# Patient Record
Sex: FEMALE | Race: WHITE | NOT HISPANIC OR LATINO | Employment: OTHER | URBAN - METROPOLITAN AREA
[De-identification: names, ages, dates, MRNs, and addresses within clinical notes are randomized per-mention and may not be internally consistent; named-entity substitution may affect disease eponyms.]

---

## 2017-03-07 ENCOUNTER — HISTORICAL (OUTPATIENT)
Dept: ADMINISTRATIVE | Facility: HOSPITAL | Age: 62
End: 2017-03-07

## 2017-03-07 LAB
ALBUMIN SERPL-MCNC: 4.2 G/DL (ref 3.1–4.7)
ALP SERPL-CCNC: 64 IU/L (ref 40–104)
ALT (SGPT): 22 IU/L (ref 3–33)
AST SERPL-CCNC: 31 IU/L (ref 10–40)
BASOPHILS NFR BLD: 0.1 K/UL (ref 0–0.2)
BASOPHILS NFR BLD: 0.6 %
BILIRUB SERPL-MCNC: 0.5 MG/DL (ref 0.3–1)
BUN SERPL-MCNC: 16 MG/DL (ref 8–20)
CALCIUM SERPL-MCNC: 9.4 MG/DL (ref 7.7–10.4)
CHLORIDE: 102 MMOL/L (ref 98–110)
CO2 SERPL-SCNC: 23.6 MMOL/L (ref 22.8–31.6)
CREATININE: 0.63 MG/DL (ref 0.6–1.4)
CRP SERPL-MCNC: 0.11 MG/DL (ref 0–1.4)
EOSINOPHIL NFR BLD: 0.4 K/UL (ref 0–0.7)
EOSINOPHIL NFR BLD: 4.4 %
ERYTHROCYTE [DISTWIDTH] IN BLOOD BY AUTOMATED COUNT: 13 % (ref 11.7–14.9)
GLUCOSE: 136 MG/DL (ref 70–99)
GRAN #: 4.8 K/UL (ref 1.4–6.5)
GRAN%: 52.1 %
HCT VFR BLD AUTO: 36.8 % (ref 36–48)
HGB BLD-MCNC: 12 G/DL (ref 12–15)
IMMATURE GRANS (ABS): 0 K/UL (ref 0–1)
IMMATURE GRANULOCYTES: 0.3 %
LYMPH #: 3.1 K/UL (ref 1.2–3.4)
LYMPH%: 33.3 %
MCH RBC QN AUTO: 35 PG (ref 25–35)
MCHC RBC AUTO-ENTMCNC: 32.6 G/DL (ref 31–36)
MCV RBC AUTO: 107.3 FL (ref 79–98)
MONO #: 0.9 K/UL (ref 0.1–0.6)
MONO%: 9.3 %
NUCLEATED RBCS: 0 %
PLATELET # BLD AUTO: 426 K/UL (ref 140–440)
PMV BLD AUTO: 10.1 FL (ref 8.8–12.7)
POTASSIUM SERPL-SCNC: 4.1 MMOL/L (ref 3.5–5)
PROT SERPL-MCNC: 7.5 G/DL (ref 6–8.2)
RBC # BLD AUTO: 3.43 M/UL (ref 3.5–5.5)
SODIUM: 141 MMOL/L (ref 134–144)
WBC # BLD AUTO: 9.3 K/UL (ref 5–10)

## 2017-06-15 RX ORDER — IBANDRONATE SODIUM 150 MG/1
TABLET, FILM COATED ORAL
Qty: 1 TABLET | Refills: 5 | Status: SHIPPED | OUTPATIENT
Start: 2017-06-15 | End: 2017-12-06 | Stop reason: SDUPTHER

## 2017-06-15 RX ORDER — TEMAZEPAM 30 MG/1
CAPSULE ORAL
Qty: 30 CAPSULE | Refills: 5 | Status: SHIPPED | OUTPATIENT
Start: 2017-06-15 | End: 2017-12-06 | Stop reason: SDUPTHER

## 2017-12-06 ENCOUNTER — OFFICE VISIT (OUTPATIENT)
Dept: RHEUMATOLOGY | Facility: CLINIC | Age: 62
End: 2017-12-06
Payer: MEDICARE

## 2017-12-06 VITALS
HEIGHT: 60 IN | BODY MASS INDEX: 20.62 KG/M2 | WEIGHT: 105 LBS | SYSTOLIC BLOOD PRESSURE: 150 MMHG | DIASTOLIC BLOOD PRESSURE: 78 MMHG

## 2017-12-06 DIAGNOSIS — M05.79 RHEUMATOID ARTHRITIS INVOLVING MULTIPLE SITES WITH POSITIVE RHEUMATOID FACTOR: Primary | ICD-10-CM

## 2017-12-06 DIAGNOSIS — F43.20 ADULT SITUATIONAL STRESS DISORDER: ICD-10-CM

## 2017-12-06 DIAGNOSIS — M79.7 FIBROMYALGIA: ICD-10-CM

## 2017-12-06 DIAGNOSIS — Z79.899 ENCOUNTER FOR LONG-TERM (CURRENT) DRUG USE: ICD-10-CM

## 2017-12-06 LAB
ALBUMIN SERPL-MCNC: 4.6 G/DL (ref 3.1–4.7)
ALP SERPL-CCNC: 58 IU/L (ref 40–104)
ALT (SGPT): 21 IU/L (ref 3–33)
AST SERPL-CCNC: 35 IU/L (ref 10–40)
BASOPHILS NFR BLD: 0.1 K/UL (ref 0–0.2)
BASOPHILS NFR BLD: 0.6 %
BILIRUB SERPL-MCNC: 0.6 MG/DL (ref 0.3–1)
BUN SERPL-MCNC: 14 MG/DL (ref 8–20)
CALCIUM SERPL-MCNC: 9.6 MG/DL (ref 7.7–10.4)
CHLORIDE: 103 MMOL/L (ref 98–110)
CO2 SERPL-SCNC: 26.4 MMOL/L (ref 22.8–31.6)
CREATININE: 0.62 MG/DL (ref 0.6–1.4)
CRP SERPL-MCNC: 0.14 MG/DL (ref 0–1.4)
EOSINOPHIL NFR BLD: 0.3 K/UL (ref 0–0.7)
EOSINOPHIL NFR BLD: 3 %
ERYTHROCYTE [DISTWIDTH] IN BLOOD BY AUTOMATED COUNT: 13.2 % (ref 11.7–14.9)
GLUCOSE: 110 MG/DL (ref 70–99)
GRAN #: 4.3 K/UL (ref 1.4–6.5)
GRAN%: 49.9 %
HCT VFR BLD AUTO: 38 % (ref 36–48)
HGB BLD-MCNC: 12.6 G/DL (ref 12–15)
IMMATURE GRANS (ABS): 0 K/UL (ref 0–1)
IMMATURE GRANULOCYTES: 0.2 %
LYMPH #: 3.3 K/UL (ref 1.2–3.4)
LYMPH%: 38.3 %
MCH RBC QN AUTO: 34.7 PG (ref 25–35)
MCHC RBC AUTO-ENTMCNC: 33.2 G/DL (ref 31–36)
MCV RBC AUTO: 104.7 FL (ref 79–98)
MONO #: 0.7 K/UL (ref 0.1–0.6)
MONO%: 8 %
NUCLEATED RBCS: 0 %
PLATELET # BLD AUTO: 369 K/UL (ref 140–440)
PMV BLD AUTO: 9.5 FL (ref 8.8–12.7)
POTASSIUM SERPL-SCNC: 4.1 MMOL/L (ref 3.5–5)
PROT SERPL-MCNC: 7.8 G/DL (ref 6–8.2)
RBC # BLD AUTO: 3.63 M/UL (ref 3.5–5.5)
SODIUM: 138 MMOL/L (ref 134–144)
WBC # BLD AUTO: 8.7 K/UL (ref 5–10)

## 2017-12-06 PROCEDURE — 99214 OFFICE O/P EST MOD 30 MIN: CPT | Mod: ,,, | Performed by: INTERNAL MEDICINE

## 2017-12-06 RX ORDER — DULOXETIN HYDROCHLORIDE 60 MG/1
CAPSULE, DELAYED RELEASE ORAL
Qty: 30 CAPSULE | Refills: 10 | Status: SHIPPED | OUTPATIENT
Start: 2017-12-06 | End: 2022-04-12 | Stop reason: ALTCHOICE

## 2017-12-06 RX ORDER — IBANDRONATE SODIUM 150 MG/1
TABLET, FILM COATED ORAL
Qty: 1 TABLET | Refills: 5 | Status: SHIPPED | OUTPATIENT
Start: 2017-12-06 | End: 2018-06-03 | Stop reason: SDUPTHER

## 2017-12-06 RX ORDER — HYDROXYCHLOROQUINE SULFATE 200 MG/1
TABLET, FILM COATED ORAL
Qty: 60 TABLET | Refills: 10 | Status: SHIPPED | OUTPATIENT
Start: 2017-12-06 | End: 2020-05-18 | Stop reason: SDUPTHER

## 2017-12-06 RX ORDER — PREDNISONE 2.5 MG/1
5 TABLET ORAL 2 TIMES DAILY
COMMUNITY
Start: 2015-12-11 | End: 2020-05-18 | Stop reason: SDUPTHER

## 2017-12-06 RX ORDER — DULOXETIN HYDROCHLORIDE 60 MG/1
CAPSULE, DELAYED RELEASE ORAL
COMMUNITY
End: 2017-12-06 | Stop reason: SDUPTHER

## 2017-12-06 RX ORDER — HYDROCODONE BITARTRATE AND ACETAMINOPHEN 10; 325 MG/1; MG/1
1 TABLET ORAL EVERY 6 HOURS PRN
COMMUNITY

## 2017-12-06 RX ORDER — HYDROXYCHLOROQUINE SULFATE 200 MG/1
TABLET, FILM COATED ORAL
COMMUNITY
End: 2017-12-06 | Stop reason: SDUPTHER

## 2017-12-06 RX ORDER — TEMAZEPAM 30 MG/1
CAPSULE ORAL
Qty: 30 CAPSULE | Refills: 5 | Status: SHIPPED | OUTPATIENT
Start: 2017-12-06 | End: 2022-04-12 | Stop reason: ALTCHOICE

## 2017-12-06 NOTE — PROGRESS NOTES
[facility] RHEUMATOLOGY           Follow-up visit      Subjective:       Patient ID:   NAME: Janey Null : 1955     62 y.o. female    Referring Doc: No ref. provider found  Other Physicians:    Chief Complaint:  Rheumatoid Arthritis      HPI/Interval History:    The patient is having no problems with her arthritis. She notes no red, hot, and/or swollen joints. She does note that she is under an enormous amount of stress. Her 22-year-old daughter is now involved in drugs and has abandoned the patient's little granddaughter. A custody garza is underway.          ROS:   GEN:    no fever, night sweats or weight loss  SKIN:   no rashes , erythema, bruising, or swelling, no Raynauds, no photosensitivity  HEENT: no HAs, no changes in vision, no mouth ulcers, no sicca symptoms, no scalp tenderness, jaw claudication.  CV:      no CP, SOB, PND, WOLFF or orthopnea,no palpitations  PULM: no SOB, cough, hemoptysis, sputum or pleuritic pain  GI:      no abdominal pain, nausea, vomiting, constipation, diarrhea, melanotic stools, BRBPR, or hematemesis, no dysphagia, no GERD  :     no hematuria, dysuria  NEURO: no paresthesias, headaches, visual disturbances  MUSCULOSKELETAL:  No muscle or joint pain  PSYCH:   + insomnia, worsening depression & anxiety    Medications:    Current Outpatient Prescriptions:     DULoxetine (CYMBALTA) 60 MG capsule, Take by mouth., Disp: , Rfl:     hydrocodone-acetaminophen 10-325mg (NORCO)  mg Tab, Take by mouth., Disp: , Rfl:     hydroxychloroquine (PLAQUENIL) 200 mg tablet, Take by mouth., Disp: , Rfl:     ibandronate (BONIVA) 150 mg tablet, take 1 tablet by mouth every month, Disp: 1 tablet, Rfl: 5    predniSONE (DELTASONE) 2.5 MG tablet, Take by mouth., Disp: , Rfl:     temazepam (RESTORIL) 30 mg capsule, take 1 capsule by mouth at bedtime, Disp: 30 capsule, Rfl: 5      FAMILY HISTORY: negative for Connective Tissue Disease        Review of patient's allergies  indicates:   Allergen Reactions    Penicillins              Objective:     Vitals:  Blood pressure (!) 150/78, height 5' (1.524 m), weight 47.6 kg (105 lb).    Physical Examination:   GEN: wn/wd female in no apparent distress  SKIN: no rashes, no lesions, no sclerodactyly, no Raynaud's, no periungual erythema  HEAD: no alopecia, no scalp tenderness, no temporal artery tenderness or induration.  EYES: no pallor, no icterus, PERRLA  ENT:  no thrush, no mucosal dryness or ulcerations, adequate oral hygiene & dentition.  NECK: supple x 6, no masses, no thyromegaly, no lymphadenopathy.  CV:   S1 and S2 regular, no murmurs, gallop or rubs  CHEST: Normal respiratory effort;  normal breath sounds/no adventitious sounds. No signs of consolidation.  ABD: non-tender and non-distended; soft; normal bowel sounds; no rebound/guarding or tenderness. No hepatosplenomegaly.  Musculoskeletal:  No active synovitis or progressive deformity is noted  EXTREM: no clubbing, cyanosis or edema. normal pulses.  NEURO: grossly intact; motor/sensory WNL; AAOx3; no tremors  PSYCH:  Tearful but normal insight, no psychotic ideation            Labs:   Lab Results   Component Value Date    WBC 9.3 03/07/2017    HGB 12.0 03/07/2017    HCT 36.8 03/07/2017    .3 (H) 03/07/2017     03/07/2017   CMP@  Sodium   Date Value Ref Range Status   03/07/2017 141 134 - 144 mmol/L      Potassium   Date Value Ref Range Status   03/07/2017 4.1 3.5 - 5.0 mmol/L      Chloride   Date Value Ref Range Status   03/07/2017 102 98 - 110 mmol/L      CO2   Date Value Ref Range Status   03/07/2017 23.6 22.8 - 31.6 mmol/L      Glucose   Date Value Ref Range Status   03/07/2017 136 (H) 70 - 99 mg/dL      BUN, Bld   Date Value Ref Range Status   03/07/2017 16 8 - 20 mg/dL      Creatinine   Date Value Ref Range Status   03/07/2017 0.63 0.60 - 1.40 mg/dL      Calcium   Date Value Ref Range Status   03/07/2017 9.4 7.7 - 10.4 mg/dL      Total Protein   Date Value Ref  Range Status   03/07/2017 7.5 6.0 - 8.2 g/dL      Albumin   Date Value Ref Range Status   03/07/2017 4.2 3.1 - 4.7 g/dL      Total Bilirubin   Date Value Ref Range Status   03/07/2017 0.5 0.3 - 1.0 mg/dL      Alkaline Phosphatase   Date Value Ref Range Status   03/07/2017 64 40 - 104 IU/L      AST   Date Value Ref Range Status   03/07/2017 31 10 - 40 IU/L      CRP   Date Value Ref Range Status   03/07/2017 0.11 0.00 - 1.40 mg/dL          Radiology/Diagnostic Studies:    none    Assessment/Discussion/Plan:   62 y.o. female with rheumatoid arthritis-stable on current medication  (2) FMS secondary to underlying anxiety-depression, exacerbated by acute situational stress.      PLAN:  I will continue her medication without change. Routine blood testing has been obtained.  I discussed stress management techniques with her. I did not offer her to change her medications are provided her with additional anxiolytic medications as I believe that her custody effort might be hampered by additional pharmaceuticals. The patient states she understands and agrees with that concern.      RTC:  I will see her back in 3-4 months.      Electronically signed by John Merchant MD

## 2017-12-07 LAB — RHEUMATOID FACT SERPL-ACNC: 10.2 IU/ML (ref 0–13.9)

## 2018-01-06 RX ORDER — PREDNISONE 5 MG/1
TABLET ORAL
Qty: 60 TABLET | Refills: 5 | Status: SHIPPED | OUTPATIENT
Start: 2018-01-06 | End: 2018-07-05

## 2018-05-31 RX ORDER — TEMAZEPAM 30 MG/1
CAPSULE ORAL
Qty: 30 CAPSULE | Refills: 0 | OUTPATIENT
Start: 2018-05-31

## 2018-06-03 RX ORDER — IBANDRONATE SODIUM 150 MG/1
TABLET, FILM COATED ORAL
Qty: 1 TABLET | Refills: 0 | Status: SHIPPED | OUTPATIENT
Start: 2018-06-03 | End: 2018-07-05 | Stop reason: SDUPTHER

## 2018-07-05 RX ORDER — IBANDRONATE SODIUM 150 MG/1
TABLET, FILM COATED ORAL
Qty: 1 TABLET | Refills: 0 | Status: SHIPPED | OUTPATIENT
Start: 2018-07-05 | End: 2018-09-20 | Stop reason: SDUPTHER

## 2018-08-11 RX ORDER — IBANDRONATE SODIUM 150 MG/1
TABLET, FILM COATED ORAL
Qty: 1 TABLET | Refills: 0
Start: 2018-08-11

## 2018-09-20 RX ORDER — IBANDRONATE SODIUM 150 MG/1
TABLET, FILM COATED ORAL
Qty: 1 TABLET | Refills: 0 | Status: SHIPPED | OUTPATIENT
Start: 2018-09-20 | End: 2022-04-12 | Stop reason: ALTCHOICE

## 2018-10-18 RX ORDER — IBANDRONATE SODIUM 150 MG/1
TABLET, FILM COATED ORAL
Qty: 1 TABLET | Refills: 0 | OUTPATIENT
Start: 2018-10-18

## 2018-11-19 RX ORDER — DULOXETIN HYDROCHLORIDE 60 MG/1
CAPSULE, DELAYED RELEASE ORAL
Qty: 30 CAPSULE | Refills: 0 | OUTPATIENT
Start: 2018-11-19

## 2018-11-19 RX ORDER — HYDROXYCHLOROQUINE SULFATE 200 MG/1
TABLET, FILM COATED ORAL
Qty: 60 TABLET | Refills: 0 | OUTPATIENT
Start: 2018-11-19

## 2018-12-18 RX ORDER — HYDROXYCHLOROQUINE SULFATE 200 MG/1
TABLET, FILM COATED ORAL
Qty: 60 TABLET | Refills: 0 | OUTPATIENT
Start: 2018-12-18

## 2019-01-16 RX ORDER — HYDROXYCHLOROQUINE SULFATE 200 MG/1
TABLET, FILM COATED ORAL
Qty: 60 TABLET | Refills: 0 | OUTPATIENT
Start: 2019-01-16

## 2019-01-16 RX ORDER — DULOXETIN HYDROCHLORIDE 60 MG/1
CAPSULE, DELAYED RELEASE ORAL
Qty: 30 CAPSULE | Refills: 0 | OUTPATIENT
Start: 2019-01-16

## 2019-04-16 ENCOUNTER — HOSPITAL ENCOUNTER (EMERGENCY)
Facility: HOSPITAL | Age: 64
Discharge: HOME | End: 2019-04-16
Attending: EMERGENCY MEDICINE
Payer: MEDICARE

## 2019-04-16 VITALS
DIASTOLIC BLOOD PRESSURE: 76 MMHG | WEIGHT: 100 LBS | RESPIRATION RATE: 20 BRPM | HEART RATE: 100 BPM | OXYGEN SATURATION: 98 % | BODY MASS INDEX: 21.57 KG/M2 | TEMPERATURE: 99 F | HEIGHT: 57 IN | SYSTOLIC BLOOD PRESSURE: 164 MMHG

## 2019-04-16 DIAGNOSIS — M25.551 RIGHT HIP PAIN: Primary | ICD-10-CM

## 2019-04-16 DIAGNOSIS — M89.8X5 PAIN IN RIGHT FEMUR: ICD-10-CM
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
## 2019-04-16 PROCEDURE — 73521 X-RAY EXAM HIPS BI 2 VIEWS: CPT | Mod: 26,,, | Performed by: RADIOLOGY

## 2019-04-16 PROCEDURE — 73552 X-RAY EXAM OF FEMUR 2/>: CPT | Mod: TC,FY,RT

## 2019-04-16 PROCEDURE — 73552 XR FEMUR 2 VIEW RIGHT: ICD-10-PCS | Mod: 26,RT,, | Performed by: RADIOLOGY

## 2019-04-16 PROCEDURE — 73521 X-RAY EXAM HIPS BI 2 VIEWS: CPT | Mod: TC,FY

## 2019-04-16 PROCEDURE — 99284 EMERGENCY DEPT VISIT MOD MDM: CPT

## 2019-04-16 PROCEDURE — 73521 XR HIPS BILATERAL 2 VIEW INCL AP PELVIS: ICD-10-PCS | Mod: 26,,, | Performed by: RADIOLOGY

## 2019-04-16 PROCEDURE — 73552 X-RAY EXAM OF FEMUR 2/>: CPT | Mod: 26,RT,, | Performed by: RADIOLOGY
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
## 2019-09-18 DIAGNOSIS — M51.27 LUMBAGO-SCIATICA DUE TO DISPLACEMENT OF LUMBAR INTERVERTEBRAL DISC: ICD-10-CM

## 2019-09-18 DIAGNOSIS — M51.26 DISPLACEMENT OF LUMBAR INTERVERTEBRAL DISC WITHOUT MYELOPATHY: ICD-10-CM

## 2019-09-18 DIAGNOSIS — M47.817 FACET HYPERTROPHY OF LUMBOSACRAL REGION: ICD-10-CM

## 2019-09-18 DIAGNOSIS — M47.27 LUMBOSACRAL RADICULOPATHY DUE TO DEGENERATIVE JOINT DISEASE OF SPINE: ICD-10-CM

## 2019-09-18 DIAGNOSIS — M47.26 OTHER SPONDYLOSIS WITH RADICULOPATHY, LUMBAR REGION: Primary | ICD-10-CM

## 2019-09-18 DIAGNOSIS — M47.816 FACET HYPERTROPHY OF LUMBAR REGION: ICD-10-CM

## 2019-09-19 ENCOUNTER — HOSPITAL ENCOUNTER (OUTPATIENT)
Dept: RADIOLOGY | Facility: HOSPITAL | Age: 64
Discharge: HOME | End: 2019-09-19
Attending: PAIN MEDICINE
Payer: MEDICARE

## 2019-09-19 DIAGNOSIS — M51.26 DISPLACEMENT OF LUMBAR INTERVERTEBRAL DISC WITHOUT MYELOPATHY: ICD-10-CM

## 2019-09-19 DIAGNOSIS — M47.26 OTHER SPONDYLOSIS WITH RADICULOPATHY, LUMBAR REGION: ICD-10-CM

## 2019-09-19 DIAGNOSIS — M51.27 LUMBAGO-SCIATICA DUE TO DISPLACEMENT OF LUMBAR INTERVERTEBRAL DISC: ICD-10-CM

## 2019-09-19 DIAGNOSIS — M47.27 LUMBOSACRAL RADICULOPATHY DUE TO DEGENERATIVE JOINT DISEASE OF SPINE: ICD-10-CM

## 2019-09-19 DIAGNOSIS — M47.816 FACET HYPERTROPHY OF LUMBAR REGION: ICD-10-CM

## 2019-09-19 DIAGNOSIS — M47.817 FACET HYPERTROPHY OF LUMBOSACRAL REGION: ICD-10-CM

## 2019-09-19 PROCEDURE — 72148 MRI LUMBAR SPINE W/O DYE: CPT | Mod: TC,PO

## 2019-09-19 PROCEDURE — 72120 X-RAY BEND ONLY L-S SPINE: CPT | Mod: TC,PO

## 2020-05-18 ENCOUNTER — OFFICE VISIT (OUTPATIENT)
Dept: RHEUMATOLOGY | Facility: CLINIC | Age: 65
End: 2020-05-18
Payer: MEDICARE

## 2020-05-18 VITALS
SYSTOLIC BLOOD PRESSURE: 165 MMHG | TEMPERATURE: 98 F | WEIGHT: 105 LBS | BODY MASS INDEX: 22.72 KG/M2 | DIASTOLIC BLOOD PRESSURE: 101 MMHG

## 2020-05-18 DIAGNOSIS — M05.79 RHEUMATOID ARTHRITIS INVOLVING MULTIPLE SITES WITH POSITIVE RHEUMATOID FACTOR: Primary | ICD-10-CM

## 2020-05-18 DIAGNOSIS — Z79.899 ENCOUNTER FOR LONG-TERM (CURRENT) DRUG USE: ICD-10-CM

## 2020-05-18 DIAGNOSIS — M79.7 FIBROMYALGIA: ICD-10-CM

## 2020-05-18 PROCEDURE — 99213 OFFICE O/P EST LOW 20 MIN: CPT | Mod: S$GLB,,, | Performed by: INTERNAL MEDICINE

## 2020-05-18 PROCEDURE — 99213 PR OFFICE/OUTPT VISIT, EST, LEVL III, 20-29 MIN: ICD-10-PCS | Mod: S$GLB,,, | Performed by: INTERNAL MEDICINE

## 2020-05-18 RX ORDER — PREDNISONE 2.5 MG/1
5 TABLET ORAL 2 TIMES DAILY
Qty: 60 TABLET | Refills: 5 | Status: SHIPPED | OUTPATIENT
Start: 2020-05-18 | End: 2020-10-22 | Stop reason: SDUPTHER

## 2020-05-18 RX ORDER — HYDROXYCHLOROQUINE SULFATE 200 MG/1
200 TABLET, FILM COATED ORAL 2 TIMES DAILY
Qty: 60 TABLET | Refills: 5 | Status: SHIPPED | OUTPATIENT
Start: 2020-05-18 | End: 2020-10-22 | Stop reason: SDUPTHER

## 2020-05-18 RX ORDER — GABAPENTIN 100 MG/1
CAPSULE ORAL NIGHTLY
COMMUNITY
Start: 2020-05-12 | End: 2022-04-12 | Stop reason: ALTCHOICE

## 2020-05-18 NOTE — PROGRESS NOTES
[facility] RHEUMATOLOGY           Follow-up visit    Notes dictated to M*Modal. Please forgive any unintended errors.  Subjective:       Patient ID:   NAME: Janey Null : 1955     64 y.o. female    Referring Doc: No ref. provider found  Other Physicians:    Chief Complaint:  Rheumatoid Arthritis      HPI/Interval History:  Patient has not been in for over 2 years.  She has had multiple personal problems to deal with and has not had time or money for visits.  She has been without Plaquenil and prednisone for 2 years.  Her arthritis has been moderately active.  She has swelling and pain in her wrists and hands.  She has also begun to notice some swelling of the left knee.  She has been in touch with her pain management physician, Dr. Reynolds, which does provide her with Norco 10/325 4 times daily as needed.    ROS:   GEN:    no fever, night sweats or weight loss  SKIN:   no rashes, erythema, bruising, or swelling, no Raynauds, no photosensitivity  HEENT: no changes in vision, no mouth ulcers, no sicca symptoms, no scalp tenderness, no jaw claudication.  CV:      no CP, PND, WOLFF, orthopnea, no palpitations  PULM: no SOB, cough, hemoptysis, sputum or pleuritic pain  GI:       no GERD, no dysphagia, no hematemesis, no abdominal pain, nausea, vomiting, constipation, diarrhea, melanotic stools, BRBPR  :      no hematuria, dysuria  NEURO: no paresthesias, headaches, acute visual disturbances  MUSCULOSKELETAL:  As above    PSYCH:   No increased insomnia, no increased anxiety, no increased depression    Past Medical/Surgical History:  Past Medical History:   Diagnosis Date    Anxiety     Depression     Encounter for long-term (current) use of high-risk medication     Fibromyalgia     Osteoporosis     Rheumatoid arthritis involving multiple sites with positive rheumatoid factor      Past Surgical History:   Procedure Laterality Date    right leg       multiple surgeries ,amputation        Allergies:  Review of patient's allergies indicates:   Allergen Reactions    Penicillins        Social/Family History:  Social History     Socioeconomic History    Marital status:      Spouse name: Not on file    Number of children: Not on file    Years of education: Not on file    Highest education level: Not on file   Occupational History    Not on file   Social Needs    Financial resource strain: Not on file    Food insecurity:     Worry: Not on file     Inability: Not on file    Transportation needs:     Medical: Not on file     Non-medical: Not on file   Tobacco Use    Smoking status: Current Every Day Smoker     Packs/day: 0.50     Types: Cigarettes    Smokeless tobacco: Never Used   Substance and Sexual Activity    Alcohol use: No    Drug use: Never    Sexual activity: Not on file   Lifestyle    Physical activity:     Days per week: Not on file     Minutes per session: Not on file    Stress: Not on file   Relationships    Social connections:     Talks on phone: Not on file     Gets together: Not on file     Attends Buddhist service: Not on file     Active member of club or organization: Not on file     Attends meetings of clubs or organizations: Not on file     Relationship status: Not on file   Other Topics Concern    Not on file   Social History Narrative    Not on file     History reviewed. No pertinent family history.  FAMILY HISTORY: negative for Connective Tissue Disease      Medications:    Current Outpatient Medications:     gabapentin (NEURONTIN) 100 MG capsule, every evening. , Disp: , Rfl:     hydrocodone-acetaminophen 10-325mg (NORCO)  mg Tab, Take 1 tablet by mouth every 6 (six) hours as needed. , Disp: , Rfl:     DULoxetine (CYMBALTA) 60 MG capsule, take 1 capsule by mouth every morning (Patient not taking: Reported on 5/18/2020), Disp: 30 capsule, Rfl: 10    hydroxychloroquine (PLAQUENIL) 200 mg tablet, Take 1 tablet (200 mg total) by mouth 2 (two)  times daily., Disp: 60 tablet, Rfl: 5    ibandronate (BONIVA) 150 mg tablet, TAKE ONE TABLET BY MOUTH ONCE MONTHLY (Patient not taking: Reported on 5/18/2020), Disp: 1 tablet, Rfl: 0    predniSONE (DELTASONE) 2.5 MG tablet, Take 2 tablets (5 mg total) by mouth 2 (two) times daily., Disp: 60 tablet, Rfl: 5    temazepam (RESTORIL) 30 mg capsule, take 1 capsule by mouth at bedtime (Patient not taking: Reported on 5/18/2020), Disp: 30 capsule, Rfl: 5      Objective:     Vitals:  Blood pressure (!) 165/101, temperature 98 °F (36.7 °C), weight 47.6 kg (105 lb).    Physical Examination:   GEN: wn/wd female in no apparent distress  SKIN: no rashes, no sclerodactyly, no Raynaud's, no periungual erythema, no digital tip ulcerations, no nailbed pitting  HEAD: no alopecia, no scalp tenderness, no temporal artery tenderness or induration.  EYES: no pallor, no icterus, PERRLA  ENT:  no thrush, no mucosal dryness or ulcerations, adequate oral hygiene & dentition.  NECK: supple x 6, no masses, no thyromegaly, no lymphadenopathy.  CV:   S1 and S2 regular, no murmurs, gallop or rubs  CHEST: Normal respiratory effort;  normal breath sounds/no adventitious sounds. No signs of consolidation.  ABD: non-tender and non-distended; soft; normal bowel sounds; no rebound/guarding or tenderness. No hepatosplenomegaly.  Musculoskeletal:  Active synovitis of the wrists, MCPs, and PIP joints of all digits bilaterally.  There is progression of interosseous atrophy on the right.  There is increased IP deformity.  There is synovitis EN bloc with subluxations of the 2nd, 3rd, and 4th fingers.  Classical radial drift an ulnar deviation of the digits is noted.  The elbows are slightly warm but without nodules and without limitation.  Shoulders and hips are normal.  There is slight warmth and puffiness to the left knee.  EXTREM: no clubbing, cyanosis or edema. normal pulses.  NEURO:  grossly intact; motor/sensory WNL; no tremors  PSYCH:  normal mood,  affect and behavior    Labs:   Lab Results   Component Value Date    WBC 8.7 12/06/2017    HGB 12.6 12/06/2017    HCT 38.0 12/06/2017    .7 (H) 12/06/2017     12/06/2017   CMP@  Sodium   Date Value Ref Range Status   12/06/2017 138 134 - 144 mmol/L      Potassium   Date Value Ref Range Status   12/06/2017 4.1 3.5 - 5.0 mmol/L      Chloride   Date Value Ref Range Status   12/06/2017 103 98 - 110 mmol/L      CO2   Date Value Ref Range Status   12/06/2017 26.4 22.8 - 31.6 mmol/L      Glucose   Date Value Ref Range Status   12/06/2017 110 (H) 70 - 99 mg/dL      BUN, Bld   Date Value Ref Range Status   12/06/2017 14 8 - 20 mg/dL      Creatinine   Date Value Ref Range Status   12/06/2017 0.62 0.60 - 1.40 mg/dL      Calcium   Date Value Ref Range Status   12/06/2017 9.6 7.7 - 10.4 mg/dL      Total Protein   Date Value Ref Range Status   12/06/2017 7.8 6.0 - 8.2 g/dL      Albumin   Date Value Ref Range Status   12/06/2017 4.6 3.1 - 4.7 g/dL      Total Bilirubin   Date Value Ref Range Status   12/06/2017 0.6 0.3 - 1.0 mg/dL      Alkaline Phosphatase   Date Value Ref Range Status   12/06/2017 58 40 - 104 IU/L      AST   Date Value Ref Range Status   12/06/2017 35 10 - 40 IU/L      CRP   Date Value Ref Range Status   12/06/2017 0.14 0.00 - 1.40 mg/dL      Rheumatoid Factor   Date Value Ref Range Status   12/06/2017 10.2 0.0 - 13.9 IU/mL      Comment:     Performed at: [facility], [facility] [address], [place], [id number] Higginbotham MD, Phone:  [phone]       Radiology/Diagnostic Studies:    None    Assessment/Discussion/Plan:   64 y.o. female with rheumatoid arthritis-active off medications secondary to noncompliance    PLAN:  We have agreed to restart Plaquenil 200 mg twice a day and prednisone 2.5 mg twice per day.  I have ordered blood testing which she will have drawn at the [facility] laboratory today or at the [facility] tomorrow.    RTC:  I will see her back in 3-4  months.    Electronically signed by John Merchant MD

## 2020-10-21 ENCOUNTER — OFFICE VISIT (OUTPATIENT)
Dept: RHEUMATOLOGY | Facility: CLINIC | Age: 65
End: 2020-10-21
Payer: MEDICARE

## 2020-10-21 ENCOUNTER — LAB VISIT (OUTPATIENT)
Dept: LAB | Facility: HOSPITAL | Age: 65
End: 2020-10-21
Attending: INTERNAL MEDICINE
Payer: MEDICARE

## 2020-10-21 VITALS
DIASTOLIC BLOOD PRESSURE: 78 MMHG | WEIGHT: 100.69 LBS | SYSTOLIC BLOOD PRESSURE: 152 MMHG | BODY MASS INDEX: 21.79 KG/M2 | TEMPERATURE: 98 F

## 2020-10-21 DIAGNOSIS — Z79.899 ENCOUNTER FOR LONG-TERM (CURRENT) DRUG USE: ICD-10-CM

## 2020-10-21 DIAGNOSIS — M05.79 RHEUMATOID ARTHRITIS INVOLVING MULTIPLE SITES WITH POSITIVE RHEUMATOID FACTOR: Primary | ICD-10-CM

## 2020-10-21 DIAGNOSIS — M05.79 RHEUMATOID ARTHRITIS INVOLVING MULTIPLE SITES WITH POSITIVE RHEUMATOID FACTOR: ICD-10-CM

## 2020-10-21 DIAGNOSIS — M79.7 FIBROMYALGIA: ICD-10-CM

## 2020-10-21 LAB
ALBUMIN SERPL BCP-MCNC: 4.3 G/DL (ref 3.5–5.2)
ALP SERPL-CCNC: 68 U/L (ref 55–135)
ALT SERPL W/O P-5'-P-CCNC: 25 U/L (ref 10–44)
ANION GAP SERPL CALC-SCNC: 11 MMOL/L (ref 8–16)
AST SERPL-CCNC: 35 U/L (ref 10–40)
BASOPHILS # BLD AUTO: 0.05 K/UL (ref 0–0.2)
BASOPHILS NFR BLD: 0.8 % (ref 0–1.9)
BILIRUB SERPL-MCNC: 0.7 MG/DL (ref 0.1–1)
BUN SERPL-MCNC: 12 MG/DL (ref 8–23)
CALCIUM SERPL-MCNC: 9.2 MG/DL (ref 8.7–10.5)
CHLORIDE SERPL-SCNC: 103 MMOL/L (ref 95–110)
CO2 SERPL-SCNC: 25 MMOL/L (ref 23–29)
CREAT SERPL-MCNC: 0.7 MG/DL (ref 0.5–1.4)
CRP SERPL-MCNC: 0.69 MG/DL
DIFFERENTIAL METHOD: ABNORMAL
EOSINOPHIL # BLD AUTO: 0.2 K/UL (ref 0–0.5)
EOSINOPHIL NFR BLD: 2.5 % (ref 0–8)
ERYTHROCYTE [DISTWIDTH] IN BLOOD BY AUTOMATED COUNT: 13.4 % (ref 11.5–14.5)
EST. GFR  (AFRICAN AMERICAN): >60 ML/MIN/1.73 M^2
EST. GFR  (NON AFRICAN AMERICAN): >60 ML/MIN/1.73 M^2
GLUCOSE SERPL-MCNC: 94 MG/DL (ref 70–110)
HCT VFR BLD AUTO: 39.1 % (ref 37–48.5)
HGB BLD-MCNC: 12.8 G/DL (ref 12–16)
IMM GRANULOCYTES # BLD AUTO: 0.02 K/UL (ref 0–0.04)
IMM GRANULOCYTES NFR BLD AUTO: 0.3 % (ref 0–0.5)
LYMPHOCYTES # BLD AUTO: 2.7 K/UL (ref 1–4.8)
LYMPHOCYTES NFR BLD: 41.1 % (ref 18–48)
MCH RBC QN AUTO: 34.8 PG (ref 27–31)
MCHC RBC AUTO-ENTMCNC: 32.7 G/DL (ref 32–36)
MCV RBC AUTO: 106 FL (ref 82–98)
MONOCYTES # BLD AUTO: 0.6 K/UL (ref 0.3–1)
MONOCYTES NFR BLD: 9.2 % (ref 4–15)
NEUTROPHILS # BLD AUTO: 3 K/UL (ref 1.8–7.7)
NEUTROPHILS NFR BLD: 46.1 % (ref 38–73)
NRBC BLD-RTO: 0 /100 WBC
PLATELET # BLD AUTO: 377 K/UL (ref 150–350)
PMV BLD AUTO: 8.8 FL (ref 9.2–12.9)
POTASSIUM SERPL-SCNC: 3.9 MMOL/L (ref 3.5–5.1)
PROT SERPL-MCNC: 7.9 G/DL (ref 6–8.4)
RBC # BLD AUTO: 3.68 M/UL (ref 4–5.4)
SODIUM SERPL-SCNC: 139 MMOL/L (ref 136–145)
WBC # BLD AUTO: 6.44 K/UL (ref 3.9–12.7)

## 2020-10-21 PROCEDURE — 99214 OFFICE O/P EST MOD 30 MIN: CPT | Mod: S$GLB,,, | Performed by: INTERNAL MEDICINE

## 2020-10-21 PROCEDURE — 85025 COMPLETE CBC W/AUTO DIFF WBC: CPT

## 2020-10-21 PROCEDURE — 80053 COMPREHEN METABOLIC PANEL: CPT

## 2020-10-21 PROCEDURE — 86140 C-REACTIVE PROTEIN: CPT

## 2020-10-21 PROCEDURE — 99214 PR OFFICE/OUTPT VISIT, EST, LEVL IV, 30-39 MIN: ICD-10-PCS | Mod: S$GLB,,, | Performed by: INTERNAL MEDICINE

## 2020-10-21 PROCEDURE — 36415 COLL VENOUS BLD VENIPUNCTURE: CPT

## 2020-10-21 RX ORDER — TIZANIDINE 2 MG/1
TABLET ORAL
COMMUNITY
Start: 2020-10-06 | End: 2022-04-12 | Stop reason: ALTCHOICE

## 2020-10-21 NOTE — PROGRESS NOTES
[facility] RHEUMATOLOGY           Follow-up visit    Notes dictated to M*Modal. Please forgive any unintended errors.  Subjective:       Patient ID:   NAME: Janey Null : 1955     64 y.o. female    Referring Doc: No ref. provider found  Other Physicians:    Chief Complaint:  Rheumatoid Arthritis      HPI/Interval History:  The patient is doing reasonably well.  She has intermittent joint pain and swelling in both hands but has been able to perform all of her chores and ADLs.    ROS:   GEN:    no fever, night sweats or weight loss  SKIN:   no rashes, bruising, or swelling, no Raynauds, no photosensitivity  HEENT: no changes in vision, no mouth ulcers, no sicca symptoms, no scalp tenderness, no jaw claudication.  CV:      no CP, PND, WOLFF, orthopnea, no palpitations  PULM: no SOB, cough, hemoptysis, sputum or pleuritic pain  GI:        no dysphagia, no GERD, no hematemesis, no abdominal pain, nausea, vomiting, constipation, diarrhea, melanotic stools, BRBPR  :      no hematuria, dysuria  NEURO: no paresthesias, headaches, acute visual disturbances  MUSCULOSKELETAL:  No red, hot, and/or swollen joints    PSYCH:   No increased insomnia, no increased anxiety, no increased depression    Past Medical/Surgical History:  Past Medical History:   Diagnosis Date    Anxiety     Depression     Encounter for long-term (current) use of high-risk medication     Fibromyalgia     Osteoporosis     Rheumatoid arthritis involving multiple sites with positive rheumatoid factor      Past Surgical History:   Procedure Laterality Date    right leg       multiple surgeries ,amputation       Allergies:  Review of patient's allergies indicates:   Allergen Reactions    Penicillins        Social/Family History:  Social History     Socioeconomic History    Marital status:      Spouse name: Not on file    Number of children: Not on file    Years of education: Not on file    Highest education level: Not on  file   Occupational History    Not on file   Social Needs    Financial resource strain: Not on file    Food insecurity     Worry: Not on file     Inability: Not on file    Transportation needs     Medical: Not on file     Non-medical: Not on file   Tobacco Use    Smoking status: Current Every Day Smoker     Packs/day: 0.50     Types: Cigarettes    Smokeless tobacco: Never Used   Substance and Sexual Activity    Alcohol use: No    Drug use: Never    Sexual activity: Not on file   Lifestyle    Physical activity     Days per week: Not on file     Minutes per session: Not on file    Stress: Not on file   Relationships    Social connections     Talks on phone: Not on file     Gets together: Not on file     Attends [employer] service: Not on file     Active member of club or organization: Not on file     Attends meetings of clubs or organizations: Not on file     Relationship status: Not on file   Other Topics Concern    Not on file   Social History Narrative    Not on file     History reviewed. No pertinent family history.  FAMILY HISTORY: negative for Connective Tissue Disease      Medications:    Current Outpatient Medications:     hydrocodone-acetaminophen 10-325mg (NORCO)  mg Tab, Take 1 tablet by mouth every 6 (six) hours as needed. , Disp: , Rfl:     hydroxychloroquine (PLAQUENIL) 200 mg tablet, Take 1 tablet (200 mg total) by mouth 2 (two) times daily., Disp: 60 tablet, Rfl: 5    predniSONE (DELTASONE) 2.5 MG tablet, Take 2 tablets (5 mg total) by mouth 2 (two) times daily., Disp: 60 tablet, Rfl: 5    tiZANidine (ZANAFLEX) 2 MG tablet, TK 1 T PO HS, Disp: , Rfl:     DULoxetine (CYMBALTA) 60 MG capsule, take 1 capsule by mouth every morning (Patient not taking: Reported on 5/18/2020), Disp: 30 capsule, Rfl: 10    gabapentin (NEURONTIN) 100 MG capsule, every evening. , Disp: , Rfl:     ibandronate (BONIVA) 150 mg tablet, TAKE ONE TABLET BY MOUTH ONCE MONTHLY (Patient not taking: Reported on  5/18/2020), Disp: 1 tablet, Rfl: 0    temazepam (RESTORIL) 30 mg capsule, take 1 capsule by mouth at bedtime (Patient not taking: Reported on 5/18/2020), Disp: 30 capsule, Rfl: 5      Objective:     Vitals:  Blood pressure (!) 152/78, temperature 97.9 °F (36.6 °C), weight 45.7 kg (100 lb 11.2 oz).    Physical Examination:   GEN: wn/wd female in no apparent distress  SKIN: no rashes, no sclerodactyly, no Raynaud's, no periungual erythema, no digital tip ulcerations, no nailbed pitting  HEAD: no alopecia, no scalp tenderness, no temporal artery tenderness or induration.  EYES: no pallor, no icterus, PERRLA  ENT:  no thrush, no mucosal dryness or ulcerations, adequate oral hygiene & dentition.  NECK: supple x 6, no masses, no thyromegaly, no lymphadenopathy.  CV:   S1 and S2 regular, no murmurs, gallop or rubs  CHEST: Normal respiratory effort;  normal breath sounds/no adventitious sounds. No signs of consolidation.  ABD: non-tender and non-distended; soft; normal bowel sounds; no rebound/guarding or tenderness. No hepatosplenomegaly.  Musculoskeletal:  Chronic synovial proliferation with advanced RA deformities, without evidence of acute synovitis or progression of deformity  EXTREM: no clubbing, cyanosis or edema. normal pulses.  NEURO:  grossly intact; motor/sensory WNL; no tremors  PSYCH:  normal mood, affect and behavior    Labs:   Lab Results   Component Value Date    WBC 8.7 12/06/2017    HGB 12.6 12/06/2017    HCT 38.0 12/06/2017    .7 (H) 12/06/2017     12/06/2017   CMP@  Sodium   Date Value Ref Range Status   12/06/2017 138 134 - 144 mmol/L      Potassium   Date Value Ref Range Status   12/06/2017 4.1 3.5 - 5.0 mmol/L      Chloride   Date Value Ref Range Status   12/06/2017 103 98 - 110 mmol/L      CO2   Date Value Ref Range Status   12/06/2017 26.4 22.8 - 31.6 mmol/L      Glucose   Date Value Ref Range Status   12/06/2017 110 (H) 70 - 99 mg/dL      BUN, Bld   Date Value Ref Range Status    12/06/2017 14 8 - 20 mg/dL      Creatinine   Date Value Ref Range Status   12/06/2017 0.62 0.60 - 1.40 mg/dL      Calcium   Date Value Ref Range Status   12/06/2017 9.6 7.7 - 10.4 mg/dL      Total Protein   Date Value Ref Range Status   12/06/2017 7.8 6.0 - 8.2 g/dL      Albumin   Date Value Ref Range Status   12/06/2017 4.6 3.1 - 4.7 g/dL      Total Bilirubin   Date Value Ref Range Status   12/06/2017 0.6 0.3 - 1.0 mg/dL      Alkaline Phosphatase   Date Value Ref Range Status   12/06/2017 58 40 - 104 IU/L      AST   Date Value Ref Range Status   12/06/2017 35 10 - 40 IU/L      CRP   Date Value Ref Range Status   12/06/2017 0.14 0.00 - 1.40 mg/dL      Rheumatoid Factor   Date Value Ref Range Status   12/06/2017 10.2 0.0 - 13.9 IU/mL      Comment:     Performed at: [facility], [facility] [address], [id number] Higginbotham MD, Phone:  [phone]       Radiology/Diagnostic Studies:    None    Assessment/Discussion/Plan:   64 y.o. female with rheumatoid arthritis-stable on Plaquenil 400 mg daily and prednisone 2.5 mg twice daily    PLAN:  She had not gotten the labs that I ordered at the last visit.  I gave her a new lab order and advised her that I would not renew her medications until those results were available to me.  She states she will have them done at [facility] this week.  I also discussed the need for her to have a visual field examination for her Plaquenil.    RTC:  I will see her back in 6 months    Electronically signed by John Merchant MD

## 2020-10-22 DIAGNOSIS — M05.79 RHEUMATOID ARTHRITIS INVOLVING MULTIPLE SITES WITH POSITIVE RHEUMATOID FACTOR: Primary | ICD-10-CM

## 2020-10-22 DIAGNOSIS — M05.79 RHEUMATOID ARTHRITIS INVOLVING MULTIPLE SITES WITH POSITIVE RHEUMATOID FACTOR: ICD-10-CM

## 2020-10-22 RX ORDER — HYDROXYCHLOROQUINE SULFATE 200 MG/1
200 TABLET, FILM COATED ORAL 2 TIMES DAILY
Qty: 60 TABLET | Refills: 5 | Status: SHIPPED | OUTPATIENT
Start: 2020-10-22 | End: 2020-10-22 | Stop reason: SDUPTHER

## 2020-10-22 RX ORDER — HYDROXYCHLOROQUINE SULFATE 200 MG/1
200 TABLET, FILM COATED ORAL 2 TIMES DAILY
Qty: 60 TABLET | Refills: 5 | Status: SHIPPED | OUTPATIENT
Start: 2020-10-22 | End: 2021-04-21 | Stop reason: SDUPTHER

## 2020-10-22 RX ORDER — PREDNISONE 2.5 MG/1
5 TABLET ORAL 2 TIMES DAILY
Qty: 60 TABLET | Refills: 5 | Status: SHIPPED | OUTPATIENT
Start: 2020-10-22 | End: 2020-10-22 | Stop reason: SDUPTHER

## 2020-10-22 RX ORDER — PREDNISONE 2.5 MG/1
5 TABLET ORAL 2 TIMES DAILY
Qty: 60 TABLET | Refills: 5 | Status: SHIPPED | OUTPATIENT
Start: 2020-10-22 | End: 2021-03-09 | Stop reason: SDUPTHER

## 2021-03-09 DIAGNOSIS — M05.79 RHEUMATOID ARTHRITIS INVOLVING MULTIPLE SITES WITH POSITIVE RHEUMATOID FACTOR: ICD-10-CM

## 2021-03-09 RX ORDER — PREDNISONE 2.5 MG/1
5 TABLET ORAL 2 TIMES DAILY
Qty: 60 TABLET | Refills: 5 | Status: SHIPPED | OUTPATIENT
Start: 2021-03-09 | End: 2021-05-11 | Stop reason: SDUPTHER

## 2021-04-21 DIAGNOSIS — M05.79 RHEUMATOID ARTHRITIS INVOLVING MULTIPLE SITES WITH POSITIVE RHEUMATOID FACTOR: Primary | ICD-10-CM

## 2021-04-21 RX ORDER — HYDROXYCHLOROQUINE SULFATE 200 MG/1
200 TABLET, FILM COATED ORAL 2 TIMES DAILY
Qty: 60 TABLET | Refills: 5 | Status: SHIPPED | OUTPATIENT
Start: 2021-04-21 | End: 2021-12-11 | Stop reason: SDUPTHER

## 2021-05-11 ENCOUNTER — OFFICE VISIT (OUTPATIENT)
Dept: RHEUMATOLOGY | Facility: CLINIC | Age: 66
End: 2021-05-11
Payer: MEDICARE

## 2021-05-11 VITALS
BODY MASS INDEX: 22.53 KG/M2 | HEART RATE: 91 BPM | SYSTOLIC BLOOD PRESSURE: 157 MMHG | WEIGHT: 104.13 LBS | DIASTOLIC BLOOD PRESSURE: 81 MMHG

## 2021-05-11 DIAGNOSIS — M79.7 FIBROMYALGIA: ICD-10-CM

## 2021-05-11 DIAGNOSIS — Z79.899 ENCOUNTER FOR LONG-TERM (CURRENT) DRUG USE: ICD-10-CM

## 2021-05-11 DIAGNOSIS — M05.79 RHEUMATOID ARTHRITIS INVOLVING MULTIPLE SITES WITH POSITIVE RHEUMATOID FACTOR: Primary | ICD-10-CM

## 2021-05-11 PROCEDURE — 99213 PR OFFICE/OUTPT VISIT, EST, LEVL III, 20-29 MIN: ICD-10-PCS | Mod: S$GLB,,, | Performed by: INTERNAL MEDICINE

## 2021-05-11 PROCEDURE — 3008F PR BODY MASS INDEX (BMI) DOCUMENTED: ICD-10-PCS | Mod: S$GLB,,, | Performed by: INTERNAL MEDICINE

## 2021-05-11 PROCEDURE — 99213 OFFICE O/P EST LOW 20 MIN: CPT | Mod: S$GLB,,, | Performed by: INTERNAL MEDICINE

## 2021-05-11 PROCEDURE — 1125F AMNT PAIN NOTED PAIN PRSNT: CPT | Mod: S$GLB,,, | Performed by: INTERNAL MEDICINE

## 2021-05-11 PROCEDURE — 3008F BODY MASS INDEX DOCD: CPT | Mod: S$GLB,,, | Performed by: INTERNAL MEDICINE

## 2021-05-11 PROCEDURE — 1125F PR PAIN SEVERITY QUANTIFIED, PAIN PRESENT: ICD-10-PCS | Mod: S$GLB,,, | Performed by: INTERNAL MEDICINE

## 2021-05-11 RX ORDER — PREDNISONE 5 MG/1
5 TABLET ORAL 2 TIMES DAILY
Qty: 60 TABLET | Refills: 5 | Status: SHIPPED | OUTPATIENT
Start: 2021-05-11 | End: 2021-11-11 | Stop reason: CLARIF

## 2021-05-11 RX ORDER — FOLIC ACID 1 MG/1
1 TABLET ORAL DAILY
Qty: 30 TABLET | Refills: 11 | Status: SHIPPED | OUTPATIENT
Start: 2021-05-11 | End: 2022-05-07 | Stop reason: SDUPTHER

## 2021-07-21 ENCOUNTER — HOSPITAL ENCOUNTER (OUTPATIENT)
Dept: RADIOLOGY | Facility: HOSPITAL | Age: 66
Discharge: HOME | End: 2021-07-21
Attending: NURSE PRACTITIONER
Payer: MEDICARE

## 2021-07-21 DIAGNOSIS — M25.552 LEFT HIP PAIN: Primary | ICD-10-CM

## 2021-07-21 DIAGNOSIS — M25.552 LEFT HIP PAIN: ICD-10-CM

## 2021-07-21 PROCEDURE — 73502 X-RAY EXAM HIP UNI 2-3 VIEWS: CPT | Mod: TC,FY,LT

## 2021-07-21 PROCEDURE — 73502 X-RAY EXAM HIP UNI 2-3 VIEWS: CPT | Mod: 26,LT,, | Performed by: RADIOLOGY

## 2021-07-21 PROCEDURE — 73502 XR PELVIS 3 VIEW INC HIP 2 VIEW LEFT: ICD-10-PCS | Mod: 26,LT,, | Performed by: RADIOLOGY

## 2021-10-08 ENCOUNTER — LAB VISIT (OUTPATIENT)
Dept: LAB | Facility: HOSPITAL | Age: 66
End: 2021-10-08
Attending: INTERNAL MEDICINE
Payer: MEDICARE

## 2021-10-08 DIAGNOSIS — Z79.899 ENCOUNTER FOR LONG-TERM (CURRENT) DRUG USE: ICD-10-CM

## 2021-10-08 DIAGNOSIS — M05.79 RHEUMATOID ARTHRITIS INVOLVING MULTIPLE SITES WITH POSITIVE RHEUMATOID FACTOR: ICD-10-CM

## 2021-10-08 LAB
ALBUMIN SERPL BCP-MCNC: 4.2 G/DL (ref 3.5–5.2)
ALP SERPL-CCNC: 64 U/L (ref 55–135)
ALT SERPL W/O P-5'-P-CCNC: 19 U/L (ref 10–44)
ANION GAP SERPL CALC-SCNC: 11 MMOL/L (ref 8–16)
AST SERPL-CCNC: 28 U/L (ref 10–40)
BASOPHILS # BLD AUTO: 0.03 K/UL (ref 0–0.2)
BASOPHILS NFR BLD: 0.3 % (ref 0–1.9)
BILIRUB SERPL-MCNC: 0.4 MG/DL (ref 0.1–1)
BUN SERPL-MCNC: 11 MG/DL (ref 8–23)
CALCIUM SERPL-MCNC: 9.3 MG/DL (ref 8.7–10.5)
CHLORIDE SERPL-SCNC: 103 MMOL/L (ref 95–110)
CO2 SERPL-SCNC: 24 MMOL/L (ref 23–29)
CREAT SERPL-MCNC: 0.5 MG/DL (ref 0.5–1.4)
CRP SERPL-MCNC: 0.31 MG/DL
DIFFERENTIAL METHOD: ABNORMAL
EOSINOPHIL # BLD AUTO: 0.1 K/UL (ref 0–0.5)
EOSINOPHIL NFR BLD: 1.1 % (ref 0–8)
ERYTHROCYTE [DISTWIDTH] IN BLOOD BY AUTOMATED COUNT: 13.8 % (ref 11.5–14.5)
EST. GFR  (AFRICAN AMERICAN): >60 ML/MIN/1.73 M^2
EST. GFR  (NON AFRICAN AMERICAN): >60 ML/MIN/1.73 M^2
GLUCOSE SERPL-MCNC: 114 MG/DL (ref 70–110)
HCT VFR BLD AUTO: 38.6 % (ref 37–48.5)
HGB BLD-MCNC: 12.7 G/DL (ref 12–16)
IMM GRANULOCYTES # BLD AUTO: 0.03 K/UL (ref 0–0.04)
IMM GRANULOCYTES NFR BLD AUTO: 0.3 % (ref 0–0.5)
LYMPHOCYTES # BLD AUTO: 3.6 K/UL (ref 1–4.8)
LYMPHOCYTES NFR BLD: 35.5 % (ref 18–48)
MCH RBC QN AUTO: 34.3 PG (ref 27–31)
MCHC RBC AUTO-ENTMCNC: 32.9 G/DL (ref 32–36)
MCV RBC AUTO: 104 FL (ref 82–98)
MONOCYTES # BLD AUTO: 0.6 K/UL (ref 0.3–1)
MONOCYTES NFR BLD: 5.6 % (ref 4–15)
NEUTROPHILS # BLD AUTO: 5.8 K/UL (ref 1.8–7.7)
NEUTROPHILS NFR BLD: 57.2 % (ref 38–73)
NRBC BLD-RTO: 0 /100 WBC
PLATELET # BLD AUTO: 373 K/UL (ref 150–450)
PMV BLD AUTO: 8.4 FL (ref 9.2–12.9)
POTASSIUM SERPL-SCNC: 4.1 MMOL/L (ref 3.5–5.1)
PROT SERPL-MCNC: 7.8 G/DL (ref 6–8.4)
RBC # BLD AUTO: 3.7 M/UL (ref 4–5.4)
SODIUM SERPL-SCNC: 138 MMOL/L (ref 136–145)
WBC # BLD AUTO: 10.13 K/UL (ref 3.9–12.7)

## 2021-10-08 PROCEDURE — 85025 COMPLETE CBC W/AUTO DIFF WBC: CPT | Performed by: INTERNAL MEDICINE

## 2021-10-08 PROCEDURE — 80053 COMPREHEN METABOLIC PANEL: CPT | Performed by: INTERNAL MEDICINE

## 2021-10-08 PROCEDURE — 86140 C-REACTIVE PROTEIN: CPT | Performed by: INTERNAL MEDICINE

## 2021-11-11 ENCOUNTER — OFFICE VISIT (OUTPATIENT)
Dept: RHEUMATOLOGY | Facility: CLINIC | Age: 66
End: 2021-11-11
Payer: MEDICARE

## 2021-11-11 VITALS
DIASTOLIC BLOOD PRESSURE: 76 MMHG | BODY MASS INDEX: 22.38 KG/M2 | WEIGHT: 103.38 LBS | SYSTOLIC BLOOD PRESSURE: 148 MMHG

## 2021-11-11 DIAGNOSIS — Z79.899 ENCOUNTER FOR LONG-TERM (CURRENT) DRUG USE: ICD-10-CM

## 2021-11-11 DIAGNOSIS — M79.7 FIBROMYALGIA: ICD-10-CM

## 2021-11-11 DIAGNOSIS — M05.79 RHEUMATOID ARTHRITIS INVOLVING MULTIPLE SITES WITH POSITIVE RHEUMATOID FACTOR: Primary | ICD-10-CM

## 2021-11-11 PROCEDURE — 99213 PR OFFICE/OUTPT VISIT, EST, LEVL III, 20-29 MIN: ICD-10-PCS | Mod: S$GLB,,, | Performed by: INTERNAL MEDICINE

## 2021-11-11 PROCEDURE — 1160F PR REVIEW ALL MEDS BY PRESCRIBER/CLIN PHARMACIST DOCUMENTED: ICD-10-PCS | Mod: S$GLB,,, | Performed by: INTERNAL MEDICINE

## 2021-11-11 PROCEDURE — 3078F DIAST BP <80 MM HG: CPT | Mod: S$GLB,,, | Performed by: INTERNAL MEDICINE

## 2021-11-11 PROCEDURE — 3008F BODY MASS INDEX DOCD: CPT | Mod: S$GLB,,, | Performed by: INTERNAL MEDICINE

## 2021-11-11 PROCEDURE — 99213 OFFICE O/P EST LOW 20 MIN: CPT | Mod: S$GLB,,, | Performed by: INTERNAL MEDICINE

## 2021-11-11 PROCEDURE — 1125F AMNT PAIN NOTED PAIN PRSNT: CPT | Mod: S$GLB,,, | Performed by: INTERNAL MEDICINE

## 2021-11-11 PROCEDURE — 1101F PR PT FALLS ASSESS DOC 0-1 FALLS W/OUT INJ PAST YR: ICD-10-PCS | Mod: S$GLB,,, | Performed by: INTERNAL MEDICINE

## 2021-11-11 PROCEDURE — 3077F SYST BP >= 140 MM HG: CPT | Mod: S$GLB,,, | Performed by: INTERNAL MEDICINE

## 2021-11-11 PROCEDURE — 1160F RVW MEDS BY RX/DR IN RCRD: CPT | Mod: S$GLB,,, | Performed by: INTERNAL MEDICINE

## 2021-11-11 PROCEDURE — 3077F PR MOST RECENT SYSTOLIC BLOOD PRESSURE >= 140 MM HG: ICD-10-PCS | Mod: S$GLB,,, | Performed by: INTERNAL MEDICINE

## 2021-11-11 PROCEDURE — 1125F PR PAIN SEVERITY QUANTIFIED, PAIN PRESENT: ICD-10-PCS | Mod: S$GLB,,, | Performed by: INTERNAL MEDICINE

## 2021-11-11 PROCEDURE — 1101F PT FALLS ASSESS-DOCD LE1/YR: CPT | Mod: S$GLB,,, | Performed by: INTERNAL MEDICINE

## 2021-11-11 PROCEDURE — 3288F PR FALLS RISK ASSESSMENT DOCUMENTED: ICD-10-PCS | Mod: S$GLB,,, | Performed by: INTERNAL MEDICINE

## 2021-11-11 PROCEDURE — 3008F PR BODY MASS INDEX (BMI) DOCUMENTED: ICD-10-PCS | Mod: S$GLB,,, | Performed by: INTERNAL MEDICINE

## 2021-11-11 PROCEDURE — 3288F FALL RISK ASSESSMENT DOCD: CPT | Mod: S$GLB,,, | Performed by: INTERNAL MEDICINE

## 2021-11-11 PROCEDURE — 3078F PR MOST RECENT DIASTOLIC BLOOD PRESSURE < 80 MM HG: ICD-10-PCS | Mod: S$GLB,,, | Performed by: INTERNAL MEDICINE

## 2021-11-11 RX ORDER — PREDNISONE 2.5 MG/1
TABLET ORAL
COMMUNITY
Start: 2021-11-10 | End: 2022-05-11 | Stop reason: SDUPTHER

## 2022-04-12 ENCOUNTER — HOSPITAL ENCOUNTER (INPATIENT)
Facility: HOSPITAL | Age: 67
LOS: 3 days | Discharge: HOME | DRG: 354 | End: 2022-04-15
Attending: EMERGENCY MEDICINE | Admitting: FAMILY MEDICINE
Payer: MEDICARE

## 2022-04-12 DIAGNOSIS — K45.8 LUMBAR HERNIA: Primary | ICD-10-CM

## 2022-04-12 DIAGNOSIS — K43.6 SPIGELIAN HERNIA WITH BOWEL OBSTRUCTION: ICD-10-CM

## 2022-04-12 DIAGNOSIS — R11.2 NAUSEA AND VOMITING, INTRACTABILITY OF VOMITING NOT SPECIFIED, UNSPECIFIED VOMITING TYPE: ICD-10-CM

## 2022-04-12 DIAGNOSIS — K59.01 SLOW TRANSIT CONSTIPATION: ICD-10-CM

## 2022-04-12 DIAGNOSIS — R07.9 CHEST PAIN: ICD-10-CM

## 2022-04-12 PROBLEM — R10.9 ABDOMINAL PAIN: Status: ACTIVE | Noted: 2022-04-12

## 2022-04-12 PROBLEM — N39.0 UTI (URINARY TRACT INFECTION): Status: ACTIVE | Noted: 2022-04-12

## 2022-04-12 LAB
ALBUMIN SERPL BCP-MCNC: 3.2 G/DL (ref 3.5–5.2)
ALP SERPL-CCNC: 71 U/L (ref 55–135)
ALT SERPL W/O P-5'-P-CCNC: 16 U/L (ref 10–44)
ANION GAP SERPL CALC-SCNC: 13 MMOL/L (ref 8–16)
AST SERPL-CCNC: 22 U/L (ref 10–40)
BACTERIA #/AREA URNS HPF: ABNORMAL /HPF
BASOPHILS # BLD AUTO: 0.04 K/UL (ref 0–0.2)
BASOPHILS NFR BLD: 0.3 % (ref 0–1.9)
BILIRUB SERPL-MCNC: 0.5 MG/DL (ref 0.1–1)
BILIRUB UR QL STRIP: NEGATIVE
BUN SERPL-MCNC: 13 MG/DL (ref 8–23)
CALCIUM SERPL-MCNC: 8.9 MG/DL (ref 8.7–10.5)
CHLORIDE SERPL-SCNC: 104 MMOL/L (ref 95–110)
CLARITY UR: CLEAR
CO2 SERPL-SCNC: 20 MMOL/L (ref 23–29)
COLOR UR: YELLOW
CREAT SERPL-MCNC: 0.6 MG/DL (ref 0.5–1.4)
DIFFERENTIAL METHOD: ABNORMAL
EOSINOPHIL # BLD AUTO: 0.2 K/UL (ref 0–0.5)
EOSINOPHIL NFR BLD: 1.4 % (ref 0–8)
ERYTHROCYTE [DISTWIDTH] IN BLOOD BY AUTOMATED COUNT: 13.2 % (ref 11.5–14.5)
EST. GFR  (AFRICAN AMERICAN): >60 ML/MIN/1.73 M^2
EST. GFR  (NON AFRICAN AMERICAN): >60 ML/MIN/1.73 M^2
GLUCOSE SERPL-MCNC: 90 MG/DL (ref 70–110)
GLUCOSE UR QL STRIP: NEGATIVE
HCT VFR BLD AUTO: 42.4 % (ref 37–48.5)
HGB BLD-MCNC: 14.3 G/DL (ref 12–16)
HGB UR QL STRIP: ABNORMAL
IMM GRANULOCYTES # BLD AUTO: 0.1 K/UL (ref 0–0.04)
IMM GRANULOCYTES NFR BLD AUTO: 0.8 % (ref 0–0.5)
KETONES UR QL STRIP: NEGATIVE
LEUKOCYTE ESTERASE UR QL STRIP: ABNORMAL
LIPASE SERPL-CCNC: 30 U/L (ref 4–60)
LYMPHOCYTES # BLD AUTO: 3.9 K/UL (ref 1–4.8)
LYMPHOCYTES NFR BLD: 32.6 % (ref 18–48)
MCH RBC QN AUTO: 33.2 PG (ref 27–31)
MCHC RBC AUTO-ENTMCNC: 33.7 G/DL (ref 32–36)
MCV RBC AUTO: 98 FL (ref 82–98)
MICROSCOPIC COMMENT: ABNORMAL
MONOCYTES # BLD AUTO: 1.1 K/UL (ref 0.3–1)
MONOCYTES NFR BLD: 9.1 % (ref 4–15)
NEUTROPHILS # BLD AUTO: 6.7 K/UL (ref 1.8–7.7)
NEUTROPHILS NFR BLD: 55.8 % (ref 38–73)
NITRITE UR QL STRIP: POSITIVE
NRBC BLD-RTO: 0 /100 WBC
PH UR STRIP: 7 [PH] (ref 5–8)
PLATELET # BLD AUTO: 356 K/UL (ref 150–450)
PMV BLD AUTO: 8.9 FL (ref 9.2–12.9)
POTASSIUM SERPL-SCNC: 3.2 MMOL/L (ref 3.5–5.1)
PROT SERPL-MCNC: 6.8 G/DL (ref 6–8.4)
PROT UR QL STRIP: NEGATIVE
RBC # BLD AUTO: 4.31 M/UL (ref 4–5.4)
RBC #/AREA URNS HPF: 2 /HPF (ref 0–4)
SODIUM SERPL-SCNC: 137 MMOL/L (ref 136–145)
SP GR UR STRIP: 1.01 (ref 1–1.03)
SQUAMOUS #/AREA URNS HPF: 2 /HPF
URN SPEC COLLECT METH UR: ABNORMAL
UROBILINOGEN UR STRIP-ACNC: NEGATIVE EU/DL
WBC # BLD AUTO: 12 K/UL (ref 3.9–12.7)
WBC #/AREA URNS HPF: 3 /HPF (ref 0–5)

## 2022-04-12 PROCEDURE — 99223 1ST HOSP IP/OBS HIGH 75: CPT | Mod: ,,, | Performed by: STUDENT IN AN ORGANIZED HEALTH CARE EDUCATION/TRAINING PROGRAM

## 2022-04-12 PROCEDURE — 99223 PR INITIAL HOSPITAL CARE,LEVL III: ICD-10-PCS | Mod: AI,,, | Performed by: FAMILY MEDICINE

## 2022-04-12 PROCEDURE — 11000001 HC ACUTE MED/SURG PRIVATE ROOM

## 2022-04-12 PROCEDURE — 83690 ASSAY OF LIPASE: CPT | Performed by: EMERGENCY MEDICINE

## 2022-04-12 PROCEDURE — 81000 URINALYSIS NONAUTO W/SCOPE: CPT | Performed by: EMERGENCY MEDICINE

## 2022-04-12 PROCEDURE — 96375 TX/PRO/DX INJ NEW DRUG ADDON: CPT

## 2022-04-12 PROCEDURE — 63600175 PHARM REV CODE 636 W HCPCS: Performed by: FAMILY MEDICINE

## 2022-04-12 PROCEDURE — 25500020 PHARM REV CODE 255: Performed by: EMERGENCY MEDICINE

## 2022-04-12 PROCEDURE — 63600175 PHARM REV CODE 636 W HCPCS: Performed by: EMERGENCY MEDICINE

## 2022-04-12 PROCEDURE — 85025 COMPLETE CBC W/AUTO DIFF WBC: CPT | Performed by: EMERGENCY MEDICINE

## 2022-04-12 PROCEDURE — 74177 CT ABD & PELVIS W/CONTRAST: CPT | Mod: TC

## 2022-04-12 PROCEDURE — 96361 HYDRATE IV INFUSION ADD-ON: CPT

## 2022-04-12 PROCEDURE — 74177 CT ABDOMEN PELVIS WITH CONTRAST: ICD-10-PCS | Mod: 26,,, | Performed by: RADIOLOGY

## 2022-04-12 PROCEDURE — 25000003 PHARM REV CODE 250: Performed by: EMERGENCY MEDICINE

## 2022-04-12 PROCEDURE — 99223 PR INITIAL HOSPITAL CARE,LEVL III: ICD-10-PCS | Mod: ,,, | Performed by: STUDENT IN AN ORGANIZED HEALTH CARE EDUCATION/TRAINING PROGRAM

## 2022-04-12 PROCEDURE — 80053 COMPREHEN METABOLIC PANEL: CPT | Performed by: EMERGENCY MEDICINE

## 2022-04-12 PROCEDURE — 99223 1ST HOSP IP/OBS HIGH 75: CPT | Mod: AI,,, | Performed by: FAMILY MEDICINE

## 2022-04-12 PROCEDURE — 99900035 HC TECH TIME PER 15 MIN (STAT)

## 2022-04-12 PROCEDURE — 96374 THER/PROPH/DIAG INJ IV PUSH: CPT

## 2022-04-12 PROCEDURE — 99285 EMERGENCY DEPT VISIT HI MDM: CPT | Mod: 25

## 2022-04-12 PROCEDURE — 74177 CT ABD & PELVIS W/CONTRAST: CPT | Mod: 26,,, | Performed by: RADIOLOGY

## 2022-04-12 RX ORDER — IPRATROPIUM BROMIDE AND ALBUTEROL SULFATE 2.5; .5 MG/3ML; MG/3ML
3 SOLUTION RESPIRATORY (INHALATION) EVERY 6 HOURS PRN
Status: DISCONTINUED | OUTPATIENT
Start: 2022-04-12 | End: 2022-04-15 | Stop reason: HOSPADM

## 2022-04-12 RX ORDER — ACETAMINOPHEN 325 MG/1
650 TABLET ORAL EVERY 4 HOURS PRN
Status: DISCONTINUED | OUTPATIENT
Start: 2022-04-12 | End: 2022-04-15 | Stop reason: HOSPADM

## 2022-04-12 RX ORDER — ONDANSETRON 2 MG/ML
4 INJECTION INTRAMUSCULAR; INTRAVENOUS EVERY 8 HOURS PRN
Status: DISCONTINUED | OUTPATIENT
Start: 2022-04-12 | End: 2022-04-15 | Stop reason: HOSPADM

## 2022-04-12 RX ORDER — PREDNISONE 1 MG/1
2 TABLET ORAL DAILY
Status: CANCELLED | OUTPATIENT
Start: 2022-04-12

## 2022-04-12 RX ORDER — LANOLIN ALCOHOL/MO/W.PET/CERES
800 CREAM (GRAM) TOPICAL
Status: DISCONTINUED | OUTPATIENT
Start: 2022-04-12 | End: 2022-04-15 | Stop reason: HOSPADM

## 2022-04-12 RX ORDER — PROMETHAZINE HYDROCHLORIDE 12.5 MG/1
25 TABLET ORAL EVERY 6 HOURS PRN
Status: DISCONTINUED | OUTPATIENT
Start: 2022-04-12 | End: 2022-04-15 | Stop reason: HOSPADM

## 2022-04-12 RX ORDER — SODIUM,POTASSIUM PHOSPHATES 280-250MG
2 POWDER IN PACKET (EA) ORAL
Status: DISCONTINUED | OUTPATIENT
Start: 2022-04-12 | End: 2022-04-15 | Stop reason: HOSPADM

## 2022-04-12 RX ORDER — SODIUM CHLORIDE 0.9 % (FLUSH) 0.9 %
10 SYRINGE (ML) INJECTION EVERY 8 HOURS PRN
Status: DISCONTINUED | OUTPATIENT
Start: 2022-04-12 | End: 2022-04-15 | Stop reason: HOSPADM

## 2022-04-12 RX ORDER — GLUCAGON 1 MG
1 KIT INJECTION
Status: DISCONTINUED | OUTPATIENT
Start: 2022-04-12 | End: 2022-04-15 | Stop reason: HOSPADM

## 2022-04-12 RX ORDER — PREDNISONE 2.5 MG/1
2.5 TABLET ORAL DAILY
Status: DISCONTINUED | OUTPATIENT
Start: 2022-04-13 | End: 2022-04-12

## 2022-04-12 RX ORDER — MORPHINE SULFATE 4 MG/ML
4 INJECTION, SOLUTION INTRAMUSCULAR; INTRAVENOUS
Status: COMPLETED | OUTPATIENT
Start: 2022-04-12 | End: 2022-04-12

## 2022-04-12 RX ORDER — SODIUM CHLORIDE, SODIUM LACTATE, POTASSIUM CHLORIDE, CALCIUM CHLORIDE 600; 310; 30; 20 MG/100ML; MG/100ML; MG/100ML; MG/100ML
INJECTION, SOLUTION INTRAVENOUS CONTINUOUS
Status: DISCONTINUED | OUTPATIENT
Start: 2022-04-12 | End: 2022-04-15 | Stop reason: HOSPADM

## 2022-04-12 RX ORDER — IBUPROFEN 200 MG
16 TABLET ORAL
Status: DISCONTINUED | OUTPATIENT
Start: 2022-04-12 | End: 2022-04-15 | Stop reason: HOSPADM

## 2022-04-12 RX ORDER — ONDANSETRON 2 MG/ML
4 INJECTION INTRAMUSCULAR; INTRAVENOUS
Status: COMPLETED | OUTPATIENT
Start: 2022-04-12 | End: 2022-04-12

## 2022-04-12 RX ORDER — FOLIC ACID 1 MG/1
1 TABLET ORAL DAILY
Status: DISCONTINUED | OUTPATIENT
Start: 2022-04-13 | End: 2022-04-15 | Stop reason: HOSPADM

## 2022-04-12 RX ORDER — NALOXONE HCL 0.4 MG/ML
0.02 VIAL (ML) INJECTION
Status: DISCONTINUED | OUTPATIENT
Start: 2022-04-12 | End: 2022-04-15 | Stop reason: HOSPADM

## 2022-04-12 RX ORDER — HYDROXYCHLOROQUINE SULFATE 200 MG/1
200 TABLET, FILM COATED ORAL 2 TIMES DAILY
Status: DISCONTINUED | OUTPATIENT
Start: 2022-04-13 | End: 2022-04-15 | Stop reason: HOSPADM

## 2022-04-12 RX ORDER — MORPHINE SULFATE 4 MG/ML
2 INJECTION, SOLUTION INTRAMUSCULAR; INTRAVENOUS EVERY 4 HOURS PRN
Status: DISCONTINUED | OUTPATIENT
Start: 2022-04-12 | End: 2022-04-13

## 2022-04-12 RX ORDER — IBUPROFEN 200 MG
24 TABLET ORAL
Status: DISCONTINUED | OUTPATIENT
Start: 2022-04-12 | End: 2022-04-15 | Stop reason: HOSPADM

## 2022-04-12 RX ADMIN — MORPHINE SULFATE 2 MG: 4 INJECTION INTRAVENOUS at 02:04

## 2022-04-12 RX ADMIN — SODIUM CHLORIDE, SODIUM LACTATE, POTASSIUM CHLORIDE, AND CALCIUM CHLORIDE: .6; .31; .03; .02 INJECTION, SOLUTION INTRAVENOUS at 12:04

## 2022-04-12 RX ADMIN — IOHEXOL 75 ML: 350 INJECTION, SOLUTION INTRAVENOUS at 09:04

## 2022-04-12 RX ADMIN — MORPHINE SULFATE 2 MG: 4 INJECTION INTRAVENOUS at 11:04

## 2022-04-12 RX ADMIN — SODIUM CHLORIDE 1000 ML: 0.9 INJECTION, SOLUTION INTRAVENOUS at 08:04

## 2022-04-12 RX ADMIN — MORPHINE SULFATE 4 MG: 4 INJECTION, SOLUTION INTRAMUSCULAR; INTRAVENOUS at 10:04

## 2022-04-12 RX ADMIN — MORPHINE SULFATE 2 MG: 4 INJECTION INTRAVENOUS at 06:04

## 2022-04-12 RX ADMIN — ONDANSETRON 4 MG: 2 INJECTION INTRAMUSCULAR; INTRAVENOUS at 08:04

## 2022-04-12 NOTE — ASSESSMENT & PLAN NOTE
With associate nausea, vomiting  Likely 2/2 colonic obstruction from hernia  General Surgery consulted, appreciate their recommendations   NPO  IVFs  IV pain medication and antiemetics  NG tube for continued emesis  Daily labs

## 2022-04-12 NOTE — NURSING
Patient AAOX4, respirations even and unlabored. No distress noted. Patient transported from ER to room 113 via wheelchair. Vital signs stable. Patient oriented to call light and verbalizes understanding. Call light within reach, bed in lowest position, wheels locked, bed alarm on and audible. Patient denies any needs at this time.

## 2022-04-12 NOTE — ED TRIAGE NOTES
Patient presents to ED POV with c/o constipation, vomiting, and abdominal pain. She reports that her LBM was four days ago. Pt is AAOx4. Skin warm, dry to touch. Respirations even, nonlabored. Ambulatory from wheelchair to stretcher.

## 2022-04-12 NOTE — H&P
[facility] Medicine  History & Physical    Patient Name: Janey Null  MRN: [id number]  Patient Class: IP- Inpatient  Admission Date: 4/12/2022  Attending Physician: Hannah Reese MD   Primary Care Provider: John Merchant MD         Patient information was obtained from patient and ER records.     Subjective:     Principal Problem:Spigelian hernia with bowel obstruction    Chief Complaint:   Chief Complaint   Patient presents with    Abdominal Pain    Vomiting        HPI: 67 yo-old female with past medical history of anxiety, depression, fibromyalgia, rheumatoid arthritis, osteoporosis presents to the ER with complaints of nausea and vomiting over the last 2 days.  She last had a bowel movement 4 days ago and has felt constipated since then along with some abdominal swelling.  Two days ago she developed crampy abdominal pain and then yesterday started vomiting.  Today the vomiting continued and she felt dehydrated along with worsening abdominal pain and bloated abdomen so came to the ER.  She has a known hernia but has never had this issue before.  Denies any abdominal surgeries.  In the ER, WBC normal, mild hypokalemia with potassium of 3.2, urine with bacteria and nitrate positive, LFTs normal.  CT abdomen pelvis with large left spigelian hernia containing prominent internal loop of colon with resulting constipation likely secondary to either delay or partial obstruction.  General surgery consulted and hospital team called for admission.      Past Medical History:   Diagnosis Date    Anxiety     Depression     Encounter for long-term (current) use of high-risk medication     Fibromyalgia     Osteoporosis     Rheumatoid arthritis involving multiple sites with positive rheumatoid factor        Past Surgical History:   Procedure Laterality Date    right leg       multiple surgeries ,amputation       Review of patient's allergies indicates:   Allergen Reactions     Penicillins        No current facility-administered medications on file prior to encounter.     Current Outpatient Medications on File Prior to Encounter   Medication Sig    folic acid (FOLVITE) 1 MG tablet Take 1 tablet (1 mg total) by mouth once daily.    hydrocodone-acetaminophen 10-325mg (NORCO)  mg Tab Take 1 tablet by mouth every 6 (six) hours as needed.     hydrOXYchloroQUINE (PLAQUENIL) 200 mg tablet TAKE 1 TABLET(200 MG) BY MOUTH TWICE DAILY    predniSONE (DELTASONE) 2.5 MG tablet     [DISCONTINUED] DULoxetine (CYMBALTA) 60 MG capsule take 1 capsule by mouth every morning    [DISCONTINUED] gabapentin (NEURONTIN) 100 MG capsule every evening.     [DISCONTINUED] ibandronate (BONIVA) 150 mg tablet TAKE ONE TABLET BY MOUTH ONCE MONTHLY    [DISCONTINUED] temazepam (RESTORIL) 30 mg capsule take 1 capsule by mouth at bedtime    [DISCONTINUED] tiZANidine (ZANAFLEX) 2 MG tablet TK 1 T PO HS     Family History    None       Tobacco Use    Smoking status: Current Every Day Smoker     Packs/day: 0.50     Types: Cigarettes    Smokeless tobacco: Never Used   Substance and Sexual Activity    Alcohol use: No    Drug use: Never    Sexual activity: Not on file     Review of Systems   Constitutional:  Positive for fatigue. Negative for fever.   Eyes: Negative.    Respiratory:  Negative for shortness of breath.    Cardiovascular:  Negative for chest pain.   Gastrointestinal:  Positive for abdominal pain, constipation, nausea and vomiting.   Endocrine: Negative.    Genitourinary: Negative.    Musculoskeletal: Negative.    Allergic/Immunologic: Negative.    Neurological: Negative.    Hematological: Negative.    Psychiatric/Behavioral: Negative.     Objective:     Vital Signs (Most Recent):  Temp: 98 °F (36.7 °C) (04/12/22 1206)  Pulse: 89 (04/12/22 1313)  Resp: 18 (04/12/22 1425)  BP: 130/60 (04/12/22 1206)  SpO2: 96 % (04/12/22 1313) Vital Signs (24h Range):  Temp:  [98 °F (36.7 °C)] 98 °F (36.7 °C)  Pulse:   [] 89  Resp:  [13-24] 18  SpO2:  [93 %-97 %] 96 %  BP: (118-178)/(60-90) 130/60     Weight: 47.7 kg (105 lb 2.6 oz)  Body mass index is 22.76 kg/m².    Physical Exam  Vitals reviewed.   Constitutional:       General: She is not in acute distress.     Appearance: She is not toxic-appearing or diaphoretic.   HENT:      Head: Normocephalic and atraumatic.      Right Ear: External ear normal.      Left Ear: External ear normal.      Nose: Nose normal.      Mouth/Throat:      Mouth: Mucous membranes are moist.   Eyes:      Conjunctiva/sclera: Conjunctivae normal.   Cardiovascular:      Rate and Rhythm: Normal rate and regular rhythm.      Pulses: Normal pulses.      Heart sounds: No murmur heard.    No gallop.   Pulmonary:      Effort: Pulmonary effort is normal. No respiratory distress.   Abdominal:      General: There is distension.      Palpations: Abdomen is soft.      Tenderness: There is abdominal tenderness (generalized but worse in the right middle/lower quandrant). There is no guarding.   Musculoskeletal:      Left lower leg: No edema.      Comments: R BKA   Skin:     General: Skin is warm and dry.   Neurological:      Mental Status: She is alert and oriented to person, place, and time. Mental status is at baseline.   Psychiatric:         Mood and Affect: Mood normal.           Significant Labs: All pertinent labs within the past 24 hours have been reviewed.  CBC:   Recent Labs   Lab 04/12/22  0746   WBC 12.00   HGB 14.3   HCT 42.4        CMP:   Recent Labs   Lab 04/12/22  0839      K 3.2*      CO2 20*   GLU 90   BUN 13   CREATININE 0.6   CALCIUM 8.9   PROT 6.8   ALBUMIN 3.2*   BILITOT 0.5   ALKPHOS 71   AST 22   ALT 16   ANIONGAP 13   EGFRNONAA >60.0       Significant Imaging: I have reviewed all pertinent imaging results/findings within the past 24 hours.  CT Abdomen Pelvis With Contrast   Final Result   Abnormal      1. Moderate sized hiatal hernia.   2. Large left spigelian hernia  containing a prominent internal loop of colon with resulting constipation likely secondary to either delay or partial obstruction.   3. Small amount of ascites.   4. Moderate thoracolumbar levoscoliosis.   This report was flagged in Epic as abnormal.         Electronically signed by: Velasquez Echevarria   Date:    04/12/2022   Time:    09:44            Assessment/Plan:     * Spigelian hernia with bowel obstruction    With associate nausea, vomiting  Likely 2/2 colonic obstruction from hernia  General Surgery consulted, appreciate their recommendations   NPO  IVFs  IV pain medication and antiemetics  NG tube for continued emesis  Daily labs    UTI (urinary tract infection)  Start on IV Rocephin for now        Abdominal pain  See above        VTE Risk Mitigation (From admission, onward)         Ordered     IP VTE LOW RISK PATIENT  Once         04/12/22 1209     Place sequential compression device  Until discontinued         04/12/22 1209                   Hannah Reese MD  Department of Hospital Medicine   [facility]

## 2022-04-12 NOTE — PLAN OF CARE
Problem: Adult Inpatient Plan of Care  Goal: Plan of Care Review  Outcome: Ongoing, Progressing     Problem: Adult Inpatient Plan of Care  Goal: Patient-Specific Goal (Individualized)  Outcome: Ongoing, Progressing     Problem: Adult Inpatient Plan of Care  Goal: Absence of Hospital-Acquired Illness or Injury  Outcome: Ongoing, Progressing     Problem: Adult Inpatient Plan of Care  Goal: Optimal Comfort and Wellbeing  Outcome: Ongoing, Progressing     Patient AAOX4, respirations even and unlabored. NADN. Patient free from falls, skin breakdown, or injury. Pain well controlled with PRN medications. Bed in lowest position, wheels locked, side rails X2, call light within reach. Purposeful rounding done.

## 2022-04-12 NOTE — ED NOTES
Dr. Sierra called back and spoke to Dr. Pennington. Case discussed and no new orders received at this time.

## 2022-04-12 NOTE — ED PROVIDER NOTES
CHIEF COMPLAINT    Chief Complaint   Patient presents with    Abdominal Pain    Vomiting       HPI    Janey Null is a 66 y.o. female who presents complaining of lower abdominal pain that started a few days ago and has had several episodes of nausea and vomiting over the past couple days.  She has not had a bowel movement in 4 days which is abnormal for her.  She says she is not passing any gas either.  Denies history of bowel obstruction or abdominal surgery.  She has had some chills, denies any fevers, chest pain, shortness of breath, diarrhea or dysuria.  She tried taking some over-the-counter laxatives yesterday which has not helped.  The severity of the symptoms is moderate.    CURRENT MEDICATIONS    Prior to Admission medications    Medication Sig Start Date End Date Taking? Authorizing Provider   DULoxetine (CYMBALTA) 60 MG capsule take 1 capsule by mouth every morning 12/6/17   John Merchant MD   folic acid (FOLVITE) 1 MG tablet Take 1 tablet (1 mg total) by mouth once daily. 5/11/21 5/11/22  John Merchant MD   gabapentin (NEURONTIN) 100 MG capsule every evening.  5/12/20   Historical Provider   hydrocodone-acetaminophen 10-325mg (NORCO)  mg Tab Take 1 tablet by mouth every 6 (six) hours as needed.     Historical Provider   hydrOXYchloroQUINE (PLAQUENIL) 200 mg tablet TAKE 1 TABLET(200 MG) BY MOUTH TWICE DAILY 2/10/22   John Merchant MD   ibandronate (BONIVA) 150 mg tablet TAKE ONE TABLET BY MOUTH ONCE MONTHLY 9/20/18   John Merchant MD   predniSONE (DELTASONE) 2.5 MG tablet  11/10/21   Historical Provider   temazepam (RESTORIL) 30 mg capsule take 1 capsule by mouth at bedtime 12/6/17   John Merchant MD   tiZANidine (ZANAFLEX) 2 MG tablet TK 1 T PO HS 10/6/20   Historical Provider       ALLERGIES    Review of patient's allergies indicates:   Allergen Reactions    Penicillins        PAST MEDICAL HISTORY  Past Medical History:   Diagnosis Date    Anxiety      "Depression     Encounter for long-term (current) use of high-risk medication     Fibromyalgia     Osteoporosis     Rheumatoid arthritis involving multiple sites with positive rheumatoid factor        SURGICAL HISTORY    Past Surgical History:   Procedure Laterality Date    right leg       multiple surgeries ,amputation       SOCIAL HISTORY    Social History     Socioeconomic History    Marital status:    Tobacco Use    Smoking status: Current Every Day Smoker     Packs/day: 0.50     Types: Cigarettes    Smokeless tobacco: Never Used   Substance and Sexual Activity    Alcohol use: No    Drug use: Never       FAMILY HISTORY    History reviewed. No pertinent family history.    REVIEW OF SYSTEMS    Constitutional:  No reported fever, weight loss or weakness.   Eyes:  No reported photophobia or discharge. No visual changes  HENT:  No reported sore throat or ear pain.   Respiratory:  No reported cough or shortness of breath.   Cardiovascular:  No reported chest pain, palpitations or swelling.   GI:  No reported diarrhea.   Musculoskeletal:  No reported back pain.   Skin:  No reported rash.   Neurologic:  No reported headache, focal weakness or sensory changes.   Endocrine:  No reported polyuria or polydypsia.   Lymphatic:  No reported swollen glands.   Psychiatric:  No reported depression, suicidal ideation or homicidal ideation.   All Systems otherwise negative except as noted in the Review of Systems and History of Present Illness.    PHYSICAL EXAM    VITAL SIGNS: /60 (BP Location: Right arm, Patient Position: Lying)   Pulse 89   Temp 98 °F (36.7 °C) (Oral)   Resp 20   Ht 4' 9" (1.448 m)   Wt 47.7 kg (105 lb 2.6 oz)   SpO2 96%   Breastfeeding No   BMI 22.76 kg/m²    Constitutional:  Well developed, Well nourished who appears stated age, No acute distress, Non-toxic appearance.   Respiratory: Breath sounds clear to auscultation bilaterally, No respiratory distress, No wheezing, No chest " tenderness.   Cardiovascular:  Normal heart rate, Normal rhythm, No murmurs, No rubs, No gallops.   GI:  Bowel sounds normal, Soft, moderate generalized lower abdominal tenderness to palpation, No rebound or guarding, No masses or hepatosplenomegaly, No pulsatile masses.   Musculoskeletal:  Intact distal pulses, No edema, No cyanosis, No clubbing. Good range of motion in all major joints. No major deformities noted. No tenderness to palpation.  Integument:  Warm, Dry, No erythema, No rash.   Psychiatric:  Affect normal, Judgment normal, Mood normal.     LABS  Pertinent labs reviewed. (See chart for details)   Labs Reviewed   CBC W/ AUTO DIFFERENTIAL - Abnormal; Notable for the following components:       Result Value    MCH 33.2 (*)     MPV 8.9 (*)     Immature Granulocytes 0.8 (*)     Immature Grans (Abs) 0.10 (*)     Mono # 1.1 (*)     All other components within normal limits   COMPREHENSIVE METABOLIC PANEL - Abnormal; Notable for the following components:    Potassium 3.2 (*)     CO2 20 (*)     Albumin 3.2 (*)     All other components within normal limits    Narrative:     Recoll. [id number] by [facility] at 04/12/2022 08:18, reason: Specimen   hemolyzed   URINALYSIS, REFLEX TO URINE CULTURE - Abnormal; Notable for the following components:    Occult Blood UA 2+ (*)     Nitrite, UA Positive (*)     Leukocytes, UA Trace (*)     All other components within normal limits    Narrative:     In and Out Cath as needed it patient unable to void  Preferred Collection Type->Urine, Clean Catch  Specimen Source->Urine   URINALYSIS MICROSCOPIC - Abnormal; Notable for the following components:    Bacteria Many (*)     All other components within normal limits    Narrative:     In and Out Cath as needed it patient unable to void  Preferred Collection Type->Urine, Clean Catch  Specimen Source->Urine   LIPASE   LIPASE    Narrative:     Recoll. [id number] by [facility] at 04/12/2022 08:18, reason: Specimen   hemolyzed       RADIOLOGY     Imaging Results           CT Abdomen Pelvis With Contrast (Final result)  Result time 04/12/22 09:44:45    Final result by Velasquez Echevarria MD (04/12/22 09:44:45)                 Impression:      1. Moderate sized hiatal hernia.  2. Large left spigelian hernia containing a prominent internal loop of colon with resulting constipation likely secondary to either delay or partial obstruction.  3. Small amount of ascites.  4. Moderate thoracolumbar levoscoliosis.  This report was flagged in Epic as abnormal.      Electronically signed by: Velasquez Echevarria  Date:    04/12/2022  Time:    09:44             Narrative:    EXAMINATION:  CT ABDOMEN PELVIS WITH CONTRAST    CLINICAL HISTORY:  Lower abdominal pain with nausea vomiting, no bowel movement for days, rule out obstruction;    TECHNIQUE:  Low dose axial images, sagittal and coronal reformations were obtained from the lung bases to the pubic symphysis following the IV administration of 75 mL of Omnipaque 350 .  Oral contrast was not given.    COMPARISON:  None.    FINDINGS:  Dependent discoid atelectasis at the lung bases.  There is a moderate sized hiatal hernia.  No pleural or pericardial effusions.    The liver and spleen are normal in size and attenuation.  Small amount of perihepatic and perisplenic fluid.  The gallbladder, pancreas and adrenal glands are unremarkable.  Kidneys are normal in size and enhance homogeneously.  No renal calculi.  No changes of hydronephrosis.  No perinephric inflammatory change.    There is a large left spigelian hernia with the defect measuring 6.5 cm.  There is a large loop of descending colon which extends through the hernia defect into the subcutaneous fat of the left lateral abdominal wall.  No definite bowel wall thickening or significant adjacent inflammatory change to suggest strangulation.  However, portion of the hernia is excluded from the examination.  There is a large amount of stool throughout the colon to the level of  the hernia suggesting delay or partial obstruction.  The loops of small bowel are decompressed.    The bladder is partially distended.  Uterus and ovaries are atrophic.  Small of free fluid within the cul-de-sac.    No significant mesenteric or retroperitoneal lymphadenopathy.  There is marked tortuosity of the of the abdominal aorta with extensive calcified atherosclerotic change resulting in significant narrowing of the distal aorta and bilateral common iliac arteries.    Moderate thoracolumbar levoscoliosis.                                ED COURSE & MEDICAL DECISION MAKING    Medications   hydrOXYchloroQUINE tablet 200 mg (has no administration in time range)   folic acid tablet 1 mg (has no administration in time range)   predniSONE tablet 2.5 mg (has no administration in time range)   sodium chloride 0.9% flush 10 mL (has no administration in time range)   albuterol-ipratropium 2.5 mg-0.5 mg/3 mL nebulizer solution 3 mL (has no administration in time range)   trazodone split tablet 25 mg (has no administration in time range)   acetaminophen tablet 650 mg (has no administration in time range)   naloxone 0.4 mg/mL injection 0.02 mg (has no administration in time range)   magnesium oxide tablet 800 mg (has no administration in time range)   magnesium oxide tablet 800 mg (has no administration in time range)   potassium, sodium phosphates 280-160-250 mg packet 2 packet (has no administration in time range)   potassium, sodium phosphates 280-160-250 mg packet 2 packet (has no administration in time range)   potassium, sodium phosphates 280-160-250 mg packet 2 packet (has no administration in time range)   glucose chewable tablet 16 g (has no administration in time range)   glucose chewable tablet 24 g (has no administration in time range)   dextrose 50% injection 12.5 g (has no administration in time range)   dextrose 50% injection 25 g (has no administration in time range)   glucagon (human recombinant) injection  1 mg (has no administration in time range)   dextrose 10% bolus 125 mL (has no administration in time range)   dextrose 10% bolus 250 mL (has no administration in time range)   lactated ringers infusion ( Intravenous New Bag 4/12/22 1249)   ondansetron injection 4 mg (has no administration in time range)   promethazine tablet 25 mg (has no administration in time range)   morphine injection 2 mg (has no administration in time range)   ondansetron injection 4 mg (4 mg Intravenous Given 4/12/22 0805)   sodium chloride 0.9% bolus 1,000 mL (0 mLs Intravenous Stopped 4/12/22 0905)   iohexoL (OMNIPAQUE 350) injection 75 mL (75 mLs Intravenous Given 4/12/22 0921)   morphine injection 4 mg (4 mg Intravenous Given 4/12/22 1049)       The patient presents with the history as noted above. The differential diagnosis would include but is not limited to:  Bowel obstruction, constipation, fecal impaction, diverticulitis, appendicitis, pancreatitis     Patient presents with abdominal pain with nausea vomiting.  No bowel movement for several days and not passing gas.  She does have ventral hernia on exam which is moderately tender, no evidence of strangulation or herniation.  CT scan of her abdomen pelvis shows a spigelian hernia with evidence of obstruction.  She was given pain medication, antiemetics and fluids and did feel little bit better but still having recurrent pain.  Spoke with Dr. Sierra who will see patient in consultation.  Dr. Reese to admit for further inpatient management.    FINAL IMPRESSION    1. Spigelian hernia with bowel obstruction    2. Chest pain    3. Nausea and vomiting, intractability of vomiting not specified, unspecified vomiting type          Chidi Pennington      **Parts of this note were created using [employer] Voice Recognition software program. While efforts were made to correct any mistakes made by this voice recognition software program, nonsensical phrases may remain in this note.        Chidi Pennington,   04/12/22 7337

## 2022-04-12 NOTE — PLAN OF CARE
Patient in no apparent distress. Sat's 96  % on room air. PRN treatments not needed . Will continue to monitor.
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
## 2022-04-12 NOTE — SUBJECTIVE & OBJECTIVE
Past Medical History:   Diagnosis Date    Anxiety     Depression     Encounter for long-term (current) use of high-risk medication     Fibromyalgia     Osteoporosis     Rheumatoid arthritis involving multiple sites with positive rheumatoid factor        Past Surgical History:   Procedure Laterality Date    right leg       multiple surgeries ,amputation       Review of patient's allergies indicates:   Allergen Reactions    Penicillins        No current facility-administered medications on file prior to encounter.     Current Outpatient Medications on File Prior to Encounter   Medication Sig    folic acid (FOLVITE) 1 MG tablet Take 1 tablet (1 mg total) by mouth once daily.    hydrocodone-acetaminophen 10-325mg (NORCO)  mg Tab Take 1 tablet by mouth every 6 (six) hours as needed.     hydrOXYchloroQUINE (PLAQUENIL) 200 mg tablet TAKE 1 TABLET(200 MG) BY MOUTH TWICE DAILY    predniSONE (DELTASONE) 2.5 MG tablet     [DISCONTINUED] DULoxetine (CYMBALTA) 60 MG capsule take 1 capsule by mouth every morning    [DISCONTINUED] gabapentin (NEURONTIN) 100 MG capsule every evening.     [DISCONTINUED] ibandronate (BONIVA) 150 mg tablet TAKE ONE TABLET BY MOUTH ONCE MONTHLY    [DISCONTINUED] temazepam (RESTORIL) 30 mg capsule take 1 capsule by mouth at bedtime    [DISCONTINUED] tiZANidine (ZANAFLEX) 2 MG tablet TK 1 T PO HS     Family History    None       Tobacco Use    Smoking status: Current Every Day Smoker     Packs/day: 0.50     Types: Cigarettes    Smokeless tobacco: Never Used   Substance and Sexual Activity    Alcohol use: No    Drug use: Never    Sexual activity: Not on file     Review of Systems   Constitutional:  Positive for fatigue. Negative for fever.   Eyes: Negative.    Respiratory:  Negative for shortness of breath.    Cardiovascular:  Negative for chest pain.   Gastrointestinal:  Positive for abdominal pain, constipation, nausea and vomiting.   Endocrine: Negative.    Genitourinary: Negative.     Musculoskeletal: Negative.    Allergic/Immunologic: Negative.    Neurological: Negative.    Hematological: Negative.    Psychiatric/Behavioral: Negative.     Objective:     Vital Signs (Most Recent):  Temp: 98 °F (36.7 °C) (04/12/22 1206)  Pulse: 89 (04/12/22 1313)  Resp: 18 (04/12/22 1425)  BP: 130/60 (04/12/22 1206)  SpO2: 96 % (04/12/22 1313) Vital Signs (24h Range):  Temp:  [98 °F (36.7 °C)] 98 °F (36.7 °C)  Pulse:  [] 89  Resp:  [13-24] 18  SpO2:  [93 %-97 %] 96 %  BP: (118-178)/(60-90) 130/60     Weight: 47.7 kg (105 lb 2.6 oz)  Body mass index is 22.76 kg/m².    Physical Exam  Vitals reviewed.   Constitutional:       General: She is not in acute distress.     Appearance: She is not toxic-appearing or diaphoretic.   HENT:      Head: Normocephalic and atraumatic.      Right Ear: External ear normal.      Left Ear: External ear normal.      Nose: Nose normal.      Mouth/Throat:      Mouth: Mucous membranes are moist.   Eyes:      Conjunctiva/sclera: Conjunctivae normal.   Cardiovascular:      Rate and Rhythm: Normal rate and regular rhythm.      Pulses: Normal pulses.      Heart sounds: No murmur heard.    No gallop.   Pulmonary:      Effort: Pulmonary effort is normal. No respiratory distress.   Abdominal:      General: There is distension.      Palpations: Abdomen is soft.      Tenderness: There is abdominal tenderness (generalized but worse in the right middle/lower quandrant). There is no guarding.   Musculoskeletal:      Left lower leg: No edema.      Comments: R BKA   Skin:     General: Skin is warm and dry.   Neurological:      Mental Status: She is alert and oriented to person, place, and time. Mental status is at baseline.   Psychiatric:         Mood and Affect: Mood normal.           Significant Labs: All pertinent labs within the past 24 hours have been reviewed.  CBC:   Recent Labs   Lab 04/12/22  0746   WBC 12.00   HGB 14.3   HCT 42.4        CMP:   Recent Labs   Lab 04/12/22  0839       K 3.2*      CO2 20*   GLU 90   BUN 13   CREATININE 0.6   CALCIUM 8.9   PROT 6.8   ALBUMIN 3.2*   BILITOT 0.5   ALKPHOS 71   AST 22   ALT 16   ANIONGAP 13   EGFRNONAA >60.0       Significant Imaging: I have reviewed all pertinent imaging results/findings within the past 24 hours.  CT Abdomen Pelvis With Contrast   Final Result   Abnormal      1. Moderate sized hiatal hernia.   2. Large left spigelian hernia containing a prominent internal loop of colon with resulting constipation likely secondary to either delay or partial obstruction.   3. Small amount of ascites.   4. Moderate thoracolumbar levoscoliosis.   This report was flagged in Epic as abnormal.         Electronically signed by: Velasquez Echevarria   Date:    04/12/2022   Time:    09:44

## 2022-04-12 NOTE — HPI
65 yo-old female with past medical history of anxiety, depression, fibromyalgia, rheumatoid arthritis, osteoporosis presents to the ER with complaints of nausea and vomiting over the last 2 days.  She last had a bowel movement 4 days ago and has felt constipated since then along with some abdominal swelling.  Two days ago she developed crampy abdominal pain and then yesterday started vomiting.  Today the vomiting continued and she felt dehydrated along with worsening abdominal pain and bloated abdomen so came to the ER.  She has a known hernia but has never had this issue before.  Denies any abdominal surgeries.  In the ER, WBC normal, mild hypokalemia with potassium of 3.2, urine with bacteria and nitrate positive, LFTs normal.  CT abdomen pelvis with large left spigelian hernia containing prominent internal loop of colon with resulting constipation likely secondary to either delay or partial obstruction.  General surgery consulted and hospital team called for admission.

## 2022-04-13 ENCOUNTER — ANESTHESIA EVENT (OUTPATIENT)
Dept: SURGERY | Facility: HOSPITAL | Age: 67
DRG: 354 | End: 2022-04-13
Payer: MEDICARE

## 2022-04-13 ENCOUNTER — ANESTHESIA (OUTPATIENT)
Dept: SURGERY | Facility: HOSPITAL | Age: 67
DRG: 354 | End: 2022-04-13
Payer: MEDICARE

## 2022-04-13 PROBLEM — K45.8 LUMBAR HERNIA: Status: ACTIVE | Noted: 2022-04-12

## 2022-04-13 LAB
ALBUMIN SERPL BCP-MCNC: 2.8 G/DL (ref 3.5–5.2)
ALP SERPL-CCNC: 70 U/L (ref 55–135)
ALT SERPL W/O P-5'-P-CCNC: 14 U/L (ref 10–44)
ANION GAP SERPL CALC-SCNC: 16 MMOL/L (ref 8–16)
AST SERPL-CCNC: 22 U/L (ref 10–40)
BASOPHILS # BLD AUTO: 0.02 K/UL (ref 0–0.2)
BASOPHILS NFR BLD: 0.2 % (ref 0–1.9)
BILIRUB SERPL-MCNC: 0.7 MG/DL (ref 0.1–1)
BUN SERPL-MCNC: 12 MG/DL (ref 8–23)
CALCIUM SERPL-MCNC: 8.6 MG/DL (ref 8.7–10.5)
CHLORIDE SERPL-SCNC: 103 MMOL/L (ref 95–110)
CO2 SERPL-SCNC: 15 MMOL/L (ref 23–29)
CREAT SERPL-MCNC: 0.5 MG/DL (ref 0.5–1.4)
DIFFERENTIAL METHOD: ABNORMAL
EOSINOPHIL # BLD AUTO: 0 K/UL (ref 0–0.5)
EOSINOPHIL NFR BLD: 0.2 % (ref 0–8)
ERYTHROCYTE [DISTWIDTH] IN BLOOD BY AUTOMATED COUNT: 13 % (ref 11.5–14.5)
EST. GFR  (AFRICAN AMERICAN): >60 ML/MIN/1.73 M^2
EST. GFR  (NON AFRICAN AMERICAN): >60 ML/MIN/1.73 M^2
GLUCOSE SERPL-MCNC: 69 MG/DL (ref 70–110)
HCT VFR BLD AUTO: 38.4 % (ref 37–48.5)
HGB BLD-MCNC: 13.1 G/DL (ref 12–16)
IMM GRANULOCYTES # BLD AUTO: 0.02 K/UL (ref 0–0.04)
IMM GRANULOCYTES NFR BLD AUTO: 0.2 % (ref 0–0.5)
LYMPHOCYTES # BLD AUTO: 1.7 K/UL (ref 1–4.8)
LYMPHOCYTES NFR BLD: 19.5 % (ref 18–48)
MAGNESIUM SERPL-MCNC: 1.4 MG/DL (ref 1.6–2.6)
MCH RBC QN AUTO: 33.6 PG (ref 27–31)
MCHC RBC AUTO-ENTMCNC: 34.1 G/DL (ref 32–36)
MCV RBC AUTO: 99 FL (ref 82–98)
MONOCYTES # BLD AUTO: 1 K/UL (ref 0.3–1)
MONOCYTES NFR BLD: 11.8 % (ref 4–15)
NEUTROPHILS # BLD AUTO: 6 K/UL (ref 1.8–7.7)
NEUTROPHILS NFR BLD: 68.1 % (ref 38–73)
NRBC BLD-RTO: 0 /100 WBC
PLATELET # BLD AUTO: 334 K/UL (ref 150–450)
PMV BLD AUTO: 8.5 FL (ref 9.2–12.9)
POTASSIUM SERPL-SCNC: 3.1 MMOL/L (ref 3.5–5.1)
PROT SERPL-MCNC: 6.3 G/DL (ref 6–8.4)
RBC # BLD AUTO: 3.9 M/UL (ref 4–5.4)
SODIUM SERPL-SCNC: 134 MMOL/L (ref 136–145)
WBC # BLD AUTO: 8.84 K/UL (ref 3.9–12.7)

## 2022-04-13 PROCEDURE — 88302 TISSUE EXAM BY PATHOLOGIST: CPT | Mod: 26,,, | Performed by: PATHOLOGY

## 2022-04-13 PROCEDURE — 63600175 PHARM REV CODE 636 W HCPCS: Performed by: SURGERY

## 2022-04-13 PROCEDURE — 71000033 HC RECOVERY, INTIAL HOUR: Performed by: SURGERY

## 2022-04-13 PROCEDURE — 63600175 PHARM REV CODE 636 W HCPCS: Performed by: FAMILY MEDICINE

## 2022-04-13 PROCEDURE — 99900035 HC TECH TIME PER 15 MIN (STAT)

## 2022-04-13 PROCEDURE — D9220A PRA ANESTHESIA: Mod: CRNA,,, | Performed by: NURSE ANESTHETIST, CERTIFIED REGISTERED

## 2022-04-13 PROCEDURE — C1781 MESH (IMPLANTABLE): HCPCS | Performed by: SURGERY

## 2022-04-13 PROCEDURE — D9220A PRA ANESTHESIA: Mod: ANES,,, | Performed by: ANESTHESIOLOGY

## 2022-04-13 PROCEDURE — 37000008 HC ANESTHESIA 1ST 15 MINUTES: Performed by: SURGERY

## 2022-04-13 PROCEDURE — 37000009 HC ANESTHESIA EA ADD 15 MINS: Performed by: SURGERY

## 2022-04-13 PROCEDURE — 63600175 PHARM REV CODE 636 W HCPCS: Performed by: NURSE ANESTHETIST, CERTIFIED REGISTERED

## 2022-04-13 PROCEDURE — 25000003 PHARM REV CODE 250: Performed by: SURGERY

## 2022-04-13 PROCEDURE — 99233 SBSQ HOSP IP/OBS HIGH 50: CPT | Mod: 57,,, | Performed by: STUDENT IN AN ORGANIZED HEALTH CARE EDUCATION/TRAINING PROGRAM

## 2022-04-13 PROCEDURE — D9220A PRA ANESTHESIA: ICD-10-PCS | Mod: ANES,,, | Performed by: ANESTHESIOLOGY

## 2022-04-13 PROCEDURE — 99233 SBSQ HOSP IP/OBS HIGH 50: CPT | Mod: ,,, | Performed by: FAMILY MEDICINE

## 2022-04-13 PROCEDURE — D9220A PRA ANESTHESIA: ICD-10-PCS | Mod: CRNA,,, | Performed by: NURSE ANESTHETIST, CERTIFIED REGISTERED

## 2022-04-13 PROCEDURE — 11000001 HC ACUTE MED/SURG PRIVATE ROOM

## 2022-04-13 PROCEDURE — 27201423 OPTIME MED/SURG SUP & DEVICES STERILE SUPPLY: Performed by: SURGERY

## 2022-04-13 PROCEDURE — 36000706: Performed by: SURGERY

## 2022-04-13 PROCEDURE — 88302 PR  SURG PATH,LEVEL II: ICD-10-PCS | Mod: 26,,, | Performed by: PATHOLOGY

## 2022-04-13 PROCEDURE — 88302 TISSUE EXAM BY PATHOLOGIST: CPT | Performed by: PATHOLOGY

## 2022-04-13 PROCEDURE — 85025 COMPLETE CBC W/AUTO DIFF WBC: CPT | Performed by: FAMILY MEDICINE

## 2022-04-13 PROCEDURE — 83735 ASSAY OF MAGNESIUM: CPT | Performed by: FAMILY MEDICINE

## 2022-04-13 PROCEDURE — 36415 COLL VENOUS BLD VENIPUNCTURE: CPT | Performed by: FAMILY MEDICINE

## 2022-04-13 PROCEDURE — 25000003 PHARM REV CODE 250: Performed by: FAMILY MEDICINE

## 2022-04-13 PROCEDURE — 80053 COMPREHEN METABOLIC PANEL: CPT | Performed by: FAMILY MEDICINE

## 2022-04-13 PROCEDURE — 25000003 PHARM REV CODE 250: Performed by: NURSE ANESTHETIST, CERTIFIED REGISTERED

## 2022-04-13 PROCEDURE — 99233 PR SUBSEQUENT HOSPITAL CARE,LEVL III: ICD-10-PCS | Mod: 57,,, | Performed by: STUDENT IN AN ORGANIZED HEALTH CARE EDUCATION/TRAINING PROGRAM

## 2022-04-13 PROCEDURE — 36000707: Performed by: SURGERY

## 2022-04-13 PROCEDURE — 49540: ICD-10-PCS | Mod: LT,,, | Performed by: SURGERY

## 2022-04-13 PROCEDURE — 49540 REPAIR LUMBAR HERNIA: CPT | Mod: LT,,, | Performed by: SURGERY

## 2022-04-13 PROCEDURE — 99233 PR SUBSEQUENT HOSPITAL CARE,LEVL III: ICD-10-PCS | Mod: ,,, | Performed by: FAMILY MEDICINE

## 2022-04-13 PROCEDURE — 63600175 PHARM REV CODE 636 W HCPCS: Performed by: ANESTHESIOLOGY

## 2022-04-13 RX ORDER — ONDANSETRON 2 MG/ML
4 INJECTION INTRAMUSCULAR; INTRAVENOUS DAILY PRN
Status: DISCONTINUED | OUTPATIENT
Start: 2022-04-13 | End: 2022-04-13 | Stop reason: HOSPADM

## 2022-04-13 RX ORDER — BUPIVACAINE HYDROCHLORIDE 2.5 MG/ML
INJECTION, SOLUTION EPIDURAL; INFILTRATION; INTRACAUDAL
Status: DISCONTINUED | OUTPATIENT
Start: 2022-04-13 | End: 2022-04-13 | Stop reason: HOSPADM

## 2022-04-13 RX ORDER — SODIUM CHLORIDE AND POTASSIUM CHLORIDE 150; 900 MG/100ML; MG/100ML
INJECTION, SOLUTION INTRAVENOUS CONTINUOUS
Status: DISCONTINUED | OUTPATIENT
Start: 2022-04-13 | End: 2022-04-13

## 2022-04-13 RX ORDER — MEPERIDINE HYDROCHLORIDE 50 MG/ML
INJECTION INTRAMUSCULAR; INTRAVENOUS; SUBCUTANEOUS
Status: DISCONTINUED | OUTPATIENT
Start: 2022-04-13 | End: 2022-04-13

## 2022-04-13 RX ORDER — OXYCODONE AND ACETAMINOPHEN 5; 325 MG/1; MG/1
1 TABLET ORAL
Status: DISCONTINUED | OUTPATIENT
Start: 2022-04-13 | End: 2022-04-13 | Stop reason: HOSPADM

## 2022-04-13 RX ORDER — PREDNISONE 1 MG/1
3 TABLET ORAL DAILY
Status: DISCONTINUED | OUTPATIENT
Start: 2022-04-13 | End: 2022-04-15 | Stop reason: HOSPADM

## 2022-04-13 RX ORDER — DOCUSATE SODIUM 100 MG/1
100 CAPSULE, LIQUID FILLED ORAL DAILY
Status: DISCONTINUED | OUTPATIENT
Start: 2022-04-13 | End: 2022-04-15 | Stop reason: HOSPADM

## 2022-04-13 RX ORDER — PROPOFOL 10 MG/ML
VIAL (ML) INTRAVENOUS
Status: DISCONTINUED | OUTPATIENT
Start: 2022-04-13 | End: 2022-04-13

## 2022-04-13 RX ORDER — ROCURONIUM BROMIDE 10 MG/ML
INJECTION, SOLUTION INTRAVENOUS
Status: DISCONTINUED | OUTPATIENT
Start: 2022-04-13 | End: 2022-04-13

## 2022-04-13 RX ORDER — MUPIROCIN 20 MG/G
OINTMENT TOPICAL 2 TIMES DAILY
Status: DISCONTINUED | OUTPATIENT
Start: 2022-04-13 | End: 2022-04-15 | Stop reason: HOSPADM

## 2022-04-13 RX ORDER — POLYETHYLENE GLYCOL 3350 17 G/17G
17 POWDER, FOR SOLUTION ORAL DAILY
Status: DISCONTINUED | OUTPATIENT
Start: 2022-04-13 | End: 2022-04-15 | Stop reason: HOSPADM

## 2022-04-13 RX ORDER — SODIUM CHLORIDE, SODIUM LACTATE, POTASSIUM CHLORIDE, CALCIUM CHLORIDE 600; 310; 30; 20 MG/100ML; MG/100ML; MG/100ML; MG/100ML
INJECTION, SOLUTION INTRAVENOUS CONTINUOUS
Status: CANCELLED | OUTPATIENT
Start: 2022-04-13

## 2022-04-13 RX ORDER — DOCUSATE SODIUM 100 MG/1
100 CAPSULE, LIQUID FILLED ORAL DAILY
Status: DISCONTINUED | OUTPATIENT
Start: 2022-04-13 | End: 2022-04-13

## 2022-04-13 RX ORDER — ACETAMINOPHEN 10 MG/ML
1000 INJECTION, SOLUTION INTRAVENOUS ONCE
Status: COMPLETED | OUTPATIENT
Start: 2022-04-13 | End: 2022-04-13

## 2022-04-13 RX ORDER — HYDROMORPHONE HYDROCHLORIDE 2 MG/ML
INJECTION, SOLUTION INTRAMUSCULAR; INTRAVENOUS; SUBCUTANEOUS
Status: DISCONTINUED | OUTPATIENT
Start: 2022-04-13 | End: 2022-04-13

## 2022-04-13 RX ORDER — MIDAZOLAM HYDROCHLORIDE 1 MG/ML
INJECTION INTRAMUSCULAR; INTRAVENOUS
Status: DISCONTINUED | OUTPATIENT
Start: 2022-04-13 | End: 2022-04-13

## 2022-04-13 RX ORDER — SODIUM CHLORIDE, SODIUM LACTATE, POTASSIUM CHLORIDE, CALCIUM CHLORIDE 600; 310; 30; 20 MG/100ML; MG/100ML; MG/100ML; MG/100ML
125 INJECTION, SOLUTION INTRAVENOUS CONTINUOUS
Status: DISCONTINUED | OUTPATIENT
Start: 2022-04-13 | End: 2022-04-13

## 2022-04-13 RX ORDER — MORPHINE SULFATE 4 MG/ML
3 INJECTION, SOLUTION INTRAMUSCULAR; INTRAVENOUS EVERY 4 HOURS PRN
Status: DISCONTINUED | OUTPATIENT
Start: 2022-04-13 | End: 2022-04-14

## 2022-04-13 RX ORDER — LIDOCAINE HYDROCHLORIDE 20 MG/ML
INJECTION, SOLUTION EPIDURAL; INFILTRATION; INTRACAUDAL; PERINEURAL
Status: DISCONTINUED | OUTPATIENT
Start: 2022-04-13 | End: 2022-04-13

## 2022-04-13 RX ORDER — MORPHINE SULFATE 4 MG/ML
2 INJECTION, SOLUTION INTRAMUSCULAR; INTRAVENOUS EVERY 5 MIN PRN
Status: DISCONTINUED | OUTPATIENT
Start: 2022-04-13 | End: 2022-04-13 | Stop reason: HOSPADM

## 2022-04-13 RX ORDER — POLYETHYLENE GLYCOL 3350 17 G/17G
17 POWDER, FOR SOLUTION ORAL DAILY
Status: DISCONTINUED | OUTPATIENT
Start: 2022-04-14 | End: 2022-04-13

## 2022-04-13 RX ORDER — BISACODYL 10 MG
10 SUPPOSITORY, RECTAL RECTAL DAILY PRN
Status: DISCONTINUED | OUTPATIENT
Start: 2022-04-13 | End: 2022-04-15 | Stop reason: HOSPADM

## 2022-04-13 RX ORDER — LIDOCAINE HYDROCHLORIDE 10 MG/ML
1 INJECTION, SOLUTION EPIDURAL; INFILTRATION; INTRACAUDAL; PERINEURAL ONCE
Status: CANCELLED | OUTPATIENT
Start: 2022-04-13 | End: 2022-04-13

## 2022-04-13 RX ADMIN — CEFTRIAXONE 1 G: 1 INJECTION, SOLUTION INTRAVENOUS at 11:04

## 2022-04-13 RX ADMIN — MIDAZOLAM HYDROCHLORIDE 3 MG: 1 INJECTION, SOLUTION INTRAMUSCULAR; INTRAVENOUS at 01:04

## 2022-04-13 RX ADMIN — POTASSIUM CHLORIDE AND SODIUM CHLORIDE: 900; 150 INJECTION, SOLUTION INTRAVENOUS at 01:04

## 2022-04-13 RX ADMIN — ACETAMINOPHEN 1000 MG: 10 INJECTION INTRAVENOUS at 02:04

## 2022-04-13 RX ADMIN — HYDROMORPHONE HYDROCHLORIDE 1 MG: 2 INJECTION INTRAMUSCULAR; INTRAVENOUS; SUBCUTANEOUS at 01:04

## 2022-04-13 RX ADMIN — LIDOCAINE HYDROCHLORIDE 100 MG: 20 INJECTION, SOLUTION EPIDURAL; INFILTRATION; INTRACAUDAL; PERINEURAL at 12:04

## 2022-04-13 RX ADMIN — DOCUSATE SODIUM 100 MG: 100 CAPSULE ORAL at 04:04

## 2022-04-13 RX ADMIN — MORPHINE SULFATE 3 MG: 4 INJECTION, SOLUTION INTRAMUSCULAR; INTRAVENOUS at 09:04

## 2022-04-13 RX ADMIN — PROPOFOL 200 MG: 10 INJECTION, EMULSION INTRAVENOUS at 12:04

## 2022-04-13 RX ADMIN — MORPHINE SULFATE 2 MG: 4 INJECTION INTRAVENOUS at 02:04

## 2022-04-13 RX ADMIN — ROCURONIUM BROMIDE 20 MG: 10 INJECTION, SOLUTION INTRAVENOUS at 12:04

## 2022-04-13 RX ADMIN — MEPERIDINE HYDROCHLORIDE 50 MG: 50 INJECTION INTRAMUSCULAR; INTRAVENOUS; SUBCUTANEOUS at 12:04

## 2022-04-13 RX ADMIN — SODIUM CHLORIDE, SODIUM LACTATE, POTASSIUM CHLORIDE, AND CALCIUM CHLORIDE: .6; .31; .03; .02 INJECTION, SOLUTION INTRAVENOUS at 05:04

## 2022-04-13 RX ADMIN — FOLIC ACID 1 MG: 1 TABLET ORAL at 08:04

## 2022-04-13 RX ADMIN — ONDANSETRON 4 MG: 2 INJECTION INTRAMUSCULAR; INTRAVENOUS at 02:04

## 2022-04-13 RX ADMIN — ONDANSETRON 4 MG: 2 INJECTION INTRAMUSCULAR; INTRAVENOUS at 12:04

## 2022-04-13 RX ADMIN — MORPHINE SULFATE 3 MG: 4 INJECTION, SOLUTION INTRAMUSCULAR; INTRAVENOUS at 05:04

## 2022-04-13 RX ADMIN — PREDNISONE 3 MG: 1 TABLET ORAL at 04:04

## 2022-04-13 RX ADMIN — MIDAZOLAM HYDROCHLORIDE 2 MG: 1 INJECTION, SOLUTION INTRAMUSCULAR; INTRAVENOUS at 12:04

## 2022-04-13 RX ADMIN — ROCURONIUM BROMIDE 30 MG: 10 INJECTION, SOLUTION INTRAVENOUS at 12:04

## 2022-04-13 RX ADMIN — DOCUSATE SODIUM 100 MG: 100 CAPSULE ORAL at 08:04

## 2022-04-13 RX ADMIN — MORPHINE SULFATE 2 MG: 4 INJECTION INTRAVENOUS at 03:04

## 2022-04-13 RX ADMIN — HYDROXYCHLOROQUINE SULFATE 200 MG: 200 TABLET, FILM COATED ORAL at 09:04

## 2022-04-13 RX ADMIN — POLYETHYLENE GLYCOL (3350) 17 G: 17 POWDER, FOR SOLUTION ORAL at 08:04

## 2022-04-13 RX ADMIN — MORPHINE SULFATE 2 MG: 4 INJECTION INTRAVENOUS at 07:04

## 2022-04-13 RX ADMIN — SUGAMMADEX 200 MG: 100 INJECTION, SOLUTION INTRAVENOUS at 01:04

## 2022-04-13 NOTE — NURSING TRANSFER
Nursing Transfer Note      4/13/2022     Reason patient is being transferred: S/P hernia repair    Transfer to 113 from PACU.     Transfer via bed    Transfer with IV fluids    Transported by this RN and ALEXANDRA Young    Medicines sent: NS & 20 meq KCL    Any special needs or follow-up needed: n/a    Chart send with patient: yes    Patient reassessed at: 4/13/22 @ 1454    Upon arrival to floor: Nursing at bedside. Bed in lowest, locked position with call light in reach.

## 2022-04-13 NOTE — ANESTHESIA PREPROCEDURE EVALUATION
04/13/2022  Janey Null is a 66 y.o., female.      Pre-op Assessment    I have reviewed the Patient Summary Reports.     I have reviewed the Nursing Notes.    I have reviewed the Medications.     Review of Systems  Anesthesia Hx:  No problems with previous Anesthesia  Neg history of prior surgery. Denies Family Hx of Anesthesia complications.   Denies Personal Hx of Anesthesia complications.   Social:  Smoker    Hematology/Oncology:  Hematology Normal   Oncology Normal     EENT/Dental:EENT/Dental Normal   Cardiovascular:  Cardiovascular Normal     Pulmonary:  Pulmonary Normal    Renal/:  Renal/ Normal     Hepatic/GI:  Hepatic/GI Normal    Musculoskeletal:  Musculoskeletal Normal    Neurological:   Neuromuscular Disease,    Endocrine:  Endocrine Normal    Dermatological:  Skin Normal    Psych:   Psychiatric History depression          Physical Exam  General: Alert    Airway:  Mallampati: II   Mouth Opening: Normal  TM Distance: Normal  Neck ROM: Normal ROM    Dental:  Intact        Anesthesia Plan  Type of Anesthesia, risks & benefits discussed:    Anesthesia Type: Gen ETT  Post Op Pain Control Plan: multimodal analgesia  Airway Plan: Direct, Post-Induction  Informed Consent: Informed consent signed with the Patient and all parties understand the risks and agree with anesthesia plan.  All questions answered. Patient consented to blood products? No  ASA Score: 2 Emergent  Day of Surgery Review of History & Physical: H&P Update referred to the surgeon/provider.    Ready For Surgery From Anesthesia Perspective.     .

## 2022-04-13 NOTE — TRANSFER OF CARE
"Anesthesia Transfer of Care Note    Patient: Janey Null    Procedure(s) Performed: Procedure(s) (LRB):  REPAIR, HERNIA--RIGHT LATERAL POSITIONING (Left)    Patient location: PACU    Anesthesia Type: general    Transport from OR: Transported from OR intubated on 100% O2 by AMBU with adequate controlled ventilation    Post pain: adequate analgesia    Post assessment: no apparent anesthetic complications    Post vital signs: stable    Level of consciousness: sedated and responds to stimulation    Nausea/Vomiting: no nausea/vomiting    Complications: none    Transfer of care protocol was followed      Last vitals:   Visit Vitals  BP (!) 154/73   Pulse 105   Temp 37.7 °C (99.9 °F) (Oral)   Resp 16   Ht 4' 9" (1.448 m)   Wt 47.6 kg (105 lb)   SpO2 99%   Breastfeeding No   BMI 22.72 kg/m²     "

## 2022-04-13 NOTE — PLAN OF CARE
Pt transferred from room 113 via bed to pre op for procedure. Earrings to left and right ears are unable to be removed due to stuck in place. Ring to right hand unable to be removed due to stuck in place. Tape placed over ring and earings. Cloraheadine wipe applied

## 2022-04-13 NOTE — ASSESSMENT & PLAN NOTE
With associate nausea, vomiting  Obstruction identified on CT  General Surgery consulted, appreciate their recommendations   NPO  IVFs  IV pain medication and antiemetics  Daily labs  To OR on 4/13 for hernia repair

## 2022-04-13 NOTE — PROGRESS NOTES
[facility] phone call message- general phone call:    Reason for call: She was supposed to receive a letter with her test results on it and has not received that yet.Please give her a call back.    Return call needed: Yes    OK to leave a message on voice mail? Yes    Primary language: English      needed? No    Call taken on April 20, 2018 at 8:11 AM by Nate Nevarez     Report called to the floor.

## 2022-04-13 NOTE — SUBJECTIVE & OBJECTIVE
Interval History: hurting in abdomen, feeling nauseated     Review of Systems   Constitutional:  Negative for fatigue and fever.   Eyes:  Negative for visual disturbance.   Respiratory:  Negative for shortness of breath.    Cardiovascular: Negative.    Gastrointestinal:  Positive for abdominal pain, constipation and nausea. Negative for vomiting.   Endocrine: Negative.    Genitourinary: Negative.    Musculoskeletal: Negative.    Skin: Negative.    Allergic/Immunologic: Negative.    Neurological: Negative.    Hematological: Negative.    Psychiatric/Behavioral: Negative.     Objective:     Vital Signs (Most Recent):  Temp: 97.9 °F (36.6 °C) (04/13/22 0710)  Pulse: 103 (04/13/22 0710)  Resp: 18 (04/13/22 0745)  BP: 133/61 (04/13/22 0710)  SpO2: 96 % (04/13/22 0710) Vital Signs (24h Range):  Temp:  [97.9 °F (36.6 °C)-99.9 °F (37.7 °C)] 97.9 °F (36.6 °C)  Pulse:  [] 103  Resp:  [16-22] 18  SpO2:  [92 %-97 %] 96 %  BP: (118-143)/(60-94) 133/61     Weight: 47.7 kg (105 lb 2.6 oz)  Body mass index is 22.76 kg/m².    Intake/Output Summary (Last 24 hours) at 4/13/2022 1003  Last data filed at 4/13/2022 0442  Gross per 24 hour   Intake 1920.73 ml   Output --   Net 1920.73 ml      Physical Exam  Vitals reviewed.   Constitutional:       General: She is not in acute distress.     Appearance: She is not toxic-appearing or diaphoretic.      Comments: Appears uncomfortable and in pain   HENT:      Head: Normocephalic and atraumatic.      Right Ear: External ear normal.      Left Ear: External ear normal.      Nose: Nose normal.      Mouth/Throat:      Mouth: Mucous membranes are moist.   Eyes:      Conjunctiva/sclera: Conjunctivae normal.   Cardiovascular:      Rate and Rhythm: Normal rate and regular rhythm.      Pulses: Normal pulses.      Heart sounds: No murmur heard.  Pulmonary:      Effort: Pulmonary effort is normal. No respiratory distress.   Abdominal:      General: There is distension.      Tenderness: There is  abdominal tenderness (lower quadrant).      Hernia: A hernia (large lumbar hernia on the left back/flank) is present.   Musculoskeletal:      Right lower leg: No edema.      Left lower leg: No edema.   Skin:     General: Skin is warm and dry.   Neurological:      Mental Status: She is alert and oriented to person, place, and time. Mental status is at baseline.   Psychiatric:         Mood and Affect: Mood normal.   All pertinent labs within the past 24 hours have been reviewed.  CBC:   Recent Labs   Lab 04/12/22  0746 04/13/22  0614   WBC 12.00 8.84   HGB 14.3 13.1   HCT 42.4 38.4    334     CMP:   Recent Labs   Lab 04/12/22  0839 04/13/22  0614    134*   K 3.2* 3.1*    103   CO2 20* 15*   GLU 90 69*   BUN 13 12   CREATININE 0.6 0.5   CALCIUM 8.9 8.6*   PROT 6.8 6.3   ALBUMIN 3.2* 2.8*   BILITOT 0.5 0.7   ALKPHOS 71 70   AST 22 22   ALT 16 14   ANIONGAP 13 16   EGFRNONAA >60.0 >60.0       Significant Labs: All pertinent labs within the past 24 hours have been reviewed.  CBC:   Recent Labs   Lab 04/12/22  0746 04/13/22  0614   WBC 12.00 8.84   HGB 14.3 13.1   HCT 42.4 38.4    334     CMP:   Recent Labs   Lab 04/12/22  0839 04/13/22  0614    134*   K 3.2* 3.1*    103   CO2 20* 15*   GLU 90 69*   BUN 13 12   CREATININE 0.6 0.5   CALCIUM 8.9 8.6*   PROT 6.8 6.3   ALBUMIN 3.2* 2.8*   BILITOT 0.5 0.7   ALKPHOS 71 70   AST 22 22   ALT 16 14   ANIONGAP 13 16   EGFRNONAA >60.0 >60.0       Significant Imaging: I have reviewed all pertinent imaging results/findings within the past 24 hours.  CT Abdomen Pelvis With Contrast   Final Result   Abnormal      1. Moderate sized hiatal hernia.   2. Large left spigelian hernia containing a prominent internal loop of colon with resulting constipation likely secondary to either delay or partial obstruction.   3. Small amount of ascites.   4. Moderate thoracolumbar levoscoliosis.   This report was flagged in Epic as abnormal.         Electronically  signed by: Velasquez Echevarria   Date:    04/12/2022   Time:    09:44

## 2022-04-13 NOTE — PROGRESS NOTES
[facility]  Progress Note    Patient Name: Janey Null  MRN: [id number]  Patient Class: IP- Inpatient   Admission Date: 4/12/2022  Length of Stay: 1 days  Attending Physician: Hannah Reese MD  Primary Care Provider: John Merchant MD        Subjective:     Principal Problem:Lumbar hernia        HPI:  65 yo-old female with past medical history of anxiety, depression, fibromyalgia, rheumatoid arthritis, osteoporosis presents to the ER with complaints of nausea and vomiting over the last 2 days.  She last had a bowel movement 4 days ago and has felt constipated since then along with some abdominal swelling.  Two days ago she developed crampy abdominal pain and then yesterday started vomiting.  Today the vomiting continued and she felt dehydrated along with worsening abdominal pain and bloated abdomen so came to the ER.  She has a known hernia but has never had this issue before.  Denies any abdominal surgeries.  In the ER, WBC normal, mild hypokalemia with potassium of 3.2, urine with bacteria and nitrate positive, LFTs normal.  CT abdomen pelvis with large left spigelian hernia containing prominent internal loop of colon with resulting constipation likely secondary to either delay or partial obstruction.  General surgery consulted and hospital team called for admission.      Overview/Hospital Course:  No notes on file    Interval History: hurting in abdomen, feeling nauseated     Review of Systems   Constitutional:  Negative for fatigue and fever.   Eyes:  Negative for visual disturbance.   Respiratory:  Negative for shortness of breath.    Cardiovascular: Negative.    Gastrointestinal:  Positive for abdominal pain, constipation and nausea. Negative for vomiting.   Endocrine: Negative.    Genitourinary: Negative.    Musculoskeletal: Negative.    Skin: Negative.    Allergic/Immunologic: Negative.    Neurological: Negative.    Hematological: Negative.     Psychiatric/Behavioral: Negative.     Objective:     Vital Signs (Most Recent):  Temp: 97.9 °F (36.6 °C) (04/13/22 0710)  Pulse: 103 (04/13/22 0710)  Resp: 18 (04/13/22 0745)  BP: 133/61 (04/13/22 0710)  SpO2: 96 % (04/13/22 0710) Vital Signs (24h Range):  Temp:  [97.9 °F (36.6 °C)-99.9 °F (37.7 °C)] 97.9 °F (36.6 °C)  Pulse:  [] 103  Resp:  [16-22] 18  SpO2:  [92 %-97 %] 96 %  BP: (118-143)/(60-94) 133/61     Weight: 47.7 kg (105 lb 2.6 oz)  Body mass index is 22.76 kg/m².    Intake/Output Summary (Last 24 hours) at 4/13/2022 1003  Last data filed at 4/13/2022 0442  Gross per 24 hour   Intake 1920.73 ml   Output --   Net 1920.73 ml      Physical Exam  Vitals reviewed.   Constitutional:       General: She is not in acute distress.     Appearance: She is not toxic-appearing or diaphoretic.      Comments: Appears uncomfortable and in pain   HENT:      Head: Normocephalic and atraumatic.      Right Ear: External ear normal.      Left Ear: External ear normal.      Nose: Nose normal.      Mouth/Throat:      Mouth: Mucous membranes are moist.   Eyes:      Conjunctiva/sclera: Conjunctivae normal.   Cardiovascular:      Rate and Rhythm: Normal rate and regular rhythm.      Pulses: Normal pulses.      Heart sounds: No murmur heard.  Pulmonary:      Effort: Pulmonary effort is normal. No respiratory distress.   Abdominal:      General: There is distension.      Tenderness: There is abdominal tenderness (lower quadrant).      Hernia: A hernia (large lumbar hernia on the left back/flank) is present.   Musculoskeletal:      Right lower leg: No edema.      Left lower leg: No edema.   Skin:     General: Skin is warm and dry.   Neurological:      Mental Status: She is alert and oriented to person, place, and time. Mental status is at baseline.   Psychiatric:         Mood and Affect: Mood normal.   All pertinent labs within the past 24 hours have been reviewed.  CBC:   Recent Labs   Lab 04/12/22  0746 04/13/22  0614   WBC  12.00 8.84   HGB 14.3 13.1   HCT 42.4 38.4    334     CMP:   Recent Labs   Lab 04/12/22  0839 04/13/22  0614    134*   K 3.2* 3.1*    103   CO2 20* 15*   GLU 90 69*   BUN 13 12   CREATININE 0.6 0.5   CALCIUM 8.9 8.6*   PROT 6.8 6.3   ALBUMIN 3.2* 2.8*   BILITOT 0.5 0.7   ALKPHOS 71 70   AST 22 22   ALT 16 14   ANIONGAP 13 16   EGFRNONAA >60.0 >60.0       Significant Labs: All pertinent labs within the past 24 hours have been reviewed.  CBC:   Recent Labs   Lab 04/12/22  0746 04/13/22  0614   WBC 12.00 8.84   HGB 14.3 13.1   HCT 42.4 38.4    334     CMP:   Recent Labs   Lab 04/12/22  0839 04/13/22  0614    134*   K 3.2* 3.1*    103   CO2 20* 15*   GLU 90 69*   BUN 13 12   CREATININE 0.6 0.5   CALCIUM 8.9 8.6*   PROT 6.8 6.3   ALBUMIN 3.2* 2.8*   BILITOT 0.5 0.7   ALKPHOS 71 70   AST 22 22   ALT 16 14   ANIONGAP 13 16   EGFRNONAA >60.0 >60.0       Significant Imaging: I have reviewed all pertinent imaging results/findings within the past 24 hours.  CT Abdomen Pelvis With Contrast   Final Result   Abnormal      1. Moderate sized hiatal hernia.   2. Large left spigelian hernia containing a prominent internal loop of colon with resulting constipation likely secondary to either delay or partial obstruction.   3. Small amount of ascites.   4. Moderate thoracolumbar levoscoliosis.   This report was flagged in Epic as abnormal.         Electronically signed by: Velasquez Echevarria   Date:    04/12/2022   Time:    09:44              Assessment/Plan:      * Lumbar hernia  With associate nausea, vomiting  Obstruction identified on CT  General Surgery consulted, appreciate their recommendations   NPO  IVFs  IV pain medication and antiemetics  Daily labs  To OR on 4/13 for hernia repair    UTI (urinary tract infection)  Continue on IV Rocephin for now        Abdominal pain  See above        VTE Risk Mitigation (From admission, onward)         Ordered     IP VTE LOW RISK PATIENT  Once          04/12/22 1209     Place sequential compression device  Until discontinued         04/12/22 1209                Discharge Planning   SHELBI:      Code Status: Full Code   Is the patient medically ready for discharge?:     Reason for patient still in hospital (select all that apply): Treatment                     Hannah Reese MD  Department of [facility]   [facility]

## 2022-04-13 NOTE — ANESTHESIA POSTPROCEDURE EVALUATION
Anesthesia Post Evaluation    Patient: Janey Null    Procedure(s) Performed: Procedure(s) (LRB):  REPAIR, HERNIA--RIGHT LATERAL POSITIONING (Left)    Final Anesthesia Type: general      Patient location during evaluation: PACU  Patient participation: Yes- Able to Participate  Level of consciousness: awake and awake and alert  Post-procedure vital signs: reviewed and stable  Pain management: adequate  Airway patency: patent    PONV status at discharge: No PONV  Anesthetic complications: no      Cardiovascular status: blood pressure returned to baseline  Respiratory status: unassisted and spontaneous ventilation  Hydration status: euvolemic  Follow-up not needed.          Vitals Value Taken Time   /61 04/13/22 1457   Temp 35.9 °C (96.6 °F) 04/13/22 1457   Pulse 96 04/13/22 1457   Resp 17 04/13/22 1457   SpO2 92 % 04/13/22 1457         Event Time   Out of Recovery 04/13/2022 14:57:00         Pain/Bryson Score: Pain Rating Prior to Med Admin: 10 (4/13/2022  2:38 PM)  Pain Rating Post Med Admin: 8 (4/13/2022  8:15 AM)  Bryson Score: 9 (4/13/2022  2:45 PM)
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
## 2022-04-13 NOTE — PROGRESS NOTES
[facility]  General Surgery  Daily Note    Patient Name: Janey Null  MRN: [id number]  Admission Date: 4/12/2022  Attending Physician: Hannah Reese MD   Consult Physician: Carly Sierra MD  Primary Care Provider: John Merchant MD    Subjective:     Principle Problem: Lumbar hernia    Last 24 hour history:  No acute events overnight.  Still no bowel movement yet.    Objective:     Vital Signs (Most Recent):  Temp: 99.9 °F (37.7 °C) (04/13/22 1121)  Pulse: 105 (04/13/22 1121)  Resp: 16 (04/13/22 1121)  BP: (!) 154/73 (04/13/22 1123)  SpO2: 99 % (04/13/22 1121) Vital Signs (24h Range):  Temp:  [97.9 °F (36.6 °C)-99.9 °F (37.7 °C)] 99.9 °F (37.7 °C)  Pulse:  [] 105  Resp:  [16-20] 16  SpO2:  [92 %-99 %] 99 %  BP: (118-167)/(60-94) 154/73     Intake/Output last 24 hours:    Intake/Output Summary (Last 24 hours) at 4/13/2022 1128  Last data filed at 4/13/2022 0442  Gross per 24 hour   Intake 1920.73 ml   Output --   Net 1920.73 ml       I/O last 3 completed shifts:  In: 2919.7 [I.V.:1920.7; IV Piggyback:999]  Out: -   No intake/output data recorded.    Weight: 47.6 kg (105 lb)  Body mass index is 22.72 kg/m².    Gen: Wd Wn female currently in NAD  Heent: Nc/At, MMM  Eyes: Perrl, Eomi  Cv: RRR, no  M/g/r  Lung: Non-labored breathing, clear bilaterally  Abd: Soft, non-tender, non-distended  Skin: No rashes bruises or abrasions  Neuro: Afocal  Ext: No cyanosis clubbing or edema  Muscular:  Left lumbar hernia with palpable stool in colon    Significant Labs:  CBC:   Recent Labs   Lab 04/13/22  0614   WBC 8.84   RBC 3.90*   HGB 13.1   HCT 38.4      MCV 99*   MCH 33.6*   MCHC 34.1     BMP:   Recent Labs   Lab 04/13/22  0614   GLU 69*   *   K 3.1*      CO2 15*   BUN 12   CREATININE 0.5   CALCIUM 8.6*   MG 1.4*     CMP:   Recent Labs   Lab 04/13/22 0614   GLU 69*   CALCIUM 8.6*   ALBUMIN 2.8*   PROT 6.3   *   K 3.1*   CO2 15*      BUN 12   CREATININE 0.5   ALKPHOS  70   ALT 14   AST 22   BILITOT 0.7     LFTs:   Recent Labs   Lab 04/13/22  0614   ALT 14   AST 22   ALKPHOS 70   BILITOT 0.7   PROT 6.3   ALBUMIN 2.8*     Coagulation: No results for input(s): LABPROT, INR, APTT in the last 168 hours.  Specimen (24h ago, onward)            None        Recent Labs   Lab 04/12/22  1045   COLORU Yellow   SPECGRAV 1.015   PHUR 7.0   PROTEINUA Negative   BACTERIA Many*   NITRITE Positive*   LEUKOCYTESUR Trace*   UROBILINOGEN Negative       Cultures:    Microbiology Results (last 7 days)     ** No results found for the last 168 hours. **          Significant Diagnostics:  I have reviewed all pertinent imaging results/findings within the past 24 hours.    Assessment:   Janey Null is a 66 y.o. female constipation and a chronic left lumbar hernia that is recurrent.    Active Diagnoses:    Diagnosis Date Noted POA    PRINCIPAL PROBLEM:  Lumbar hernia [K45.8] 04/12/2022 Yes    Abdominal pain [R10.9] 04/12/2022 Yes    UTI (urinary tract infection) [N39.0] 04/12/2022 Yes    Slow transit constipation [K59.01]  Yes      Problems Resolved During this Admission:     VTE Risk Mitigation (From admission, onward)         Ordered     IP VTE LOW RISK PATIENT  Once         04/12/22 1209     Place sequential compression device  Until discontinued         04/12/22 1209                Medical Decision Making/Plan:  Patient with large stool block burden and constipation.  She still has not had a bowel movement since admission.  She may have a partial obstruction caused by this hernia.  I have recommended to the patient surgical repair of the hernia and relief of any obstruction.  She has had a similar repair in the past.  Risks, benefits, alternatives to procedure discussed with patient and all of her questions were answered.  She agrees to proceed.    Carly Sierra MD  General Surgery  [facility]

## 2022-04-14 PROBLEM — K43.6 SPIGELIAN HERNIA WITH BOWEL OBSTRUCTION: Status: ACTIVE | Noted: 2022-04-14

## 2022-04-14 LAB
ALBUMIN SERPL BCP-MCNC: 2.3 G/DL (ref 3.5–5.2)
ALP SERPL-CCNC: 57 U/L (ref 55–135)
ALT SERPL W/O P-5'-P-CCNC: 12 U/L (ref 10–44)
ANION GAP SERPL CALC-SCNC: 12 MMOL/L (ref 8–16)
AST SERPL-CCNC: 31 U/L (ref 10–40)
BASOPHILS # BLD AUTO: 0.01 K/UL (ref 0–0.2)
BASOPHILS NFR BLD: 0.1 % (ref 0–1.9)
BILIRUB SERPL-MCNC: 0.6 MG/DL (ref 0.1–1)
BUN SERPL-MCNC: 8 MG/DL (ref 8–23)
CALCIUM SERPL-MCNC: 8.6 MG/DL (ref 8.7–10.5)
CHLORIDE SERPL-SCNC: 100 MMOL/L (ref 95–110)
CO2 SERPL-SCNC: 19 MMOL/L (ref 23–29)
CREAT SERPL-MCNC: 0.5 MG/DL (ref 0.5–1.4)
DIFFERENTIAL METHOD: ABNORMAL
EOSINOPHIL # BLD AUTO: 0 K/UL (ref 0–0.5)
EOSINOPHIL NFR BLD: 0.1 % (ref 0–8)
ERYTHROCYTE [DISTWIDTH] IN BLOOD BY AUTOMATED COUNT: 12.9 % (ref 11.5–14.5)
EST. GFR  (AFRICAN AMERICAN): >60 ML/MIN/1.73 M^2
EST. GFR  (NON AFRICAN AMERICAN): >60 ML/MIN/1.73 M^2
GLUCOSE SERPL-MCNC: 90 MG/DL (ref 70–110)
HCT VFR BLD AUTO: 35.4 % (ref 37–48.5)
HGB BLD-MCNC: 11.9 G/DL (ref 12–16)
IMM GRANULOCYTES # BLD AUTO: 0.03 K/UL (ref 0–0.04)
IMM GRANULOCYTES NFR BLD AUTO: 0.3 % (ref 0–0.5)
LYMPHOCYTES # BLD AUTO: 1.1 K/UL (ref 1–4.8)
LYMPHOCYTES NFR BLD: 10.6 % (ref 18–48)
MAGNESIUM SERPL-MCNC: 1.4 MG/DL (ref 1.6–2.6)
MCH RBC QN AUTO: 33.4 PG (ref 27–31)
MCHC RBC AUTO-ENTMCNC: 33.6 G/DL (ref 32–36)
MCV RBC AUTO: 99 FL (ref 82–98)
MONOCYTES # BLD AUTO: 1.4 K/UL (ref 0.3–1)
MONOCYTES NFR BLD: 13.4 % (ref 4–15)
NEUTROPHILS # BLD AUTO: 7.6 K/UL (ref 1.8–7.7)
NEUTROPHILS NFR BLD: 75.5 % (ref 38–73)
NRBC BLD-RTO: 0 /100 WBC
PLATELET # BLD AUTO: 328 K/UL (ref 150–450)
PMV BLD AUTO: 8.7 FL (ref 9.2–12.9)
POTASSIUM SERPL-SCNC: 3.5 MMOL/L (ref 3.5–5.1)
PROT SERPL-MCNC: 5.8 G/DL (ref 6–8.4)
RBC # BLD AUTO: 3.56 M/UL (ref 4–5.4)
SODIUM SERPL-SCNC: 131 MMOL/L (ref 136–145)
WBC # BLD AUTO: 10.12 K/UL (ref 3.9–12.7)

## 2022-04-14 PROCEDURE — 99900035 HC TECH TIME PER 15 MIN (STAT)

## 2022-04-14 PROCEDURE — 25000003 PHARM REV CODE 250: Performed by: FAMILY MEDICINE

## 2022-04-14 PROCEDURE — 83735 ASSAY OF MAGNESIUM: CPT | Performed by: SURGERY

## 2022-04-14 PROCEDURE — 63600175 PHARM REV CODE 636 W HCPCS: Performed by: SURGERY

## 2022-04-14 PROCEDURE — 85025 COMPLETE CBC W/AUTO DIFF WBC: CPT | Performed by: SURGERY

## 2022-04-14 PROCEDURE — 36415 COLL VENOUS BLD VENIPUNCTURE: CPT | Performed by: SURGERY

## 2022-04-14 PROCEDURE — 80053 COMPREHEN METABOLIC PANEL: CPT | Performed by: SURGERY

## 2022-04-14 PROCEDURE — 99024 POSTOP FOLLOW-UP VISIT: CPT | Mod: ,,, | Performed by: SURGERY

## 2022-04-14 PROCEDURE — 11000001 HC ACUTE MED/SURG PRIVATE ROOM

## 2022-04-14 PROCEDURE — 25000003 PHARM REV CODE 250: Performed by: HOSPITALIST

## 2022-04-14 PROCEDURE — 25000003 PHARM REV CODE 250: Performed by: SURGERY

## 2022-04-14 PROCEDURE — 99024 PR POST-OP FOLLOW-UP VISIT: ICD-10-PCS | Mod: ,,, | Performed by: SURGERY

## 2022-04-14 PROCEDURE — 99232 SBSQ HOSP IP/OBS MODERATE 35: CPT | Mod: ,,, | Performed by: HOSPITALIST

## 2022-04-14 PROCEDURE — 99232 PR SUBSEQUENT HOSPITAL CARE,LEVL II: ICD-10-PCS | Mod: ,,, | Performed by: HOSPITALIST

## 2022-04-14 PROCEDURE — 63600175 PHARM REV CODE 636 W HCPCS: Performed by: HOSPITALIST

## 2022-04-14 RX ORDER — HYDROCODONE BITARTRATE AND ACETAMINOPHEN 7.5; 325 MG/1; MG/1
1 TABLET ORAL EVERY 6 HOURS PRN
Status: DISCONTINUED | OUTPATIENT
Start: 2022-04-14 | End: 2022-04-15 | Stop reason: HOSPADM

## 2022-04-14 RX ORDER — MORPHINE SULFATE 4 MG/ML
3 INJECTION, SOLUTION INTRAMUSCULAR; INTRAVENOUS EVERY 4 HOURS PRN
Status: DISCONTINUED | OUTPATIENT
Start: 2022-04-14 | End: 2022-04-14

## 2022-04-14 RX ORDER — CYCLOBENZAPRINE HCL 5 MG
10 TABLET ORAL 3 TIMES DAILY
Status: DISCONTINUED | OUTPATIENT
Start: 2022-04-14 | End: 2022-04-15 | Stop reason: HOSPADM

## 2022-04-14 RX ORDER — MORPHINE SULFATE 4 MG/ML
2 INJECTION, SOLUTION INTRAMUSCULAR; INTRAVENOUS EVERY 4 HOURS PRN
Status: DISCONTINUED | OUTPATIENT
Start: 2022-04-14 | End: 2022-04-15 | Stop reason: HOSPADM

## 2022-04-14 RX ADMIN — MAGNESIUM OXIDE TAB 400 MG (241.3 MG ELEMENTAL MG) 800 MG: 400 (241.3 MG) TAB at 10:04

## 2022-04-14 RX ADMIN — CEFTRIAXONE 1 G: 1 INJECTION, SOLUTION INTRAVENOUS at 10:04

## 2022-04-14 RX ADMIN — SODIUM CHLORIDE, SODIUM LACTATE, POTASSIUM CHLORIDE, AND CALCIUM CHLORIDE: .6; .31; .03; .02 INJECTION, SOLUTION INTRAVENOUS at 09:04

## 2022-04-14 RX ADMIN — MORPHINE SULFATE 2 MG: 4 INJECTION, SOLUTION INTRAMUSCULAR; INTRAVENOUS at 02:04

## 2022-04-14 RX ADMIN — Medication 800 MG: at 06:04

## 2022-04-14 RX ADMIN — MORPHINE SULFATE 3 MG: 4 INJECTION, SOLUTION INTRAMUSCULAR; INTRAVENOUS at 05:04

## 2022-04-14 RX ADMIN — SODIUM CHLORIDE, SODIUM LACTATE, POTASSIUM CHLORIDE, AND CALCIUM CHLORIDE: .6; .31; .03; .02 INJECTION, SOLUTION INTRAVENOUS at 01:04

## 2022-04-14 RX ADMIN — HYDROXYCHLOROQUINE SULFATE 200 MG: 200 TABLET, FILM COATED ORAL at 09:04

## 2022-04-14 RX ADMIN — MORPHINE SULFATE 2 MG: 4 INJECTION, SOLUTION INTRAMUSCULAR; INTRAVENOUS at 08:04

## 2022-04-14 RX ADMIN — MORPHINE SULFATE 3 MG: 4 INJECTION, SOLUTION INTRAMUSCULAR; INTRAVENOUS at 10:04

## 2022-04-14 RX ADMIN — CYCLOBENZAPRINE HYDROCHLORIDE 10 MG: 5 TABLET, FILM COATED ORAL at 02:04

## 2022-04-14 RX ADMIN — CYCLOBENZAPRINE HYDROCHLORIDE 10 MG: 5 TABLET, FILM COATED ORAL at 09:04

## 2022-04-14 RX ADMIN — FOLIC ACID 1 MG: 1 TABLET ORAL at 09:04

## 2022-04-14 RX ADMIN — Medication 800 MG: at 10:04

## 2022-04-14 RX ADMIN — MUPIROCIN: 20 OINTMENT TOPICAL at 09:04

## 2022-04-14 RX ADMIN — PREDNISONE 3 MG: 1 TABLET ORAL at 09:04

## 2022-04-14 RX ADMIN — MORPHINE SULFATE 3 MG: 4 INJECTION, SOLUTION INTRAMUSCULAR; INTRAVENOUS at 01:04

## 2022-04-14 RX ADMIN — DOCUSATE SODIUM 100 MG: 100 CAPSULE ORAL at 04:04

## 2022-04-14 RX ADMIN — POLYETHYLENE GLYCOL (3350) 17 G: 17 POWDER, FOR SOLUTION ORAL at 09:04

## 2022-04-14 NOTE — SUBJECTIVE & OBJECTIVE
Interval History:  She complains of mid back pain which is 8/10 on the pain intensity scale.  Patient has a history of back pain.  She also has occasional nausea but denies vomiting, fever, chills or dysuria.  She has not had a bowel movement.    Review of Systems   Constitutional: Negative.    HENT: Negative.     Eyes: Negative.    Respiratory: Negative.     Cardiovascular: Negative.    Gastrointestinal:  Positive for abdominal pain and nausea. Negative for diarrhea and vomiting.   Endocrine: Negative.    Genitourinary: Negative.    Musculoskeletal:  Positive for back pain.   Skin: Negative.    Allergic/Immunologic: Negative.    Neurological: Negative.    Hematological: Negative.    Psychiatric/Behavioral: Negative.     Objective:     Vital Signs (Most Recent):  Temp: 99.9 °F (37.7 °C) (04/14/22 1010)  Pulse: 108 (04/14/22 1010)  Resp: 17 (04/14/22 1010)  BP: 129/68 (04/14/22 1010)  SpO2: (!) 90 % (04/14/22 1010)   Vital Signs (24h Range):  Temp:  [96.6 °F (35.9 °C)-99.9 °F (37.7 °C)] 99.9 °F (37.7 °C)  Pulse:  [] 108  Resp:  [16-32] 17  SpO2:  [90 %-100 %] 90 %  BP: (124-167)/(56-80) 129/68     Weight: 47.6 kg (105 lb)  Body mass index is 22.72 kg/m².    Intake/Output Summary (Last 24 hours) at 4/14/2022 1021  Last data filed at 4/14/2022 0543  Gross per 24 hour   Intake 1240 ml   Output 1775 ml   Net -535 ml      Physical Exam  Constitutional:       Appearance: Normal appearance.   HENT:      Head: Normocephalic and atraumatic.      Right Ear: External ear normal.      Left Ear: External ear normal.      Nose: Nose normal.      Mouth/Throat:      Mouth: Mucous membranes are moist.   Eyes:      Extraocular Movements: Extraocular movements intact.   Cardiovascular:      Rate and Rhythm: Normal rate and regular rhythm.      Pulses: Normal pulses.      Heart sounds: Normal heart sounds.   Pulmonary:      Effort: Pulmonary effort is normal.      Breath sounds: Normal breath sounds.   Abdominal:      General:  Abdomen is flat. Bowel sounds are normal.      Palpations: Abdomen is soft.      Tenderness: There is abdominal tenderness.   Musculoskeletal:         General: Normal range of motion.      Cervical back: Normal range of motion and neck supple.   Skin:     General: Skin is warm.      Capillary Refill: Capillary refill takes 2 to 3 seconds.   Neurological:      General: No focal deficit present.      Mental Status: She is alert and oriented to person, place, and time. Mental status is at baseline.   Psychiatric:         Mood and Affect: Mood normal.         Behavior: Behavior normal.       Significant Labs: All pertinent labs within the past 24 hours have been reviewed.    Significant Imaging: I have reviewed all pertinent imaging results/findings within the past 24 hours.

## 2022-04-14 NOTE — PLAN OF CARE
Problem: Adult Inpatient Plan of Care  Goal: Plan of Care Review  Outcome: Ongoing, Progressing  Goal: Patient-Specific Goal (Individualized)  Outcome: Ongoing, Progressing  Goal: Absence of Hospital-Acquired Illness or Injury  Outcome: Ongoing, Progressing  Goal: Optimal Comfort and Wellbeing  Outcome: Ongoing, Progressing  Goal: Readiness for Transition of Care  Outcome: Ongoing, Progressing     Problem: Infection  Goal: Absence of Infection Signs and Symptoms  Outcome: Ongoing, Progressing     Problem: Skin Injury Risk Increased  Goal: Skin Health and Integrity  Outcome: Ongoing, Progressing     Problem: Fall Injury Risk  Goal: Absence of Fall and Fall-Related Injury  Outcome: Ongoing, Progressing

## 2022-04-14 NOTE — PLAN OF CARE
04/14/22 1057   Discharge Assessment   Assessment Type Discharge Planning Assessment   Confirmed Demographics Contacted registration to update   Source of Information patient;family   When was your last doctors appointment?   (November with PCP/Rhematology)   Communicated SHELBI with patient/caregiver Date not available/Unable to determine   Reason For Admission Lumbar Hernia Surgery   Lives With spouse;child(blanco), dependent   Facility Arrived From: Home   Do you expect to return to your current living situation? Yes   Do you have help at home or someone to help you manage your care at home? Yes   Who are your caregiver(s) and their phone number(s)? Spouse, Owen Null ([phone])   Prior to hospitilization cognitive status: Alert/Oriented   Current cognitive status: Alert/Oriented   Walking or Climbing Stairs Difficulty none   Dressing/Bathing Difficulty none   Equipment Currently Used at Home none   Readmission within 30 days? No   Patient currently being followed by outpatient case management? No   Do you currently have service(s) that help you manage your care at home? No   Do you take prescription medications? Yes   Do you have prescription coverage? Yes   Coverage [employer]   Do you have any problems affording any of your prescribed medications? No   Is the patient taking medications as prescribed? yes   Who is going to help you get home at discharge? Spouse   How do you get to doctors appointments? car, drives self   Are you on dialysis? No   Discharge Plan A Home with family   Discharge Plan B Home with family   DME Needed Upon Discharge  none   Discharge Plan discussed with: Patient;Spouse/sig other   Name(s) and Number(s) Owen Null (Spouse) [phone]   Discharge Barriers Identified None   Relationship/Environment   Name(s) of Who Lives With Patient Oewn Null (Spouse) [phone] and 7 year old child   SW spoke with patient to complete discharge planning assessment.  Patient in room with supportive  spouse; is experiencing pain from surgery and receiving pain medication.  Patient is alert and oriented and independent of ADL's at baseline.  She will have support and assistance from spouse as she recuperates at home from surgery.  Patient's plan is to return home with spouse after surgery.  Also in the home is their 7 year old daughter.  No need are anticipated other than assistance with scheduling needed follow up appointments.    SW/Case Management will continue to follow.

## 2022-04-14 NOTE — PROGRESS NOTES
[facility] Medicine  Progress Note    Patient Name: Janey Null  MRN: [id number]  Patient Class: IP- Inpatient   Admission Date: 4/12/2022  Length of Stay: 2 days  Attending Physician: Hannah Reese MD  Primary Care Provider: John Merchant MD        Subjective:     Principal Problem:Lumbar hernia        HPI:  65 yo-old female with past medical history of anxiety, depression, fibromyalgia, rheumatoid arthritis, osteoporosis presents to the ER with complaints of nausea and vomiting over the last 2 days.  She last had a bowel movement 4 days ago and has felt constipated since then along with some abdominal swelling.  Two days ago she developed crampy abdominal pain and then yesterday started vomiting.  Today the vomiting continued and she felt dehydrated along with worsening abdominal pain and bloated abdomen so came to the ER.  She has a known hernia but has never had this issue before.  Denies any abdominal surgeries.  In the ER, WBC normal, mild hypokalemia with potassium of 3.2, urine with bacteria and nitrate positive, LFTs normal.  CT abdomen pelvis with large left spigelian hernia containing prominent internal loop of colon with resulting constipation likely secondary to either delay or partial obstruction.  General surgery consulted and hospital team called for admission.      Overview/Hospital Course:  No notes on file    Interval History:  She complains of mid back pain which is 8/10 on the pain intensity scale.  Patient has a history of back pain.  She also has occasional nausea but denies vomiting, fever, chills or dysuria.  She has not had a bowel movement.    Review of Systems   Constitutional: Negative.    HENT: Negative.     Eyes: Negative.    Respiratory: Negative.     Cardiovascular: Negative.    Gastrointestinal:  Positive for abdominal pain and nausea. Negative for diarrhea and vomiting.   Endocrine: Negative.    Genitourinary: Negative.    Musculoskeletal:   Positive for back pain.   Skin: Negative.    Allergic/Immunologic: Negative.    Neurological: Negative.    Hematological: Negative.    Psychiatric/Behavioral: Negative.     Objective:     Vital Signs (Most Recent):  Temp: 99.9 °F (37.7 °C) (04/14/22 1010)  Pulse: 108 (04/14/22 1010)  Resp: 17 (04/14/22 1010)  BP: 129/68 (04/14/22 1010)  SpO2: (!) 90 % (04/14/22 1010)   Vital Signs (24h Range):  Temp:  [96.6 °F (35.9 °C)-99.9 °F (37.7 °C)] 99.9 °F (37.7 °C)  Pulse:  [] 108  Resp:  [16-32] 17  SpO2:  [90 %-100 %] 90 %  BP: (124-167)/(56-80) 129/68     Weight: 47.6 kg (105 lb)  Body mass index is 22.72 kg/m².    Intake/Output Summary (Last 24 hours) at 4/14/2022 1021  Last data filed at 4/14/2022 0543  Gross per 24 hour   Intake 1240 ml   Output 1775 ml   Net -535 ml      Physical Exam  Constitutional:       Appearance: Normal appearance.   HENT:      Head: Normocephalic and atraumatic.      Right Ear: External ear normal.      Left Ear: External ear normal.      Nose: Nose normal.      Mouth/Throat:      Mouth: Mucous membranes are moist.   Eyes:      Extraocular Movements: Extraocular movements intact.   Cardiovascular:      Rate and Rhythm: Normal rate and regular rhythm.      Pulses: Normal pulses.      Heart sounds: Normal heart sounds.   Pulmonary:      Effort: Pulmonary effort is normal.      Breath sounds: Normal breath sounds.   Abdominal:      General: Abdomen is flat. Bowel sounds are normal.      Palpations: Abdomen is soft.      Tenderness: There is abdominal tenderness.   Musculoskeletal:         General: Normal range of motion.      Cervical back: Normal range of motion and neck supple.   Skin:     General: Skin is warm.      Capillary Refill: Capillary refill takes 2 to 3 seconds.   Neurological:      General: No focal deficit present.      Mental Status: She is alert and oriented to person, place, and time. Mental status is at baseline.   Psychiatric:         Mood and Affect: Mood normal.          Behavior: Behavior normal.       Significant Labs: All pertinent labs within the past 24 hours have been reviewed.    Significant Imaging: I have reviewed all pertinent imaging results/findings within the past 24 hours.      Assessment/Plan:      * Lumbar hernia  With associate nausea, vomiting  Obstruction identified on CT  General Surgery consulted, appreciate their recommendations   NPO  IVFs  IV pain medication and antiemetics  Daily labs  To OR on 4/13 for hernia repair    Spigelian hernia with bowel obstruction    Continue postoperative care    UTI (urinary tract infection)  Continue on IV Rocephin for now        Abdominal pain  See above        VTE Risk Mitigation (From admission, onward)         Ordered     IP VTE LOW RISK PATIENT  Once         04/12/22 1209     Place sequential compression device  Until discontinued         04/12/22 1209                Discharge Planning   SHELBI:      Code Status: Full Code   Is the patient medically ready for discharge?:     Reason for patient still in hospital (select all that apply): Treatment                     Issa Duron MD  Department of Hospital Medicine   [facility]

## 2022-04-14 NOTE — PROGRESS NOTES
"Surgery note.    Afebrile.  Vital signs stable since surgical intervention yesterday.  Patient feels much better today.  Abdominal pain resolved.  Pain to be expected after surgery.  No other new issues or complaints.  Tolerating a diet.  Passing flatus no bowel movement yet.    BP (!) 124/56 (BP Location: Left arm, Patient Position: Lying)   Pulse 107   Temp 99.4 °F (37.4 °C) (Oral)   Resp 20   Ht 4' 9" (1.448 m)   Wt 47.6 kg (105 lb)   SpO2 (!) 94%   Breastfeeding No   BMI 22.72 kg/m²   Abdomen surgical sites left flank clean dry intact no signs or symptoms of infection.  Soft nondistended tenderness to be expected on left side after surgical intervention.    Assessment plan 66-year-old female status post lumbar/flank hernia, recurrent, incarcerated with colon repair with large bowel obstruction related to hernia.    Continue current course.  Continue liquid diet.  Advance diet as patient tolerates.  Once patient has a good bowel movement, she may be able to be discharged home.  Lifting precautions no heavy lifting greater than 10 lb.  Follow-up surgery clinic 2 weeks.  "

## 2022-04-14 NOTE — PLAN OF CARE
04/14/22 1057   Medicare Message   Important Message from Medicare regarding Discharge Appeal Rights Given to patient/caregiver;Explained to patient/caregiver;Signed/date by patient/caregiver   Date IMM was signed 04/14/22   Time IMM was signed 1057

## 2022-04-15 VITALS
DIASTOLIC BLOOD PRESSURE: 72 MMHG | HEIGHT: 57 IN | SYSTOLIC BLOOD PRESSURE: 141 MMHG | RESPIRATION RATE: 17 BRPM | HEART RATE: 107 BPM | WEIGHT: 105 LBS | BODY MASS INDEX: 22.65 KG/M2 | OXYGEN SATURATION: 95 % | TEMPERATURE: 99 F

## 2022-04-15 PROBLEM — N39.0 UTI (URINARY TRACT INFECTION): Status: RESOLVED | Noted: 2022-04-12 | Resolved: 2022-04-15

## 2022-04-15 PROBLEM — K45.8 LUMBAR HERNIA: Status: RESOLVED | Noted: 2022-04-12 | Resolved: 2022-04-15

## 2022-04-15 PROBLEM — K43.6 SPIGELIAN HERNIA WITH BOWEL OBSTRUCTION: Status: RESOLVED | Noted: 2022-04-14 | Resolved: 2022-04-15

## 2022-04-15 LAB
ALBUMIN SERPL BCP-MCNC: 2 G/DL (ref 3.5–5.2)
ALP SERPL-CCNC: 78 U/L (ref 55–135)
ALT SERPL W/O P-5'-P-CCNC: 13 U/L (ref 10–44)
ANION GAP SERPL CALC-SCNC: 12 MMOL/L (ref 8–16)
AST SERPL-CCNC: 25 U/L (ref 10–40)
BASOPHILS # BLD AUTO: 0.01 K/UL (ref 0–0.2)
BASOPHILS NFR BLD: 0.1 % (ref 0–1.9)
BILIRUB SERPL-MCNC: 0.6 MG/DL (ref 0.1–1)
BUN SERPL-MCNC: 6 MG/DL (ref 8–23)
CALCIUM SERPL-MCNC: 7.9 MG/DL (ref 8.7–10.5)
CHLORIDE SERPL-SCNC: 97 MMOL/L (ref 95–110)
CO2 SERPL-SCNC: 20 MMOL/L (ref 23–29)
CREAT SERPL-MCNC: 0.4 MG/DL (ref 0.5–1.4)
DIFFERENTIAL METHOD: ABNORMAL
EOSINOPHIL # BLD AUTO: 0 K/UL (ref 0–0.5)
EOSINOPHIL NFR BLD: 0.1 % (ref 0–8)
ERYTHROCYTE [DISTWIDTH] IN BLOOD BY AUTOMATED COUNT: 12.5 % (ref 11.5–14.5)
EST. GFR  (AFRICAN AMERICAN): >60 ML/MIN/1.73 M^2
EST. GFR  (NON AFRICAN AMERICAN): >60 ML/MIN/1.73 M^2
GLUCOSE SERPL-MCNC: 115 MG/DL (ref 70–110)
HCT VFR BLD AUTO: 34.5 % (ref 37–48.5)
HGB BLD-MCNC: 11.8 G/DL (ref 12–16)
IMM GRANULOCYTES # BLD AUTO: 0.05 K/UL (ref 0–0.04)
IMM GRANULOCYTES NFR BLD AUTO: 0.5 % (ref 0–0.5)
LYMPHOCYTES # BLD AUTO: 0.9 K/UL (ref 1–4.8)
LYMPHOCYTES NFR BLD: 8.7 % (ref 18–48)
MAGNESIUM SERPL-MCNC: 1.4 MG/DL (ref 1.6–2.6)
MCH RBC QN AUTO: 33.1 PG (ref 27–31)
MCHC RBC AUTO-ENTMCNC: 34.2 G/DL (ref 32–36)
MCV RBC AUTO: 97 FL (ref 82–98)
MONOCYTES # BLD AUTO: 0.9 K/UL (ref 0.3–1)
MONOCYTES NFR BLD: 8.5 % (ref 4–15)
NEUTROPHILS # BLD AUTO: 8.6 K/UL (ref 1.8–7.7)
NEUTROPHILS NFR BLD: 82.1 % (ref 38–73)
NRBC BLD-RTO: 0 /100 WBC
PLATELET # BLD AUTO: 317 K/UL (ref 150–450)
PMV BLD AUTO: 8.8 FL (ref 9.2–12.9)
POTASSIUM SERPL-SCNC: 3 MMOL/L (ref 3.5–5.1)
PROT SERPL-MCNC: 5 G/DL (ref 6–8.4)
RBC # BLD AUTO: 3.56 M/UL (ref 4–5.4)
SODIUM SERPL-SCNC: 129 MMOL/L (ref 136–145)
WBC # BLD AUTO: 10.48 K/UL (ref 3.9–12.7)

## 2022-04-15 PROCEDURE — 1111F PR DISCHARGE MEDS RECONCILED W/ CURRENT OUTPATIENT MED LIST: ICD-10-PCS | Mod: CPTII,,, | Performed by: HOSPITALIST

## 2022-04-15 PROCEDURE — 25000003 PHARM REV CODE 250: Performed by: SURGERY

## 2022-04-15 PROCEDURE — 99238 HOSP IP/OBS DSCHRG MGMT 30/<: CPT | Mod: ,,, | Performed by: HOSPITALIST

## 2022-04-15 PROCEDURE — 25000003 PHARM REV CODE 250: Performed by: HOSPITALIST

## 2022-04-15 PROCEDURE — 1111F DSCHRG MED/CURRENT MED MERGE: CPT | Mod: CPTII,,, | Performed by: HOSPITALIST

## 2022-04-15 PROCEDURE — 63600175 PHARM REV CODE 636 W HCPCS: Performed by: SURGERY

## 2022-04-15 PROCEDURE — 51798 US URINE CAPACITY MEASURE: CPT

## 2022-04-15 PROCEDURE — 94761 N-INVAS EAR/PLS OXIMETRY MLT: CPT

## 2022-04-15 PROCEDURE — 85025 COMPLETE CBC W/AUTO DIFF WBC: CPT | Performed by: SURGERY

## 2022-04-15 PROCEDURE — 80053 COMPREHEN METABOLIC PANEL: CPT | Performed by: SURGERY

## 2022-04-15 PROCEDURE — 63600175 PHARM REV CODE 636 W HCPCS: Performed by: FAMILY MEDICINE

## 2022-04-15 PROCEDURE — 63600175 PHARM REV CODE 636 W HCPCS: Performed by: HOSPITALIST

## 2022-04-15 PROCEDURE — 83735 ASSAY OF MAGNESIUM: CPT | Performed by: SURGERY

## 2022-04-15 PROCEDURE — 25000003 PHARM REV CODE 250: Performed by: FAMILY MEDICINE

## 2022-04-15 PROCEDURE — 36415 COLL VENOUS BLD VENIPUNCTURE: CPT | Performed by: SURGERY

## 2022-04-15 PROCEDURE — 99238 PR HOSPITAL DISCHARGE DAY,<30 MIN: ICD-10-PCS | Mod: ,,, | Performed by: HOSPITALIST

## 2022-04-15 RX ORDER — POTASSIUM CHLORIDE 750 MG/1
10 TABLET, EXTENDED RELEASE ORAL ONCE
Status: COMPLETED | OUTPATIENT
Start: 2022-04-15 | End: 2022-04-15

## 2022-04-15 RX ORDER — CIPROFLOXACIN 500 MG/1
500 TABLET ORAL 2 TIMES DAILY
Qty: 6 TABLET | Refills: 0 | Status: SHIPPED | OUTPATIENT
Start: 2022-04-15 | End: 2022-04-18

## 2022-04-15 RX ORDER — MAGNESIUM SULFATE HEPTAHYDRATE 40 MG/ML
2 INJECTION, SOLUTION INTRAVENOUS
Status: COMPLETED | OUTPATIENT
Start: 2022-04-15 | End: 2022-04-15

## 2022-04-15 RX ORDER — MAGNESIUM SULFATE HEPTAHYDRATE 40 MG/ML
2 INJECTION, SOLUTION INTRAVENOUS ONCE
Status: DISCONTINUED | OUTPATIENT
Start: 2022-04-15 | End: 2022-04-15

## 2022-04-15 RX ADMIN — SODIUM CHLORIDE, SODIUM LACTATE, POTASSIUM CHLORIDE, AND CALCIUM CHLORIDE: .6; .31; .03; .02 INJECTION, SOLUTION INTRAVENOUS at 05:04

## 2022-04-15 RX ADMIN — MORPHINE SULFATE 2 MG: 4 INJECTION, SOLUTION INTRAMUSCULAR; INTRAVENOUS at 01:04

## 2022-04-15 RX ADMIN — MAGNESIUM SULFATE 2 G: 2 INJECTION INTRAVENOUS at 10:04

## 2022-04-15 RX ADMIN — MORPHINE SULFATE 2 MG: 4 INJECTION, SOLUTION INTRAMUSCULAR; INTRAVENOUS at 10:04

## 2022-04-15 RX ADMIN — PREDNISONE 3 MG: 1 TABLET ORAL at 08:04

## 2022-04-15 RX ADMIN — POTASSIUM CHLORIDE 10 MEQ: 750 TABLET, FILM COATED, EXTENDED RELEASE ORAL at 10:04

## 2022-04-15 RX ADMIN — MORPHINE SULFATE 2 MG: 4 INJECTION, SOLUTION INTRAMUSCULAR; INTRAVENOUS at 05:04

## 2022-04-15 RX ADMIN — MAGNESIUM SULFATE 2 G: 2 INJECTION INTRAVENOUS at 12:04

## 2022-04-15 RX ADMIN — CEFTRIAXONE 1 G: 1 INJECTION, SOLUTION INTRAVENOUS at 10:04

## 2022-04-15 RX ADMIN — CYCLOBENZAPRINE HYDROCHLORIDE 10 MG: 5 TABLET, FILM COATED ORAL at 08:04

## 2022-04-15 RX ADMIN — HYDROXYCHLOROQUINE SULFATE 200 MG: 200 TABLET, FILM COATED ORAL at 08:04

## 2022-04-15 RX ADMIN — MUPIROCIN: 20 OINTMENT TOPICAL at 09:04

## 2022-04-15 RX ADMIN — FOLIC ACID 1 MG: 1 TABLET ORAL at 08:04

## 2022-04-15 RX ADMIN — POLYETHYLENE GLYCOL (3350) 17 G: 17 POWDER, FOR SOLUTION ORAL at 08:04

## 2022-04-15 NOTE — PLAN OF CARE
04/15/22 1000   Final Note   Assessment Type Final Discharge Note   Anticipated Discharge Disposition Home   What phone number can be called within the next 1-3 days to see how you are doing after discharge? [phone]   Post-Acute Status   Discharge Delays None known at this time   Patient notified of being discharged today.  is happy to go home. States he  will be at home to help her when she is at home. She denies wanting home health. [facility] she has an appointment on Tuesday with her pain management doctor & Dr Merchant in May. Will call her on Monday with follow up appointment with Dr Cornelius. Demonstrated understanding by verbal feedback. Denies any other needs at this time.

## 2022-04-15 NOTE — NURSING
New orders received for discharge, patient is agreeable with discharge. PIV removed, catheter tip intact. Dressing applied. Discharge teaching done at bedside with patient and spouse, verbalized understanding. Presciptions e-scripted to pharmacy of choice. Medications reviewed, appointments given. Will d/c home with all belongings per w/c.

## 2022-04-15 NOTE — NURSING
Dr. Duron notified patient is unable to void and bladder scan shows 113ML. Per MD encourage PO fluids.     1615--Dr. Duron notified that the patient does not want to stay until she voids that she needs to leave, educated patient that the would have to leave AMA per the MD patient verbalizes understanding on the meaning of leaving AMA and would like to sign the paperwork.     1621--AMA form reviewed with the patient and verbalizes understanding and is signed by the patient and witnessed.

## 2022-04-15 NOTE — DISCHARGE SUMMARY
[facility]  Discharge Summary      Patient Name: Janey Null  MRN: [id number]  Admission Date: 4/12/2022  Hospital Length of Stay: 3 days  Discharge Date and Time:  04/15/2022 9:55 AM  Attending Physician: Hannah Reese MD   Discharging Provider: Issa Duron MD  Primary Care Provider: John Merchant MD admitting diagnosis:    Admitting diagnosis:    1. Spigelian hernia with bowel obstruction    2. Abdominal pain    3. Urinary tract infection    3. Hypokalemia    Final diagnosis:    1. Spigelian hernia with bowel obstruction    2. Abdominal pain     3. Hypokalemia    4. Hypomagnesemia     Reason for hospitalization:  To repair of large left Spigelian hernia        HPI:  This 66 year old female who suffers from anxiety, depression, fibromyalgia, rheumatoid arthritis, and osteoporosis has been having nausea and vomiting for 2 days duration.  She has not had a bowel movement and 4 days.  She was seen and evaluated in the emergency room and CT scan of the abdomen revealed a large left SPIGELIAN hernia.  She also had hypokalemia.  Surgical service was called and recommended patient be admitted to the hospitalist service.    Procedure(s) (LRB):  REPAIR, HERNIA--RIGHT LATERAL POSITIONING (Left)      Hospital Course:  Patient was admitted and surgical consult was obtained.  She was taken to surgery on 04/13/2022 and had hernia repair.  She tolerated procedure well.  Her hypokalemia was treated with potassium chloride.  She also was treated with magnesium sulfate for hypomagnesemia .  She was given morphine and Norco for pain.  She was placed on liquid diet when she developed bowel sounds.  She had a large liquid stool this morning.  Surgical service recommend the patient be discharged and follow up in 2 weeks.  Her diet has been advanced to a soft diet.  There were not any documentation that this patient had a urinary tract infection.    Consults:   Consults (From  admission, onward)        Status Ordering Provider     Inpatient consult to General Surgery  Once        Provider:  Carly Sierra MD    Completed JEREMIAS GONG          Final Active Diagnoses:    Diagnosis Date Noted POA    Abdominal pain [R10.9] 04/12/2022 Yes    Slow transit constipation [K59.01]  Yes      Problems Resolved During this Admission:    Diagnosis Date Noted Date Resolved POA    PRINCIPAL PROBLEM:  Spigelian hernia with bowel obstruction [K43.6] 04/14/2022 04/15/2022 Unknown    Lumbar hernia [K45.8] 04/12/2022 04/15/2022 Yes    UTI (urinary tract infection) [N39.0] 04/12/2022 04/15/2022 Yes      Discharged Condition: stable    Disposition: Home or Self Care    Follow Up:   Follow-up Information     Tyrell Cornelius MD Follow up in 2 week(s).    Specialty: General Surgery  Why: someone will call with follow up appointment  Contact information:  [address] [place] [address]  [phone]                       Patient Instructions:   No discharge procedures on file.  Medications:  Reconciled Home Medications:      Medication List      START taking these medications    ciprofloxacin HCl 500 MG tablet  Commonly known as: CIPRO  Take 1 tablet (500 mg total) by mouth 2 (two) times daily. for 3 days        CONTINUE taking these medications    folic acid 1 MG tablet  Commonly known as: FOLVITE  Take 1 tablet (1 mg total) by mouth once daily.     HYDROcodone-acetaminophen  mg per tablet  Commonly known as: NORCO  Take 1 tablet by mouth every 6 (six) hours as needed.     hydrOXYchloroQUINE 200 mg tablet  Commonly known as: PLAQUENIL  TAKE 1 TABLET(200 MG) BY MOUTH TWICE DAILY     predniSONE 2.5 MG tablet  Commonly known as: DELTASONE            Significant Diagnostic Studies: Labs:   CMP   Recent Labs   Lab 04/14/22  0611 04/15/22  0641   * 129*   K 3.5 3.0*    97   CO2 19* 20*   GLU 90 115*   BUN 8 6*   CREATININE 0.5 0.4*   CALCIUM 8.6* 7.9*   PROT 5.8* 5.0*    ALBUMIN 2.3* 2.0*   BILITOT 0.6 0.6   ALKPHOS 57 78   AST 31 25   ALT 12 13   ANIONGAP 12 12   ESTGFRAFRICA >60.0 >60.0   EGFRNONAA >60.0 >60.0    and CBC   Recent Labs   Lab 04/14/22  0611 04/15/22  0641   WBC 10.12 10.48   HGB 11.9* 11.8*   HCT 35.4* 34.5*    317       Pending Diagnostic Studies:     Procedure Component Value Units Date/Time    Specimen to Pathology, Surgery General Surgery [[id number]] Collected: 04/13/22 1326    Order Status: Sent Lab Status: In process Updated: 04/14/22 0745    Specimen: Tissue         Indwelling Lines/Drains at time of discharge:   Lines/Drains/Airways     Drain  Duration                Urethral Catheter 04/13/22 1230 1 day                Time spent on the discharge of patient: 20 minutes         Issa Duron MD  Department of Hospital Medicine  [facility]

## 2022-04-15 NOTE — PLAN OF CARE
Problem: Adult Inpatient Plan of Care  Goal: Plan of Care Review  Outcome: Met     Problem: Adult Inpatient Plan of Care  Goal: Patient-Specific Goal (Individualized)  Outcome: Met     Problem: Adult Inpatient Plan of Care  Goal: Absence of Hospital-Acquired Illness or Injury  Outcome: Met     Problem: Adult Inpatient Plan of Care  Goal: Optimal Comfort and Wellbeing  Outcome: Met     Problem: Adult Inpatient Plan of Care  Goal: Readiness for Transition of Care  Outcome: Met     Problem: Infection  Goal: Absence of Infection Signs and Symptoms  Outcome: Met       Patient AAOX4, respirations even and unlabored. NADN. Patient free from falls, skin breakdown, or injury. Pain well controlled with PRN medications. Bed in lowest position, wheels locked, side rails X2, call light within reach. Purposeful rounding done.

## 2022-04-25 LAB
FINAL PATHOLOGIC DIAGNOSIS: NORMAL
GROSS: NORMAL
Lab: NORMAL

## 2022-04-28 ENCOUNTER — OFFICE VISIT (OUTPATIENT)
Dept: SURGERY | Facility: CLINIC | Age: 67
End: 2022-04-28
Payer: MEDICARE

## 2022-04-28 VITALS
DIASTOLIC BLOOD PRESSURE: 74 MMHG | WEIGHT: 99 LBS | OXYGEN SATURATION: 96 % | BODY MASS INDEX: 21.36 KG/M2 | HEART RATE: 108 BPM | HEIGHT: 57 IN | SYSTOLIC BLOOD PRESSURE: 166 MMHG

## 2022-04-28 DIAGNOSIS — Z09 POSTOP CHECK: Primary | ICD-10-CM

## 2022-04-28 PROCEDURE — 1101F PR PT FALLS ASSESS DOC 0-1 FALLS W/OUT INJ PAST YR: ICD-10-PCS | Mod: CPTII,S$GLB,, | Performed by: SURGERY

## 2022-04-28 PROCEDURE — 99999 PR PBB SHADOW E&M-EST. PATIENT-LVL III: CPT | Mod: PBBFAC,,, | Performed by: SURGERY

## 2022-04-28 PROCEDURE — 1159F MED LIST DOCD IN RCRD: CPT | Mod: CPTII,S$GLB,, | Performed by: SURGERY

## 2022-04-28 PROCEDURE — 1159F PR MEDICATION LIST DOCUMENTED IN MEDICAL RECORD: ICD-10-PCS | Mod: CPTII,S$GLB,, | Performed by: SURGERY

## 2022-04-28 PROCEDURE — 1125F AMNT PAIN NOTED PAIN PRSNT: CPT | Mod: CPTII,S$GLB,, | Performed by: SURGERY

## 2022-04-28 PROCEDURE — 1101F PT FALLS ASSESS-DOCD LE1/YR: CPT | Mod: CPTII,S$GLB,, | Performed by: SURGERY

## 2022-04-28 PROCEDURE — 99024 POSTOP FOLLOW-UP VISIT: CPT | Mod: S$GLB,,, | Performed by: SURGERY

## 2022-04-28 PROCEDURE — 3078F DIAST BP <80 MM HG: CPT | Mod: CPTII,S$GLB,, | Performed by: SURGERY

## 2022-04-28 PROCEDURE — 3078F PR MOST RECENT DIASTOLIC BLOOD PRESSURE < 80 MM HG: ICD-10-PCS | Mod: CPTII,S$GLB,, | Performed by: SURGERY

## 2022-04-28 PROCEDURE — 99999 PR PBB SHADOW E&M-EST. PATIENT-LVL III: ICD-10-PCS | Mod: PBBFAC,,, | Performed by: SURGERY

## 2022-04-28 PROCEDURE — 3288F FALL RISK ASSESSMENT DOCD: CPT | Mod: CPTII,S$GLB,, | Performed by: SURGERY

## 2022-04-28 PROCEDURE — 3077F SYST BP >= 140 MM HG: CPT | Mod: CPTII,S$GLB,, | Performed by: SURGERY

## 2022-04-28 PROCEDURE — 99024 PR POST-OP FOLLOW-UP VISIT: ICD-10-PCS | Mod: S$GLB,,, | Performed by: SURGERY

## 2022-04-28 PROCEDURE — 3008F BODY MASS INDEX DOCD: CPT | Mod: CPTII,S$GLB,, | Performed by: SURGERY

## 2022-04-28 PROCEDURE — 1125F PR PAIN SEVERITY QUANTIFIED, PAIN PRESENT: ICD-10-PCS | Mod: CPTII,S$GLB,, | Performed by: SURGERY

## 2022-04-28 PROCEDURE — 3288F PR FALLS RISK ASSESSMENT DOCUMENTED: ICD-10-PCS | Mod: CPTII,S$GLB,, | Performed by: SURGERY

## 2022-04-28 PROCEDURE — 3008F PR BODY MASS INDEX (BMI) DOCUMENTED: ICD-10-PCS | Mod: CPTII,S$GLB,, | Performed by: SURGERY

## 2022-04-28 PROCEDURE — 3077F PR MOST RECENT SYSTOLIC BLOOD PRESSURE >= 140 MM HG: ICD-10-PCS | Mod: CPTII,S$GLB,, | Performed by: SURGERY

## 2022-04-28 RX ORDER — GABAPENTIN 100 MG/1
100-300 CAPSULE ORAL NIGHTLY PRN
COMMUNITY
Start: 2022-04-21 | End: 2023-09-11

## 2022-04-28 NOTE — PROGRESS NOTES
"General Surgery  [facility]  Follow-up    HPI/Follow-up exam:  Janey Null is a 66 y.o. female presents today for follow-up examination after left flank/lumbar hernia repair.  Patient since surgeries done well.  No fevers chills.  No nausea vomiting.  Tolerating diet.  Having normal bowel function.  Surgical site clean dry intact no signs symptoms of infection.  Mild skin desiccation around the midportion of the wound proximally 1 x 2 cm.  No erythema induration or fluctuance.  No signs or symptoms of infection.  No other new issues or complaints today.      PHYSICAL EXAM:  BP (!) 166/74   Pulse 108   Ht 4' 9" (1.448 m)   Wt 44.9 kg (99 lb)   SpO2 96%   BMI 21.42 kg/m²   Gen: Wd Wn female in NAD  Heent: Nc/At, MMM  Cv: RRR  Lung: Non-labored breathing, clear bilaterally  Abd: Soft, non-tender, non-distended, left side surgical site clean dry intact no signs symptoms of infection.  Mild wound desiccation near midportion of wound 1 x 2 cm.   Ext: No cyanosis clubbing or edema    Pathology:  Benign hernia sac.    Assessment:  Janey Null is a 66 y.o. female s/p left flank/lumbar hernia repair.    Plan/Medical Decision Making:  Doing well.  No issues.  Continue lifting precautions.  Follow-up surgery clinic 2-3 weeks.    Followup:  2 3 weeks.    Patient instructed that best way to communicate with my office staff is for patient to get on the [facility] patient portal to expedite communication and communication issues that may occur.  Patient was given instructions on how to get on the portal.  I encouraged patient to obtain portal access as well.  Ultimately it is up to the patient to obtain access.  Patient voiced understanding.          "

## 2022-05-10 ENCOUNTER — HOSPITAL ENCOUNTER (EMERGENCY)
Facility: HOSPITAL | Age: 67
Discharge: HOME | End: 2022-05-10
Attending: FAMILY MEDICINE
Payer: MEDICARE

## 2022-05-10 VITALS
BODY MASS INDEX: 21.36 KG/M2 | HEIGHT: 57 IN | TEMPERATURE: 98 F | DIASTOLIC BLOOD PRESSURE: 77 MMHG | WEIGHT: 99 LBS | HEART RATE: 104 BPM | SYSTOLIC BLOOD PRESSURE: 167 MMHG | OXYGEN SATURATION: 99 % | RESPIRATION RATE: 20 BRPM

## 2022-05-10 DIAGNOSIS — R06.02 SOB (SHORTNESS OF BREATH): ICD-10-CM

## 2022-05-10 DIAGNOSIS — L08.9 WOUND INFECTION: ICD-10-CM

## 2022-05-10 DIAGNOSIS — Z48.89 ENCOUNTER FOR POST SURGICAL WOUND CHECK: Primary | ICD-10-CM

## 2022-05-10 DIAGNOSIS — T14.8XXA WOUND INFECTION: ICD-10-CM

## 2022-05-10 LAB
ALBUMIN SERPL BCP-MCNC: 2.6 G/DL (ref 3.5–5.2)
ALP SERPL-CCNC: 84 U/L (ref 55–135)
ALT SERPL W/O P-5'-P-CCNC: 20 U/L (ref 10–44)
ANION GAP SERPL CALC-SCNC: 11 MMOL/L (ref 8–16)
AST SERPL-CCNC: 22 U/L (ref 10–40)
BACTERIA #/AREA URNS HPF: ABNORMAL /HPF
BASOPHILS # BLD AUTO: 0.02 K/UL (ref 0–0.2)
BASOPHILS NFR BLD: 0.2 % (ref 0–1.9)
BILIRUB SERPL-MCNC: 0.2 MG/DL (ref 0.1–1)
BILIRUB UR QL STRIP: NEGATIVE
BUN SERPL-MCNC: 11 MG/DL (ref 8–23)
CALCIUM SERPL-MCNC: 8.8 MG/DL (ref 8.7–10.5)
CHLORIDE SERPL-SCNC: 102 MMOL/L (ref 95–110)
CLARITY UR: CLEAR
CO2 SERPL-SCNC: 24 MMOL/L (ref 23–29)
COLOR UR: YELLOW
CREAT SERPL-MCNC: 0.6 MG/DL (ref 0.5–1.4)
DIFFERENTIAL METHOD: ABNORMAL
EOSINOPHIL # BLD AUTO: 0.1 K/UL (ref 0–0.5)
EOSINOPHIL NFR BLD: 0.6 % (ref 0–8)
ERYTHROCYTE [DISTWIDTH] IN BLOOD BY AUTOMATED COUNT: 13.3 % (ref 11.5–14.5)
EST. GFR  (AFRICAN AMERICAN): >60 ML/MIN/1.73 M^2
EST. GFR  (NON AFRICAN AMERICAN): >60 ML/MIN/1.73 M^2
GLUCOSE SERPL-MCNC: 119 MG/DL (ref 70–110)
GLUCOSE UR QL STRIP: NEGATIVE
HCT VFR BLD AUTO: 33.1 % (ref 37–48.5)
HGB BLD-MCNC: 10.8 G/DL (ref 12–16)
HGB UR QL STRIP: ABNORMAL
IMM GRANULOCYTES # BLD AUTO: 0.03 K/UL (ref 0–0.04)
IMM GRANULOCYTES NFR BLD AUTO: 0.3 % (ref 0–0.5)
KETONES UR QL STRIP: NEGATIVE
LACTATE SERPL-SCNC: 1.3 MMOL/L (ref 0.5–2.2)
LEUKOCYTE ESTERASE UR QL STRIP: NEGATIVE
LYMPHOCYTES # BLD AUTO: 2.2 K/UL (ref 1–4.8)
LYMPHOCYTES NFR BLD: 22.2 % (ref 18–48)
MCH RBC QN AUTO: 32.1 PG (ref 27–31)
MCHC RBC AUTO-ENTMCNC: 32.6 G/DL (ref 32–36)
MCV RBC AUTO: 99 FL (ref 82–98)
MICROSCOPIC COMMENT: ABNORMAL
MONOCYTES # BLD AUTO: 0.7 K/UL (ref 0.3–1)
MONOCYTES NFR BLD: 7.5 % (ref 4–15)
NEUTROPHILS # BLD AUTO: 6.7 K/UL (ref 1.8–7.7)
NEUTROPHILS NFR BLD: 69.2 % (ref 38–73)
NITRITE UR QL STRIP: NEGATIVE
NRBC BLD-RTO: 0 /100 WBC
PH UR STRIP: 7 [PH] (ref 5–8)
PLATELET # BLD AUTO: 513 K/UL (ref 150–450)
PMV BLD AUTO: 8.2 FL (ref 9.2–12.9)
POTASSIUM SERPL-SCNC: 3.9 MMOL/L (ref 3.5–5.1)
PROT SERPL-MCNC: 7.3 G/DL (ref 6–8.4)
PROT UR QL STRIP: NEGATIVE
RBC # BLD AUTO: 3.36 M/UL (ref 4–5.4)
RBC #/AREA URNS HPF: 20 /HPF (ref 0–4)
SODIUM SERPL-SCNC: 137 MMOL/L (ref 136–145)
SP GR UR STRIP: 1.01 (ref 1–1.03)
URN SPEC COLLECT METH UR: ABNORMAL
UROBILINOGEN UR STRIP-ACNC: NEGATIVE EU/DL
WBC # BLD AUTO: 9.67 K/UL (ref 3.9–12.7)
WBC #/AREA URNS HPF: 1 /HPF (ref 0–5)

## 2022-05-10 PROCEDURE — 71045 X-RAY EXAM CHEST 1 VIEW: CPT | Mod: TC,FY

## 2022-05-10 PROCEDURE — 71045 X-RAY EXAM CHEST 1 VIEW: CPT | Mod: 26,,, | Performed by: RADIOLOGY

## 2022-05-10 PROCEDURE — 71045 XR CHEST AP PORTABLE: ICD-10-PCS | Mod: 26,,, | Performed by: RADIOLOGY

## 2022-05-10 PROCEDURE — 96360 HYDRATION IV INFUSION INIT: CPT

## 2022-05-10 PROCEDURE — 36415 COLL VENOUS BLD VENIPUNCTURE: CPT | Performed by: FAMILY MEDICINE

## 2022-05-10 PROCEDURE — 81000 URINALYSIS NONAUTO W/SCOPE: CPT | Performed by: FAMILY MEDICINE

## 2022-05-10 PROCEDURE — 80053 COMPREHEN METABOLIC PANEL: CPT | Performed by: FAMILY MEDICINE

## 2022-05-10 PROCEDURE — 83605 ASSAY OF LACTIC ACID: CPT | Performed by: FAMILY MEDICINE

## 2022-05-10 PROCEDURE — 25000003 PHARM REV CODE 250: Performed by: FAMILY MEDICINE

## 2022-05-10 PROCEDURE — 93010 EKG 12-LEAD: ICD-10-PCS | Mod: ,,, | Performed by: INTERNAL MEDICINE

## 2022-05-10 PROCEDURE — 87040 BLOOD CULTURE FOR BACTERIA: CPT | Performed by: FAMILY MEDICINE

## 2022-05-10 PROCEDURE — 93010 ELECTROCARDIOGRAM REPORT: CPT | Mod: ,,, | Performed by: INTERNAL MEDICINE

## 2022-05-10 PROCEDURE — 85025 COMPLETE CBC W/AUTO DIFF WBC: CPT | Performed by: FAMILY MEDICINE

## 2022-05-10 PROCEDURE — 93005 ELECTROCARDIOGRAM TRACING: CPT

## 2022-05-10 PROCEDURE — 99285 EMERGENCY DEPT VISIT HI MDM: CPT | Mod: 25

## 2022-05-10 RX ORDER — SULFAMETHOXAZOLE AND TRIMETHOPRIM 800; 160 MG/1; MG/1
1 TABLET ORAL 2 TIMES DAILY
Qty: 14 TABLET | Refills: 0 | Status: SHIPPED | OUTPATIENT
Start: 2022-05-10 | End: 2022-05-17

## 2022-05-10 RX ADMIN — SODIUM CHLORIDE 500 ML: 9 INJECTION, SOLUTION INTRAVENOUS at 03:05

## 2022-05-10 NOTE — ED PROVIDER NOTES
Encounter Date: 5/10/2022       History     Chief Complaint   Patient presents with    Post-op Problem     Lumbar hernia sx on 4/13 by Dr Cornelius. Concerned incision site is infected. +Redness +Discharge     66-year-old female presents to the ED ambulatory complaining of drainage from a surgical incision on her left buttock area this was a repair attempt per general surgeon Dr. Cornelius it was done on April 14th of this year she denies any fever chills nausea vomiting decreased appetite or excessive fatigue she has a past history anxiety depression fibromyalgia and osteoporosis        Review of patient's allergies indicates:   Allergen Reactions    Penicillins      Past Medical History:   Diagnosis Date    Anxiety     Depression     Encounter for long-term (current) use of high-risk medication     Fibromyalgia     Osteoporosis     Rheumatoid arthritis involving multiple sites with positive rheumatoid factor      Past Surgical History:   Procedure Laterality Date    REPAIR OF RECURRENT INCARCERATED INCISIONAL HERNIA Left 4/13/2022    Procedure: REPAIR, HERNIA--RIGHT LATERAL POSITIONING;  Surgeon: Tyrell Cornelius MD;  Location: [facility];  Service: General;  Laterality: Left;  left lumbar hernia    right leg       multiple surgeries ,amputation     History reviewed. No pertinent family history.  Social History     Tobacco Use    Smoking status: Current Every Day Smoker     Packs/day: 0.50     Types: Cigarettes    Smokeless tobacco: Never Used   Substance Use Topics    Alcohol use: No    Drug use: Never     Review of Systems   Constitutional: Negative for fever.   HENT: Negative for sore throat.    Respiratory: Negative for shortness of breath.    Cardiovascular: Negative for chest pain.   Gastrointestinal: Negative for nausea.   Genitourinary: Negative for dysuria.   Musculoskeletal: Positive for back pain.   Skin: Positive for wound. Negative for rash.   Neurological: Negative for weakness.    Hematological: Does not bruise/bleed easily.       Physical Exam     Initial Vitals   BP Pulse Resp Temp SpO2   05/10/22 1404 05/10/22 1402 05/10/22 1402 05/10/22 1402 05/10/22 1402   (!) 167/77 104 20 98.4 °F (36.9 °C) 99 %      MAP       --                Physical Exam    Nursing note and vitals reviewed.  Constitutional: She is not diaphoretic. No distress.   Patient is very thin   HENT:   Head: Normocephalic and atraumatic.   Right Ear: External ear normal.   Left Ear: External ear normal.   Eyes: Pupils are equal, round, and reactive to light. Right eye exhibits no discharge. Left eye exhibits no discharge.   Neck: No tracheal deviation present. No JVD present.   Cardiovascular: Exam reveals no friction rub.    No murmur heard.  Pulmonary/Chest: No stridor. No respiratory distress. She has no wheezes. She has no rales.   Abdominal: Bowel sounds are normal. She exhibits no distension.   Musculoskeletal:         General: Normal range of motion.     Neurological: She is alert.   Skin: Skin is warm.   Patient has wound dehiscence of part of the surgical wound over the left buttock with some cellulitic induration and drainage of gray shaggy purulent material this does appear to be localized   Psychiatric: She has a normal mood and affect.         ED Course   Procedures  Labs Reviewed   CBC W/ AUTO DIFFERENTIAL - Abnormal; Notable for the following components:       Result Value    RBC 3.36 (*)     Hemoglobin 10.8 (*)     Hematocrit 33.1 (*)     MCV 99 (*)     MCH 32.1 (*)     Platelets 513 (*)     MPV 8.2 (*)     All other components within normal limits   COMPREHENSIVE METABOLIC PANEL - Abnormal; Notable for the following components:    Glucose 119 (*)     Albumin 2.6 (*)     All other components within normal limits   URINALYSIS, REFLEX TO URINE CULTURE - Abnormal; Notable for the following components:    Occult Blood UA 1+ (*)     All other components within normal limits    Narrative:     Preferred Collection  Type->Urine, Clean Catch  Specimen Source->Urine   URINALYSIS MICROSCOPIC - Abnormal; Notable for the following components:    RBC, UA 20 (*)     All other components within normal limits    Narrative:     Preferred Collection Type->Urine, Clean Catch  Specimen Source->Urine   CULTURE, BLOOD   CULTURE, BLOOD   LACTIC ACID, PLASMA   LACTIC ACID, PLASMA     EKG Readings: (Independently Interpreted)   Initial Reading: No STEMI. Rhythm: Normal Sinus Rhythm. Heart Rate: 92. Ectopy: No Ectopy. Conduction: Normal. ST Segments: Normal ST Segments. T Waves: Normal.       Imaging Results          X-Ray Chest AP Portable (Final result)  Result time 05/10/22 15:38:10    Final result by Velasquez Echevarria MD (05/10/22 15:38:10)                 Impression:      Mild chronic interstitial change with dependent atelectasis at the lung bases.      Electronically signed by: Velasquez Echevarria  Date:    05/10/2022  Time:    15:38             Narrative:    EXAMINATION:  XR CHEST AP PORTABLE    CLINICAL HISTORY:  Sepsis;    TECHNIQUE:  Single frontal view of the chest was performed.    COMPARISON:  None    FINDINGS:  Mild chronic interstitial change.  Dependent discoid atelectasis at the lung bases.  No focal consolidation.    Heart size is normal.  Calcified aortic plaque.  Trachea midline.    Bony thorax intact.  Surgical clips project over the left lung and left axilla.                                 Medications   sodium chloride 0.9% bolus 500 mL (0 mLs Intravenous Stopped 5/10/22 1640)                 ED Course as of 05/10/22 1731   Tue May 10, 2022   1506 The full 30 cc/kg of recommended fluids will not be given at this time I will give her bolus doses of of 500 cc normal saline as her vital signs are relatively normal and she is very tiny individual [WK]   9401 Message left on Dr. Cornelius answering phone, he is not on-call today [WK]      ED Course User Index  [WK] Jerson Gilbert MD             Clinical Impression:   Final  diagnoses:  [R06.02] SOB (shortness of breath)  [Z48.89] Encounter for post surgical wound check (Primary)  [T14.8XXA, L08.9] Wound infection          ED Disposition Condition    Discharge Stable        ED Prescriptions     Medication Sig Dispense Start Date End Date Auth. Provider    sulfamethoxazole-trimethoprim 800-160mg (BACTRIM DS) 800-160 mg Tab Take 1 tablet by mouth 2 (two) times daily. for 7 days 14 tablet 5/10/2022 5/17/2022 Jerson Gilbert MD        Follow-up Information    None          Jerson Gilbert MD  05/10/22 9664

## 2022-05-10 NOTE — ED TRIAGE NOTES
Lumbar Hernia sx by Dr Cornelius on 4/13. Concerned incision site is infected after dressing change this morning. +Redness +Discharge

## 2022-05-15 LAB
BACTERIA BLD CULT: NORMAL
BACTERIA BLD CULT: NORMAL

## 2022-05-17 ENCOUNTER — OFFICE VISIT (OUTPATIENT)
Dept: SURGERY | Facility: CLINIC | Age: 67
End: 2022-05-17
Payer: MEDICARE

## 2022-05-17 VITALS
WEIGHT: 98 LBS | BODY MASS INDEX: 21.14 KG/M2 | OXYGEN SATURATION: 95 % | SYSTOLIC BLOOD PRESSURE: 158 MMHG | HEIGHT: 57 IN | RESPIRATION RATE: 17 BRPM | DIASTOLIC BLOOD PRESSURE: 66 MMHG | HEART RATE: 97 BPM

## 2022-05-17 DIAGNOSIS — Z09 POSTOP CHECK: Primary | ICD-10-CM

## 2022-05-17 PROCEDURE — 1159F PR MEDICATION LIST DOCUMENTED IN MEDICAL RECORD: ICD-10-PCS | Mod: CPTII,S$GLB,, | Performed by: SURGERY

## 2022-05-17 PROCEDURE — 99999 PR PBB SHADOW E&M-EST. PATIENT-LVL III: CPT | Mod: PBBFAC,,, | Performed by: SURGERY

## 2022-05-17 PROCEDURE — 99024 POSTOP FOLLOW-UP VISIT: CPT | Mod: S$GLB,,, | Performed by: SURGERY

## 2022-05-17 PROCEDURE — 99999 PR PBB SHADOW E&M-EST. PATIENT-LVL III: ICD-10-PCS | Mod: PBBFAC,,, | Performed by: SURGERY

## 2022-05-17 PROCEDURE — 3008F BODY MASS INDEX DOCD: CPT | Mod: CPTII,S$GLB,, | Performed by: SURGERY

## 2022-05-17 PROCEDURE — 3008F PR BODY MASS INDEX (BMI) DOCUMENTED: ICD-10-PCS | Mod: CPTII,S$GLB,, | Performed by: SURGERY

## 2022-05-17 PROCEDURE — 1126F AMNT PAIN NOTED NONE PRSNT: CPT | Mod: CPTII,S$GLB,, | Performed by: SURGERY

## 2022-05-17 PROCEDURE — 3078F PR MOST RECENT DIASTOLIC BLOOD PRESSURE < 80 MM HG: ICD-10-PCS | Mod: CPTII,S$GLB,, | Performed by: SURGERY

## 2022-05-17 PROCEDURE — 99024 PR POST-OP FOLLOW-UP VISIT: ICD-10-PCS | Mod: S$GLB,,, | Performed by: SURGERY

## 2022-05-17 PROCEDURE — 1126F PR PAIN SEVERITY QUANTIFIED, NO PAIN PRESENT: ICD-10-PCS | Mod: CPTII,S$GLB,, | Performed by: SURGERY

## 2022-05-17 PROCEDURE — 3077F SYST BP >= 140 MM HG: CPT | Mod: CPTII,S$GLB,, | Performed by: SURGERY

## 2022-05-17 PROCEDURE — 3078F DIAST BP <80 MM HG: CPT | Mod: CPTII,S$GLB,, | Performed by: SURGERY

## 2022-05-17 PROCEDURE — 1159F MED LIST DOCD IN RCRD: CPT | Mod: CPTII,S$GLB,, | Performed by: SURGERY

## 2022-05-17 PROCEDURE — 3077F PR MOST RECENT SYSTOLIC BLOOD PRESSURE >= 140 MM HG: ICD-10-PCS | Mod: CPTII,S$GLB,, | Performed by: SURGERY

## 2022-05-17 NOTE — PROGRESS NOTES
"General Surgery  [facility]  Follow-up    HPI/Follow-up exam:  Janey Null is a 66 y.o. female  Presents today for follow-up examination after left flank/ lumbar hernia repair.  Patient since repairs done well.  No evidence of recurrence.  Patient had a small area of superficial wound dehiscence at last visit.  Patient with fibrous necrosis at this site.  No erythema induration fluctuance.  No purulent drainage.  No other new issues or complaints today.  She presented the ER initiated on Bactrim.    PHYSICAL EXAM:  BP (!) 158/66   Pulse 97   Resp 17   Ht 4' 9" (1.448 m)   Wt 44.5 kg (98 lb)   SpO2 95%   BMI 21.21 kg/m²   Gen: Wd Wn female in NAD  Heent: Nc/At, MMM  Cv: RRR  Lung: Non-labored breathing, clear bilaterally  Abd: Soft, non-tender, non-distended,   Left flank procedural site with 3 cm x 1 cm area of fibrinous exudate.  No signs or symptoms of infection no erythema induration or fluctuance.  No evidence of recurrence of hernia.  Ext: No cyanosis clubbing or edema    Assessment:  Janey Null is a 66 y.o. female s/p   Left flank hernia repair.    Plan/Medical Decision Making:    No overt signs of infection.  Blood cultures negative.  Finished course of Bactrim.  Fibrous exudate is presents with mild superficial wound dehiscence however no superficial wound infection.  Debrided in clinic today.  Damp to dry dressings recommended.  Follow-up surgery clinic 3 weeks    Followup:  3 weeks.    Patient instructed that best way to communicate with my office staff is for patient to get on the [email or web address] [facility] patient portal to expedite communication and communication issues that may occur.  Patient was given instructions on how to get on the portal.  I encouraged patient to obtain portal access as well.  Ultimately it is up to the patient to obtain access.  Patient voiced understanding.          "

## 2022-05-19 ENCOUNTER — OFFICE VISIT (OUTPATIENT)
Dept: RHEUMATOLOGY | Facility: CLINIC | Age: 67
End: 2022-05-19
Payer: MEDICARE

## 2022-05-19 VITALS — BODY MASS INDEX: 21.34 KG/M2 | SYSTOLIC BLOOD PRESSURE: 154 MMHG | DIASTOLIC BLOOD PRESSURE: 76 MMHG | WEIGHT: 98.63 LBS

## 2022-05-19 DIAGNOSIS — M81.0 AGE-RELATED OSTEOPOROSIS WITHOUT CURRENT PATHOLOGICAL FRACTURE: ICD-10-CM

## 2022-05-19 DIAGNOSIS — Z79.899 ENCOUNTER FOR LONG-TERM (CURRENT) DRUG USE: ICD-10-CM

## 2022-05-19 DIAGNOSIS — M79.7 FIBROMYALGIA: ICD-10-CM

## 2022-05-19 DIAGNOSIS — M05.79 RHEUMATOID ARTHRITIS INVOLVING MULTIPLE SITES WITH POSITIVE RHEUMATOID FACTOR: Primary | ICD-10-CM

## 2022-05-19 DIAGNOSIS — M85.89 DISAPPEARING BONE DISEASE: ICD-10-CM

## 2022-05-19 PROCEDURE — 3077F SYST BP >= 140 MM HG: CPT | Mod: CPTII,S$GLB,, | Performed by: INTERNAL MEDICINE

## 2022-05-19 PROCEDURE — 1125F AMNT PAIN NOTED PAIN PRSNT: CPT | Mod: CPTII,S$GLB,, | Performed by: INTERNAL MEDICINE

## 2022-05-19 PROCEDURE — 3008F PR BODY MASS INDEX (BMI) DOCUMENTED: ICD-10-PCS | Mod: CPTII,S$GLB,, | Performed by: INTERNAL MEDICINE

## 2022-05-19 PROCEDURE — 99214 PR OFFICE/OUTPT VISIT, EST, LEVL IV, 30-39 MIN: ICD-10-PCS | Mod: S$GLB,,, | Performed by: INTERNAL MEDICINE

## 2022-05-19 PROCEDURE — 3288F FALL RISK ASSESSMENT DOCD: CPT | Mod: CPTII,S$GLB,, | Performed by: INTERNAL MEDICINE

## 2022-05-19 PROCEDURE — 1101F PT FALLS ASSESS-DOCD LE1/YR: CPT | Mod: CPTII,S$GLB,, | Performed by: INTERNAL MEDICINE

## 2022-05-19 PROCEDURE — 1159F PR MEDICATION LIST DOCUMENTED IN MEDICAL RECORD: ICD-10-PCS | Mod: CPTII,S$GLB,, | Performed by: INTERNAL MEDICINE

## 2022-05-19 PROCEDURE — 1159F MED LIST DOCD IN RCRD: CPT | Mod: CPTII,S$GLB,, | Performed by: INTERNAL MEDICINE

## 2022-05-19 PROCEDURE — 1101F PR PT FALLS ASSESS DOC 0-1 FALLS W/OUT INJ PAST YR: ICD-10-PCS | Mod: CPTII,S$GLB,, | Performed by: INTERNAL MEDICINE

## 2022-05-19 PROCEDURE — 3288F PR FALLS RISK ASSESSMENT DOCUMENTED: ICD-10-PCS | Mod: CPTII,S$GLB,, | Performed by: INTERNAL MEDICINE

## 2022-05-19 PROCEDURE — 99214 OFFICE O/P EST MOD 30 MIN: CPT | Mod: S$GLB,,, | Performed by: INTERNAL MEDICINE

## 2022-05-19 PROCEDURE — 3077F PR MOST RECENT SYSTOLIC BLOOD PRESSURE >= 140 MM HG: ICD-10-PCS | Mod: CPTII,S$GLB,, | Performed by: INTERNAL MEDICINE

## 2022-05-19 PROCEDURE — 1160F RVW MEDS BY RX/DR IN RCRD: CPT | Mod: CPTII,S$GLB,, | Performed by: INTERNAL MEDICINE

## 2022-05-19 PROCEDURE — 3078F DIAST BP <80 MM HG: CPT | Mod: CPTII,S$GLB,, | Performed by: INTERNAL MEDICINE

## 2022-05-19 PROCEDURE — 1125F PR PAIN SEVERITY QUANTIFIED, PAIN PRESENT: ICD-10-PCS | Mod: CPTII,S$GLB,, | Performed by: INTERNAL MEDICINE

## 2022-05-19 PROCEDURE — 1160F PR REVIEW ALL MEDS BY PRESCRIBER/CLIN PHARMACIST DOCUMENTED: ICD-10-PCS | Mod: CPTII,S$GLB,, | Performed by: INTERNAL MEDICINE

## 2022-05-19 PROCEDURE — 3078F PR MOST RECENT DIASTOLIC BLOOD PRESSURE < 80 MM HG: ICD-10-PCS | Mod: CPTII,S$GLB,, | Performed by: INTERNAL MEDICINE

## 2022-05-19 PROCEDURE — 3008F BODY MASS INDEX DOCD: CPT | Mod: CPTII,S$GLB,, | Performed by: INTERNAL MEDICINE

## 2022-05-19 NOTE — PROGRESS NOTES
[facility] RHEUMATOLOGY           Follow-up visit    Notes dictated to M*Modal. Please forgive any unintended errors.  Subjective:       Patient ID:   NAME: Janey Null : 1955     66 y.o. female    Referring Doc: No ref. provider found  Other Physicians:    Chief Complaint:  Rheumatoid Arthritis      HPI/Interval History:  The patient continues to experience inflammation of the right wrist.  She has not noted any new swelling.  She underwent repair of an abdominal hernia at the left mid axillary line.  This apparently became infected.  She has been managed by infectious disease and is now in wound care.  Her methotrexate was discontinued for 2 months; she just restarted it 2 weeks ago does not yet feel any improvement.      ROS:   GEN:    no fever, night sweats or weight loss  SKIN:   no rashes, bruising, or swelling, no Raynauds, no photosensitivity  HEENT: no changes in vision, no mouth ulcers, no sicca symptoms, no scalp tenderness, no jaw claudication.  CV:      no CP, PND, WOLFF, orthopnea, no palpitations  PULM: no SOB, cough, hemoptysis, sputum or pleuritic pain  GI:        no dysphagia, no GERD, no hematemesis, no abdominal pain, nausea, vomiting, constipation, diarrhea, melanotic stools, BRBPR  :      no hematuria, dysuria  NEURO: no paresthesias, headaches, acute visual disturbances  MUSCULOSKELETAL:  No red, hot, and/or swollen joints  PSYCH:   No increased insomnia, no increased anxiety, no increased depression    Past Medical/Surgical History:  Past Medical History:   Diagnosis Date    Anxiety     Depression     Encounter for long-term (current) use of high-risk medication     Fibromyalgia     Osteoporosis     Rheumatoid arthritis involving multiple sites with positive rheumatoid factor      Past Surgical History:   Procedure Laterality Date    REPAIR OF RECURRENT INCARCERATED INCISIONAL HERNIA Left 2022    Procedure: REPAIR, HERNIA--RIGHT LATERAL POSITIONING;  Surgeon:  Tyrell Cornelius MD;  Location: [facility] OR;  Service: General;  Laterality: Left;  left lumbar hernia    right leg       multiple surgeries ,amputation       Allergies:  Review of patient's allergies indicates:   Allergen Reactions    Penicillins        Social/Family History:  Social History     Socioeconomic History    Marital status:    Tobacco Use    Smoking status: Current Every Day Smoker     Packs/day: 0.50     Types: Cigarettes    Smokeless tobacco: Never Used   Substance and Sexual Activity    Alcohol use: No    Drug use: Never     History reviewed. No pertinent family history.  FAMILY HISTORY: negative for Connective Tissue Disease      Medications:    Current Outpatient Medications:     folic acid (FOLVITE) 1 MG tablet, TAKE 1 TABLET(1 MG) BY MOUTH EVERY DAY, Disp: 30 tablet, Rfl: 11    gabapentin (NEURONTIN) 100 MG capsule, Take 100-300 mg by mouth nightly as needed., Disp: , Rfl:     hydrocodone-acetaminophen 10-325mg (NORCO)  mg Tab, Take 1 tablet by mouth every 6 (six) hours as needed. , Disp: , Rfl:     hydrOXYchloroQUINE (PLAQUENIL) 200 mg tablet, TAKE 1 TABLET(200 MG) BY MOUTH TWICE DAILY, Disp: 60 tablet, Rfl: 5    predniSONE (DELTASONE) 5 MG tablet, TAKE 1 TABLET(5 MG) BY MOUTH TWICE DAILY, Disp: 60 tablet, Rfl: 5      Objective:     Vitals:  Blood pressure (!) 154/76, weight 44.7 kg (98 lb 9.6 oz).    Physical Examination:   GEN: wn/wd female in no apparent distress  SKIN: no rashes, no sclerodactyly, no Raynaud's, no periungual erythema, no digital tip ulcerations, no nailbed pitting  HEAD: no alopecia, no scalp tenderness, no temporal artery tenderness or induration.  EYES: no pallor, no icterus, PERRLA  ENT:  no thrush, no mucosal dryness or ulcerations, adequate oral hygiene & dentition.  NECK: supple x 6, no masses, no thyromegaly, no lymphadenopathy.  CV:   S1 and S2 regular, no murmurs, gallop or rubs  CHEST: Normal respiratory effort;  normal breath sounds/no  adventitious sounds. No signs of consolidation.  ABD:  Occlusive bandage in the left latissimus region at the mid axillary line; non-tender and non-distended; soft; normal bowel sounds; no rebound/guarding or tenderness. No hepatosplenomegaly.  Musculoskeletal:  No evidence of active synovitis.  No progression of deformities  EXTREM: no clubbing, cyanosis or edema. normal pulses.  NEURO:  grossly intact; motor/sensory WNL; no tremors  PSYCH:  normal mood, affect and behavior    Labs:   Lab Results   Component Value Date    WBC 9.67 05/10/2022    HGB 10.8 (L) 05/10/2022    HCT 33.1 (L) 05/10/2022    MCV 99 (H) 05/10/2022     (H) 05/10/2022   CMP@  Sodium   Date Value Ref Range Status   05/10/2022 137 136 - 145 mmol/L Final   12/06/2017 138 134 - 144 mmol/L      Potassium   Date Value Ref Range Status   05/10/2022 3.9 3.5 - 5.1 mmol/L Final     Chloride   Date Value Ref Range Status   05/10/2022 102 95 - 110 mmol/L Final   12/06/2017 103 98 - 110 mmol/L      CO2   Date Value Ref Range Status   05/10/2022 24 23 - 29 mmol/L Final     Glucose   Date Value Ref Range Status   05/10/2022 119 (H) 70 - 110 mg/dL Final   12/06/2017 110 (H) 70 - 99 mg/dL      BUN   Date Value Ref Range Status   05/10/2022 11 8 - 23 mg/dL Final     Creatinine   Date Value Ref Range Status   05/10/2022 0.6 0.5 - 1.4 mg/dL Final   12/06/2017 0.62 0.60 - 1.40 mg/dL      Calcium   Date Value Ref Range Status   05/10/2022 8.8 8.7 - 10.5 mg/dL Final     Total Protein   Date Value Ref Range Status   05/10/2022 7.3 6.0 - 8.4 g/dL Final     Albumin   Date Value Ref Range Status   05/10/2022 2.6 (L) 3.5 - 5.2 g/dL Final   12/06/2017 4.6 3.1 - 4.7 g/dL      Total Bilirubin   Date Value Ref Range Status   05/10/2022 0.2 0.1 - 1.0 mg/dL Final     Comment:     For infants and newborns, interpretation of results should be based  on gestational age, weight and in agreement with clinical  observations.    Premature Infant recommended reference ranges:  Up  to 24 hours.............<8.0 mg/dL  Up to 48 hours............<12.0 mg/dL  3-5 days..................<15.0 mg/dL  6-29 days.................<15.0 mg/dL       Alkaline Phosphatase   Date Value Ref Range Status   05/10/2022 84 55 - 135 U/L Final     AST   Date Value Ref Range Status   05/10/2022 22 10 - 40 U/L Final     ALT   Date Value Ref Range Status   05/10/2022 20 10 - 44 U/L Final     CRP   Date Value Ref Range Status   10/08/2021 0.31 <0.76 mg/dL Final     Rheumatoid Factor   Date Value Ref Range Status   12/06/2017 10.2 0.0 - 13.9 IU/mL      Comment:     Performed at: [facility], [facility] [address], [id number] Higginbotham MD, Phone:  [phone]       Radiology/Diagnostic Studies:    None    Assessment/Discussion/Plan:   66 y.o. 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  She has remained on prednisone 2.5 mg twice daily and on Plaquenil 400 mg daily  Routine follow-up blood testing including an ESR, CRP, and pre calcitonin were ordered.  She was advised to go immediately to the emergency room or to dial 911 if she develops abdominal pain, fever, chills, or notes a foul or fetid discharge from the abdominal wound.    RTC:  I will see her back in 3 months or sooner if needed        Electronically signed by John Merchant MD

## 2022-05-20 ENCOUNTER — LAB VISIT (OUTPATIENT)
Dept: LAB | Facility: HOSPITAL | Age: 67
End: 2022-05-20
Attending: INTERNAL MEDICINE
Payer: MEDICARE

## 2022-05-20 DIAGNOSIS — Z79.899 ENCOUNTER FOR LONG-TERM (CURRENT) DRUG USE: ICD-10-CM

## 2022-05-20 DIAGNOSIS — M05.79 RHEUMATOID ARTHRITIS INVOLVING MULTIPLE SITES WITH POSITIVE RHEUMATOID FACTOR: ICD-10-CM

## 2022-05-20 DIAGNOSIS — M81.0 AGE-RELATED OSTEOPOROSIS WITHOUT CURRENT PATHOLOGICAL FRACTURE: ICD-10-CM

## 2022-05-20 DIAGNOSIS — M85.89 DISAPPEARING BONE DISEASE: ICD-10-CM

## 2022-05-20 LAB
25(OH)D3+25(OH)D2 SERPL-MCNC: 21 NG/ML (ref 30–96)
ALBUMIN SERPL BCP-MCNC: 3.4 G/DL (ref 3.5–5.2)
ALP SERPL-CCNC: 70 U/L (ref 55–135)
ALT SERPL W/O P-5'-P-CCNC: 20 U/L (ref 10–44)
ANION GAP SERPL CALC-SCNC: 13 MMOL/L (ref 8–16)
AST SERPL-CCNC: 25 U/L (ref 10–40)
BASOPHILS # BLD AUTO: 0.06 K/UL (ref 0–0.2)
BASOPHILS NFR BLD: 0.4 % (ref 0–1.9)
BILIRUB SERPL-MCNC: 0.3 MG/DL (ref 0.1–1)
BUN SERPL-MCNC: 22 MG/DL (ref 8–23)
CALCIUM SERPL-MCNC: 8.6 MG/DL (ref 8.7–10.5)
CHLORIDE SERPL-SCNC: 101 MMOL/L (ref 95–110)
CO2 SERPL-SCNC: 21 MMOL/L (ref 23–29)
CREAT SERPL-MCNC: 0.5 MG/DL (ref 0.5–1.4)
CRP SERPL-MCNC: 2 MG/DL
DIFFERENTIAL METHOD: ABNORMAL
EOSINOPHIL # BLD AUTO: 0.1 K/UL (ref 0–0.5)
EOSINOPHIL NFR BLD: 0.7 % (ref 0–8)
ERYTHROCYTE [DISTWIDTH] IN BLOOD BY AUTOMATED COUNT: 14.1 % (ref 11.5–14.5)
EST. GFR  (AFRICAN AMERICAN): >60 ML/MIN/1.73 M^2
EST. GFR  (NON AFRICAN AMERICAN): >60 ML/MIN/1.73 M^2
GLUCOSE SERPL-MCNC: 105 MG/DL (ref 70–110)
HCT VFR BLD AUTO: 34.6 % (ref 37–48.5)
HGB BLD-MCNC: 11.2 G/DL (ref 12–16)
IMM GRANULOCYTES # BLD AUTO: 0.05 K/UL (ref 0–0.04)
IMM GRANULOCYTES NFR BLD AUTO: 0.4 % (ref 0–0.5)
LYMPHOCYTES # BLD AUTO: 4.8 K/UL (ref 1–4.8)
LYMPHOCYTES NFR BLD: 35.5 % (ref 18–48)
MCH RBC QN AUTO: 31.7 PG (ref 27–31)
MCHC RBC AUTO-ENTMCNC: 32.4 G/DL (ref 32–36)
MCV RBC AUTO: 98 FL (ref 82–98)
MONOCYTES # BLD AUTO: 1 K/UL (ref 0.3–1)
MONOCYTES NFR BLD: 7.3 % (ref 4–15)
NEUTROPHILS # BLD AUTO: 7.5 K/UL (ref 1.8–7.7)
NEUTROPHILS NFR BLD: 55.7 % (ref 38–73)
NRBC BLD-RTO: 0 /100 WBC
PLATELET # BLD AUTO: 455 K/UL (ref 150–450)
PMV BLD AUTO: 8.1 FL (ref 9.2–12.9)
POTASSIUM SERPL-SCNC: 3.8 MMOL/L (ref 3.5–5.1)
PROCALCITONIN SERPL IA-MCNC: <0.05 NG/ML (ref 0–0.5)
PROT SERPL-MCNC: 7.6 G/DL (ref 6–8.4)
RBC # BLD AUTO: 3.53 M/UL (ref 4–5.4)
SODIUM SERPL-SCNC: 135 MMOL/L (ref 136–145)
WBC # BLD AUTO: 13.53 K/UL (ref 3.9–12.7)

## 2022-05-20 PROCEDURE — 36415 COLL VENOUS BLD VENIPUNCTURE: CPT | Performed by: INTERNAL MEDICINE

## 2022-05-20 PROCEDURE — 84145 PROCALCITONIN (PCT): CPT | Performed by: INTERNAL MEDICINE

## 2022-05-20 PROCEDURE — 86140 C-REACTIVE PROTEIN: CPT | Performed by: INTERNAL MEDICINE

## 2022-05-20 PROCEDURE — 82306 VITAMIN D 25 HYDROXY: CPT | Performed by: INTERNAL MEDICINE

## 2022-05-20 PROCEDURE — 85025 COMPLETE CBC W/AUTO DIFF WBC: CPT | Performed by: INTERNAL MEDICINE

## 2022-05-20 PROCEDURE — 80053 COMPREHEN METABOLIC PANEL: CPT | Performed by: INTERNAL MEDICINE

## 2022-05-22 ENCOUNTER — NURSE TRIAGE (OUTPATIENT)
Dept: ADMINISTRATIVE | Facility: CLINIC | Age: 67
End: 2022-05-22
Payer: MEDICARE

## 2022-05-23 ENCOUNTER — TELEPHONE (OUTPATIENT)
Dept: SURGERY | Facility: CLINIC | Age: 67
End: 2022-05-23
Payer: MEDICARE

## 2022-05-23 NOTE — TELEPHONE ENCOUNTER
Returned call. Scheduled appointment for tomorrow.     ----- Message from Garima Dominguez sent at 5/23/2022  7:44 AM CDT -----  Contact: Patti  Type: Needs Medical Advice  Who Called: Pt daughter Patti  Symptoms (please be specific):   How long has patient had these symptoms:    Pharmacy name and phone #:    Best Call Back Number: [phone]  Additional Information: Pt daughter requesting a call back concerning the pts wound and has concerns of infection.Pt has surgery on 04/13/22.

## 2022-05-23 NOTE — TELEPHONE ENCOUNTER
Adult patient is not with the caller at this time. Caller states that she has an incision from a hernia repair. Caller states when changing the dressing there is a lot of white pus in the wound. States greenish/white pus in the wound. Surgery noted in EPic on 4/13. Was seen by surgeon on May 17th.    Reason for Disposition   [1] Pus or bad-smelling fluid draining from incision AND [2] no fever    Additional Information   Negative: [1] Major abdominal surgical incision AND [2] wound gaping open AND [3] visible internal organs   Negative: Sounds like a life-threatening emergency to the triager   Negative: Patient has a Negative Pressure Wound Therapy device   Negative: Patient is followed by a wound clinic or wound specialist for this wound   Negative: [1] Bleeding from incision AND [2] won't stop after 10 minutes of direct pressure   Negative: [1] Widespread rash AND [2] bright red, sunburn-like   Negative: Severe pain in the incision   Negative: [1] Incision gaping open AND [2] < 48 hours since wound re-opened   Negative: [1] Incision gaping open AND [2] length of opening > 2 inches (5 cm)   Negative: Patient sounds very sick or weak to the triager   Negative: Sounds like a serious complication to the triager   Negative: Fever > 100.4 F (38.0 C)   Negative: [1] Incision looks infected (spreading redness, pain) AND [2] fever > 99.5 F (37.5 C)   Negative: [1] Incision looks infected (spreading redness, pain) AND [2] large red area (> 2 in. or 5 cm)   Negative: [1] Incision looks infected (spreading redness, pain) AND [2] face wound   Negative: [1] Red streak runs from the incision AND [2] longer than 1 inch (2.5 cm)    Protocols used: POST-OP INCISION SYMPTOMS AND [id number]

## 2022-05-24 ENCOUNTER — OFFICE VISIT (OUTPATIENT)
Dept: SURGERY | Facility: CLINIC | Age: 67
End: 2022-05-24
Payer: MEDICARE

## 2022-05-24 VITALS
DIASTOLIC BLOOD PRESSURE: 81 MMHG | OXYGEN SATURATION: 95 % | SYSTOLIC BLOOD PRESSURE: 159 MMHG | HEART RATE: 104 BPM | WEIGHT: 99 LBS | HEIGHT: 57 IN | BODY MASS INDEX: 21.36 KG/M2

## 2022-05-24 DIAGNOSIS — Z09 POSTOP CHECK: Primary | ICD-10-CM

## 2022-05-24 PROCEDURE — 1125F AMNT PAIN NOTED PAIN PRSNT: CPT | Mod: CPTII,S$GLB,, | Performed by: SURGERY

## 2022-05-24 PROCEDURE — 3077F PR MOST RECENT SYSTOLIC BLOOD PRESSURE >= 140 MM HG: ICD-10-PCS | Mod: CPTII,S$GLB,, | Performed by: SURGERY

## 2022-05-24 PROCEDURE — 1159F PR MEDICATION LIST DOCUMENTED IN MEDICAL RECORD: ICD-10-PCS | Mod: CPTII,S$GLB,, | Performed by: SURGERY

## 2022-05-24 PROCEDURE — 3077F SYST BP >= 140 MM HG: CPT | Mod: CPTII,S$GLB,, | Performed by: SURGERY

## 2022-05-24 PROCEDURE — 3008F BODY MASS INDEX DOCD: CPT | Mod: CPTII,S$GLB,, | Performed by: SURGERY

## 2022-05-24 PROCEDURE — 1159F MED LIST DOCD IN RCRD: CPT | Mod: CPTII,S$GLB,, | Performed by: SURGERY

## 2022-05-24 PROCEDURE — 99024 PR POST-OP FOLLOW-UP VISIT: ICD-10-PCS | Mod: S$GLB,,, | Performed by: SURGERY

## 2022-05-24 PROCEDURE — 1125F PR PAIN SEVERITY QUANTIFIED, PAIN PRESENT: ICD-10-PCS | Mod: CPTII,S$GLB,, | Performed by: SURGERY

## 2022-05-24 PROCEDURE — 99999 PR PBB SHADOW E&M-EST. PATIENT-LVL III: ICD-10-PCS | Mod: PBBFAC,,, | Performed by: SURGERY

## 2022-05-24 PROCEDURE — 3288F PR FALLS RISK ASSESSMENT DOCUMENTED: ICD-10-PCS | Mod: CPTII,S$GLB,, | Performed by: SURGERY

## 2022-05-24 PROCEDURE — 1101F PT FALLS ASSESS-DOCD LE1/YR: CPT | Mod: CPTII,S$GLB,, | Performed by: SURGERY

## 2022-05-24 PROCEDURE — 3079F DIAST BP 80-89 MM HG: CPT | Mod: CPTII,S$GLB,, | Performed by: SURGERY

## 2022-05-24 PROCEDURE — 3008F PR BODY MASS INDEX (BMI) DOCUMENTED: ICD-10-PCS | Mod: CPTII,S$GLB,, | Performed by: SURGERY

## 2022-05-24 PROCEDURE — 1101F PR PT FALLS ASSESS DOC 0-1 FALLS W/OUT INJ PAST YR: ICD-10-PCS | Mod: CPTII,S$GLB,, | Performed by: SURGERY

## 2022-05-24 PROCEDURE — 99999 PR PBB SHADOW E&M-EST. PATIENT-LVL III: CPT | Mod: PBBFAC,,, | Performed by: SURGERY

## 2022-05-24 PROCEDURE — 99024 POSTOP FOLLOW-UP VISIT: CPT | Mod: S$GLB,,, | Performed by: SURGERY

## 2022-05-24 PROCEDURE — 3288F FALL RISK ASSESSMENT DOCD: CPT | Mod: CPTII,S$GLB,, | Performed by: SURGERY

## 2022-05-24 PROCEDURE — 3079F PR MOST RECENT DIASTOLIC BLOOD PRESSURE 80-89 MM HG: ICD-10-PCS | Mod: CPTII,S$GLB,, | Performed by: SURGERY

## 2022-05-24 NOTE — PROGRESS NOTES
Patient presents today because of concerns of wound.  Wound with mild surgical dehiscence.  There was some fibrous exudate.  No purulent drainage.  No erythema.  No induration.  No fluctuance.  No fevers or chills.  Mild fibrous exudate lightly debrided in clinic.  Continue damp to dry dressing changes.  Follow-up as scheduled previous postoperative evaluation.

## 2022-05-30 DIAGNOSIS — Z79.899 ENCOUNTER FOR LONG-TERM (CURRENT) DRUG USE: Primary | ICD-10-CM

## 2022-05-30 DIAGNOSIS — R79.89 LOW VITAMIN D LEVEL: ICD-10-CM

## 2022-05-30 RX ORDER — ERGOCALCIFEROL 1.25 MG/1
50000 CAPSULE ORAL
Qty: 12 CAPSULE | Refills: 1 | Status: SHIPPED | OUTPATIENT
Start: 2022-05-30 | End: 2022-09-02 | Stop reason: SDUPTHER

## 2022-05-30 NOTE — TELEPHONE ENCOUNTER
----- Message from John Merchant MD sent at 5/30/2022  8:59 AM CDT -----  Regarding: FW: Reminder about Janey Null [MRN: [id number]]  Her vitamin-D level is very low.  Have her start vitamin-D 63947 units weekly.  Recheck vitamin-D level in 3 months.  Thank you  ----- Message -----  From: John Merchant MD  Sent: 5/21/2022   7:40 AM CDT  To: John Merchant MD  Subject: Reminder about Janey Null [MRN:#    Vit D

## 2022-06-07 ENCOUNTER — OFFICE VISIT (OUTPATIENT)
Dept: SURGERY | Facility: CLINIC | Age: 67
End: 2022-06-07
Payer: MEDICARE

## 2022-06-07 VITALS
SYSTOLIC BLOOD PRESSURE: 150 MMHG | HEIGHT: 57 IN | WEIGHT: 99 LBS | DIASTOLIC BLOOD PRESSURE: 78 MMHG | OXYGEN SATURATION: 97 % | BODY MASS INDEX: 21.36 KG/M2 | RESPIRATION RATE: 17 BRPM | HEART RATE: 97 BPM

## 2022-06-07 DIAGNOSIS — Z09 POSTOP CHECK: Primary | ICD-10-CM

## 2022-06-07 PROCEDURE — 3008F PR BODY MASS INDEX (BMI) DOCUMENTED: ICD-10-PCS | Mod: CPTII,S$GLB,, | Performed by: SURGERY

## 2022-06-07 PROCEDURE — 1126F PR PAIN SEVERITY QUANTIFIED, NO PAIN PRESENT: ICD-10-PCS | Mod: CPTII,S$GLB,, | Performed by: SURGERY

## 2022-06-07 PROCEDURE — 1101F PR PT FALLS ASSESS DOC 0-1 FALLS W/OUT INJ PAST YR: ICD-10-PCS | Mod: CPTII,S$GLB,, | Performed by: SURGERY

## 2022-06-07 PROCEDURE — 3078F DIAST BP <80 MM HG: CPT | Mod: CPTII,S$GLB,, | Performed by: SURGERY

## 2022-06-07 PROCEDURE — 99999 PR PBB SHADOW E&M-EST. PATIENT-LVL III: ICD-10-PCS | Mod: PBBFAC,,, | Performed by: SURGERY

## 2022-06-07 PROCEDURE — 99999 PR PBB SHADOW E&M-EST. PATIENT-LVL III: CPT | Mod: PBBFAC,,, | Performed by: SURGERY

## 2022-06-07 PROCEDURE — 99024 PR POST-OP FOLLOW-UP VISIT: ICD-10-PCS | Mod: S$GLB,,, | Performed by: SURGERY

## 2022-06-07 PROCEDURE — 3008F BODY MASS INDEX DOCD: CPT | Mod: CPTII,S$GLB,, | Performed by: SURGERY

## 2022-06-07 PROCEDURE — 1159F MED LIST DOCD IN RCRD: CPT | Mod: CPTII,S$GLB,, | Performed by: SURGERY

## 2022-06-07 PROCEDURE — 1159F PR MEDICATION LIST DOCUMENTED IN MEDICAL RECORD: ICD-10-PCS | Mod: CPTII,S$GLB,, | Performed by: SURGERY

## 2022-06-07 PROCEDURE — 1101F PT FALLS ASSESS-DOCD LE1/YR: CPT | Mod: CPTII,S$GLB,, | Performed by: SURGERY

## 2022-06-07 PROCEDURE — 3077F SYST BP >= 140 MM HG: CPT | Mod: CPTII,S$GLB,, | Performed by: SURGERY

## 2022-06-07 PROCEDURE — 1126F AMNT PAIN NOTED NONE PRSNT: CPT | Mod: CPTII,S$GLB,, | Performed by: SURGERY

## 2022-06-07 PROCEDURE — 3288F FALL RISK ASSESSMENT DOCD: CPT | Mod: CPTII,S$GLB,, | Performed by: SURGERY

## 2022-06-07 PROCEDURE — 99024 POSTOP FOLLOW-UP VISIT: CPT | Mod: S$GLB,,, | Performed by: SURGERY

## 2022-06-07 PROCEDURE — 3077F PR MOST RECENT SYSTOLIC BLOOD PRESSURE >= 140 MM HG: ICD-10-PCS | Mod: CPTII,S$GLB,, | Performed by: SURGERY

## 2022-06-07 PROCEDURE — 3078F PR MOST RECENT DIASTOLIC BLOOD PRESSURE < 80 MM HG: ICD-10-PCS | Mod: CPTII,S$GLB,, | Performed by: SURGERY

## 2022-06-07 PROCEDURE — 3288F PR FALLS RISK ASSESSMENT DOCUMENTED: ICD-10-PCS | Mod: CPTII,S$GLB,, | Performed by: SURGERY

## 2022-06-07 NOTE — PROGRESS NOTES
"General Surgery  [facility]  Follow-up    HPI/Follow-up exam:  Janey Null is a 66 y.o. female presents today for follow-up examination of open wound, miles skin dehiscence at lumbar hernia/flank hernia site repair.  Patient since last visit has done well.  No erythema induration fluctuance.  There is a 2 cm long by 1 cm wide area of skin dehiscence middle aspect of the surgical scar.  [place] healthy granulation tissue in base of wound.  Significant, considerable improvement in decreasing in size since last visit.  No signs or symptoms of infection.  Patient doing well today.  No other new issues or complaints.    PHYSICAL EXAM:  BP (!) 150/78   Pulse 97   Resp 17   Ht 4' 9" (1.448 m)   Wt 44.9 kg (99 lb)   SpO2 97%   BMI 21.42 kg/m²   Gen: Wd Wn female in NAD  Heent: Nc/At, MMM  Cv: RRR  Lung: Non-labored breathing, clear bilaterally  Abd: Soft, non-tender, non-distended, left-sided surgical site clean dry intact no signs or symptoms of infection.  Healthy pink granulation tissue in base of the wound.  Ext: No cyanosis clubbing or edema    Assessment:  Janey Null is a 66 y.o. female s/p left lumbar hernia repair/flank hernia repair.    Plan/Medical Decision Making:  Doing well.  No signs or symptoms of infection.  Continue damp to dry dressing changes.  Follow-up surgery clinic 1 month    Followup:  1 month.    Patient instructed that best way to communicate with my office staff is for patient to get on the [facility] patient portal to expedite communication and communication issues that may occur.  Patient was given instructions on how to get on the portal.  I encouraged patient to obtain portal access as well.  Ultimately it is up to the patient to obtain access.  Patient voiced understanding.          "

## 2022-06-21 ENCOUNTER — TELEPHONE (OUTPATIENT)
Dept: SURGERY | Facility: CLINIC | Age: 67
End: 2022-06-21
Payer: MEDICARE

## 2022-06-21 ENCOUNTER — OFFICE VISIT (OUTPATIENT)
Dept: SURGERY | Facility: CLINIC | Age: 67
End: 2022-06-21
Payer: MEDICARE

## 2022-06-21 VITALS
WEIGHT: 99 LBS | OXYGEN SATURATION: 97 % | HEART RATE: 101 BPM | HEIGHT: 57 IN | SYSTOLIC BLOOD PRESSURE: 143 MMHG | BODY MASS INDEX: 21.36 KG/M2 | DIASTOLIC BLOOD PRESSURE: 59 MMHG

## 2022-06-21 DIAGNOSIS — Z09 POSTOP CHECK: Primary | ICD-10-CM

## 2022-06-21 PROCEDURE — 1125F PR PAIN SEVERITY QUANTIFIED, PAIN PRESENT: ICD-10-PCS | Mod: CPTII,S$GLB,, | Performed by: SURGERY

## 2022-06-21 PROCEDURE — 1101F PT FALLS ASSESS-DOCD LE1/YR: CPT | Mod: CPTII,S$GLB,, | Performed by: SURGERY

## 2022-06-21 PROCEDURE — 99024 POSTOP FOLLOW-UP VISIT: CPT | Mod: S$GLB,,, | Performed by: SURGERY

## 2022-06-21 PROCEDURE — 3078F DIAST BP <80 MM HG: CPT | Mod: CPTII,S$GLB,, | Performed by: SURGERY

## 2022-06-21 PROCEDURE — 3008F PR BODY MASS INDEX (BMI) DOCUMENTED: ICD-10-PCS | Mod: CPTII,S$GLB,, | Performed by: SURGERY

## 2022-06-21 PROCEDURE — 99024 PR POST-OP FOLLOW-UP VISIT: ICD-10-PCS | Mod: S$GLB,,, | Performed by: SURGERY

## 2022-06-21 PROCEDURE — 1101F PR PT FALLS ASSESS DOC 0-1 FALLS W/OUT INJ PAST YR: ICD-10-PCS | Mod: CPTII,S$GLB,, | Performed by: SURGERY

## 2022-06-21 PROCEDURE — 3008F BODY MASS INDEX DOCD: CPT | Mod: CPTII,S$GLB,, | Performed by: SURGERY

## 2022-06-21 PROCEDURE — 3078F PR MOST RECENT DIASTOLIC BLOOD PRESSURE < 80 MM HG: ICD-10-PCS | Mod: CPTII,S$GLB,, | Performed by: SURGERY

## 2022-06-21 PROCEDURE — 99999 PR PBB SHADOW E&M-EST. PATIENT-LVL II: ICD-10-PCS | Mod: PBBFAC,,, | Performed by: SURGERY

## 2022-06-21 PROCEDURE — 99999 PR PBB SHADOW E&M-EST. PATIENT-LVL II: CPT | Mod: PBBFAC,,, | Performed by: SURGERY

## 2022-06-21 PROCEDURE — 3077F PR MOST RECENT SYSTOLIC BLOOD PRESSURE >= 140 MM HG: ICD-10-PCS | Mod: CPTII,S$GLB,, | Performed by: SURGERY

## 2022-06-21 PROCEDURE — 3077F SYST BP >= 140 MM HG: CPT | Mod: CPTII,S$GLB,, | Performed by: SURGERY

## 2022-06-21 PROCEDURE — 1125F AMNT PAIN NOTED PAIN PRSNT: CPT | Mod: CPTII,S$GLB,, | Performed by: SURGERY

## 2022-06-21 PROCEDURE — 3288F PR FALLS RISK ASSESSMENT DOCUMENTED: ICD-10-PCS | Mod: CPTII,S$GLB,, | Performed by: SURGERY

## 2022-06-21 PROCEDURE — 3288F FALL RISK ASSESSMENT DOCD: CPT | Mod: CPTII,S$GLB,, | Performed by: SURGERY

## 2022-06-21 NOTE — TELEPHONE ENCOUNTER
----- Message from Connor Escalante sent at 6/21/2022 11:33 AM CDT -----  Type: Needs Medical Advice  Who Called:  Pt  Symptoms (please be specific):  in a lot of pain  How long has patient had these symptoms:     Pharmacy name and phone #:      [id number] - [place], [place] - [address] [place] [address] AT [address]  [address] [address]  Phone: [phone] Fax: [phone]      Best Call Back Number: [phone]    Additional Information: Would like a call back said she started having a lot of pain last night.  Please advise -- Thank you

## 2022-06-21 NOTE — TELEPHONE ENCOUNTER
Writer spoke to patient and scheduled her a same day appt due to pain. Pt expressed verbal understanding.

## 2022-06-21 NOTE — PROGRESS NOTES
"General Surgery  [facility]  Follow-up    HPI/Follow-up exam:  Janey Null is a 66 y.o. female presents today for follow-up examination after lumbar hernia/ flank hernia repair.  Patient last night started have some pain left side.  She got concerned.  No fevers chills.  No erythema induration fluctuance surgical wound.  Very small area healing by secondary intention with healthy pink granulation tissue in base of wound.  Decreasing in size significantly since last visit.  Tolerating diet.  Having normal bowel function.  No bulge.  She got concerned therefore desired surgery Clinic today for evaluation.    PHYSICAL EXAM:  BP (!) 143/59   Pulse 101   Ht 4' 9" (1.448 m)   Wt 44.9 kg (99 lb)   SpO2 97%   BMI 21.42 kg/m²   Gen: Wd Wn female in NAD  Heent: Nc/At, MMM  Cv: RRR  Lung: Non-labored breathing, clear bilaterally  Abd: Soft, non-tender, non-distended, left flank wound looks great.  No signs or symptoms of infection.  Healthy pink granulation tissue in base of wound 1 x 1 cm.   Ext: No cyanosis clubbing or edema    Assessment:  Janey Null is a 66 y.o. female s/p left flank/lumbar hernia repair.    Plan/Medical Decision Making:  Doing well.  No apparent postsurgical issues.  Pain likely musculoskeletal pulled muscle.  Ibuprofen naproxen as needed for pain relief.  Heating pad can help.  No evidence of recurrence of hernia.  Follow-up as scheduled at next visit.  Continue lifting precautions.  Silver nitrate applied today to granulation tissue.    Followup:  As previously scheduled.    Patient instructed that best way to communicate with my office staff is for patient to get on the [facility] patient portal to expedite communication and communication issues that may occur.  Patient was given instructions on how to get on the portal.  I encouraged patient to obtain portal access as well.  Ultimately it is up to the patient to obtain access.  Patient voiced understanding.          "

## 2022-06-30 ENCOUNTER — OFFICE VISIT (OUTPATIENT)
Dept: SURGERY | Facility: CLINIC | Age: 67
End: 2022-06-30
Payer: MEDICARE

## 2022-06-30 VITALS
HEIGHT: 57 IN | WEIGHT: 100 LBS | HEART RATE: 91 BPM | DIASTOLIC BLOOD PRESSURE: 79 MMHG | SYSTOLIC BLOOD PRESSURE: 157 MMHG | BODY MASS INDEX: 21.57 KG/M2

## 2022-06-30 DIAGNOSIS — Z09 POSTOP CHECK: Primary | ICD-10-CM

## 2022-06-30 PROCEDURE — 99024 POSTOP FOLLOW-UP VISIT: CPT | Mod: S$GLB,,, | Performed by: SURGERY

## 2022-06-30 PROCEDURE — 1159F PR MEDICATION LIST DOCUMENTED IN MEDICAL RECORD: ICD-10-PCS | Mod: CPTII,S$GLB,, | Performed by: SURGERY

## 2022-06-30 PROCEDURE — 3078F PR MOST RECENT DIASTOLIC BLOOD PRESSURE < 80 MM HG: ICD-10-PCS | Mod: CPTII,S$GLB,, | Performed by: SURGERY

## 2022-06-30 PROCEDURE — 3077F SYST BP >= 140 MM HG: CPT | Mod: CPTII,S$GLB,, | Performed by: SURGERY

## 2022-06-30 PROCEDURE — 1101F PR PT FALLS ASSESS DOC 0-1 FALLS W/OUT INJ PAST YR: ICD-10-PCS | Mod: CPTII,S$GLB,, | Performed by: SURGERY

## 2022-06-30 PROCEDURE — 1159F MED LIST DOCD IN RCRD: CPT | Mod: CPTII,S$GLB,, | Performed by: SURGERY

## 2022-06-30 PROCEDURE — 99024 PR POST-OP FOLLOW-UP VISIT: ICD-10-PCS | Mod: S$GLB,,, | Performed by: SURGERY

## 2022-06-30 PROCEDURE — 3288F PR FALLS RISK ASSESSMENT DOCUMENTED: ICD-10-PCS | Mod: CPTII,S$GLB,, | Performed by: SURGERY

## 2022-06-30 PROCEDURE — 99999 PR PBB SHADOW E&M-EST. PATIENT-LVL III: CPT | Mod: PBBFAC,,, | Performed by: SURGERY

## 2022-06-30 PROCEDURE — 99999 PR PBB SHADOW E&M-EST. PATIENT-LVL III: ICD-10-PCS | Mod: PBBFAC,,, | Performed by: SURGERY

## 2022-06-30 PROCEDURE — 1125F PR PAIN SEVERITY QUANTIFIED, PAIN PRESENT: ICD-10-PCS | Mod: CPTII,S$GLB,, | Performed by: SURGERY

## 2022-06-30 PROCEDURE — 3008F BODY MASS INDEX DOCD: CPT | Mod: CPTII,S$GLB,, | Performed by: SURGERY

## 2022-06-30 PROCEDURE — 3077F PR MOST RECENT SYSTOLIC BLOOD PRESSURE >= 140 MM HG: ICD-10-PCS | Mod: CPTII,S$GLB,, | Performed by: SURGERY

## 2022-06-30 PROCEDURE — 3288F FALL RISK ASSESSMENT DOCD: CPT | Mod: CPTII,S$GLB,, | Performed by: SURGERY

## 2022-06-30 PROCEDURE — 3078F DIAST BP <80 MM HG: CPT | Mod: CPTII,S$GLB,, | Performed by: SURGERY

## 2022-06-30 PROCEDURE — 1125F AMNT PAIN NOTED PAIN PRSNT: CPT | Mod: CPTII,S$GLB,, | Performed by: SURGERY

## 2022-06-30 PROCEDURE — 3008F PR BODY MASS INDEX (BMI) DOCUMENTED: ICD-10-PCS | Mod: CPTII,S$GLB,, | Performed by: SURGERY

## 2022-06-30 PROCEDURE — 1101F PT FALLS ASSESS-DOCD LE1/YR: CPT | Mod: CPTII,S$GLB,, | Performed by: SURGERY

## 2022-06-30 NOTE — PROGRESS NOTES
"General Surgery  [facility]  Follow-up    HPI/Follow-up exam:  Janey Null is a 66 y.o. female presents today for follow-up examination after hernia repair.  Patient since surgeries done well.  Since last visit her symptoms have completely resolved.  She feels great.  Likely was a pulled muscle with the pain.  Wound is well healed today.  No signs or symptoms of infection.  No new issues or complaints.  She presents today for follow-up examination.    PHYSICAL EXAM:  BP (!) 157/79   Pulse 91   Ht 4' 9" (1.448 m)   Wt 45.4 kg (100 lb)   BMI 21.64 kg/m²   Gen: Wd Wn female in NAD  Heent: Nc/At, MMM  Cv: RRR  Lung: Non-labored breathing, clear bilaterally  Abd: Soft, non-tender, non-distended, surgical site left side clean dry intact.  Well healed.  No signs or symptoms of recurrence of hernia.  Ext: No cyanosis clubbing or edema    Assessment:  Janey Null is a 66 y.o. female s/p hernia repair left flank/lumbar.    Plan/Medical Decision Making:  Doing well.  No issues.  Return to normal activities.  Follow-up surgery clinic as needed.    Followup:  As needed.    Patient instructed that best way to communicate with my office staff is for patient to get on the [facility] patient portal to expedite communication and communication issues that may occur.  Patient was given instructions on how to get on the portal.  I encouraged patient to obtain portal access as well.  Ultimately it is up to the patient to obtain access.  Patient voiced understanding.          "

## 2022-11-07 DIAGNOSIS — M05.79 RHEUMATOID ARTHRITIS INVOLVING MULTIPLE SITES WITH POSITIVE RHEUMATOID FACTOR: ICD-10-CM

## 2022-11-07 RX ORDER — PREDNISONE 5 MG/1
5 TABLET ORAL 2 TIMES DAILY
Qty: 60 TABLET | Refills: 5 | Status: SHIPPED | OUTPATIENT
Start: 2022-11-07 | End: 2023-09-11

## 2023-04-03 ENCOUNTER — HOSPITAL ENCOUNTER (OUTPATIENT)
Facility: HOSPITAL | Age: 68
Discharge: HOME | End: 2023-04-06
Attending: STUDENT IN AN ORGANIZED HEALTH CARE EDUCATION/TRAINING PROGRAM | Admitting: HOSPITALIST
Payer: MEDICARE

## 2023-04-03 DIAGNOSIS — R10.13 EPIGASTRIC PAIN: ICD-10-CM

## 2023-04-03 DIAGNOSIS — R05.9 COUGH: ICD-10-CM

## 2023-04-03 DIAGNOSIS — R53.1 WEAKNESS: ICD-10-CM

## 2023-04-03 DIAGNOSIS — K31.89 GASTRIC MASS: ICD-10-CM

## 2023-04-03 DIAGNOSIS — J44.9 CHRONIC OBSTRUCTIVE PULMONARY DISEASE, UNSPECIFIED COPD TYPE: ICD-10-CM

## 2023-04-03 DIAGNOSIS — D64.9 ANEMIA, UNSPECIFIED TYPE: Primary | ICD-10-CM

## 2023-04-03 DIAGNOSIS — D64.9 SYMPTOMATIC ANEMIA: ICD-10-CM

## 2023-04-03 LAB
ABO + RH BLD: NORMAL
ABO AND RH: NORMAL
ALBUMIN SERPL BCP-MCNC: 3.1 G/DL (ref 3.5–5.2)
ALP SERPL-CCNC: 85 U/L (ref 55–135)
ALT SERPL W/O P-5'-P-CCNC: 13 U/L (ref 10–44)
ANION GAP SERPL CALC-SCNC: 12 MMOL/L (ref 8–16)
AST SERPL-CCNC: 21 U/L (ref 10–40)
BASOPHILS # BLD AUTO: 0.02 K/UL (ref 0–0.2)
BASOPHILS NFR BLD: 0.2 % (ref 0–1.9)
BILIRUB SERPL-MCNC: 0.2 MG/DL (ref 0.1–1)
BLD GP AB SCN CELLS X3 SERPL QL: NORMAL
BNP SERPL-MCNC: 78 PG/ML (ref 0–99)
BUN SERPL-MCNC: 10 MG/DL (ref 8–23)
CALCIUM SERPL-MCNC: 9.1 MG/DL (ref 8.7–10.5)
CHLORIDE SERPL-SCNC: 103 MMOL/L (ref 95–110)
CO2 SERPL-SCNC: 20 MMOL/L (ref 23–29)
CREAT SERPL-MCNC: 0.6 MG/DL (ref 0.5–1.4)
DIFFERENTIAL METHOD: ABNORMAL
EOSINOPHIL # BLD AUTO: 0.2 K/UL (ref 0–0.5)
EOSINOPHIL NFR BLD: 1.4 % (ref 0–8)
ERYTHROCYTE [DISTWIDTH] IN BLOOD BY AUTOMATED COUNT: 17.9 % (ref 11.5–14.5)
EST. GFR  (NO RACE VARIABLE): >60 ML/MIN/1.73 M^2
GLUCOSE SERPL-MCNC: 96 MG/DL (ref 70–110)
HCT VFR BLD AUTO: 15.9 % (ref 37–48.5)
HGB BLD-MCNC: 4.6 G/DL (ref 12–16)
IMM GRANULOCYTES # BLD AUTO: 0.05 K/UL (ref 0–0.04)
IMM GRANULOCYTES NFR BLD AUTO: 0.5 % (ref 0–0.5)
LYMPHOCYTES # BLD AUTO: 2.2 K/UL (ref 1–4.8)
LYMPHOCYTES NFR BLD: 20.6 % (ref 18–48)
MCH RBC QN AUTO: 22.3 PG (ref 27–31)
MCHC RBC AUTO-ENTMCNC: 28.9 G/DL (ref 32–36)
MCV RBC AUTO: 77 FL (ref 82–98)
MONOCYTES # BLD AUTO: 0.9 K/UL (ref 0.3–1)
MONOCYTES NFR BLD: 8 % (ref 4–15)
NEUTROPHILS # BLD AUTO: 7.3 K/UL (ref 1.8–7.7)
NEUTROPHILS NFR BLD: 69.3 % (ref 38–73)
NRBC BLD-RTO: 0 /100 WBC
OB PNL STL: NEGATIVE
PLATELET # BLD AUTO: 200 K/UL (ref 150–450)
PMV BLD AUTO: 9.9 FL (ref 9.2–12.9)
POTASSIUM SERPL-SCNC: 4.1 MMOL/L (ref 3.5–5.1)
PROT SERPL-MCNC: 7.4 G/DL (ref 6–8.4)
RBC # BLD AUTO: 2.06 M/UL (ref 4–5.4)
SODIUM SERPL-SCNC: 135 MMOL/L (ref 136–145)
SPECIMEN OUTDATE: NORMAL
WBC # BLD AUTO: 10.56 K/UL (ref 3.9–12.7)

## 2023-04-03 PROCEDURE — 71046 X-RAY EXAM CHEST 2 VIEWS: CPT | Mod: 26,,, | Performed by: RADIOLOGY

## 2023-04-03 PROCEDURE — 96365 THER/PROPH/DIAG IV INF INIT: CPT

## 2023-04-03 PROCEDURE — G0378 HOSPITAL OBSERVATION PER HR: HCPCS

## 2023-04-03 PROCEDURE — 96375 TX/PRO/DX INJ NEW DRUG ADDON: CPT

## 2023-04-03 PROCEDURE — P9016 RBC LEUKOCYTES REDUCED: HCPCS | Performed by: NURSE PRACTITIONER

## 2023-04-03 PROCEDURE — 94640 AIRWAY INHALATION TREATMENT: CPT

## 2023-04-03 PROCEDURE — 63600175 PHARM REV CODE 636 W HCPCS: Performed by: NURSE PRACTITIONER

## 2023-04-03 PROCEDURE — 85025 COMPLETE CBC W/AUTO DIFF WBC: CPT | Performed by: NURSE PRACTITIONER

## 2023-04-03 PROCEDURE — 86803 HEPATITIS C AB TEST: CPT | Performed by: EMERGENCY MEDICINE

## 2023-04-03 PROCEDURE — 80053 COMPREHEN METABOLIC PANEL: CPT | Performed by: NURSE PRACTITIONER

## 2023-04-03 PROCEDURE — 71046 X-RAY EXAM CHEST 2 VIEWS: CPT | Mod: TC

## 2023-04-03 PROCEDURE — 25000003 PHARM REV CODE 250: Performed by: NURSE PRACTITIONER

## 2023-04-03 PROCEDURE — 83880 ASSAY OF NATRIURETIC PEPTIDE: CPT | Performed by: NURSE PRACTITIONER

## 2023-04-03 PROCEDURE — 86920 COMPATIBILITY TEST SPIN: CPT | Performed by: NURSE PRACTITIONER

## 2023-04-03 PROCEDURE — 25000242 PHARM REV CODE 250 ALT 637 W/ HCPCS: Performed by: NURSE PRACTITIONER

## 2023-04-03 PROCEDURE — 96374 THER/PROPH/DIAG INJ IV PUSH: CPT | Mod: 59

## 2023-04-03 PROCEDURE — 99285 EMERGENCY DEPT VISIT HI MDM: CPT | Mod: 25

## 2023-04-03 PROCEDURE — 94761 N-INVAS EAR/PLS OXIMETRY MLT: CPT

## 2023-04-03 PROCEDURE — 36415 COLL VENOUS BLD VENIPUNCTURE: CPT | Performed by: NURSE PRACTITIONER

## 2023-04-03 PROCEDURE — 82272 OCCULT BLD FECES 1-3 TESTS: CPT | Performed by: NURSE PRACTITIONER

## 2023-04-03 PROCEDURE — 36430 TRANSFUSION BLD/BLD COMPNT: CPT

## 2023-04-03 PROCEDURE — 86900 BLOOD TYPING SEROLOGIC ABO: CPT | Performed by: NURSE PRACTITIONER

## 2023-04-03 PROCEDURE — 71046 XR CHEST PA AND LATERAL: ICD-10-PCS | Mod: 26,,, | Performed by: RADIOLOGY

## 2023-04-03 PROCEDURE — 96361 HYDRATE IV INFUSION ADD-ON: CPT

## 2023-04-03 PROCEDURE — 87389 HIV-1 AG W/HIV-1&-2 AB AG IA: CPT | Performed by: EMERGENCY MEDICINE

## 2023-04-03 PROCEDURE — 87040 BLOOD CULTURE FOR BACTERIA: CPT | Performed by: NURSE PRACTITIONER

## 2023-04-03 PROCEDURE — 86900 BLOOD TYPING SEROLOGIC ABO: CPT | Mod: 91 | Performed by: NURSE PRACTITIONER

## 2023-04-03 RX ORDER — ACETAMINOPHEN 325 MG/1
650 TABLET ORAL
Status: COMPLETED | OUTPATIENT
Start: 2023-04-04 | End: 2023-04-03

## 2023-04-03 RX ORDER — ONDANSETRON 2 MG/ML
4 INJECTION INTRAMUSCULAR; INTRAVENOUS
Status: COMPLETED | OUTPATIENT
Start: 2023-04-03 | End: 2023-04-03

## 2023-04-03 RX ORDER — FOLIC ACID 1 MG/1
1 TABLET ORAL DAILY
Status: DISCONTINUED | OUTPATIENT
Start: 2023-04-04 | End: 2023-04-06 | Stop reason: HOSPADM

## 2023-04-03 RX ORDER — SODIUM CHLORIDE 0.9 % (FLUSH) 0.9 %
10 SYRINGE (ML) INJECTION
Status: DISCONTINUED | OUTPATIENT
Start: 2023-04-03 | End: 2023-04-06 | Stop reason: HOSPADM

## 2023-04-03 RX ORDER — SODIUM CHLORIDE 9 MG/ML
1000 INJECTION, SOLUTION INTRAVENOUS
Status: COMPLETED | OUTPATIENT
Start: 2023-04-03 | End: 2023-04-04

## 2023-04-03 RX ORDER — IPRATROPIUM BROMIDE 0.5 MG/2.5ML
0.5 SOLUTION RESPIRATORY (INHALATION) EVERY 4 HOURS PRN
Status: DISCONTINUED | OUTPATIENT
Start: 2023-04-03 | End: 2023-04-06 | Stop reason: HOSPADM

## 2023-04-03 RX ORDER — METHYLPREDNISOLONE SOD SUCC 125 MG
80 VIAL (EA) INJECTION EVERY 8 HOURS
Status: DISCONTINUED | OUTPATIENT
Start: 2023-04-04 | End: 2023-04-04

## 2023-04-03 RX ORDER — ALBUTEROL SULFATE 0.83 MG/ML
2.5 SOLUTION RESPIRATORY (INHALATION)
Status: COMPLETED | OUTPATIENT
Start: 2023-04-03 | End: 2023-04-03

## 2023-04-03 RX ORDER — ACETAMINOPHEN 325 MG/1
650 TABLET ORAL EVERY 4 HOURS PRN
Status: DISCONTINUED | OUTPATIENT
Start: 2023-04-03 | End: 2023-04-06 | Stop reason: HOSPADM

## 2023-04-03 RX ORDER — DIPHENHYDRAMINE HCL 25 MG
50 CAPSULE ORAL
Status: COMPLETED | OUTPATIENT
Start: 2023-04-04 | End: 2023-04-03

## 2023-04-03 RX ORDER — HYDROCODONE BITARTRATE AND ACETAMINOPHEN 500; 5 MG/1; MG/1
TABLET ORAL
Status: DISCONTINUED | OUTPATIENT
Start: 2023-04-03 | End: 2023-04-06 | Stop reason: HOSPADM

## 2023-04-03 RX ORDER — FUROSEMIDE 10 MG/ML
40 INJECTION INTRAMUSCULAR; INTRAVENOUS
Status: COMPLETED | OUTPATIENT
Start: 2023-04-04 | End: 2023-04-03

## 2023-04-03 RX ORDER — ALBUTEROL SULFATE 0.83 MG/ML
2.5 SOLUTION RESPIRATORY (INHALATION) EVERY 4 HOURS PRN
Status: DISCONTINUED | OUTPATIENT
Start: 2023-04-03 | End: 2023-04-06 | Stop reason: HOSPADM

## 2023-04-03 RX ORDER — METHYLPREDNISOLONE SOD SUCC 125 MG
125 VIAL (EA) INJECTION
Status: COMPLETED | OUTPATIENT
Start: 2023-04-03 | End: 2023-04-03

## 2023-04-03 RX ORDER — ENOXAPARIN SODIUM 100 MG/ML
40 INJECTION SUBCUTANEOUS EVERY 24 HOURS
Status: DISCONTINUED | OUTPATIENT
Start: 2023-04-03 | End: 2023-04-04

## 2023-04-03 RX ADMIN — FUROSEMIDE 40 MG: 10 INJECTION, SOLUTION INTRAMUSCULAR; INTRAVENOUS at 11:04

## 2023-04-03 RX ADMIN — METHYLPREDNISOLONE SODIUM SUCCINATE 125 MG: 125 INJECTION, POWDER, FOR SOLUTION INTRAMUSCULAR; INTRAVENOUS at 07:04

## 2023-04-03 RX ADMIN — DIPHENHYDRAMINE HYDROCHLORIDE 50 MG: 25 CAPSULE ORAL at 11:04

## 2023-04-03 RX ADMIN — ALBUTEROL SULFATE 2.5 MG: 2.5 SOLUTION RESPIRATORY (INHALATION) at 07:04

## 2023-04-03 RX ADMIN — SODIUM CHLORIDE 500 ML: 9 INJECTION, SOLUTION INTRAVENOUS at 07:04

## 2023-04-03 RX ADMIN — ACETAMINOPHEN 650 MG: 325 TABLET ORAL at 11:04

## 2023-04-03 RX ADMIN — SODIUM CHLORIDE 1000 ML: 9 INJECTION, SOLUTION INTRAVENOUS at 10:04

## 2023-04-03 RX ADMIN — DOXYCYCLINE 100 MG: 100 INJECTION, POWDER, LYOPHILIZED, FOR SOLUTION INTRAVENOUS at 10:04

## 2023-04-03 RX ADMIN — ONDANSETRON 4 MG: 2 INJECTION INTRAMUSCULAR; INTRAVENOUS at 07:04

## 2023-04-03 RX ADMIN — ALBUTEROL SULFATE 2.5 MG: 2.5 SOLUTION RESPIRATORY (INHALATION) at 09:04

## 2023-04-03 NOTE — Clinical Note
Diagnosis: Weakness [[id number]]   Future Attending Provider: DONALD GARCÍA [[id number]]   Admitting Provider:: NICOLE WORKMAN [[id number]]   Special Needs:: No Special Needs [1]

## 2023-04-04 PROBLEM — M06.9 RHEUMATOID ARTHRITIS: Status: ACTIVE | Noted: 2023-04-04

## 2023-04-04 PROBLEM — R05.9 COUGH: Status: RESOLVED | Noted: 2023-04-04 | Resolved: 2023-04-04

## 2023-04-04 PROBLEM — R05.9 COUGH: Status: ACTIVE | Noted: 2023-04-04

## 2023-04-04 PROBLEM — D64.9 SYMPTOMATIC ANEMIA: Status: ACTIVE | Noted: 2023-04-04

## 2023-04-04 LAB
ANION GAP SERPL CALC-SCNC: 15 MMOL/L (ref 8–16)
APTT PPP: 28 SEC (ref 21–32)
BASOPHILS # BLD AUTO: 0.01 K/UL (ref 0–0.2)
BASOPHILS # BLD AUTO: 0.01 K/UL (ref 0–0.2)
BASOPHILS NFR BLD: 0.1 % (ref 0–1.9)
BASOPHILS NFR BLD: 0.1 % (ref 0–1.9)
BLD PROD TYP BPU: NORMAL
BLD PROD TYP BPU: NORMAL
BLOOD UNIT EXPIRATION DATE: NORMAL
BLOOD UNIT EXPIRATION DATE: NORMAL
BLOOD UNIT TYPE CODE: 5100
BLOOD UNIT TYPE CODE: 5100
BLOOD UNIT TYPE: NORMAL
BLOOD UNIT TYPE: NORMAL
BUN SERPL-MCNC: 9 MG/DL (ref 8–23)
CALCIUM SERPL-MCNC: 9.4 MG/DL (ref 8.7–10.5)
CHLORIDE SERPL-SCNC: 103 MMOL/L (ref 95–110)
CO2 SERPL-SCNC: 22 MMOL/L (ref 23–29)
CODING SYSTEM: NORMAL
CODING SYSTEM: NORMAL
CREAT SERPL-MCNC: 0.6 MG/DL (ref 0.5–1.4)
CROSSMATCH INTERPRETATION: NORMAL
CROSSMATCH INTERPRETATION: NORMAL
DIFFERENTIAL METHOD: ABNORMAL
DIFFERENTIAL METHOD: ABNORMAL
DISPENSE STATUS: NORMAL
DISPENSE STATUS: NORMAL
EOSINOPHIL # BLD AUTO: 0 K/UL (ref 0–0.5)
EOSINOPHIL # BLD AUTO: 0 K/UL (ref 0–0.5)
EOSINOPHIL NFR BLD: 0 % (ref 0–8)
EOSINOPHIL NFR BLD: 0 % (ref 0–8)
ERYTHROCYTE [DISTWIDTH] IN BLOOD BY AUTOMATED COUNT: 17.3 % (ref 11.5–14.5)
ERYTHROCYTE [DISTWIDTH] IN BLOOD BY AUTOMATED COUNT: 17.5 % (ref 11.5–14.5)
EST. GFR  (NO RACE VARIABLE): >60 ML/MIN/1.73 M^2
FERRITIN SERPL-MCNC: 29 NG/ML (ref 20–300)
FOLATE SERPL-MCNC: 12.6 NG/ML (ref 4–24)
GLUCOSE SERPL-MCNC: 146 MG/DL (ref 70–110)
HCT VFR BLD AUTO: 33.1 % (ref 37–48.5)
HCT VFR BLD AUTO: 33.3 % (ref 37–48.5)
HCV AB SERPL QL IA: NORMAL
HGB BLD-MCNC: 10.9 G/DL (ref 12–16)
HGB BLD-MCNC: 11 G/DL (ref 12–16)
HIV 1+2 AB+HIV1 P24 AG SERPL QL IA: NORMAL
IMM GRANULOCYTES # BLD AUTO: 0.02 K/UL (ref 0–0.04)
IMM GRANULOCYTES # BLD AUTO: 0.04 K/UL (ref 0–0.04)
IMM GRANULOCYTES NFR BLD AUTO: 0.2 % (ref 0–0.5)
IMM GRANULOCYTES NFR BLD AUTO: 0.4 % (ref 0–0.5)
INR PPP: 0.9 (ref 0.8–1.2)
IRON SERPL-MCNC: 74 UG/DL (ref 30–160)
LYMPHOCYTES # BLD AUTO: 0.8 K/UL (ref 1–4.8)
LYMPHOCYTES # BLD AUTO: 1 K/UL (ref 1–4.8)
LYMPHOCYTES NFR BLD: 11.3 % (ref 18–48)
LYMPHOCYTES NFR BLD: 8.1 % (ref 18–48)
MAGNESIUM SERPL-MCNC: 1.6 MG/DL (ref 1.6–2.6)
MCH RBC QN AUTO: 26.4 PG (ref 27–31)
MCH RBC QN AUTO: 26.5 PG (ref 27–31)
MCHC RBC AUTO-ENTMCNC: 32.9 G/DL (ref 32–36)
MCHC RBC AUTO-ENTMCNC: 33 G/DL (ref 32–36)
MCV RBC AUTO: 80 FL (ref 82–98)
MCV RBC AUTO: 80 FL (ref 82–98)
MONOCYTES # BLD AUTO: 0.1 K/UL (ref 0.3–1)
MONOCYTES # BLD AUTO: 0.1 K/UL (ref 0.3–1)
MONOCYTES NFR BLD: 0.9 % (ref 4–15)
MONOCYTES NFR BLD: 1.4 % (ref 4–15)
NEUTROPHILS # BLD AUTO: 8 K/UL (ref 1.8–7.7)
NEUTROPHILS # BLD AUTO: 8.9 K/UL (ref 1.8–7.7)
NEUTROPHILS NFR BLD: 86.8 % (ref 38–73)
NEUTROPHILS NFR BLD: 90.7 % (ref 38–73)
NRBC BLD-RTO: 0 /100 WBC
NRBC BLD-RTO: 0 /100 WBC
NUM UNITS TRANS PACKED RBC: NORMAL
NUM UNITS TRANS PACKED RBC: NORMAL
PLATELET # BLD AUTO: 425 K/UL (ref 150–450)
PLATELET # BLD AUTO: 438 K/UL (ref 150–450)
PMV BLD AUTO: 8.7 FL (ref 9.2–12.9)
PMV BLD AUTO: 8.9 FL (ref 9.2–12.9)
POTASSIUM SERPL-SCNC: 3.9 MMOL/L (ref 3.5–5.1)
PROTHROMBIN TIME: 10.5 SEC (ref 9–12.5)
RBC # BLD AUTO: 4.13 M/UL (ref 4–5.4)
RBC # BLD AUTO: 4.15 M/UL (ref 4–5.4)
RETICS/RBC NFR AUTO: 1 % (ref 0.5–2.5)
SATURATED IRON: 13 % (ref 20–50)
SODIUM SERPL-SCNC: 140 MMOL/L (ref 136–145)
TOTAL IRON BINDING CAPACITY: 565 UG/DL (ref 250–450)
TRANSFERRIN SERPL-MCNC: 382 MG/DL (ref 200–375)
VIT B12 SERPL-MCNC: 443 PG/ML (ref 210–950)
WBC # BLD AUTO: 9.17 K/UL (ref 3.9–12.7)
WBC # BLD AUTO: 9.86 K/UL (ref 3.9–12.7)

## 2023-04-04 PROCEDURE — 63600175 PHARM REV CODE 636 W HCPCS: Performed by: STUDENT IN AN ORGANIZED HEALTH CARE EDUCATION/TRAINING PROGRAM

## 2023-04-04 PROCEDURE — 96375 TX/PRO/DX INJ NEW DRUG ADDON: CPT

## 2023-04-04 PROCEDURE — C9113 INJ PANTOPRAZOLE SODIUM, VIA: HCPCS | Performed by: STUDENT IN AN ORGANIZED HEALTH CARE EDUCATION/TRAINING PROGRAM

## 2023-04-04 PROCEDURE — 99223 PR INITIAL HOSPITAL CARE,LEVL III: ICD-10-PCS | Mod: AI,95,, | Performed by: HOSPITALIST

## 2023-04-04 PROCEDURE — 85730 THROMBOPLASTIN TIME PARTIAL: CPT | Performed by: STUDENT IN AN ORGANIZED HEALTH CARE EDUCATION/TRAINING PROGRAM

## 2023-04-04 PROCEDURE — 96376 TX/PRO/DX INJ SAME DRUG ADON: CPT

## 2023-04-04 PROCEDURE — 63600175 PHARM REV CODE 636 W HCPCS: Performed by: HOSPITALIST

## 2023-04-04 PROCEDURE — 99233 SBSQ HOSP IP/OBS HIGH 50: CPT | Mod: ,,, | Performed by: STUDENT IN AN ORGANIZED HEALTH CARE EDUCATION/TRAINING PROGRAM

## 2023-04-04 PROCEDURE — 84466 ASSAY OF TRANSFERRIN: CPT | Performed by: STUDENT IN AN ORGANIZED HEALTH CARE EDUCATION/TRAINING PROGRAM

## 2023-04-04 PROCEDURE — G0378 HOSPITAL OBSERVATION PER HR: HCPCS

## 2023-04-04 PROCEDURE — 82728 ASSAY OF FERRITIN: CPT | Performed by: STUDENT IN AN ORGANIZED HEALTH CARE EDUCATION/TRAINING PROGRAM

## 2023-04-04 PROCEDURE — 99233 PR SUBSEQUENT HOSPITAL CARE,LEVL III: ICD-10-PCS | Mod: ,,, | Performed by: STUDENT IN AN ORGANIZED HEALTH CARE EDUCATION/TRAINING PROGRAM

## 2023-04-04 PROCEDURE — 94761 N-INVAS EAR/PLS OXIMETRY MLT: CPT

## 2023-04-04 PROCEDURE — 96361 HYDRATE IV INFUSION ADD-ON: CPT

## 2023-04-04 PROCEDURE — 83735 ASSAY OF MAGNESIUM: CPT | Performed by: HOSPITALIST

## 2023-04-04 PROCEDURE — 85025 COMPLETE CBC W/AUTO DIFF WBC: CPT | Mod: 91 | Performed by: HOSPITALIST

## 2023-04-04 PROCEDURE — 85025 COMPLETE CBC W/AUTO DIFF WBC: CPT | Performed by: STUDENT IN AN ORGANIZED HEALTH CARE EDUCATION/TRAINING PROGRAM

## 2023-04-04 PROCEDURE — 80048 BASIC METABOLIC PNL TOTAL CA: CPT | Performed by: HOSPITALIST

## 2023-04-04 PROCEDURE — 82746 ASSAY OF FOLIC ACID SERUM: CPT | Performed by: STUDENT IN AN ORGANIZED HEALTH CARE EDUCATION/TRAINING PROGRAM

## 2023-04-04 PROCEDURE — 82607 VITAMIN B-12: CPT | Performed by: STUDENT IN AN ORGANIZED HEALTH CARE EDUCATION/TRAINING PROGRAM

## 2023-04-04 PROCEDURE — 36415 COLL VENOUS BLD VENIPUNCTURE: CPT | Performed by: STUDENT IN AN ORGANIZED HEALTH CARE EDUCATION/TRAINING PROGRAM

## 2023-04-04 PROCEDURE — 85610 PROTHROMBIN TIME: CPT | Performed by: STUDENT IN AN ORGANIZED HEALTH CARE EDUCATION/TRAINING PROGRAM

## 2023-04-04 PROCEDURE — 25000003 PHARM REV CODE 250: Performed by: HOSPITALIST

## 2023-04-04 PROCEDURE — A9698 NON-RAD CONTRAST MATERIALNOC: HCPCS | Performed by: STUDENT IN AN ORGANIZED HEALTH CARE EDUCATION/TRAINING PROGRAM

## 2023-04-04 PROCEDURE — 85045 AUTOMATED RETICULOCYTE COUNT: CPT | Performed by: STUDENT IN AN ORGANIZED HEALTH CARE EDUCATION/TRAINING PROGRAM

## 2023-04-04 PROCEDURE — 25500020 PHARM REV CODE 255: Performed by: STUDENT IN AN ORGANIZED HEALTH CARE EDUCATION/TRAINING PROGRAM

## 2023-04-04 PROCEDURE — P9016 RBC LEUKOCYTES REDUCED: HCPCS | Performed by: NURSE PRACTITIONER

## 2023-04-04 PROCEDURE — 96372 THER/PROPH/DIAG INJ SC/IM: CPT | Performed by: HOSPITALIST

## 2023-04-04 PROCEDURE — 99223 1ST HOSP IP/OBS HIGH 75: CPT | Mod: AI,95,, | Performed by: HOSPITALIST

## 2023-04-04 RX ORDER — PANTOPRAZOLE SODIUM 40 MG/10ML
40 INJECTION, POWDER, LYOPHILIZED, FOR SOLUTION INTRAVENOUS DAILY
Status: DISCONTINUED | OUTPATIENT
Start: 2023-04-04 | End: 2023-04-04

## 2023-04-04 RX ORDER — HYDROCODONE BITARTRATE AND ACETAMINOPHEN 10; 325 MG/1; MG/1
1 TABLET ORAL EVERY 6 HOURS PRN
Status: DISCONTINUED | OUTPATIENT
Start: 2023-04-04 | End: 2023-04-06 | Stop reason: HOSPADM

## 2023-04-04 RX ORDER — HYDROXYCHLOROQUINE SULFATE 200 MG/1
200 TABLET, FILM COATED ORAL 2 TIMES DAILY
Status: DISCONTINUED | OUTPATIENT
Start: 2023-04-04 | End: 2023-04-06 | Stop reason: HOSPADM

## 2023-04-04 RX ORDER — PREDNISONE 1 MG/1
5 TABLET ORAL 2 TIMES DAILY
Status: DISCONTINUED | OUTPATIENT
Start: 2023-04-04 | End: 2023-04-06 | Stop reason: HOSPADM

## 2023-04-04 RX ORDER — OXYCODONE HYDROCHLORIDE 5 MG/1
5 TABLET ORAL EVERY 4 HOURS PRN
Status: DISCONTINUED | OUTPATIENT
Start: 2023-04-04 | End: 2023-04-04

## 2023-04-04 RX ORDER — PANTOPRAZOLE SODIUM 40 MG/1
40 TABLET, DELAYED RELEASE ORAL DAILY
Status: DISCONTINUED | OUTPATIENT
Start: 2023-04-05 | End: 2023-04-06 | Stop reason: HOSPADM

## 2023-04-04 RX ORDER — MORPHINE SULFATE 2 MG/ML
2 INJECTION, SOLUTION INTRAMUSCULAR; INTRAVENOUS EVERY 4 HOURS PRN
Status: DISCONTINUED | OUTPATIENT
Start: 2023-04-04 | End: 2023-04-06 | Stop reason: HOSPADM

## 2023-04-04 RX ADMIN — IOHEXOL 50 ML: 350 INJECTION, SOLUTION INTRAVENOUS at 02:04

## 2023-04-04 RX ADMIN — MORPHINE SULFATE 2 MG: 2 INJECTION, SOLUTION INTRAMUSCULAR; INTRAVENOUS at 08:04

## 2023-04-04 RX ADMIN — ACETAMINOPHEN 650 MG: 325 TABLET ORAL at 03:04

## 2023-04-04 RX ADMIN — HYDROXYCHLOROQUINE SULFATE 200 MG: 200 TABLET, FILM COATED ORAL at 08:04

## 2023-04-04 RX ADMIN — MORPHINE SULFATE 2 MG: 2 INJECTION, SOLUTION INTRAMUSCULAR; INTRAVENOUS at 04:04

## 2023-04-04 RX ADMIN — PANTOPRAZOLE SODIUM 40 MG: 40 INJECTION, POWDER, LYOPHILIZED, FOR SOLUTION INTRAVENOUS at 08:04

## 2023-04-04 RX ADMIN — FOLIC ACID 1 MG: 1 TABLET ORAL at 08:04

## 2023-04-04 RX ADMIN — METHYLPREDNISOLONE SODIUM SUCCINATE 80 MG: 125 INJECTION, POWDER, FOR SOLUTION INTRAMUSCULAR; INTRAVENOUS at 03:04

## 2023-04-04 RX ADMIN — BARIUM SULFATE 450 ML: 20 SUSPENSION ORAL at 02:04

## 2023-04-04 RX ADMIN — PREDNISONE 5 MG: 1 TABLET ORAL at 08:04

## 2023-04-04 RX ADMIN — ENOXAPARIN SODIUM 40 MG: 40 INJECTION SUBCUTANEOUS at 01:04

## 2023-04-04 RX ADMIN — MORPHINE SULFATE 2 MG: 2 INJECTION, SOLUTION INTRAMUSCULAR; INTRAVENOUS at 10:04

## 2023-04-04 NOTE — PROVIDER PROGRESS NOTES - EMERGENCY DEPT.
Encounter Date: 4/3/2023    ED Physician Progress Notes        Physician Note:   Awaiting lab results

## 2023-04-04 NOTE — SUBJECTIVE & OBJECTIVE
Interval History: Patient w/ appropriate response to 2 units of blood. No evidence of bleeding. Patient denies any blood in stool or urine and denies any vomiting. She has noticed swelling in her abdomen for past 2 weeks on left side and is having intermittent pain in that area. She denies any weight loss or hx of malignancy. CT completed which shows likely mass in gastric antrum. General surgery to be consulted to see patient in am.     Review of Systems   All other systems reviewed and are negative.  Objective:     Vital Signs (Most Recent):  Temp: 99.2 °F (37.3 °C) (04/04/23 1610)  Pulse: 93 (04/04/23 1610)  Resp: 16 (04/04/23 1611)  BP: 138/73 (04/04/23 1610)  SpO2: (!) 94 % (04/04/23 1610)   Vital Signs (24h Range):  Temp:  [96.5 °F (35.8 °C)-99.2 °F (37.3 °C)] 99.2 °F (37.3 °C)  Pulse:  [] 93  Resp:  [16-32] 16  SpO2:  [92 %-99 %] 94 %  BP: (128-188)/() 138/73     Weight: 38.3 kg (84 lb 7 oz)  Body mass index is 18.93 kg/m².    Intake/Output Summary (Last 24 hours) at 4/4/2023 1705  Last data filed at 4/4/2023 0632  Gross per 24 hour   Intake 2749.92 ml   Output --   Net 2749.92 ml      Physical Exam  Vitals reviewed  General: NAD, Well developed, Thin appearing  Head: NC/AT  Eyes: EOMI, CONTRERAS  Cardiovascular: Pulses intact distally, Regular Rate  Pulmonary: Normal Respiratory Rate, No respiratory distress  Gi: Soft, Non-tender, distended on left side  Extremities: Warm, No edema present  Skin: Warm, dry  Neuro: Alert, Oriented x3, No focal Deficit  Psych: Appropriate mood and affect      Significant Labs: All pertinent labs within the past 24 hours have been reviewed.    Significant Imaging: I have reviewed all pertinent imaging results/findings within the past 24 hours.

## 2023-04-04 NOTE — ED PROVIDER NOTES
Encounter Date: 4/3/2023       History     Chief Complaint   Patient presents with    Cough     Pt states she has been experiencing cough since Thursday. Also experiencing congestion and rhinorrhea. She reports vomiting, but states that she believes the vomiting is secondary to coughing so much.     POV to the ED with family.  Patient complains of cough congestion for 4-5 days.  Vomiting and diarrhea.  Denies chest pain or shortness of breadth.  History of tobacco use, 1+ pack per day for many many years.  Reports feeling weak.  Denies feeling faint or dizzy.  No other complaints    The history is provided by the patient.   Review of patient's allergies indicates:   Allergen Reactions    Penicillins      Past Medical History:   Diagnosis Date    Anxiety     Depression     Encounter for long-term (current) use of high-risk medication     Fibromyalgia     Osteoporosis     Rheumatoid arthritis involving multiple sites with positive rheumatoid factor      Past Surgical History:   Procedure Laterality Date    REPAIR OF RECURRENT INCARCERATED INCISIONAL HERNIA Left 4/13/2022    Procedure: REPAIR, HERNIA--RIGHT LATERAL POSITIONING;  Surgeon: Tyrell Cornelius MD;  Location: [facility] OR;  Service: General;  Laterality: Left;  left lumbar hernia    right leg       multiple surgeries ,amputation     History reviewed. No pertinent family history.  Social History     Tobacco Use    Smoking status: Every Day     Packs/day: 0.50     Types: Cigarettes    Smokeless tobacco: Never   Substance Use Topics    Alcohol use: No    Drug use: Never     Review of Systems   Constitutional:  Positive for chills. Negative for fever.   HENT:  Negative for ear pain and sore throat.    Respiratory:  Positive for cough. Negative for shortness of breath.    Cardiovascular:  Negative for chest pain.   Gastrointestinal:  Positive for diarrhea, nausea and vomiting. Negative for abdominal pain.   Musculoskeletal:  Negative for back pain.    Neurological:  Positive for weakness. Negative for dizziness and syncope.   All other systems reviewed and are negative.    Physical Exam     Initial Vitals [04/03/23 1835]   BP Pulse Resp Temp SpO2   (!) 149/73 101 20 98.6 °F (37 °C) 97 %      MAP       --         Physical Exam    Nursing note and vitals reviewed.  Constitutional: She appears well-developed and well-nourished. No distress.   Tired appearing   HENT:   Head: Normocephalic.   Dry mucous membranes, airway patent   Eyes: Pupils are equal, round, and reactive to light.   Neck:   Normal range of motion.  Cardiovascular:  Normal rate and regular rhythm.           Pulmonary/Chest: No respiratory distress.   Faint scattered coarse lung sounds with expiratory wheezing.  No work of breathing.  Mild decreased breath sounds   Abdominal: Abdomen is soft. Bowel sounds are normal. There is no abdominal tenderness.   Musculoskeletal:         General: Normal range of motion.      Cervical back: Normal range of motion.     Neurological: She is alert and oriented to person, place, and time. She has normal strength. GCS score is 15. GCS eye subscore is 4. GCS verbal subscore is 5. GCS motor subscore is 6.   Skin: Skin is warm and dry. There is pallor.   Psychiatric: She has a normal mood and affect.       ED Course   Procedures  Labs Reviewed   CBC W/ AUTO DIFFERENTIAL - Abnormal; Notable for the following components:       Result Value    RBC 2.06 (*)     Hemoglobin 4.6 (*)     Hematocrit 15.9 (*)     MCV 77 (*)     MCH 22.3 (*)     MCHC 28.9 (*)     RDW 17.9 (*)     Immature Grans (Abs) 0.05 (*)     All other components within normal limits    Narrative:       HGB, HCT critical result(s) called and verbal readback obtained   from Anil Peng  by DAX 04/03/2023 20:08  Recoll. [id number] by DAX at 04/03/2023 19:46, reason: verify results   COMPREHENSIVE METABOLIC PANEL - Abnormal; Notable for the following components:    Sodium 135 (*)     CO2 20 (*)     Albumin 3.1  (*)     All other components within normal limits   B-TYPE NATRIURETIC PEPTIDE   HIV 1 / 2 ANTIBODY   HEPATITIS C ANTIBODY   OCCULT BLOOD X 1, STOOL   TYPE & SCREEN   PREPARE RBC SOFT          Imaging Results              X-Ray Chest PA And Lateral (In process)                   X-Rays:   Independently Interpreted Readings:   Other Readings:  COPD pattern.  Reviewed and discussed with Dr Ashley, pending radiology over-read  Medications   0.9%  NaCl infusion (for blood administration) (has no administration in time range)   sodium chloride 0.9% bolus 500 mL 500 mL (0 mLs Intravenous Stopped 4/3/23 2030)   ondansetron injection 4 mg (4 mg Intravenous Given by Other 4/3/23 1905)   albuterol nebulizer solution 2.5 mg (2.5 mg Nebulization Given 4/3/23 1913)   methylPREDNISolone sodium succinate injection 125 mg (125 mg Intravenous Given by Other 4/3/23 1905)     Medical Decision Making:   Initial Assessment:   Presents for evaluation of coughing with vomiting and diarrhea.  Lab work ordered, chest x-ray ordered.  Disposition pending  Differential Diagnosis:   Viral illness, bacterial illness, dehydration, volume depletion, abnormal electrolytes, anemia, COPD, CHF, pneumonia, bronchitis  ED Management:  Noted H&H 4.6/15.9.  Noted COPD pattern on chest x-ray.  Patient has had albuterol treatment with Solu-Medrol.  Reporting that she was feeling better after her albuterol treatment.  Patient will be admitted to the hospitalist group, accepted by Dr. Matute.  Observation admission.  Type and screen has been ordered.  2 units of packed red blood cells have been ordered.  She agrees with plan of care and admission  Other:   I discussed test(s) with the performing physician.       <> Summary of the Findings: Dr Ashley, sees patient in the ED           ED Course as of 04/03/23 2122 Mon Apr 03, 2023 2020 Complete Blood Count (CBC)   Final result 04/03  1956    RBC 2.06  Hemoglobin 4.6  Hematocrit 15.9  MCV 77  MCH 22.3  MCHC 28.9  RDW 17.9  Immature Grans (Abs) 0.05     Comprehensive Metabolic Panel (CMP)                 Final result 04/03 1856    Sodium 135  CO2 20  Albumin 3.1       [CP]      ED Course User Index  [CP] Cydney Bledsoe NP                 Clinical Impression:   Final diagnoses:  [R05.9] Cough               Cydney Bledsoe NP  04/03/23 2122       Cydney Bledsoe NP  04/03/23 2130

## 2023-04-04 NOTE — ED NOTES
PRBC's transfusing as per orders. Pt denies c/o pain, discomfort, SOB, pruritus or nausea at this time. Skin pale/warm/dry. Resp equal, somewhat shallow and unlabored. Decreased wet LS heard at this time. Aleksander transfusion well. Updated on POC; verb understanding. Denies wants, needs at this time. Will CTM.

## 2023-04-04 NOTE — HPI
The patient is a 68 y/o female with PMH of fibromyalgia and RA who presents with generalized weakness, SOB, and cough. She states feeling very tired over the past month. Patient is a current smoker but denies any history of COPD diagnosis. Work up in the ER showed profound anemia. She denies any hemoptysis, BRBPR, or melena. She does report having a cough productive of clear sputum and some temps of 100.1 at home.

## 2023-04-04 NOTE — PROGRESS NOTES
[facility]  Progress Note    Patient Name: Janey Null  MRN: [id number]  Patient Class: OP- Observation   Admission Date: 4/3/2023  Length of Stay: 0 days  Attending Physician: Walter Roth MD  Primary Care Provider: John Merchant MD (Inactive)        Subjective:     Principal Problem:Abdominal pain        HPI:  The patient is a 68 y/o female with PMH of fibromyalgia and RA who presents with generalized weakness, SOB, and cough. She states feeling very tired over the past month. Patient is a current smoker but denies any history of COPD diagnosis. Work up in the ER showed profound anemia. She denies any hemoptysis, BRBPR, or melena. She does report having a cough productive of clear sputum and some temps of 100.1 at home.       Overview/Hospital Course:  No notes on file    Interval History: Patient w/ appropriate response to 2 units of blood. No evidence of bleeding. Patient denies any blood in stool or urine and denies any vomiting. She has noticed swelling in her abdomen for past 2 weeks on left side and is having intermittent pain in that area. She denies any weight loss or hx of malignancy. CT completed which shows likely mass in gastric antrum. General surgery to be consulted to see patient in am.     Review of Systems   All other systems reviewed and are negative.  Objective:     Vital Signs (Most Recent):  Temp: 99.2 °F (37.3 °C) (04/04/23 1610)  Pulse: 93 (04/04/23 1610)  Resp: 16 (04/04/23 1611)  BP: 138/73 (04/04/23 1610)  SpO2: (!) 94 % (04/04/23 1610)   Vital Signs (24h Range):  Temp:  [96.5 °F (35.8 °C)-99.2 °F (37.3 °C)] 99.2 °F (37.3 °C)  Pulse:  [] 93  Resp:  [16-32] 16  SpO2:  [92 %-99 %] 94 %  BP: (128-188)/() 138/73     Weight: 38.3 kg (84 lb 7 oz)  Body mass index is 18.93 kg/m².    Intake/Output Summary (Last 24 hours) at 4/4/2023 1705  Last data filed at 4/4/2023 0632  Gross per 24 hour   Intake 2749.92 ml   Output --   Net  2749.92 ml      Physical Exam  Vitals reviewed  General: NAD, Well developed, Thin appearing  Head: NC/AT  Eyes: EOMI, CONTRERAS  Cardiovascular: Pulses intact distally, Regular Rate  Pulmonary: Normal Respiratory Rate, No respiratory distress  Gi: Soft, Non-tender, distended on left side  Extremities: Warm, No edema present  Skin: Warm, dry  Neuro: Alert, Oriented x3, No focal Deficit  Psych: Appropriate mood and affect      Significant Labs: All pertinent labs within the past 24 hours have been reviewed.    Significant Imaging: I have reviewed all pertinent imaging results/findings within the past 24 hours.      Assessment/Plan:      * Abdominal pain  Air and stool throughout the colon and rectum.  Scattered diverticula within the colon.  No mesenteric inflammatory change.  No CT evidence for bowel obstruction.  The appendix is normal in caliber.  There is a poorly defined soft tissue attenuation mass of the distal gastric body near the gastric antrum measuring 2.5 x 3.2 cm.  This is suspicious for a gastric neoplasm.    General surgery consulted. Planning for endoscopy tomorrow. NPO at [facility]      Rheumatoid arthritis  Continue hydroxychloroquine and prednisone per home dose       Symptomatic anemia  Appropriate response to 2 units pRBC  Anemia studies completed  Will repeat Hb this evening to assure stability          VTE Risk Mitigation (From admission, onward)         Ordered     IP VTE LOW RISK PATIENT  Once         04/03/23 2233                Discharge Planning   SHELBI:      Code Status: Full Code   Is the patient medically ready for discharge?:     Reason for patient still in hospital (select all that apply): Treatment  Discharge Plan A: Home with family                  Walter Roth MD  Department of Hospital Medicine   [place] - Intensive Care

## 2023-04-04 NOTE — ASSESSMENT & PLAN NOTE
fobt negative  Follow up with PCP for anemia work up  In the process of being transfused 2 units PRBC  Repeat CBC once transfusion complete with goal of > 7  Patient already reporting feeling improved

## 2023-04-04 NOTE — SUBJECTIVE & OBJECTIVE
Past Medical History:   Diagnosis Date    Anxiety     Depression     Encounter for long-term (current) use of high-risk medication     Fibromyalgia     Osteoporosis     Rheumatoid arthritis involving multiple sites with positive rheumatoid factor        Past Surgical History:   Procedure Laterality Date    REPAIR OF RECURRENT INCARCERATED INCISIONAL HERNIA Left 4/13/2022    Procedure: REPAIR, HERNIA--RIGHT LATERAL POSITIONING;  Surgeon: Tyrell Cornelius MD;  Location: [facility];  Service: General;  Laterality: Left;  left lumbar hernia    right leg       multiple surgeries ,amputation       Review of patient's allergies indicates:   Allergen Reactions    Penicillins        No current facility-administered medications on file prior to encounter.     Current Outpatient Medications on File Prior to Encounter   Medication Sig    ergocalciferol (ERGOCALCIFEROL) 50,000 unit Cap TAKE 1 CAPSULE BY MOUTH EVERY 7 DAYS    folic acid (FOLVITE) 1 MG tablet TAKE 1 TABLET(1 MG) BY MOUTH EVERY DAY    gabapentin (NEURONTIN) 100 MG capsule Take 100-300 mg by mouth nightly as needed.    hydrocodone-acetaminophen 10-325mg (NORCO)  mg Tab Take 1 tablet by mouth every 6 (six) hours as needed.     hydrOXYchloroQUINE (PLAQUENIL) 200 mg tablet TAKE 1 TABLET(200 MG) BY MOUTH TWICE DAILY    predniSONE (DELTASONE) 5 MG tablet Take 1 tablet (5 mg total) by mouth 2 (two) times daily.     Family History    None       Tobacco Use    Smoking status: Every Day     Packs/day: 0.50     Types: Cigarettes    Smokeless tobacco: Never   Substance and Sexual Activity    Alcohol use: No    Drug use: Never    Sexual activity: Yes     Review of Systems   Constitutional:  Positive for activity change, fatigue and fever.   Respiratory:  Positive for cough and shortness of breath.    Neurological:  Negative for syncope.   Objective:     Vital Signs (Most Recent):  Temp: 98.2 °F (36.8 °C) (04/04/23 0426)  Pulse: 88 (04/04/23 0426)  Resp: (!) 24  (04/04/23 0426)  BP: (!) 175/98 (04/04/23 0426)  SpO2: 97 % (04/04/23 0426)   Vital Signs (24h Range):  Temp:  [97.9 °F (36.6 °C)-98.6 °F (37 °C)] 98.2 °F (36.8 °C)  Pulse:  [] 88  Resp:  [18-32] 24  SpO2:  [92 %-99 %] 97 %  BP: (128-175)/() 175/98     Weight: 40.8 kg (90 lb)  Body mass index is 20.18 kg/m².    Physical Exam  Constitutional:       General: She is not in acute distress.  Cardiovascular:      Rate and Rhythm: Normal rate.   Pulmonary:      Effort: Pulmonary effort is normal. No respiratory distress.   Musculoskeletal:      Comments: R BKA    Neurological:      Mental Status: She is alert.           Significant Labs: All pertinent labs within the past 24 hours have been reviewed.    Significant Imaging: I have reviewed all pertinent imaging results/findings within the past 24 hours.

## 2023-04-04 NOTE — PLAN OF CARE
[facility], [place], operated by [facility]  Initial Discharge Assessment       Primary Care Provider: John Merchant MD (Inactive)    Admission Diagnosis: Cough [R05.9]  Weakness [R53.1]  Chronic obstructive pulmonary disease, unspecified COPD type [J44.9]  Anemia, unspecified type [D64.9]    Admission Date: 4/3/2023  Expected Discharge Date:    completed assessment with patient who was alert and oriented. Patient lives with her spouse, Owen Null [phone], 8 year old daughter Tamera. She does not have a PCP and would like to get established with [facility]. Her Rheumatologist is Dr. Forde. Patient has [employer] insurance and uses [facility] in [place]. She does not use any assistive equipment at home. Patient is able to drive/transport herself. Patient denied any additional needs or barriers at this time. Case management will continue to follow.   Discharge Barriers Identified: None    Payor: [employer] MANAGED MEDICARE / Plan: [employer] MEDICARE HMO / Product Type: Capitation /     Extended Emergency Contact Information  Primary Emergency Contact: Owen Null  Mobile Phone: [phone]  Relation: Spouse  Preferred language: English   needed? No    Discharge Plan A: Home with family  Discharge Plan B: Home with family      [facility] - [place], [place] - [address]  [address] [place] [address] [place] [address]  Phone: [phone] Fax: [phone]    [id number] - [place], [place] - [address]  [address]  [place] [address]  Phone: [phone] Fax: [phone]      Initial Assessment (most recent)       Adult Discharge Assessment - 04/04/23 1250          Discharge Assessment    Assessment Type Discharge Planning Assessment     Confirmed/corrected address, phone number and insurance Yes     Confirmed Demographics Correct on Facesheet     Source of Information patient     Does patient/caregiver understand observation status Yes     Communicated SHELBI with  patient/caregiver Date not available/Unable to determine     Reason For Admission cough     People in Home child(blanco), dependent;spouse     Do you expect to return to your current living situation? Yes     Do you have help at home or someone to help you manage your care at home? Yes     Who are your caregiver(s) and their phone number(s)? spouse, wOen Null [phone]     Prior to hospitilization cognitive status: Alert/Oriented     Current cognitive status: Alert/Oriented     Equipment Currently Used at Home none     Readmission within 30 days? No     Patient currently being followed by outpatient case management? No     Do you currently have service(s) that help you manage your care at home? No     Do you have prescription coverage? Yes     Coverage [employer]     Do you have any problems affording any of your prescribed medications? No     Is the patient taking medications as prescribed? yes     Who is going to help you get home at discharge? spouse, Owen Null [phone]     How do you get to doctors appointments? car, drives self     Are you on dialysis? No     Do you take coumadin? No     Discharge Plan A Home with family     Discharge Plan B Home with family     DME Needed Upon Discharge  none     Discharge Plan discussed with: Patient     Discharge Barriers Identified None        Physical Activity    On average, how many days per week do you engage in moderate to strenuous exercise (like a brisk walk)? 2 days     On average, how many minutes do you engage in exercise at this level? 10 min        Financial Resource Strain    How hard is it for you to pay for the very basics like food, housing, medical care, and heating? Not hard at all        Housing Stability    In the last 12 months, was there a time when you were not able to pay the mortgage or rent on time? No     In the last 12 months, was there a time when you did not have a steady place to sleep or slept in a shelter (including now)? No         Transportation Needs    In the past 12 months, has lack of transportation kept you from medical appointments or from getting medications? No     In the past 12 months, has lack of transportation kept you from meetings, work, or from getting things needed for daily living? No        Food Insecurity    Within the past 12 months, you worried that your food would run out before you got the money to buy more. Never true     Within the past 12 months, the food you bought just didn't last and you didn't have money to get more. Never true        Stress    Do you feel stress - tense, restless, nervous, or anxious, or unable to sleep at night because your mind is troubled all the time - these days? Not at all        Social Connections    In a typical week, how many times do you talk on the phone with family, friends, or neighbors? Once a week     How often do you get together with friends or relatives? Twice a week     How often do you attend Christianity or Jehovah's witness services? Never     Do you belong to any clubs or organizations such as Christianity groups, unions, fraternal or athletic groups, or school groups? No     How often do you attend meetings of the clubs or organizations you belong to? Never     Are you , , , , never , or living with a partner?         Alcohol Use    Q1: How often do you have a drink containing alcohol? Never     Q2: How many drinks containing alcohol do you have on a typical day when you are drinking? Patient does not drink     Q3: How often do you have six or more drinks on one occasion? Never        OTHER    Name(s) of People in Home spouse, Owen Null [phone], 8 year old daughter Tamera

## 2023-04-04 NOTE — ASSESSMENT & PLAN NOTE
Patient may have an undiagnosed COPD  Given nebs and solumedrol  Feels improved  Will resume home dose prednisone  O2 and nebs PRN

## 2023-04-04 NOTE — ASSESSMENT & PLAN NOTE
Appropriate response to 2 units pRBC  Anemia studies completed  Will repeat Hb this evening to assure stability

## 2023-04-04 NOTE — H&P
[facility]  [facility] & Physical    Patient Name: Janey Null  MRN: [id number]  Admission Date: 4/3/2023  Attending Physician: Walter Roth MD   Primary Care Provider: John Merchant MD (Inactive)         Patient information was obtained from patient and ER records.       Subjective:     Principal Problem:Symptomatic anemia    Chief Complaint:   Chief Complaint   Patient presents with    Cough     Pt states she has been experiencing cough since Thursday. Also experiencing congestion and rhinorrhea. She reports vomiting, but states that she believes the vomiting is secondary to coughing so much.        HPI: The patient is a 68 y/o female with PMH of fibromyalgia and RA who presents with generalized weakness, SOB, and cough. She states feeling very tired over the past month. Patient is a current smoker but denies any history of COPD diagnosis. Work up in the ER showed profound anemia. She denies any hemoptysis, BRBPR, or melena. She does report having a cough productive of clear sputum and some temps of 100.1 at home.       Past Medical History:   Diagnosis Date    Anxiety     Depression     Encounter for long-term (current) use of high-risk medication     Fibromyalgia     Osteoporosis     Rheumatoid arthritis involving multiple sites with positive rheumatoid factor        Past Surgical History:   Procedure Laterality Date    REPAIR OF RECURRENT INCARCERATED INCISIONAL HERNIA Left 4/13/2022    Procedure: REPAIR, HERNIA--RIGHT LATERAL POSITIONING;  Surgeon: Tyrell Cornelius MD;  [facility];  Service: General;  Laterality: Left;  left lumbar hernia    right leg       multiple surgeries ,amputation       Review of patient's allergies indicates:   Allergen Reactions    Penicillins        No current facility-administered medications on file prior to encounter.     Current Outpatient Medications on File Prior to Encounter   Medication Sig     ergocalciferol (ERGOCALCIFEROL) 50,000 unit Cap TAKE 1 CAPSULE BY MOUTH EVERY 7 DAYS    folic acid (FOLVITE) 1 MG tablet TAKE 1 TABLET(1 MG) BY MOUTH EVERY DAY    gabapentin (NEURONTIN) 100 MG capsule Take 100-300 mg by mouth nightly as needed.    hydrocodone-acetaminophen 10-325mg (NORCO)  mg Tab Take 1 tablet by mouth every 6 (six) hours as needed.     hydrOXYchloroQUINE (PLAQUENIL) 200 mg tablet TAKE 1 TABLET(200 MG) BY MOUTH TWICE DAILY    predniSONE (DELTASONE) 5 MG tablet Take 1 tablet (5 mg total) by mouth 2 (two) times daily.     Family History    None       Tobacco Use    Smoking status: Every Day     Packs/day: 0.50     Types: Cigarettes    Smokeless tobacco: Never   Substance and Sexual Activity    Alcohol use: No    Drug use: Never    Sexual activity: Yes     Review of Systems   Constitutional:  Positive for activity change, fatigue and fever.   Respiratory:  Positive for cough and shortness of breath.    Neurological:  Negative for syncope.   Objective:     Vital Signs (Most Recent):  Temp: 98.2 °F (36.8 °C) (04/04/23 0426)  Pulse: 88 (04/04/23 0426)  Resp: (!) 24 (04/04/23 0426)  BP: (!) 175/98 (04/04/23 0426)  SpO2: 97 % (04/04/23 0426)   Vital Signs (24h Range):  Temp:  [97.9 °F (36.6 °C)-98.6 °F (37 °C)] 98.2 °F (36.8 °C)  Pulse:  [] 88  Resp:  [18-32] 24  SpO2:  [92 %-99 %] 97 %  BP: (128-175)/() 175/98     Weight: 40.8 kg (90 lb)  Body mass index is 20.18 kg/m².    Physical Exam  Constitutional:       General: She is not in acute distress.  Cardiovascular:      Rate and Rhythm: Normal rate.   Pulmonary:      Effort: Pulmonary effort is normal. No respiratory distress.   Musculoskeletal:      Comments: MANDY GARDNER    Neurological:      Mental Status: She is alert.           Significant Labs: All pertinent labs within the past 24 hours have been reviewed.    Significant Imaging: I have reviewed all pertinent imaging results/findings within the past 24 hours.    Assessment/Plan:      * Symptomatic anemia  fobt negative  Follow up with PCP for anemia work up  In the process of being transfused 2 units PRBC  Repeat CBC once transfusion complete with goal of > 7  Patient already reporting feeling improved       Rheumatoid arthritis  Continue hydroxychloroquine and prednisone per home dose       Cough  Patient may have an undiagnosed COPD  Given nebs and solumedrol  Feels improved  Will resume home dose prednisone  O2 and nebs PRN         VTE Risk Mitigation (From admission, onward)         Ordered     enoxaparin injection 40 mg  Daily         04/03/23 2233     IP VTE LOW RISK PATIENT  Once         04/03/23 2233                     The attending portion of this evaluation, treatment, and documentation was performed per Ely Kendall MD via Telemedicine AudioVisual using the secure [employer] software platform with 2 way audio/video. The provider was located off-site and the patient is located in the hospital. The aforementioned video software was utilized to document the relevant history and physical exam      Ely Kendall MD  Department of Hospital Medicine   [place] - Emergency Dept

## 2023-04-04 NOTE — PLAN OF CARE
04/04/23 1255   DIOR Message   Medicare Outpatient and Observation Notification regarding financial responsibility Explained to patient/caregiver;Signed/date by patient/caregiver   Date DIOR was signed 04/04/23   Time DIOR was signed 1240

## 2023-04-04 NOTE — ED NOTES
Approx 1200 cc's clear, yellow UOP voided to bedpan. Pt has Rt BKA and an adult incontinence brief in place. Discussed Pure Wick placement and pt verb understanding and assent to POC.

## 2023-04-05 ENCOUNTER — ANESTHESIA (OUTPATIENT)
Dept: SURGERY | Facility: HOSPITAL | Age: 68
End: 2023-04-05
Payer: MEDICARE

## 2023-04-05 ENCOUNTER — ANESTHESIA EVENT (OUTPATIENT)
Dept: SURGERY | Facility: HOSPITAL | Age: 68
End: 2023-04-05
Payer: MEDICARE

## 2023-04-05 PROBLEM — K31.89 GASTRIC MASS: Status: ACTIVE | Noted: 2023-04-05

## 2023-04-05 PROBLEM — D64.9 ANEMIA: Status: ACTIVE | Noted: 2023-04-05

## 2023-04-05 PROBLEM — J44.9 COPD (CHRONIC OBSTRUCTIVE PULMONARY DISEASE): Status: ACTIVE | Noted: 2023-04-05

## 2023-04-05 PROBLEM — Z72.0 TOBACCO ABUSE: Status: ACTIVE | Noted: 2023-04-05

## 2023-04-05 LAB
ALBUMIN SERPL BCP-MCNC: 3 G/DL (ref 3.5–5.2)
ALP SERPL-CCNC: 104 U/L (ref 55–135)
ALT SERPL W/O P-5'-P-CCNC: 13 U/L (ref 10–44)
ANION GAP SERPL CALC-SCNC: 10 MMOL/L (ref 8–16)
AST SERPL-CCNC: 27 U/L (ref 10–40)
BASOPHILS # BLD AUTO: 0.02 K/UL (ref 0–0.2)
BASOPHILS NFR BLD: 0.1 % (ref 0–1.9)
BILIRUB SERPL-MCNC: 0.4 MG/DL (ref 0.1–1)
BUN SERPL-MCNC: 15 MG/DL (ref 8–23)
CALCIUM SERPL-MCNC: 9.4 MG/DL (ref 8.7–10.5)
CHLORIDE SERPL-SCNC: 104 MMOL/L (ref 95–110)
CO2 SERPL-SCNC: 23 MMOL/L (ref 23–29)
CREAT SERPL-MCNC: 0.7 MG/DL (ref 0.5–1.4)
DIFFERENTIAL METHOD: ABNORMAL
EOSINOPHIL # BLD AUTO: 0 K/UL (ref 0–0.5)
EOSINOPHIL NFR BLD: 0 % (ref 0–8)
ERYTHROCYTE [DISTWIDTH] IN BLOOD BY AUTOMATED COUNT: 18.3 % (ref 11.5–14.5)
EST. GFR  (NO RACE VARIABLE): >60 ML/MIN/1.73 M^2
GLUCOSE SERPL-MCNC: 106 MG/DL (ref 70–110)
HCT VFR BLD AUTO: 35.1 % (ref 37–48.5)
HGB BLD-MCNC: 11.6 G/DL (ref 12–16)
IMM GRANULOCYTES # BLD AUTO: 0.09 K/UL (ref 0–0.04)
IMM GRANULOCYTES NFR BLD AUTO: 0.6 % (ref 0–0.5)
LYMPHOCYTES # BLD AUTO: 2.1 K/UL (ref 1–4.8)
LYMPHOCYTES NFR BLD: 13.8 % (ref 18–48)
MAGNESIUM SERPL-MCNC: 1.9 MG/DL (ref 1.6–2.6)
MCH RBC QN AUTO: 26.5 PG (ref 27–31)
MCHC RBC AUTO-ENTMCNC: 33 G/DL (ref 32–36)
MCV RBC AUTO: 80 FL (ref 82–98)
MONOCYTES # BLD AUTO: 1 K/UL (ref 0.3–1)
MONOCYTES NFR BLD: 6.4 % (ref 4–15)
NEUTROPHILS # BLD AUTO: 11.9 K/UL (ref 1.8–7.7)
NEUTROPHILS NFR BLD: 79.1 % (ref 38–73)
NRBC BLD-RTO: 0 /100 WBC
PLATELET # BLD AUTO: 476 K/UL (ref 150–450)
PMV BLD AUTO: 9 FL (ref 9.2–12.9)
POTASSIUM SERPL-SCNC: 3.8 MMOL/L (ref 3.5–5.1)
PROT SERPL-MCNC: 7.5 G/DL (ref 6–8.4)
RBC # BLD AUTO: 4.38 M/UL (ref 4–5.4)
SODIUM SERPL-SCNC: 137 MMOL/L (ref 136–145)
WBC # BLD AUTO: 15.11 K/UL (ref 3.9–12.7)

## 2023-04-05 PROCEDURE — 43239 EGD BIOPSY SINGLE/MULTIPLE: CPT | Performed by: SURGERY

## 2023-04-05 PROCEDURE — G0378 HOSPITAL OBSERVATION PER HR: HCPCS

## 2023-04-05 PROCEDURE — 83735 ASSAY OF MAGNESIUM: CPT | Performed by: STUDENT IN AN ORGANIZED HEALTH CARE EDUCATION/TRAINING PROGRAM

## 2023-04-05 PROCEDURE — 88305 TISSUE EXAM BY PATHOLOGIST: CPT | Performed by: STUDENT IN AN ORGANIZED HEALTH CARE EDUCATION/TRAINING PROGRAM

## 2023-04-05 PROCEDURE — 37000008 HC ANESTHESIA 1ST 15 MINUTES: Performed by: SURGERY

## 2023-04-05 PROCEDURE — 63600175 PHARM REV CODE 636 W HCPCS: Performed by: NURSE ANESTHETIST, CERTIFIED REGISTERED

## 2023-04-05 PROCEDURE — 27000221 HC OXYGEN, UP TO 24 HOURS

## 2023-04-05 PROCEDURE — 27201423 OPTIME MED/SURG SUP & DEVICES STERILE SUPPLY: Performed by: SURGERY

## 2023-04-05 PROCEDURE — 25000003 PHARM REV CODE 250: Performed by: HOSPITALIST

## 2023-04-05 PROCEDURE — 43239 EGD BIOPSY SINGLE/MULTIPLE: CPT | Mod: ,,, | Performed by: SURGERY

## 2023-04-05 PROCEDURE — 99223 PR INITIAL HOSPITAL CARE,LEVL III: ICD-10-PCS | Mod: 57,,, | Performed by: STUDENT IN AN ORGANIZED HEALTH CARE EDUCATION/TRAINING PROGRAM

## 2023-04-05 PROCEDURE — 88342 CHG IMMUNOCYTOCHEMISTRY: ICD-10-PCS | Mod: 26,,, | Performed by: STUDENT IN AN ORGANIZED HEALTH CARE EDUCATION/TRAINING PROGRAM

## 2023-04-05 PROCEDURE — 88305 TISSUE EXAM BY PATHOLOGIST: CPT | Mod: 26,,, | Performed by: STUDENT IN AN ORGANIZED HEALTH CARE EDUCATION/TRAINING PROGRAM

## 2023-04-05 PROCEDURE — 99233 PR SUBSEQUENT HOSPITAL CARE,LEVL III: ICD-10-PCS | Mod: ,,, | Performed by: STUDENT IN AN ORGANIZED HEALTH CARE EDUCATION/TRAINING PROGRAM

## 2023-04-05 PROCEDURE — 25000242 PHARM REV CODE 250 ALT 637 W/ HCPCS: Performed by: HOSPITALIST

## 2023-04-05 PROCEDURE — 99233 SBSQ HOSP IP/OBS HIGH 50: CPT | Mod: ,,, | Performed by: STUDENT IN AN ORGANIZED HEALTH CARE EDUCATION/TRAINING PROGRAM

## 2023-04-05 PROCEDURE — 80053 COMPREHEN METABOLIC PANEL: CPT | Performed by: STUDENT IN AN ORGANIZED HEALTH CARE EDUCATION/TRAINING PROGRAM

## 2023-04-05 PROCEDURE — D9220A PRA ANESTHESIA: ICD-10-PCS | Mod: ANES,,, | Performed by: ANESTHESIOLOGY

## 2023-04-05 PROCEDURE — 25000242 PHARM REV CODE 250 ALT 637 W/ HCPCS: Performed by: ANESTHESIOLOGY

## 2023-04-05 PROCEDURE — 25000003 PHARM REV CODE 250: Performed by: NURSE ANESTHETIST, CERTIFIED REGISTERED

## 2023-04-05 PROCEDURE — 96376 TX/PRO/DX INJ SAME DRUG ADON: CPT | Mod: 59

## 2023-04-05 PROCEDURE — 88342 IMHCHEM/IMCYTCHM 1ST ANTB: CPT | Mod: 59 | Performed by: STUDENT IN AN ORGANIZED HEALTH CARE EDUCATION/TRAINING PROGRAM

## 2023-04-05 PROCEDURE — 63600175 PHARM REV CODE 636 W HCPCS: Performed by: HOSPITALIST

## 2023-04-05 PROCEDURE — 99223 1ST HOSP IP/OBS HIGH 75: CPT | Mod: 57,,, | Performed by: STUDENT IN AN ORGANIZED HEALTH CARE EDUCATION/TRAINING PROGRAM

## 2023-04-05 PROCEDURE — 43239 PR EGD, FLEX, W/BIOPSY, SGL/MULTI: ICD-10-PCS | Mod: ,,, | Performed by: SURGERY

## 2023-04-05 PROCEDURE — 99900031 HC PATIENT EDUCATION (STAT)

## 2023-04-05 PROCEDURE — 94640 AIRWAY INHALATION TREATMENT: CPT | Mod: XB

## 2023-04-05 PROCEDURE — D9220A PRA ANESTHESIA: ICD-10-PCS | Mod: CRNA,,, | Performed by: NURSE ANESTHETIST, CERTIFIED REGISTERED

## 2023-04-05 PROCEDURE — D9220A PRA ANESTHESIA: Mod: ANES,,, | Performed by: ANESTHESIOLOGY

## 2023-04-05 PROCEDURE — 25000242 PHARM REV CODE 250 ALT 637 W/ HCPCS: Performed by: STUDENT IN AN ORGANIZED HEALTH CARE EDUCATION/TRAINING PROGRAM

## 2023-04-05 PROCEDURE — 88342 IMHCHEM/IMCYTCHM 1ST ANTB: CPT | Mod: 26,,, | Performed by: STUDENT IN AN ORGANIZED HEALTH CARE EDUCATION/TRAINING PROGRAM

## 2023-04-05 PROCEDURE — 25000003 PHARM REV CODE 250: Performed by: STUDENT IN AN ORGANIZED HEALTH CARE EDUCATION/TRAINING PROGRAM

## 2023-04-05 PROCEDURE — 88305 TISSUE EXAM BY PATHOLOGIST: ICD-10-PCS | Mod: 26,,, | Performed by: STUDENT IN AN ORGANIZED HEALTH CARE EDUCATION/TRAINING PROGRAM

## 2023-04-05 PROCEDURE — D9220A PRA ANESTHESIA: Mod: CRNA,,, | Performed by: NURSE ANESTHETIST, CERTIFIED REGISTERED

## 2023-04-05 PROCEDURE — 85025 COMPLETE CBC W/AUTO DIFF WBC: CPT | Performed by: STUDENT IN AN ORGANIZED HEALTH CARE EDUCATION/TRAINING PROGRAM

## 2023-04-05 PROCEDURE — 94761 N-INVAS EAR/PLS OXIMETRY MLT: CPT

## 2023-04-05 RX ORDER — ONDANSETRON 2 MG/ML
4 INJECTION INTRAMUSCULAR; INTRAVENOUS DAILY PRN
Status: DISCONTINUED | OUTPATIENT
Start: 2023-04-05 | End: 2023-04-05 | Stop reason: HOSPADM

## 2023-04-05 RX ORDER — SODIUM CHLORIDE, SODIUM LACTATE, POTASSIUM CHLORIDE, CALCIUM CHLORIDE 600; 310; 30; 20 MG/100ML; MG/100ML; MG/100ML; MG/100ML
INJECTION, SOLUTION INTRAVENOUS CONTINUOUS PRN
Status: DISCONTINUED | OUTPATIENT
Start: 2023-04-05 | End: 2023-04-05

## 2023-04-05 RX ORDER — LIDOCAINE HYDROCHLORIDE 10 MG/ML
1 INJECTION, SOLUTION EPIDURAL; INFILTRATION; INTRACAUDAL; PERINEURAL ONCE
Status: CANCELLED | OUTPATIENT
Start: 2023-04-05 | End: 2023-04-05

## 2023-04-05 RX ORDER — IPRATROPIUM BROMIDE AND ALBUTEROL SULFATE 2.5; .5 MG/3ML; MG/3ML
3 SOLUTION RESPIRATORY (INHALATION) ONCE
Status: COMPLETED | OUTPATIENT
Start: 2023-04-05 | End: 2023-04-05

## 2023-04-05 RX ORDER — SODIUM CHLORIDE, SODIUM LACTATE, POTASSIUM CHLORIDE, CALCIUM CHLORIDE 600; 310; 30; 20 MG/100ML; MG/100ML; MG/100ML; MG/100ML
125 INJECTION, SOLUTION INTRAVENOUS CONTINUOUS
Status: DISCONTINUED | OUTPATIENT
Start: 2023-04-05 | End: 2023-04-05

## 2023-04-05 RX ORDER — PROPOFOL 10 MG/ML
VIAL (ML) INTRAVENOUS
Status: DISCONTINUED | OUTPATIENT
Start: 2023-04-05 | End: 2023-04-05

## 2023-04-05 RX ORDER — SODIUM CHLORIDE, SODIUM LACTATE, POTASSIUM CHLORIDE, CALCIUM CHLORIDE 600; 310; 30; 20 MG/100ML; MG/100ML; MG/100ML; MG/100ML
INJECTION, SOLUTION INTRAVENOUS CONTINUOUS
Status: CANCELLED | OUTPATIENT
Start: 2023-04-05

## 2023-04-05 RX ORDER — ALBUTEROL SULFATE 0.83 MG/ML
2.5 SOLUTION RESPIRATORY (INHALATION) ONCE
Status: COMPLETED | OUTPATIENT
Start: 2023-04-05 | End: 2023-04-05

## 2023-04-05 RX ORDER — IPRATROPIUM BROMIDE AND ALBUTEROL SULFATE 2.5; .5 MG/3ML; MG/3ML
SOLUTION RESPIRATORY (INHALATION)
Status: DISPENSED
Start: 2023-04-05 | End: 2023-04-05

## 2023-04-05 RX ORDER — LIDOCAINE HYDROCHLORIDE 20 MG/ML
INJECTION, SOLUTION EPIDURAL; INFILTRATION; INTRACAUDAL; PERINEURAL
Status: DISCONTINUED | OUTPATIENT
Start: 2023-04-05 | End: 2023-04-05

## 2023-04-05 RX ADMIN — PROPOFOL 50 MG: 10 INJECTION, EMULSION INTRAVENOUS at 01:04

## 2023-04-05 RX ADMIN — HYDROXYCHLOROQUINE SULFATE 200 MG: 200 TABLET, FILM COATED ORAL at 09:04

## 2023-04-05 RX ADMIN — ALBUTEROL SULFATE 2.5 MG: 2.5 SOLUTION RESPIRATORY (INHALATION) at 02:04

## 2023-04-05 RX ADMIN — PROPOFOL 100 MG: 10 INJECTION, EMULSION INTRAVENOUS at 01:04

## 2023-04-05 RX ADMIN — MORPHINE SULFATE 2 MG: 2 INJECTION, SOLUTION INTRAMUSCULAR; INTRAVENOUS at 02:04

## 2023-04-05 RX ADMIN — MORPHINE SULFATE 2 MG: 2 INJECTION, SOLUTION INTRAMUSCULAR; INTRAVENOUS at 01:04

## 2023-04-05 RX ADMIN — PANTOPRAZOLE SODIUM 40 MG: 40 TABLET, DELAYED RELEASE ORAL at 08:04

## 2023-04-05 RX ADMIN — LIDOCAINE HYDROCHLORIDE 20 MG: 20 INJECTION, SOLUTION EPIDURAL; INFILTRATION; INTRACAUDAL; PERINEURAL at 01:04

## 2023-04-05 RX ADMIN — MORPHINE SULFATE 2 MG: 2 INJECTION, SOLUTION INTRAMUSCULAR; INTRAVENOUS at 08:04

## 2023-04-05 RX ADMIN — ALBUTEROL SULFATE 2.5 MG: 2.5 SOLUTION RESPIRATORY (INHALATION) at 11:04

## 2023-04-05 RX ADMIN — IPRATROPIUM BROMIDE 0.5 MG: 0.5 SOLUTION RESPIRATORY (INHALATION) at 02:04

## 2023-04-05 RX ADMIN — SODIUM CHLORIDE, POTASSIUM CHLORIDE, SODIUM LACTATE AND CALCIUM CHLORIDE: 600; 310; 30; 20 INJECTION, SOLUTION INTRAVENOUS at 01:04

## 2023-04-05 RX ADMIN — GLYCOPYRROLATE 0.4 MG: 0.2 INJECTION INTRAMUSCULAR; INTRAVENOUS at 01:04

## 2023-04-05 RX ADMIN — MORPHINE SULFATE 2 MG: 2 INJECTION, SOLUTION INTRAMUSCULAR; INTRAVENOUS at 07:04

## 2023-04-05 RX ADMIN — FOLIC ACID 1 MG: 1 TABLET ORAL at 08:04

## 2023-04-05 RX ADMIN — PREDNISONE 5 MG: 1 TABLET ORAL at 09:04

## 2023-04-05 RX ADMIN — HYDROXYCHLOROQUINE SULFATE 200 MG: 200 TABLET, FILM COATED ORAL at 08:04

## 2023-04-05 RX ADMIN — IPRATROPIUM BROMIDE AND ALBUTEROL SULFATE 3 ML: 2.5; .5 SOLUTION RESPIRATORY (INHALATION) at 07:04

## 2023-04-05 RX ADMIN — PREDNISONE 5 MG: 1 TABLET ORAL at 08:04

## 2023-04-05 NOTE — SUBJECTIVE & OBJECTIVE
Interval History: NPO at [facility] for EGD today. Patient continues to have mild cough. WBC increased in setting of high dose steroids yesterday. Not showing any signs of infection currently. She was placed on O2 for oxygen sat of 91-92%. 94% on RA. No hx of COPD but patient has a significant smoking hx. Not having fever or any other infectious symptoms. Plan to re-evaluate after EGD.    Review of Systems   All other systems reviewed and are negative.  Objective:     Vital Signs (Most Recent):  Temp: 98.3 °F (36.8 °C) (04/05/23 0746)  Pulse: 88 (04/05/23 0753)  Resp: 18 (04/05/23 0859)  BP: (!) 164/90 (04/05/23 0746)  SpO2: 99 % (04/05/23 0753)   Vital Signs (24h Range):  Temp:  [97.1 °F (36.2 °C)-99.2 °F (37.3 °C)] 98.3 °F (36.8 °C)  Pulse:  [85-99] 88  Resp:  [14-19] 18  SpO2:  [91 %-99 %] 99 %  BP: (136-169)/(70-90) 164/90     Weight: 38.3 kg (84 lb 7 oz)  Body mass index is 18.93 kg/m².    Intake/Output Summary (Last 24 hours) at 4/5/2023 1110  Last data filed at 4/4/2023 1838  Gross per 24 hour   Intake 120 ml   Output --   Net 120 ml      Physical Exam  Vitals reviewed  General: NAD, Well developed, Well Nourished  Head: NC/AT  Eyes: EOMI, CONTRERAS  Cardiovascular: Pulses intact distally, Regular Rate and rhythm  Pulmonary: Normal Respiratory Rate, No respiratory distress  Gi: Soft, distended. Some tenderness to mid epigastrum  Extremities: Warm, No edema present  Skin: Warm, dry  Neuro: Alert, Oriented x3, No focal Deficit  Psych: Appropriate mood and affect      Significant Labs: All pertinent labs within the past 24 hours have been reviewed.    Significant Imaging: I have reviewed all pertinent imaging results/findings within the past 24 hours.

## 2023-04-05 NOTE — ASSESSMENT & PLAN NOTE
Patient with likely COPD with significant smoking hx  Does not currently appear to be in acute exacerbation  Prn treatments

## 2023-04-05 NOTE — NURSING
Patient returned from endoscopy AAOx3, no change in LOC. No reports of nausea or dizziness. VSS. 93% o2 on room air. Respirations even and unlabored. All patient needs met and anticipated, patient updated on plan of care.

## 2023-04-05 NOTE — PLAN OF CARE
Transferred to ICU 6 per w/c with RN. SUE x3. Resp even and unlabored room air. No distress noted. Tolerating Po fluids without c/o nausea/vomiting. Denies c/o pain. Saline lock intact without observed problems to site.  All personal belongings returned to pt.

## 2023-04-05 NOTE — CONSULTS
[place] - Intensive Care  General Surgery  Consult Note    Patient Name: Janey Null  MRN: [id number]  Admission Date: 4/3/2023  Attending Physician: Walter Roth MD   Consult Physician: Carly Sierra MD  Primary Care Provider: John Merchant MD (Inactive)    Patient information was obtained from patient and ER records.     Subjective:     Reason for consultation: gastric mass    History of Present Illness:  Janey Null is a 67 y.o. female with a history of anxiety, depression, fibromyalgia, rheumatoid arthritis, osteoporosis presents with abdominal pain and weakness. Admitted to hospitalist service for symptomatic anemia with initial Hgb of 4.6. She received 2 units of packed red cells and had more than appropriate rise to Hgb 11. CT abdomen pelvis performed revealed a mass in the distal stomach concerning for neoplasm. Surgery consulted for diagnostic EGD. Patient feeling better since admission. She does admit to weight loss and poor appetite for the past 2 months. Denies nausea, vomiting, change in bowel habits, or blood in stool. She is a smoker.    Review of patient's allergies indicates:   Allergen Reactions    Penicillins        Past Medical History:   Diagnosis Date    Anxiety     Depression     Encounter for long-term (current) use of high-risk medication     Fibromyalgia     Osteoporosis     Rheumatoid arthritis involving multiple sites with positive rheumatoid factor      Past Surgical History:   Procedure Laterality Date    REPAIR OF RECURRENT INCARCERATED INCISIONAL HERNIA Left 4/13/2022    Procedure: REPAIR, HERNIA--RIGHT LATERAL POSITIONING;  Surgeon: Tyrell Cornelius MD;  Location: [facility];  Service: General;  Laterality: Left;  left lumbar hernia    right leg       multiple surgeries ,amputation     Family History    None       Tobacco Use    Smoking status: Every Day     Packs/day: 0.50     Types: Cigarettes    Smokeless tobacco: Never   Substance and Sexual  Activity    Alcohol use: No    Drug use: Never    Sexual activity: Yes     Review of Systems   Constitutional:  Positive for appetite change, fatigue and unexpected weight change. Negative for activity change, chills and fever.   HENT:  Negative for congestion, dental problem and ear discharge.    Eyes:  Negative for discharge and itching.   Respiratory:  Negative for apnea, choking and chest tightness.    Cardiovascular:  Negative for chest pain and leg swelling.   Gastrointestinal:  Positive for abdominal pain. Negative for abdominal distention, anal bleeding, constipation, diarrhea and nausea.   Endocrine: Negative for cold intolerance and heat intolerance.   Genitourinary:  Negative for difficulty urinating and dyspareunia.   Musculoskeletal:  Positive for arthralgias. Negative for back pain.   Skin:  Negative for color change and pallor.   Neurological:  Positive for weakness. Negative for dizziness and facial asymmetry.   Hematological:  Negative for adenopathy. Does not bruise/bleed easily.   Psychiatric/Behavioral:  Negative for agitation and behavioral problems.    Objective:     Vital Signs (Most Recent):  Temp: 98.3 °F (36.8 °C) (04/05/23 0746)  Pulse: 88 (04/05/23 0753)  Resp: 18 (04/05/23 0753)  BP: (!) 164/90 (04/05/23 0746)  SpO2: 99 % (04/05/23 0753)   Vital Signs (24h Range):  Temp:  [97.1 °F (36.2 °C)-99.2 °F (37.3 °C)] 98.3 °F (36.8 °C)  Pulse:  [85-99] 88  Resp:  [14-20] 18  SpO2:  [91 %-99 %] 99 %  BP: (136-176)/(70-90) 164/90     Weight: 38.3 kg (84 lb 7 oz)  Body mass index is 18.93 kg/m².    Physical Exam  Constitutional:       General: She is not in acute distress.     Appearance: She is well-developed.   HENT:      Head: Normocephalic and atraumatic.   Eyes:      Pupils: Pupils are equal, round, and reactive to light.   Neck:      Thyroid: No thyromegaly.   Cardiovascular:      Rate and Rhythm: Normal rate and regular rhythm.   Pulmonary:      Effort: Pulmonary effort is normal.      Breath  sounds: Normal breath sounds.   Abdominal:      General: Bowel sounds are normal. There is no distension.      Palpations: Abdomen is soft.      Tenderness: There is no abdominal tenderness.   Musculoskeletal:         General: No deformity. Normal range of motion.      Cervical back: Normal range of motion and neck supple.   Skin:     General: Skin is warm.      Capillary Refill: Capillary refill takes less than 2 seconds.      Findings: No erythema.   Neurological:      Mental Status: She is alert and oriented to person, place, and time.      Cranial Nerves: No cranial nerve deficit.   Psychiatric:         Behavior: Behavior normal.       Significant Labs:  CBC:   Recent Labs   Lab 04/05/23 0343   WBC 15.11*   RBC 4.38   HGB 11.6*   HCT 35.1*   *   MCV 80*   MCH 26.5*   MCHC 33.0     BMP:   Recent Labs   Lab 04/05/23 0343         K 3.8      CO2 23   BUN 15   CREATININE 0.7   CALCIUM 9.4   MG 1.9     CMP:   Recent Labs   Lab 04/03/23 1856 04/04/23  0700 04/05/23 0343   GLU 96   < > 106   CALCIUM 9.1   < > 9.4   ALBUMIN 3.1*  --  3.0*   PROT 7.4  --  7.5   *   < > 137   K 4.1   < > 3.8   CO2 20*   < > 23      < > 104   BUN 10   < > 15   CREATININE 0.6   < > 0.7   ALKPHOS 85  --  104   ALT 13  --   --    AST 21  --  27   BILITOT 0.2  --  0.4    < > = values in this interval not displayed.     LFTs:   Recent Labs   Lab 04/03/23 1856 04/05/23  0343   ALT 13  --    AST 21 27   ALKPHOS 85 104   BILITOT 0.2 0.4   PROT 7.4 7.5   ALBUMIN 3.1* 3.0*     Coagulation:   Recent Labs   Lab 04/04/23  0700   LABPROT 10.5   INR 0.9   APTT 28.0     Specimen (24h ago, onward)      None          No results for input(s): COLORU, CLARITYU, SPECGRAV, PHUR, PROTEINUA, GLUCOSEU, BILIRUBINCON, BLOODU, WBCU, RBCU, BACTERIA, MUCUS, NITRITE, LEUKOCYTESUR, UROBILINOGEN, HYALINECASTS in the last 168 hours.    Significant Diagnostics:  CT: I have reviewed all pertinent results/findings within the past 24  hours and my personal findings are:  mass of the distal stomach    Assessment:   Janey Null is a 67 y.o. female who presents with anemia, weight loss, gastric mass on imaging concerning for neoplasm.    Active Diagnoses:    Diagnosis Date Noted POA    PRINCIPAL PROBLEM:  Abdominal pain [R10.9] 04/12/2022 Yes    Symptomatic anemia [D64.9] 04/04/2023 Yes    Rheumatoid arthritis [M06.9] 04/04/2023 Yes      Problems Resolved During this Admission:    Diagnosis Date Noted Date Resolved POA    Cough [R05.9] 04/04/2023 04/04/2023 Yes     VTE Risk Mitigation (From admission, onward)           Ordered     IP VTE LOW RISK PATIENT  Once         04/03/23 2233                    Medical Decision Making/Plan:  Diagnostic EGD today with biopsy    Risk and benefits of EGD were discussed in depth.  Risk of EGD were discussed to include bleeding as well as perforation.  Patient understands that if the above procedural risks were to occur, he could need further intervention to include but not exclude a blood transfusion, repeat procedure, admission to the hospital, or even surgery which would likely require transfer to a higher level of care.       Carly Sierra MD  General Surgery  [facility]

## 2023-04-05 NOTE — PLAN OF CARE
[facility] - Intensive Care  Discharge Reassessment    Primary Care Provider: John Merchant MD (Inactive)    Expected Discharge Date:   Patient with abdominal mass going for EGD today. Will continue to follow for needs. Lives at home with her spouse.     Reassessment (most recent)       Discharge Reassessment - 04/05/23 0820          Discharge Reassessment    Assessment Type Discharge Planning Reassessment     Did the patient's condition or plan change since previous assessment? No     Discharge Plan discussed with: Patient     Communicated SHELBI with patient/caregiver Date not available/Unable to determine     Discharge Plan A Home with family     DME Needed Upon Discharge  none     Discharge Barriers Identified None     Why the patient remains in the hospital Requires continued medical care        Post-Acute Status    Discharge Delays None known at this time

## 2023-04-05 NOTE — PROGRESS NOTES
[facility]  Progress Note    Patient Name: Janey Null  MRN: [id number]  Patient Class: OP- Observation   Admission Date: 4/3/2023  Length of Stay: 0 days  Attending Physician: Walter Roth MD  Primary Care Provider: John Merchant MD (Inactive)        Subjective:     Principal Problem:Abdominal pain        HPI:  The patient is a 68 y/o female with PMH of fibromyalgia and RA who presents with generalized weakness, SOB, and cough. She states feeling very tired over the past month. Patient is a current smoker but denies any history of COPD diagnosis. Work up in the ER showed profound anemia. She denies any hemoptysis, BRBPR, or melena. She does report having a cough productive of clear sputum and some temps of 100.1 at home.       Overview/Hospital Course:  No notes on file    Interval History: NPO at [facility] for EGD today. Patient continues to have mild cough. WBC increased in setting of high dose steroids yesterday. Not showing any signs of infection currently. She was placed on O2 for oxygen sat of 91-92%. 94% on RA. No hx of COPD but patient has a significant smoking hx. Not having fever or any other infectious symptoms. Plan to re-evaluate after EGD.    Review of Systems   All other systems reviewed and are negative.  Objective:     Vital Signs (Most Recent):  Temp: 98.3 °F (36.8 °C) (04/05/23 0746)  Pulse: 88 (04/05/23 0753)  Resp: 18 (04/05/23 0859)  BP: (!) 164/90 (04/05/23 0746)  SpO2: 99 % (04/05/23 0753)   Vital Signs (24h Range):  Temp:  [97.1 °F (36.2 °C)-99.2 °F (37.3 °C)] 98.3 °F (36.8 °C)  Pulse:  [85-99] 88  Resp:  [14-19] 18  SpO2:  [91 %-99 %] 99 %  BP: (136-169)/(70-90) 164/90     Weight: 38.3 kg (84 lb 7 oz)  Body mass index is 18.93 kg/m².    Intake/Output Summary (Last 24 hours) at 4/5/2023 1110  Last data filed at 4/4/2023 1838  Gross per 24 hour   Intake 120 ml   Output --   Net 120 ml      Physical Exam  Vitals reviewed  General: NAD, Well  developed, Well Nourished  Head: NC/AT  Eyes: EOMI, CONTRERAS  Cardiovascular: Pulses intact distally, Regular Rate and rhythm  Pulmonary: Normal Respiratory Rate, No respiratory distress  Gi: Soft, distended. Some tenderness to mid epigastrum  Extremities: Warm, No edema present  Skin: Warm, dry  Neuro: Alert, Oriented x3, No focal Deficit  Psych: Appropriate mood and affect      Significant Labs: All pertinent labs within the past 24 hours have been reviewed.    Significant Imaging: I have reviewed all pertinent imaging results/findings within the past 24 hours.      Assessment/Plan:      * Abdominal pain  Air and stool throughout the colon and rectum.  Scattered diverticula within the colon.  No mesenteric inflammatory change.  No CT evidence for bowel obstruction.  The appendix is normal in caliber.  There is a poorly defined soft tissue attenuation mass of the distal gastric body near the gastric antrum measuring 2.5 x 3.2 cm.  This is suspicious for a gastric neoplasm.    General surgery consulted. EGD today. Treatment course to be determined following procedure      COPD (chronic obstructive pulmonary disease)  Patient with likely COPD with significant smoking hx  Does not currently appear to be in acute exacerbation  Prn treatments      Tobacco abuse  Counseled on cessation      Rheumatoid arthritis  Continue hydroxychloroquine and prednisone per home dose       Symptomatic anemia  Appropriate response to 2 units pRBC  Anemia studies completed  Hb stable          VTE Risk Mitigation (From admission, onward)         Ordered     Plan to start lovenox following EGD unless it is contraindicated from surgical standpoint      04/03/23 223                Discharge Planning   SHELBI:      Code Status: Full Code   Is the patient medically ready for discharge?:     Reason for patient still in hospital (select all that apply): Treatment  Discharge Plan A: Home with family                  Walter Roth MD  Department of  [facility] Intensive Care

## 2023-04-05 NOTE — ASSESSMENT & PLAN NOTE
Air and stool throughout the colon and rectum.  Scattered diverticula within the colon.  No mesenteric inflammatory change.  No CT evidence for bowel obstruction.  The appendix is normal in caliber.  There is a poorly defined soft tissue attenuation mass of the distal gastric body near the gastric antrum measuring 2.5 x 3.2 cm.  This is suspicious for a gastric neoplasm.    General surgery consulted. EGD today. Treatment course to be determined following procedure

## 2023-04-05 NOTE — [employer]ATION PATIENT INSTRUCTIONS
Discharge Summary/Instructions after an Endoscopic Procedure  Patient Name: Janey Null  Patient MRN: [id number]  Patient YOB: 1955 Wednesday, April 5, 2023  Tyrell Cornelius MD  RESTRICTIONS:  During your procedure today, you received medications for sedation.  These   medications may affect your judgment, balance and coordination.  Therefore,   for 24 hours, you have the following restrictions:   - DO NOT drive a car, operate machinery, make legal/financial decisions,   sign important papers or drink alcohol.    ACTIVITY:  Today: no heavy lifting, straining or running due to procedural   sedation/anesthesia.  The following day: return to full activity including work.  DIET:  Eat and drink normally unless instructed otherwise.     TREATMENT FOR COMMON SIDE EFFECTS:  - Mild abdominal pain, nausea, belching, bloating or excessive gas:  rest,   eat lightly and use a heating pad.  - Sore Throat: treat with throat lozenges and/or gargle with warm salt   water.  - Because air was used during the procedure, expelling large amounts of air   from your rectum or belching is normal.  - If a bowel prep was taken, you may not have a bowel movement for 1-3 days.    This is normal.  SYMPTOMS TO WATCH FOR AND REPORT TO YOUR PHYSICIAN:  1. Abdominal pain or bloating, other than gas cramps.  2. Chest pain.  3. Back pain.  4. Signs of infection such as: chills or fever occurring within 24 hours   after the procedure.  5. Rectal bleeding, which would show as bright red, maroon, or black stools.   (A tablespoon of blood from the rectum is not serious, especially if   hemorrhoids are present.)  6. Vomiting.  7. Weakness or dizziness.  GO DIRECTLY TO THE NEAREST EMERGENCY ROOM IF YOU HAVE ANY OF THE FOLLOWING:      Difficulty breathing              Chills and/or fever over 101 F   Persistent vomiting and/or vomiting blood   Severe abdominal pain   Severe chest pain   Black, tarry stools   Bleeding- more than one  tablespoon   Any other symptom or condition that you feel may need urgent attention  Your doctor recommends these additional instructions:  If any biopsies were taken, your doctors clinic will contact you in 1 to 2   weeks with any results.  - Return patient to hospital [facility] for ongoing care.   - Use Protonix (pantoprazole) 40 mg PO daily.   - Use sucralfate tablets 1 gram PO QID.  For questions, problems or results please call your physician - Tyrell Cornelius MD at Work:  [phone].  [facility] EMERGENCY ROOM PHONE NUMBER: [phone]  IF A COMPLICATION OR EMERGENCY SITUATION ARISES AND YOU ARE UNABLE TO REACH   YOUR PHYSICIAN - GO DIRECTLY TO THE EMERGENCY ROOM.  MD Tyrell Nguyen MD  4/5/2023 1:33:28 PM  This report has been verified and signed electronically.  Dear patient,  As a result of recent federal legislation (The Federal Cures Act), you may   receive lab or pathology results from your procedure in your [facility]   account before your physician is able to contact you. Your physician or   their representative will relay the results to you with their   recommendations at their soonest availability.  Thank you,  [employer]ATION

## 2023-04-05 NOTE — TRANSFER OF CARE
"Anesthesia Transfer of Care Note    Patient: Janey Null    Procedure(s) Performed: Procedure(s) (LRB):  EGD (ESOPHAGOGASTRODUODENOSCOPY) (N/A)    Patient location: PACU    Anesthesia Type: general    Transport from OR: Transported from OR on room air with adequate spontaneous ventilation    Post pain: adequate analgesia    Post assessment: no apparent anesthetic complications and tolerated procedure well    Post vital signs: stable    Level of consciousness: awake, alert and oriented    Nausea/Vomiting: no nausea/vomiting    Complications: none    Transfer of care protocol was followed      Last vitals:   Visit Vitals  BP (!) 151/93 (BP Location: Right arm, Patient Position: Sitting)   Pulse 93   Temp 36.9 °C (98.5 °F) (Oral)   Resp (!) 23   Ht 4' 8" (1.422 m)   Wt 38.3 kg (84 lb 7 oz)   SpO2 (!) 93%   Breastfeeding No   BMI 18.93 kg/m²     "

## 2023-04-05 NOTE — ANESTHESIA PREPROCEDURE EVALUATION
04/05/2023  Janey Null is a 67 y.o., female.      Pre-op Assessment    I have reviewed the Patient Summary Reports.     I have reviewed the Nursing Notes.    I have reviewed the Medications.     Review of Systems  Anesthesia Hx:  No problems with previous Anesthesia  Neg history of prior surgery. Denies Family Hx of Anesthesia complications.   Denies Personal Hx of Anesthesia complications.   Social:  Smoker    Hematology/Oncology:  Hematology Normal   Oncology Normal     EENT/Dental:EENT/Dental Normal   Cardiovascular:  Cardiovascular Normal     Pulmonary:  Pulmonary Normal    Renal/:  Renal/ Normal     Hepatic/GI:  Hepatic/GI Normal    Musculoskeletal:  Musculoskeletal Normal    Neurological:   Neuromuscular Disease,    Endocrine:  Endocrine Normal    Dermatological:  Skin Normal    Psych:   Psychiatric History depression          Physical Exam  General: Alert    Airway:  Mallampati: II   Mouth Opening: Normal  TM Distance: Normal  Neck ROM: Normal ROM    Dental:  Intact    Chest/Lungs:  Clear to auscultation, Normal Respiratory Rate    Heart:  Rate: Normal    Abdomen:  Normal        Anesthesia Plan  Type of Anesthesia, risks & benefits discussed:    Anesthesia Type: Gen ETT  Post Op Pain Control Plan: multimodal analgesia  Airway Plan: Direct, Post-Induction  Informed Consent: Informed consent signed with the Patient and all parties understand the risks and agree with anesthesia plan.  All questions answered. Patient consented to blood products? No  ASA Score: 2  Day of Surgery Review of History & Physical: H&P Update referred to the surgeon/provider.    Ready For Surgery From Anesthesia Perspective.     .

## 2023-04-06 VITALS
HEART RATE: 97 BPM | RESPIRATION RATE: 18 BRPM | WEIGHT: 84.44 LBS | HEIGHT: 56 IN | OXYGEN SATURATION: 95 % | SYSTOLIC BLOOD PRESSURE: 143 MMHG | TEMPERATURE: 99 F | DIASTOLIC BLOOD PRESSURE: 81 MMHG | BODY MASS INDEX: 18.99 KG/M2

## 2023-04-06 LAB
ALBUMIN SERPL BCP-MCNC: 2.7 G/DL (ref 3.5–5.2)
ALP SERPL-CCNC: 86 U/L (ref 55–135)
ALT SERPL W/O P-5'-P-CCNC: 14 U/L (ref 10–44)
ANION GAP SERPL CALC-SCNC: 15 MMOL/L (ref 8–16)
AST SERPL-CCNC: 29 U/L (ref 10–40)
BASOPHILS # BLD AUTO: 0.01 K/UL (ref 0–0.2)
BASOPHILS NFR BLD: 0.1 % (ref 0–1.9)
BILIRUB SERPL-MCNC: 0.4 MG/DL (ref 0.1–1)
BUN SERPL-MCNC: 8 MG/DL (ref 8–23)
CALCIUM SERPL-MCNC: 8.9 MG/DL (ref 8.7–10.5)
CHLORIDE SERPL-SCNC: 107 MMOL/L (ref 95–110)
CO2 SERPL-SCNC: 21 MMOL/L (ref 23–29)
CREAT SERPL-MCNC: 0.5 MG/DL (ref 0.5–1.4)
DIFFERENTIAL METHOD: ABNORMAL
EOSINOPHIL # BLD AUTO: 0 K/UL (ref 0–0.5)
EOSINOPHIL NFR BLD: 0.1 % (ref 0–8)
ERYTHROCYTE [DISTWIDTH] IN BLOOD BY AUTOMATED COUNT: 19.7 % (ref 11.5–14.5)
EST. GFR  (NO RACE VARIABLE): >60 ML/MIN/1.73 M^2
GLUCOSE SERPL-MCNC: 98 MG/DL (ref 70–110)
HCT VFR BLD AUTO: 34 % (ref 37–48.5)
HGB BLD-MCNC: 11.2 G/DL (ref 12–16)
IMM GRANULOCYTES # BLD AUTO: 0.06 K/UL (ref 0–0.04)
IMM GRANULOCYTES NFR BLD AUTO: 0.6 % (ref 0–0.5)
LYMPHOCYTES # BLD AUTO: 1.7 K/UL (ref 1–4.8)
LYMPHOCYTES NFR BLD: 17.8 % (ref 18–48)
MCH RBC QN AUTO: 26.3 PG (ref 27–31)
MCHC RBC AUTO-ENTMCNC: 32.9 G/DL (ref 32–36)
MCV RBC AUTO: 80 FL (ref 82–98)
MONOCYTES # BLD AUTO: 0.8 K/UL (ref 0.3–1)
MONOCYTES NFR BLD: 8.3 % (ref 4–15)
NEUTROPHILS # BLD AUTO: 7.1 K/UL (ref 1.8–7.7)
NEUTROPHILS NFR BLD: 73.1 % (ref 38–73)
NRBC BLD-RTO: 0 /100 WBC
PLATELET # BLD AUTO: 439 K/UL (ref 150–450)
PMV BLD AUTO: 8.5 FL (ref 9.2–12.9)
POTASSIUM SERPL-SCNC: 3.3 MMOL/L (ref 3.5–5.1)
PROT SERPL-MCNC: 7.1 G/DL (ref 6–8.4)
RBC # BLD AUTO: 4.26 M/UL (ref 4–5.4)
SODIUM SERPL-SCNC: 143 MMOL/L (ref 136–145)
WBC # BLD AUTO: 9.69 K/UL (ref 3.9–12.7)

## 2023-04-06 PROCEDURE — 94761 N-INVAS EAR/PLS OXIMETRY MLT: CPT

## 2023-04-06 PROCEDURE — 25000242 PHARM REV CODE 250 ALT 637 W/ HCPCS: Performed by: SURGERY

## 2023-04-06 PROCEDURE — 99239 PR HOSPITAL DISCHARGE DAY,>30 MIN: ICD-10-PCS | Mod: ,,, | Performed by: STUDENT IN AN ORGANIZED HEALTH CARE EDUCATION/TRAINING PROGRAM

## 2023-04-06 PROCEDURE — 80053 COMPREHEN METABOLIC PANEL: CPT | Performed by: STUDENT IN AN ORGANIZED HEALTH CARE EDUCATION/TRAINING PROGRAM

## 2023-04-06 PROCEDURE — 25000003 PHARM REV CODE 250: Performed by: STUDENT IN AN ORGANIZED HEALTH CARE EDUCATION/TRAINING PROGRAM

## 2023-04-06 PROCEDURE — 63600175 PHARM REV CODE 636 W HCPCS: Performed by: HOSPITALIST

## 2023-04-06 PROCEDURE — 25000003 PHARM REV CODE 250: Performed by: HOSPITALIST

## 2023-04-06 PROCEDURE — 85025 COMPLETE CBC W/AUTO DIFF WBC: CPT | Performed by: STUDENT IN AN ORGANIZED HEALTH CARE EDUCATION/TRAINING PROGRAM

## 2023-04-06 PROCEDURE — 94640 AIRWAY INHALATION TREATMENT: CPT

## 2023-04-06 PROCEDURE — G0378 HOSPITAL OBSERVATION PER HR: HCPCS

## 2023-04-06 PROCEDURE — 99239 HOSP IP/OBS DSCHRG MGMT >30: CPT | Mod: ,,, | Performed by: STUDENT IN AN ORGANIZED HEALTH CARE EDUCATION/TRAINING PROGRAM

## 2023-04-06 PROCEDURE — 25000003 PHARM REV CODE 250: Performed by: SURGERY

## 2023-04-06 RX ORDER — PANTOPRAZOLE SODIUM 40 MG/10ML
40 INJECTION, POWDER, LYOPHILIZED, FOR SOLUTION INTRAVENOUS DAILY
Status: DISCONTINUED | OUTPATIENT
Start: 2023-04-06 | End: 2023-04-06 | Stop reason: HOSPADM

## 2023-04-06 RX ORDER — SUCRALFATE 1 G/1
1 TABLET ORAL 4 TIMES DAILY
Qty: 120 TABLET | Refills: 2 | Status: SHIPPED | OUTPATIENT
Start: 2023-04-06 | End: 2023-07-05

## 2023-04-06 RX ORDER — PANTOPRAZOLE SODIUM 40 MG/1
40 TABLET, DELAYED RELEASE ORAL DAILY
Qty: 30 TABLET | Refills: 11 | Status: SHIPPED | OUTPATIENT
Start: 2023-04-07 | End: 2023-12-19 | Stop reason: SDUPTHER

## 2023-04-06 RX ORDER — SUCRALFATE 1 G/10ML
1 SUSPENSION ORAL EVERY 6 HOURS
Status: DISCONTINUED | OUTPATIENT
Start: 2023-04-06 | End: 2023-04-06 | Stop reason: HOSPADM

## 2023-04-06 RX ORDER — IPRATROPIUM BROMIDE AND ALBUTEROL SULFATE 2.5; .5 MG/3ML; MG/3ML
3 SOLUTION RESPIRATORY (INHALATION) EVERY 6 HOURS PRN
Qty: 75 ML | Refills: 0 | Status: SHIPPED | OUTPATIENT
Start: 2023-04-06 | End: 2023-04-09

## 2023-04-06 RX ADMIN — HYDROCODONE BITARTRATE AND ACETAMINOPHEN 1 TABLET: 10; 325 TABLET ORAL at 09:04

## 2023-04-06 RX ADMIN — POTASSIUM BICARBONATE 25 MEQ: 978 TABLET, EFFERVESCENT ORAL at 09:04

## 2023-04-06 RX ADMIN — FOLIC ACID 1 MG: 1 TABLET ORAL at 09:04

## 2023-04-06 RX ADMIN — IPRATROPIUM BROMIDE 0.5 MG: 0.5 SOLUTION RESPIRATORY (INHALATION) at 07:04

## 2023-04-06 RX ADMIN — PREDNISONE 5 MG: 1 TABLET ORAL at 09:04

## 2023-04-06 RX ADMIN — ALBUTEROL SULFATE 2.5 MG: 2.5 SOLUTION RESPIRATORY (INHALATION) at 07:04

## 2023-04-06 RX ADMIN — PANTOPRAZOLE SODIUM 40 MG: 40 TABLET, DELAYED RELEASE ORAL at 09:04

## 2023-04-06 RX ADMIN — HYDROXYCHLOROQUINE SULFATE 200 MG: 200 TABLET, FILM COATED ORAL at 09:04

## 2023-04-06 RX ADMIN — SUCRALFATE 1 G: 1 SUSPENSION ORAL at 12:04

## 2023-04-06 NOTE — PLAN OF CARE
[facility] - Intensive Care  Discharge Final Note    Primary Care Provider: John Merchant MD (Inactive)    Expected Discharge Date: 4/6/2023  Verbal & written follow up appointments with Dr Milan & Dr Cornelius provided to patient. Demonstrated understanding by verbal feedback. [facility] States her  is at work but can come get her whenever she is ready for discharge. Nurse notified. She will let patient know when she can call her spouse.     Final Discharge Note (most recent)       Final Note - 04/06/23 1117          Final Note    Assessment Type Final Discharge Note     Anticipated Discharge Disposition Home or Self Care     What phone number can be called within the next 1-3 days to see how you are doing after discharge? [phone]     Hospital Resources/Appts/Education Provided Appointments scheduled and added to AVS        Post-Acute Status    Post-Acute Authorization E     E Status Set-up Complete/Auth obtained     Discharge Delays None known at this time                     Important Message from Medicare             Contact Info       Mumtaz Milan Md    [address] [place] [address]  [phone]       Next Steps: Go on 4/18/2023    Instructions: Follow up appointment on Tuesday April 18th at 11:20 am    Dr. Tyrell Cornelius    [address] [place] [address]  [phone]       Next Steps: Go on 4/28/2023    Instructions: Follow up appointment on Friday, April 28th at 11:00am

## 2023-04-06 NOTE — NURSING
Discharged patient at this time via wheelchair to .  No distress noted. All belongings with patient. Nebulizer in hand. DC instructions given. Verbalized understanding.

## 2023-04-06 NOTE — ANESTHESIA POSTPROCEDURE EVALUATION
Anesthesia Post Evaluation    Patient: Janey Null    Procedure(s) Performed: Procedure(s) (LRB):  EGD (ESOPHAGOGASTRODUODENOSCOPY) (N/A)    Final Anesthesia Type: general      Patient location during evaluation: PACU  Patient participation: Yes- Able to Participate  Level of consciousness: awake and awake and alert  Post-procedure vital signs: reviewed and stable  Pain management: adequate  Airway patency: patent    PONV status at discharge: No PONV  Anesthetic complications: no      Cardiovascular status: blood pressure returned to baseline  Respiratory status: unassisted and spontaneous ventilation  Hydration status: euvolemic  Follow-up not needed.          Vitals Value Taken Time   /72 04/05/23 1700   Temp 37 °C (98.6 °F) 04/05/23 1700   Pulse 93 04/05/23 1700   Resp 16 04/05/23 1914   SpO2 94 % 04/05/23 1700         Event Time   Out of Recovery 04/05/2023 14:23:50         Pain/Brysno Score: Pain Rating Prior to Med Admin: 7 (4/5/2023  7:14 PM)  Pain Rating Post Med Admin: 3 (4/5/2023  3:13 PM)  Bryson Score: 10 (4/5/2023  2:15 PM)

## 2023-04-06 NOTE — PLAN OF CARE
Patient in no apparent distress. Sat's  94 % on room air. Wheezing present. PRN treatment given . Will continue to monitor.

## 2023-04-09 LAB
BACTERIA BLD CULT: NORMAL
BACTERIA BLD CULT: NORMAL

## 2023-04-11 NOTE — HOSPITAL COURSE
Patient admitted for symptomatic anemia with initial Hb of 4.6. Given 2 units of pRBC with response to Hb of 11. No signs of bleeding. Patient with abdominal pain on exam. CT completed which showed possible gastric mass. EGD completed which showed multiple ulcers which were not bleeding. Patients Hb remained stable overnight and patient was discharge home. Placed on PO protonix and sucralfate at discharge per surgery recommendations. Provided discharge instructions and return precautions.    EGD findings:   - Normal esophagus.    - Non-bleeding gastric ulcers with no stigmata of bleeding. Biopsied.    - Erythematous duodenopathy. Biopsied.     Recommendation:          - Return patient to hospital [facility] for ongoing care.    - Use Protonix (pantoprazole) 40 mg PO daily.    - Use sucralfate tablets 1 gram PO QID.

## 2023-04-18 ENCOUNTER — OFFICE VISIT (OUTPATIENT)
Dept: FAMILY MEDICINE | Facility: CLINIC | Age: 68
End: 2023-04-18
Payer: MEDICARE

## 2023-04-18 VITALS
DIASTOLIC BLOOD PRESSURE: 78 MMHG | RESPIRATION RATE: 18 BRPM | OXYGEN SATURATION: 96 % | BODY MASS INDEX: 20.16 KG/M2 | SYSTOLIC BLOOD PRESSURE: 132 MMHG | HEART RATE: 98 BPM | HEIGHT: 56 IN | WEIGHT: 89.63 LBS | TEMPERATURE: 98 F

## 2023-04-18 DIAGNOSIS — Z76.89 ENCOUNTER TO ESTABLISH CARE: Primary | ICD-10-CM

## 2023-04-18 DIAGNOSIS — Z89.619: ICD-10-CM

## 2023-04-18 DIAGNOSIS — J44.9 CHRONIC OBSTRUCTIVE PULMONARY DISEASE, UNSPECIFIED COPD TYPE: ICD-10-CM

## 2023-04-18 DIAGNOSIS — Z79.899 DRUG-INDUCED IMMUNODEFICIENCY: ICD-10-CM

## 2023-04-18 DIAGNOSIS — D84.821 DRUG-INDUCED IMMUNODEFICIENCY: ICD-10-CM

## 2023-04-18 DIAGNOSIS — Z09 HOSPITAL DISCHARGE FOLLOW-UP: ICD-10-CM

## 2023-04-18 LAB
FINAL PATHOLOGIC DIAGNOSIS: NORMAL
GROSS: NORMAL
Lab: NORMAL
MICROSCOPIC EXAM: NORMAL

## 2023-04-18 PROCEDURE — 3075F PR MOST RECENT SYSTOLIC BLOOD PRESS GE 130-139MM HG: ICD-10-PCS | Mod: CPTII,S$GLB,, | Performed by: FAMILY MEDICINE

## 2023-04-18 PROCEDURE — 3078F DIAST BP <80 MM HG: CPT | Mod: CPTII,S$GLB,, | Performed by: FAMILY MEDICINE

## 2023-04-18 PROCEDURE — 99999 PR PBB SHADOW E&M-EST. PATIENT-LVL IV: ICD-10-PCS | Mod: PBBFAC,,, | Performed by: FAMILY MEDICINE

## 2023-04-18 PROCEDURE — 3075F SYST BP GE 130 - 139MM HG: CPT | Mod: CPTII,S$GLB,, | Performed by: FAMILY MEDICINE

## 2023-04-18 PROCEDURE — 3008F PR BODY MASS INDEX (BMI) DOCUMENTED: ICD-10-PCS | Mod: CPTII,S$GLB,, | Performed by: FAMILY MEDICINE

## 2023-04-18 PROCEDURE — 3288F FALL RISK ASSESSMENT DOCD: CPT | Mod: CPTII,S$GLB,, | Performed by: FAMILY MEDICINE

## 2023-04-18 PROCEDURE — 3008F BODY MASS INDEX DOCD: CPT | Mod: CPTII,S$GLB,, | Performed by: FAMILY MEDICINE

## 2023-04-18 PROCEDURE — 1159F PR MEDICATION LIST DOCUMENTED IN MEDICAL RECORD: ICD-10-PCS | Mod: CPTII,S$GLB,, | Performed by: FAMILY MEDICINE

## 2023-04-18 PROCEDURE — 3078F PR MOST RECENT DIASTOLIC BLOOD PRESSURE < 80 MM HG: ICD-10-PCS | Mod: CPTII,S$GLB,, | Performed by: FAMILY MEDICINE

## 2023-04-18 PROCEDURE — 99204 OFFICE O/P NEW MOD 45 MIN: CPT | Mod: S$GLB,,, | Performed by: FAMILY MEDICINE

## 2023-04-18 PROCEDURE — 99204 PR OFFICE/OUTPT VISIT, NEW, LEVL IV, 45-59 MIN: ICD-10-PCS | Mod: S$GLB,,, | Performed by: FAMILY MEDICINE

## 2023-04-18 PROCEDURE — 1101F PT FALLS ASSESS-DOCD LE1/YR: CPT | Mod: CPTII,S$GLB,, | Performed by: FAMILY MEDICINE

## 2023-04-18 PROCEDURE — 1160F PR REVIEW ALL MEDS BY PRESCRIBER/CLIN PHARMACIST DOCUMENTED: ICD-10-PCS | Mod: CPTII,S$GLB,, | Performed by: FAMILY MEDICINE

## 2023-04-18 PROCEDURE — 99999 PR PBB SHADOW E&M-EST. PATIENT-LVL IV: CPT | Mod: PBBFAC,,, | Performed by: FAMILY MEDICINE

## 2023-04-18 PROCEDURE — 1159F MED LIST DOCD IN RCRD: CPT | Mod: CPTII,S$GLB,, | Performed by: FAMILY MEDICINE

## 2023-04-18 PROCEDURE — 3288F PR FALLS RISK ASSESSMENT DOCUMENTED: ICD-10-PCS | Mod: CPTII,S$GLB,, | Performed by: FAMILY MEDICINE

## 2023-04-18 PROCEDURE — 1101F PR PT FALLS ASSESS DOC 0-1 FALLS W/OUT INJ PAST YR: ICD-10-PCS | Mod: CPTII,S$GLB,, | Performed by: FAMILY MEDICINE

## 2023-04-18 PROCEDURE — 1160F RVW MEDS BY RX/DR IN RCRD: CPT | Mod: CPTII,S$GLB,, | Performed by: FAMILY MEDICINE

## 2023-04-18 PROCEDURE — 1126F AMNT PAIN NOTED NONE PRSNT: CPT | Mod: CPTII,S$GLB,, | Performed by: FAMILY MEDICINE

## 2023-04-18 PROCEDURE — 1126F PR PAIN SEVERITY QUANTIFIED, NO PAIN PRESENT: ICD-10-PCS | Mod: CPTII,S$GLB,, | Performed by: FAMILY MEDICINE

## 2023-04-18 RX ORDER — PREGABALIN 75 MG/1
75 CAPSULE ORAL NIGHTLY
COMMUNITY
Start: 2023-02-16 | End: 2023-09-11

## 2023-04-18 RX ORDER — IPRATROPIUM BROMIDE AND ALBUTEROL SULFATE 2.5; .5 MG/3ML; MG/3ML
SOLUTION RESPIRATORY (INHALATION)
Qty: 300 ML | Refills: 2 | Status: SHIPPED | OUTPATIENT
Start: 2023-04-18

## 2023-04-18 NOTE — PROGRESS NOTES
[facility] - Clinic Note    Subjective      Ms. Null is a 67 y.o. female who presents to clinic for Establish Care    Patient was recently admitted to the hospital for symptomatic anemia.  This has since resolved.  Scope was completed with nonbleeding gastric ulcers.  Has follow-up with Dr. Cornelius next week.  Also has a history of COPD.  She has been having some trouble breathing.    [facility] Janey has a past medical history of Anxiety, Depression, Encounter for long-term (current) use of high-risk medication, Fibromyalgia, Osteoporosis, and Rheumatoid arthritis involving multiple sites with positive rheumatoid factor.   PSXH Janey has a past surgical history that includes right leg ; Repair of recurrent incarcerated incisional hernia (Left, 4/13/2022); and Esophagogastroduodenoscopy (N/A, 4/5/2023).    Janey's family history is not on file.    Janey reports that she quit smoking 13 days ago. Her smoking use included cigarettes. She smoked an average of .5 packs per day. She has never used smokeless tobacco. She reports that she does not drink alcohol and does not use drugs.   ALG Janey is allergic to penicillins.   MED Janey has a current medication list which includes the following prescription(s): ergocalciferol, folic acid, hydrocodone-acetaminophen, hydroxychloroquine, pantoprazole, prednisone, pregabalin, sucralfate, albuterol-ipratropium, fluticasone-umeclidin-vilanter, and gabapentin.     Review of Systems   Constitutional:  Negative for chills and fever.   HENT:  Negative for congestion and rhinorrhea.    Eyes:  Negative for visual disturbance.   Respiratory:  Positive for wheezing. Negative for cough and shortness of breath.    Cardiovascular:  Negative for chest pain.   Gastrointestinal:  Negative for abdominal pain, constipation, diarrhea, nausea and vomiting.   Genitourinary:  Negative for dysuria.   Musculoskeletal:  Negative for myalgias.   Skin:  Negative for  "rash.   Neurological:  Negative for weakness and headaches.   Objective     /78 (BP Location: Right arm, Patient Position: Sitting, BP Method: Medium (Manual))   Pulse 98   Temp 98.1 °F (36.7 °C) (Temporal)   Resp 18   Ht 4' 8" (1.422 m)   Wt 40.6 kg (89 lb 9.6 oz)   SpO2 96%   BMI 20.09 kg/m²     Physical Exam  Vitals and nursing note reviewed.   Constitutional:       General: She is not in acute distress.     Appearance: Normal appearance. She is well-developed. She is not diaphoretic.   HENT:      Head: Normocephalic and atraumatic.      Right Ear: External ear normal.      Left Ear: External ear normal.   Eyes:      General:         Right eye: No discharge.         Left eye: No discharge.   Cardiovascular:      Rate and Rhythm: Normal rate and regular rhythm.      Heart sounds: Normal heart sounds.   Pulmonary:      Effort: Pulmonary effort is normal.      Breath sounds: Wheezing present. No rales.   Musculoskeletal:      Comments: Right LE amputation   Skin:     General: Skin is warm and dry.   Neurological:      Mental Status: She is alert and oriented to person, place, and time. Mental status is at baseline.   Psychiatric:         Mood and Affect: Mood normal.         Behavior: Behavior normal.         Thought Content: Thought content normal.         Judgment: Judgment normal.      Assessment/Plan     1. Encounter to establish care        2. Hospital discharge follow-up        3. Chronic obstructive pulmonary disease, unspecified COPD type  albuterol-ipratropium (DUO-NEB) 2.5 mg-0.5 mg/3 mL nebulizer solution    fluticasone-umeclidin-vilanter (TRELEGY ELLIPTA) 200-62.5-25 mcg inhaler      4. Drug-induced immunodeficiency        5. History of lower limb amputation          Keep follow-up with Dr. Cornelius.  Refilled DuoNeb inhalers.  Will start Trelegy to help with shortness of breath and COPD.  Patient has stopped smoking.  Follow-up in 1 month for re-evaluation.    1. Encounter to establish " care    2. Hospital discharge follow-up    3. Chronic obstructive pulmonary disease, unspecified COPD type  -     albuterol-ipratropium (DUO-NEB) 2.5 mg-0.5 mg/3 mL nebulizer solution; INHALE 1 VIAL BY NEBULIZER ROUTE EVERY 6 HOURS AS NEEDED  Dispense: 300 mL; Refill: 2  -     fluticasone-umeclidin-vilanter (TRELEGY ELLIPTA) 200-62.5-25 mcg inhaler; Inhale 1 puff into the lungs once daily.  Dispense: 60 each; Refill: 5    4. Drug-induced immunodeficiency  Assessment & Plan:  On hcq, prednisone      5. History of lower limb amputation          Future Appointments   Date Time Provider Department Center   4/28/2023 11:00 AM Tyrell Cornelius MD [facility]         Mumtaz Milan MD  Family Medicine  [facility]

## 2023-04-28 ENCOUNTER — PATIENT MESSAGE (OUTPATIENT)
Dept: ADMINISTRATIVE | Facility: HOSPITAL | Age: 68
End: 2023-04-28
Payer: MEDICARE

## 2023-04-28 ENCOUNTER — OFFICE VISIT (OUTPATIENT)
Dept: SURGERY | Facility: CLINIC | Age: 68
End: 2023-04-28
Payer: MEDICARE

## 2023-04-28 VITALS
DIASTOLIC BLOOD PRESSURE: 78 MMHG | HEIGHT: 56 IN | BODY MASS INDEX: 20.24 KG/M2 | OXYGEN SATURATION: 95 % | HEART RATE: 105 BPM | WEIGHT: 90 LBS | SYSTOLIC BLOOD PRESSURE: 178 MMHG

## 2023-04-28 DIAGNOSIS — K29.70 GASTRITIS, PRESENCE OF BLEEDING UNSPECIFIED, UNSPECIFIED CHRONICITY, UNSPECIFIED GASTRITIS TYPE: Primary | ICD-10-CM

## 2023-04-28 PROCEDURE — 1159F PR MEDICATION LIST DOCUMENTED IN MEDICAL RECORD: ICD-10-PCS | Mod: CPTII,S$GLB,, | Performed by: SURGERY

## 2023-04-28 PROCEDURE — 99213 OFFICE O/P EST LOW 20 MIN: CPT | Mod: S$GLB,,, | Performed by: SURGERY

## 2023-04-28 PROCEDURE — 1126F PR PAIN SEVERITY QUANTIFIED, NO PAIN PRESENT: ICD-10-PCS | Mod: CPTII,S$GLB,, | Performed by: SURGERY

## 2023-04-28 PROCEDURE — 3078F PR MOST RECENT DIASTOLIC BLOOD PRESSURE < 80 MM HG: ICD-10-PCS | Mod: CPTII,S$GLB,, | Performed by: SURGERY

## 2023-04-28 PROCEDURE — 99999 PR PBB SHADOW E&M-EST. PATIENT-LVL III: CPT | Mod: PBBFAC,,, | Performed by: SURGERY

## 2023-04-28 PROCEDURE — 3078F DIAST BP <80 MM HG: CPT | Mod: CPTII,S$GLB,, | Performed by: SURGERY

## 2023-04-28 PROCEDURE — 1159F MED LIST DOCD IN RCRD: CPT | Mod: CPTII,S$GLB,, | Performed by: SURGERY

## 2023-04-28 PROCEDURE — 3077F PR MOST RECENT SYSTOLIC BLOOD PRESSURE >= 140 MM HG: ICD-10-PCS | Mod: CPTII,S$GLB,, | Performed by: SURGERY

## 2023-04-28 PROCEDURE — 3008F PR BODY MASS INDEX (BMI) DOCUMENTED: ICD-10-PCS | Mod: CPTII,S$GLB,, | Performed by: SURGERY

## 2023-04-28 PROCEDURE — 3077F SYST BP >= 140 MM HG: CPT | Mod: CPTII,S$GLB,, | Performed by: SURGERY

## 2023-04-28 PROCEDURE — 99213 PR OFFICE/OUTPT VISIT, EST, LEVL III, 20-29 MIN: ICD-10-PCS | Mod: S$GLB,,, | Performed by: SURGERY

## 2023-04-28 PROCEDURE — 3008F BODY MASS INDEX DOCD: CPT | Mod: CPTII,S$GLB,, | Performed by: SURGERY

## 2023-04-28 PROCEDURE — 1126F AMNT PAIN NOTED NONE PRSNT: CPT | Mod: CPTII,S$GLB,, | Performed by: SURGERY

## 2023-04-28 PROCEDURE — 99999 PR PBB SHADOW E&M-EST. PATIENT-LVL III: ICD-10-PCS | Mod: PBBFAC,,, | Performed by: SURGERY

## 2023-04-28 NOTE — PROGRESS NOTES
"[facility] Surgery  Follow-up    Subjective:     Chief Complaint:   Follow-up EGD gastric ulcerations.    HPI:  Janey Null is a 67 y.o. female   Presents today for follow-up examination.  Patient since last visit has done well.  She is been on Protonix and Carafate since EGD.  Ulcerations identified.  Biopsies proved gastritis.  No evidence of H.pylori species.  No carcinoma or malignancy.  Patient has done well since EGD.  Symptoms have much improved.  She feels good today.  No new issues.    Review of Systems   Constitutional:  Negative for activity change, appetite change, chills and fever.   HENT:  Negative for congestion, dental problem and ear discharge.    Eyes:  Negative for discharge and itching.   Respiratory:  Negative for apnea, choking and chest tightness.    Cardiovascular:  Negative for chest pain and leg swelling.   Gastrointestinal:  Negative for abdominal distention, abdominal pain, anal bleeding, constipation, diarrhea and nausea.   Endocrine: Negative for cold intolerance and heat intolerance.   Genitourinary:  Negative for difficulty urinating and dyspareunia.   Musculoskeletal:  Negative for arthralgias and back pain.   Skin:  Negative for color change and pallor.   Neurological:  Negative for dizziness and facial asymmetry.   Hematological:  Negative for adenopathy. Does not bruise/bleed easily.   Psychiatric/Behavioral:  Negative for agitation and behavioral problems.      Objective:      Vitals:    04/28/23 1055   BP: (!) 178/78   Pulse: 105   SpO2: 95%   Weight: 40.8 kg (90 lb)   Height: 4' 8" (1.422 m)     Physical Exam  Constitutional:       General: She is not in acute distress.     Appearance: She is well-developed.   HENT:      Head: Normocephalic and atraumatic.   Eyes:      Pupils: Pupils are equal, round, and reactive to light.   Neck:      Thyroid: No thyromegaly.   Cardiovascular:      Rate and Rhythm: Normal rate and regular rhythm.   Pulmonary:      Effort: " Pulmonary effort is normal.      Breath sounds: Normal breath sounds.   Abdominal:      General: Bowel sounds are normal. There is no distension.      Palpations: Abdomen is soft.      Tenderness: There is no abdominal tenderness.   Musculoskeletal:         General: No deformity. Normal range of motion.      Cervical back: Normal range of motion and neck supple.   Skin:     General: Skin is warm.      Capillary Refill: Capillary refill takes less than 2 seconds.      Findings: No erythema.   Neurological:      Mental Status: She is alert and oriented to person, place, and time.      Cranial Nerves: No cranial nerve deficit.   Psychiatric:         Behavior: Behavior normal.        Assessment:       1. Gastritis, presence of bleeding unspecified, unspecified chronicity, unspecified gastritis type        Plan:   Gastritis, presence of bleeding unspecified, unspecified chronicity, unspecified gastritis type        Medical Decision Making/Counseling:    Gastritis.  Improving.  Continue Protonix.  Utilize Carafate for 1 month.  Follow-up surgery clinic 6 weeks.    Follow up:   Six weeks.    Patient instructed that best way to communicate with my office staff is for patient to get on the [facility] patient portal to expedite communication and communication issues that may occur.  Patient was given instructions on how to get on the portal.  I encouraged patient to obtain portal access as well.  Ultimately it is up to the patient to obtain access.  Patient voiced understanding.

## 2023-05-16 ENCOUNTER — TELEPHONE (OUTPATIENT)
Dept: FAMILY MEDICINE | Facility: CLINIC | Age: 68
End: 2023-05-16
Payer: MEDICARE

## 2023-05-16 NOTE — TELEPHONE ENCOUNTER
Attempted to contact Janey Null to discuss Scheduling an appointment.    Left voice mail to return our call at [phone] on [phone] (home).    Tracy Pandey LPN

## 2023-05-16 NOTE — TELEPHONE ENCOUNTER
----- Message from Marilyn Martinez sent at 5/16/2023 11:25 AM CDT -----  Contact: PT  Type:  Sooner Appointment Request    Caller is requesting a sooner appointment.  Caller declined first available appointment listed below.  Caller will not accept being placed on the waitlist and is requesting a message be sent to doctor.    Name of Caller:   PT  When is the first available appointment?  9/7/23  Symptoms:  6 week F/UP   Best Call Back Number:  [phone]  Additional Information:  Last seen on 4/18 6 weeks will be 5/31/23, prefer morning appt

## 2023-05-16 NOTE — TELEPHONE ENCOUNTER
----- Message from Ntaaliya Angel sent at 5/16/2023 10:45 AM CDT -----  Contact: pt  Type:  Sooner Appointment Request    Caller is requesting a sooner appointment.  Caller declined first available appointment listed below.  Caller will not accept being placed on the waitlist and is requesting a message be sent to doctor.    Name of Caller:  pt  When is the first available appointment?  None showing, blocked  Symptoms:  f/u on her COPD    Best Call Back Number:  [phone]    Additional Information:

## 2023-05-16 NOTE — TELEPHONE ENCOUNTER
----- Message from Tiana Troy, Patient Care Assistant sent at 5/16/2023 11:20 AM CDT -----  Contact: self  Type:  Patient Returning Call    Who Called:  self  Who Left Message for Patient:  Tracy  Does the patient know what this is regarding?:   need a 6 wk f/u  Best Call Back Number:  [phone]  Additional Information: Please leave a detailed mess. Thanks

## 2023-05-23 ENCOUNTER — PATIENT MESSAGE (OUTPATIENT)
Dept: RESEARCH | Facility: HOSPITAL | Age: 68
End: 2023-05-23
Payer: MEDICARE

## 2023-06-09 ENCOUNTER — OFFICE VISIT (OUTPATIENT)
Dept: SURGERY | Facility: CLINIC | Age: 68
End: 2023-06-09
Payer: MEDICARE

## 2023-06-09 VITALS
HEIGHT: 56 IN | BODY MASS INDEX: 20.24 KG/M2 | DIASTOLIC BLOOD PRESSURE: 73 MMHG | OXYGEN SATURATION: 95 % | SYSTOLIC BLOOD PRESSURE: 165 MMHG | WEIGHT: 90 LBS | HEART RATE: 79 BPM

## 2023-06-09 DIAGNOSIS — K29.70 GASTRITIS, PRESENCE OF BLEEDING UNSPECIFIED, UNSPECIFIED CHRONICITY, UNSPECIFIED GASTRITIS TYPE: Primary | ICD-10-CM

## 2023-06-09 PROCEDURE — 99999 PR PBB SHADOW E&M-EST. PATIENT-LVL III: CPT | Mod: PBBFAC,,, | Performed by: SURGERY

## 2023-06-09 PROCEDURE — 1125F AMNT PAIN NOTED PAIN PRSNT: CPT | Mod: CPTII,S$GLB,, | Performed by: SURGERY

## 2023-06-09 PROCEDURE — 3077F PR MOST RECENT SYSTOLIC BLOOD PRESSURE >= 140 MM HG: ICD-10-PCS | Mod: CPTII,S$GLB,, | Performed by: SURGERY

## 2023-06-09 PROCEDURE — 99213 PR OFFICE/OUTPT VISIT, EST, LEVL III, 20-29 MIN: ICD-10-PCS | Mod: S$GLB,,, | Performed by: SURGERY

## 2023-06-09 PROCEDURE — 3008F BODY MASS INDEX DOCD: CPT | Mod: CPTII,S$GLB,, | Performed by: SURGERY

## 2023-06-09 PROCEDURE — 3288F PR FALLS RISK ASSESSMENT DOCUMENTED: ICD-10-PCS | Mod: CPTII,S$GLB,, | Performed by: SURGERY

## 2023-06-09 PROCEDURE — 3078F DIAST BP <80 MM HG: CPT | Mod: CPTII,S$GLB,, | Performed by: SURGERY

## 2023-06-09 PROCEDURE — 1101F PR PT FALLS ASSESS DOC 0-1 FALLS W/OUT INJ PAST YR: ICD-10-PCS | Mod: CPTII,S$GLB,, | Performed by: SURGERY

## 2023-06-09 PROCEDURE — 1125F PR PAIN SEVERITY QUANTIFIED, PAIN PRESENT: ICD-10-PCS | Mod: CPTII,S$GLB,, | Performed by: SURGERY

## 2023-06-09 PROCEDURE — 99213 OFFICE O/P EST LOW 20 MIN: CPT | Mod: S$GLB,,, | Performed by: SURGERY

## 2023-06-09 PROCEDURE — 1101F PT FALLS ASSESS-DOCD LE1/YR: CPT | Mod: CPTII,S$GLB,, | Performed by: SURGERY

## 2023-06-09 PROCEDURE — 3288F FALL RISK ASSESSMENT DOCD: CPT | Mod: CPTII,S$GLB,, | Performed by: SURGERY

## 2023-06-09 PROCEDURE — 99999 PR PBB SHADOW E&M-EST. PATIENT-LVL III: ICD-10-PCS | Mod: PBBFAC,,, | Performed by: SURGERY

## 2023-06-09 PROCEDURE — 1159F MED LIST DOCD IN RCRD: CPT | Mod: CPTII,S$GLB,, | Performed by: SURGERY

## 2023-06-09 PROCEDURE — 3077F SYST BP >= 140 MM HG: CPT | Mod: CPTII,S$GLB,, | Performed by: SURGERY

## 2023-06-09 PROCEDURE — 3008F PR BODY MASS INDEX (BMI) DOCUMENTED: ICD-10-PCS | Mod: CPTII,S$GLB,, | Performed by: SURGERY

## 2023-06-09 PROCEDURE — 1159F PR MEDICATION LIST DOCUMENTED IN MEDICAL RECORD: ICD-10-PCS | Mod: CPTII,S$GLB,, | Performed by: SURGERY

## 2023-06-09 PROCEDURE — 3078F PR MOST RECENT DIASTOLIC BLOOD PRESSURE < 80 MM HG: ICD-10-PCS | Mod: CPTII,S$GLB,, | Performed by: SURGERY

## 2023-06-09 RX ORDER — METHOTREXATE 2.5 MG/1
TABLET ORAL
COMMUNITY
Start: 2023-05-05

## 2023-06-09 NOTE — PROGRESS NOTES
"[facility]  Follow-up    Subjective:     Chief Complaint:  Follow-up gastritis with ulcerations.    HPI:  Janey Null is a 67 y.o. female presents today for follow-up examination.  Patient since last visit has done well.  No issues.  Patient on EGD found to have gastritis with ulcerations within the antrum.  Biopsies proven.  No evidence of malignancy.  Patient continued to have slow progression of improvement of left upper quadrant pain abdominal pain.  No nausea vomiting.  Tolerating a diet having bowel function.  No other new issues or complaints.    Review of Systems   Constitutional:  Negative for activity change, appetite change, chills and fever.   HENT:  Negative for congestion, dental problem and ear discharge.    Eyes:  Negative for discharge and itching.   Respiratory:  Negative for apnea, choking and chest tightness.    Cardiovascular:  Negative for chest pain and leg swelling.   Gastrointestinal:  Negative for abdominal distention, abdominal pain, anal bleeding, constipation, diarrhea and nausea.   Endocrine: Negative for cold intolerance and heat intolerance.   Genitourinary:  Negative for difficulty urinating and dyspareunia.   Musculoskeletal:  Negative for arthralgias and back pain.   Skin:  Negative for color change and pallor.   Neurological:  Negative for dizziness and facial asymmetry.   Hematological:  Negative for adenopathy. Does not bruise/bleed easily.   Psychiatric/Behavioral:  Negative for agitation and behavioral problems.      Objective:      Vitals:    06/09/23 0954   BP: (!) 165/73   Pulse: 79   SpO2: 95%   Weight: 40.8 kg (90 lb)   Height: 4' 8" (1.422 m)     Physical Exam  Constitutional:       General: She is not in acute distress.     Appearance: She is well-developed.   HENT:      Head: Normocephalic and atraumatic.   Eyes:      Pupils: Pupils are equal, round, and reactive to light.   Neck:      Thyroid: No thyromegaly.   Cardiovascular:      Rate and " Rhythm: Normal rate and regular rhythm.   Pulmonary:      Effort: Pulmonary effort is normal.      Breath sounds: Normal breath sounds.   Abdominal:      General: Bowel sounds are normal. There is no distension.      Palpations: Abdomen is soft.      Tenderness: There is no abdominal tenderness.   Musculoskeletal:         General: No deformity. Normal range of motion.      Cervical back: Normal range of motion and neck supple.   Skin:     General: Skin is warm.      Capillary Refill: Capillary refill takes less than 2 seconds.      Findings: No erythema.   Neurological:      Mental Status: She is alert and oriented to person, place, and time.      Cranial Nerves: No cranial nerve deficit.   Psychiatric:         Behavior: Behavior normal.        Assessment:       1. Gastritis, presence of bleeding unspecified, unspecified chronicity, unspecified gastritis type        Plan:   Gastritis, presence of bleeding unspecified, unspecified chronicity, unspecified gastritis type        Medical Decision Making/Counseling:  Gastritis.  Improving.  Continue current regimen with the Protonix and Carafate.  Follow-up surgery clinic 3 months.  Possible need for repeat EGD if symptoms were to worsen    Follow up:  3 months    Patient instructed that best way to communicate with my office staff is for patient to get on the [facility] patient portal to expedite communication and communication issues that may occur.  Patient was given instructions on how to get on the portal.  I encouraged patient to obtain portal access as well.  Ultimately it is up to the patient to obtain access.  Patient voiced understanding.

## 2023-07-04 RX ORDER — SUCRALFATE 1 G/1
TABLET ORAL
Qty: 120 TABLET | Refills: 2 | OUTPATIENT
Start: 2023-07-04

## 2023-07-19 ENCOUNTER — PATIENT MESSAGE (OUTPATIENT)
Dept: SURGERY | Facility: CLINIC | Age: 68
End: 2023-07-19
Payer: MEDICARE

## 2023-07-19 DIAGNOSIS — K29.70 GASTRITIS, PRESENCE OF BLEEDING UNSPECIFIED, UNSPECIFIED CHRONICITY, UNSPECIFIED GASTRITIS TYPE: Primary | ICD-10-CM

## 2023-07-19 RX ORDER — SUCRALFATE 1 G/1
1 TABLET ORAL 4 TIMES DAILY
Qty: 120 TABLET | Refills: 0 | Status: SHIPPED | OUTPATIENT
Start: 2023-07-19 | End: 2023-08-23

## 2023-07-19 NOTE — TELEPHONE ENCOUNTER
----- Message from So Feldman sent at 7/19/2023  8:09 AM CDT -----  Contact: pt  Type:  RX Refill Request    Who Called:   pt  Refill or New Rx:   refill  RX Name and Strength:   sucralfate  How is the patient currently taking it? (ex. 1XDay):  as ordered ( by hospital) x4 day  Is this a 30 day or 90 day RX:  90  Preferred Pharmacy with phone number:      [id number] [place] [address] [address]  [address] [place] [address]  Phone: [phone] Fax: [phone]      Local or Mail Order:  local  Ordering Provider:  [facility]  Best Call Back Number:  [phone]  Additional Information:   pt said Dr Santillan said he would refill at last visit

## 2023-08-23 DIAGNOSIS — K29.70 GASTRITIS, PRESENCE OF BLEEDING UNSPECIFIED, UNSPECIFIED CHRONICITY, UNSPECIFIED GASTRITIS TYPE: Primary | ICD-10-CM

## 2023-08-23 RX ORDER — SUCRALFATE 1 G/1
1 TABLET ORAL
Qty: 360 TABLET | Refills: 2 | Status: SHIPPED | OUTPATIENT
Start: 2023-08-23

## 2023-08-24 ENCOUNTER — HOSPITAL ENCOUNTER (EMERGENCY)
Facility: HOSPITAL | Age: 68
Discharge: HOME | End: 2023-08-24
Attending: STUDENT IN AN ORGANIZED HEALTH CARE EDUCATION/TRAINING PROGRAM
Payer: MEDICARE

## 2023-08-24 VITALS
DIASTOLIC BLOOD PRESSURE: 63 MMHG | SYSTOLIC BLOOD PRESSURE: 125 MMHG | OXYGEN SATURATION: 98 % | WEIGHT: 90 LBS | RESPIRATION RATE: 20 BRPM | TEMPERATURE: 98 F | HEIGHT: 56 IN | BODY MASS INDEX: 20.24 KG/M2 | HEART RATE: 109 BPM

## 2023-08-24 DIAGNOSIS — L03.116 CELLULITIS OF LEFT LEG: Primary | ICD-10-CM

## 2023-08-24 DIAGNOSIS — M79.89 LEG SWELLING: ICD-10-CM

## 2023-08-24 LAB
ALBUMIN SERPL BCP-MCNC: 3.5 G/DL (ref 3.5–5.2)
ALP SERPL-CCNC: 126 U/L (ref 55–135)
ALT SERPL W/O P-5'-P-CCNC: 19 U/L (ref 10–44)
ANION GAP SERPL CALC-SCNC: 10 MMOL/L (ref 8–16)
AST SERPL-CCNC: 27 U/L (ref 10–40)
BASOPHILS # BLD AUTO: 0.04 K/UL (ref 0–0.2)
BASOPHILS NFR BLD: 0.4 % (ref 0–1.9)
BILIRUB SERPL-MCNC: 0.4 MG/DL (ref 0.1–1)
BUN SERPL-MCNC: 12 MG/DL (ref 8–23)
CALCIUM SERPL-MCNC: 9.9 MG/DL (ref 8.7–10.5)
CHLORIDE SERPL-SCNC: 106 MMOL/L (ref 95–110)
CO2 SERPL-SCNC: 23 MMOL/L (ref 23–29)
CREAT SERPL-MCNC: 0.7 MG/DL (ref 0.5–1.4)
DIFFERENTIAL METHOD: ABNORMAL
EOSINOPHIL # BLD AUTO: 0.3 K/UL (ref 0–0.5)
EOSINOPHIL NFR BLD: 2.8 % (ref 0–8)
ERYTHROCYTE [DISTWIDTH] IN BLOOD BY AUTOMATED COUNT: 16.6 % (ref 11.5–14.5)
EST. GFR  (NO RACE VARIABLE): >60 ML/MIN/1.73 M^2
GLUCOSE SERPL-MCNC: 86 MG/DL (ref 70–110)
HCT VFR BLD AUTO: 30.5 % (ref 37–48.5)
HGB BLD-MCNC: 9.9 G/DL (ref 12–16)
IMM GRANULOCYTES # BLD AUTO: 0.04 K/UL (ref 0–0.04)
IMM GRANULOCYTES NFR BLD AUTO: 0.4 % (ref 0–0.5)
LACTATE SERPL-SCNC: 0.8 MMOL/L (ref 0.5–2.2)
LYMPHOCYTES # BLD AUTO: 2.5 K/UL (ref 1–4.8)
LYMPHOCYTES NFR BLD: 21.8 % (ref 18–48)
MCH RBC QN AUTO: 28.9 PG (ref 27–31)
MCHC RBC AUTO-ENTMCNC: 32.5 G/DL (ref 32–36)
MCV RBC AUTO: 89 FL (ref 82–98)
MONOCYTES # BLD AUTO: 0.8 K/UL (ref 0.3–1)
MONOCYTES NFR BLD: 6.8 % (ref 4–15)
NEUTROPHILS # BLD AUTO: 7.7 K/UL (ref 1.8–7.7)
NEUTROPHILS NFR BLD: 67.8 % (ref 38–73)
NRBC BLD-RTO: 0 /100 WBC
PLATELET # BLD AUTO: 445 K/UL (ref 150–450)
PMV BLD AUTO: 8.4 FL (ref 9.2–12.9)
POTASSIUM SERPL-SCNC: 3.6 MMOL/L (ref 3.5–5.1)
PROT SERPL-MCNC: 7.8 G/DL (ref 6–8.4)
RBC # BLD AUTO: 3.43 M/UL (ref 4–5.4)
SODIUM SERPL-SCNC: 139 MMOL/L (ref 136–145)
WBC # BLD AUTO: 11.27 K/UL (ref 3.9–12.7)

## 2023-08-24 PROCEDURE — 85025 COMPLETE CBC W/AUTO DIFF WBC: CPT | Performed by: STUDENT IN AN ORGANIZED HEALTH CARE EDUCATION/TRAINING PROGRAM

## 2023-08-24 PROCEDURE — 25000003 PHARM REV CODE 250: Performed by: STUDENT IN AN ORGANIZED HEALTH CARE EDUCATION/TRAINING PROGRAM

## 2023-08-24 PROCEDURE — 96365 THER/PROPH/DIAG IV INF INIT: CPT

## 2023-08-24 PROCEDURE — 96361 HYDRATE IV INFUSION ADD-ON: CPT

## 2023-08-24 PROCEDURE — 87040 BLOOD CULTURE FOR BACTERIA: CPT | Mod: 59 | Performed by: STUDENT IN AN ORGANIZED HEALTH CARE EDUCATION/TRAINING PROGRAM

## 2023-08-24 PROCEDURE — 96367 TX/PROPH/DG ADDL SEQ IV INF: CPT

## 2023-08-24 PROCEDURE — 96366 THER/PROPH/DIAG IV INF ADDON: CPT

## 2023-08-24 PROCEDURE — 63600175 PHARM REV CODE 636 W HCPCS: Performed by: STUDENT IN AN ORGANIZED HEALTH CARE EDUCATION/TRAINING PROGRAM

## 2023-08-24 PROCEDURE — 80053 COMPREHEN METABOLIC PANEL: CPT | Performed by: STUDENT IN AN ORGANIZED HEALTH CARE EDUCATION/TRAINING PROGRAM

## 2023-08-24 PROCEDURE — 83605 ASSAY OF LACTIC ACID: CPT | Performed by: STUDENT IN AN ORGANIZED HEALTH CARE EDUCATION/TRAINING PROGRAM

## 2023-08-24 PROCEDURE — 99285 EMERGENCY DEPT VISIT HI MDM: CPT | Mod: 25

## 2023-08-24 RX ORDER — AMITRIPTYLINE HYDROCHLORIDE 10 MG/1
TABLET, FILM COATED ORAL
COMMUNITY
Start: 2023-08-23 | End: 2023-09-11

## 2023-08-24 RX ORDER — ADALIMUMAB 40MG/0.4ML
40 KIT SUBCUTANEOUS
COMMUNITY
Start: 2023-08-23 | End: 2023-11-27

## 2023-08-24 RX ORDER — SULFAMETHOXAZOLE AND TRIMETHOPRIM 800; 160 MG/1; MG/1
1 TABLET ORAL 2 TIMES DAILY
Qty: 20 TABLET | Refills: 0 | Status: SHIPPED | OUTPATIENT
Start: 2023-08-24 | End: 2023-09-03

## 2023-08-24 RX ORDER — DOXYCYCLINE HYCLATE 100 MG
100 TABLET ORAL 2 TIMES DAILY
Qty: 14 TABLET | Refills: 0 | Status: SHIPPED | OUTPATIENT
Start: 2023-08-24 | End: 2023-08-24 | Stop reason: CLARIF

## 2023-08-24 RX ADMIN — CEFEPIME 2 G: 2 INJECTION, POWDER, FOR SOLUTION INTRAVENOUS at 12:08

## 2023-08-24 RX ADMIN — SODIUM CHLORIDE 1000 ML: 9 INJECTION, SOLUTION INTRAVENOUS at 12:08

## 2023-08-24 RX ADMIN — VANCOMYCIN HYDROCHLORIDE 500 MG: 500 INJECTION, POWDER, LYOPHILIZED, FOR SOLUTION INTRAVENOUS at 12:08
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
## 2023-08-24 NOTE — ED NOTES
Pt c/o left leg swelling x 2 days. Pt denies any changes to her daily activities or sched meds, denies fever at home. Pt afebrile in ER. Noted redness, erythema, edema to left ankle leg, calf.

## 2023-08-29 LAB
BACTERIA BLD CULT: NORMAL
BACTERIA BLD CULT: NORMAL

## 2023-08-30 DIAGNOSIS — J44.9 CHRONIC OBSTRUCTIVE PULMONARY DISEASE, UNSPECIFIED COPD TYPE: ICD-10-CM

## 2023-08-30 NOTE — TELEPHONE ENCOUNTER
----- Message from Juliana Blackburn sent at 8/30/2023  8:45 AM CDT -----  Type:  RX Refill Request    Who Called: pt    Refill or New Rx:refill    RX Name and Strength:fluticasone-umeclidin-vilanter (TRELEGY ELLIPTA) 200-62.5-25 mcg inhaler    How is the patient currently taking it? (ex. 1XDay):as directed    Is this a 30 day or 90 day RX:30    Preferred Pharmacy with phone number:  [facility][id number] - [place], [place] [facility] [address] [facility] [address]  [address]  [place] [address]  Phone: [phone] Fax: [phone]      Would the patient rather a call back or a response via [facility]? Call back    Best Call Back Number:[phone]      Additional Information:       Please call Back to advise. Thanks!

## 2023-09-11 ENCOUNTER — OFFICE VISIT (OUTPATIENT)
Dept: SURGERY | Facility: CLINIC | Age: 68
End: 2023-09-11
Payer: MEDICARE

## 2023-09-11 VITALS
WEIGHT: 94 LBS | OXYGEN SATURATION: 98 % | HEART RATE: 91 BPM | SYSTOLIC BLOOD PRESSURE: 169 MMHG | BODY MASS INDEX: 21.15 KG/M2 | HEIGHT: 56 IN | DIASTOLIC BLOOD PRESSURE: 77 MMHG

## 2023-09-11 DIAGNOSIS — K29.30 CHRONIC SUPERFICIAL GASTRITIS WITHOUT BLEEDING: Primary | ICD-10-CM

## 2023-09-11 PROCEDURE — 1101F PR PT FALLS ASSESS DOC 0-1 FALLS W/OUT INJ PAST YR: ICD-10-PCS | Mod: CPTII,S$GLB,, | Performed by: STUDENT IN AN ORGANIZED HEALTH CARE EDUCATION/TRAINING PROGRAM

## 2023-09-11 PROCEDURE — 1126F PR PAIN SEVERITY QUANTIFIED, NO PAIN PRESENT: ICD-10-PCS | Mod: CPTII,S$GLB,, | Performed by: STUDENT IN AN ORGANIZED HEALTH CARE EDUCATION/TRAINING PROGRAM

## 2023-09-11 PROCEDURE — 1159F MED LIST DOCD IN RCRD: CPT | Mod: CPTII,S$GLB,, | Performed by: STUDENT IN AN ORGANIZED HEALTH CARE EDUCATION/TRAINING PROGRAM

## 2023-09-11 PROCEDURE — 3077F PR MOST RECENT SYSTOLIC BLOOD PRESSURE >= 140 MM HG: ICD-10-PCS | Mod: CPTII,S$GLB,, | Performed by: STUDENT IN AN ORGANIZED HEALTH CARE EDUCATION/TRAINING PROGRAM

## 2023-09-11 PROCEDURE — 1159F PR MEDICATION LIST DOCUMENTED IN MEDICAL RECORD: ICD-10-PCS | Mod: CPTII,S$GLB,, | Performed by: STUDENT IN AN ORGANIZED HEALTH CARE EDUCATION/TRAINING PROGRAM

## 2023-09-11 PROCEDURE — 3078F DIAST BP <80 MM HG: CPT | Mod: CPTII,S$GLB,, | Performed by: STUDENT IN AN ORGANIZED HEALTH CARE EDUCATION/TRAINING PROGRAM

## 2023-09-11 PROCEDURE — 99999 PR PBB SHADOW E&M-EST. PATIENT-LVL III: CPT | Mod: PBBFAC,,, | Performed by: STUDENT IN AN ORGANIZED HEALTH CARE EDUCATION/TRAINING PROGRAM

## 2023-09-11 PROCEDURE — 99213 OFFICE O/P EST LOW 20 MIN: CPT | Mod: S$GLB,,, | Performed by: STUDENT IN AN ORGANIZED HEALTH CARE EDUCATION/TRAINING PROGRAM

## 2023-09-11 PROCEDURE — 99999 PR PBB SHADOW E&M-EST. PATIENT-LVL III: ICD-10-PCS | Mod: PBBFAC,,, | Performed by: STUDENT IN AN ORGANIZED HEALTH CARE EDUCATION/TRAINING PROGRAM

## 2023-09-11 PROCEDURE — 1126F AMNT PAIN NOTED NONE PRSNT: CPT | Mod: CPTII,S$GLB,, | Performed by: STUDENT IN AN ORGANIZED HEALTH CARE EDUCATION/TRAINING PROGRAM

## 2023-09-11 PROCEDURE — 3008F BODY MASS INDEX DOCD: CPT | Mod: CPTII,S$GLB,, | Performed by: STUDENT IN AN ORGANIZED HEALTH CARE EDUCATION/TRAINING PROGRAM

## 2023-09-11 PROCEDURE — 3077F SYST BP >= 140 MM HG: CPT | Mod: CPTII,S$GLB,, | Performed by: STUDENT IN AN ORGANIZED HEALTH CARE EDUCATION/TRAINING PROGRAM

## 2023-09-11 PROCEDURE — 1101F PT FALLS ASSESS-DOCD LE1/YR: CPT | Mod: CPTII,S$GLB,, | Performed by: STUDENT IN AN ORGANIZED HEALTH CARE EDUCATION/TRAINING PROGRAM

## 2023-09-11 PROCEDURE — 3078F PR MOST RECENT DIASTOLIC BLOOD PRESSURE < 80 MM HG: ICD-10-PCS | Mod: CPTII,S$GLB,, | Performed by: STUDENT IN AN ORGANIZED HEALTH CARE EDUCATION/TRAINING PROGRAM

## 2023-09-11 PROCEDURE — 3288F FALL RISK ASSESSMENT DOCD: CPT | Mod: CPTII,S$GLB,, | Performed by: STUDENT IN AN ORGANIZED HEALTH CARE EDUCATION/TRAINING PROGRAM

## 2023-09-11 PROCEDURE — 3288F PR FALLS RISK ASSESSMENT DOCUMENTED: ICD-10-PCS | Mod: CPTII,S$GLB,, | Performed by: STUDENT IN AN ORGANIZED HEALTH CARE EDUCATION/TRAINING PROGRAM

## 2023-09-11 PROCEDURE — 3008F PR BODY MASS INDEX (BMI) DOCUMENTED: ICD-10-PCS | Mod: CPTII,S$GLB,, | Performed by: STUDENT IN AN ORGANIZED HEALTH CARE EDUCATION/TRAINING PROGRAM

## 2023-09-11 PROCEDURE — 99213 PR OFFICE/OUTPT VISIT, EST, LEVL III, 20-29 MIN: ICD-10-PCS | Mod: S$GLB,,, | Performed by: STUDENT IN AN ORGANIZED HEALTH CARE EDUCATION/TRAINING PROGRAM

## 2023-09-11 NOTE — PROGRESS NOTES
"[facility]  Follow-up    Subjective:     Chief Complaint:  Follow-up gastritis    HPI:  Janey Null is a 67 y.o. female presents today for follow-up examination.  Patient since last visit has done well.  No issues.  Patient on EGD found to have gastritis with ulcerations within the antrum. Tolerating a diet having bowel function.  No other new issues or complaints.    Review of Systems   Constitutional:  Negative for activity change, appetite change, chills and fever.   HENT:  Negative for congestion, dental problem and ear discharge.    Eyes:  Negative for discharge and itching.   Respiratory:  Negative for apnea, choking and chest tightness.    Cardiovascular:  Negative for chest pain and leg swelling.   Gastrointestinal:  Negative for abdominal distention, abdominal pain, anal bleeding, constipation, diarrhea and nausea.   Endocrine: Negative for cold intolerance and heat intolerance.   Genitourinary:  Negative for difficulty urinating and dyspareunia.   Musculoskeletal:  Negative for arthralgias and back pain.   Skin:  Negative for color change and pallor.   Neurological:  Negative for dizziness and facial asymmetry.   Hematological:  Negative for adenopathy. Does not bruise/bleed easily.   Psychiatric/Behavioral:  Negative for agitation and behavioral problems.        Objective:      Vitals:    09/11/23 1041   BP: (!) 169/77   Pulse: 91   SpO2: 98%   Weight: 42.6 kg (94 lb)   Height: 4' 8" (1.422 m)     Physical Exam  Constitutional:       General: She is not in acute distress.     Appearance: She is well-developed.   HENT:      Head: Normocephalic and atraumatic.   Eyes:      Pupils: Pupils are equal, round, and reactive to light.   Neck:      Thyroid: No thyromegaly.   Cardiovascular:      Rate and Rhythm: Normal rate and regular rhythm.   Pulmonary:      Effort: Pulmonary effort is normal.      Breath sounds: Normal breath sounds.   Abdominal:      General: Bowel sounds are " normal. There is no distension.      Palpations: Abdomen is soft.      Tenderness: There is no abdominal tenderness.   Musculoskeletal:         General: No deformity. Normal range of motion.      Cervical back: Normal range of motion and neck supple.   Skin:     General: Skin is warm.      Capillary Refill: Capillary refill takes less than 2 seconds.      Findings: No erythema.   Neurological:      Mental Status: She is alert and oriented to person, place, and time.      Cranial Nerves: No cranial nerve deficit.   Psychiatric:         Behavior: Behavior normal.          Assessment:       1. Chronic superficial gastritis without bleeding        Plan:   Chronic superficial gastritis without bleeding        Medical Decision Making/Counseling:  Gastritis.  Improving.  Continue current regimen with the Protonix and Carafate.     Follow up:  PRN    Patient instructed that best way to communicate with my office staff is for patient to get on the [facility] patient portal to expedite communication and communication issues that may occur.  Patient was given instructions on how to get on the portal.  I encouraged patient to obtain portal access as well.  Ultimately it is up to the patient to obtain access.  Patient voiced understanding.

## 2023-09-21 DIAGNOSIS — Z78.0 MENOPAUSE: ICD-10-CM

## 2023-09-29 ENCOUNTER — TELEPHONE (OUTPATIENT)
Dept: FAMILY MEDICINE | Facility: CLINIC | Age: 68
End: 2023-09-29

## 2023-09-29 NOTE — TELEPHONE ENCOUNTER
Fax received for prosthesis prescription. Fax requests:  -Rx to evaluate and treat  -Last office note  -Activity Level    Please review and advise. Documents can be faxed to [facility] @ [phone].

## 2023-10-24 ENCOUNTER — OFFICE VISIT (OUTPATIENT)
Dept: FAMILY MEDICINE | Facility: CLINIC | Age: 68
End: 2023-10-24
Payer: MEDICARE

## 2023-10-24 VITALS
HEIGHT: 56 IN | RESPIRATION RATE: 18 BRPM | DIASTOLIC BLOOD PRESSURE: 90 MMHG | OXYGEN SATURATION: 99 % | TEMPERATURE: 98 F | BODY MASS INDEX: 21.59 KG/M2 | WEIGHT: 96 LBS | HEART RATE: 94 BPM | SYSTOLIC BLOOD PRESSURE: 160 MMHG

## 2023-10-24 DIAGNOSIS — Z89.619: ICD-10-CM

## 2023-10-24 DIAGNOSIS — I10 ESSENTIAL HYPERTENSION: ICD-10-CM

## 2023-10-24 DIAGNOSIS — R05.9 COUGH, UNSPECIFIED TYPE: Primary | ICD-10-CM

## 2023-10-24 PROCEDURE — 4010F ACE/ARB THERAPY RXD/TAKEN: CPT | Mod: CPTII,S$GLB,, | Performed by: FAMILY MEDICINE

## 2023-10-24 PROCEDURE — 1159F MED LIST DOCD IN RCRD: CPT | Mod: CPTII,S$GLB,, | Performed by: FAMILY MEDICINE

## 2023-10-24 PROCEDURE — 1160F RVW MEDS BY RX/DR IN RCRD: CPT | Mod: CPTII,S$GLB,, | Performed by: FAMILY MEDICINE

## 2023-10-24 PROCEDURE — 3080F DIAST BP >= 90 MM HG: CPT | Mod: CPTII,S$GLB,, | Performed by: FAMILY MEDICINE

## 2023-10-24 PROCEDURE — 1160F PR REVIEW ALL MEDS BY PRESCRIBER/CLIN PHARMACIST DOCUMENTED: ICD-10-PCS | Mod: CPTII,S$GLB,, | Performed by: FAMILY MEDICINE

## 2023-10-24 PROCEDURE — 99214 OFFICE O/P EST MOD 30 MIN: CPT | Mod: S$GLB,,, | Performed by: FAMILY MEDICINE

## 2023-10-24 PROCEDURE — 3288F FALL RISK ASSESSMENT DOCD: CPT | Mod: CPTII,S$GLB,, | Performed by: FAMILY MEDICINE

## 2023-10-24 PROCEDURE — 99999 PR PBB SHADOW E&M-EST. PATIENT-LVL IV: CPT | Mod: PBBFAC,,, | Performed by: FAMILY MEDICINE

## 2023-10-24 PROCEDURE — 3077F SYST BP >= 140 MM HG: CPT | Mod: CPTII,S$GLB,, | Performed by: FAMILY MEDICINE

## 2023-10-24 PROCEDURE — 1101F PT FALLS ASSESS-DOCD LE1/YR: CPT | Mod: CPTII,S$GLB,, | Performed by: FAMILY MEDICINE

## 2023-10-24 PROCEDURE — 99214 PR OFFICE/OUTPT VISIT, EST, LEVL IV, 30-39 MIN: ICD-10-PCS | Mod: S$GLB,,, | Performed by: FAMILY MEDICINE

## 2023-10-24 PROCEDURE — 1125F AMNT PAIN NOTED PAIN PRSNT: CPT | Mod: CPTII,S$GLB,, | Performed by: FAMILY MEDICINE

## 2023-10-24 PROCEDURE — 4010F PR ACE/ARB THEARPY RXD/TAKEN: ICD-10-PCS | Mod: CPTII,S$GLB,, | Performed by: FAMILY MEDICINE

## 2023-10-24 PROCEDURE — 3008F BODY MASS INDEX DOCD: CPT | Mod: CPTII,S$GLB,, | Performed by: FAMILY MEDICINE

## 2023-10-24 PROCEDURE — 3077F PR MOST RECENT SYSTOLIC BLOOD PRESSURE >= 140 MM HG: ICD-10-PCS | Mod: CPTII,S$GLB,, | Performed by: FAMILY MEDICINE

## 2023-10-24 PROCEDURE — 3288F PR FALLS RISK ASSESSMENT DOCUMENTED: ICD-10-PCS | Mod: CPTII,S$GLB,, | Performed by: FAMILY MEDICINE

## 2023-10-24 PROCEDURE — 1125F PR PAIN SEVERITY QUANTIFIED, PAIN PRESENT: ICD-10-PCS | Mod: CPTII,S$GLB,, | Performed by: FAMILY MEDICINE

## 2023-10-24 PROCEDURE — 1101F PR PT FALLS ASSESS DOC 0-1 FALLS W/OUT INJ PAST YR: ICD-10-PCS | Mod: CPTII,S$GLB,, | Performed by: FAMILY MEDICINE

## 2023-10-24 PROCEDURE — 99999 PR PBB SHADOW E&M-EST. PATIENT-LVL IV: ICD-10-PCS | Mod: PBBFAC,,, | Performed by: FAMILY MEDICINE

## 2023-10-24 PROCEDURE — 3080F PR MOST RECENT DIASTOLIC BLOOD PRESSURE >= 90 MM HG: ICD-10-PCS | Mod: CPTII,S$GLB,, | Performed by: FAMILY MEDICINE

## 2023-10-24 PROCEDURE — 1159F PR MEDICATION LIST DOCUMENTED IN MEDICAL RECORD: ICD-10-PCS | Mod: CPTII,S$GLB,, | Performed by: FAMILY MEDICINE

## 2023-10-24 PROCEDURE — 3008F PR BODY MASS INDEX (BMI) DOCUMENTED: ICD-10-PCS | Mod: CPTII,S$GLB,, | Performed by: FAMILY MEDICINE

## 2023-10-24 RX ORDER — LOSARTAN POTASSIUM 50 MG/1
50 TABLET ORAL DAILY
Qty: 90 TABLET | Refills: 3 | Status: SHIPPED | OUTPATIENT
Start: 2023-10-24 | End: 2023-11-09

## 2023-10-24 NOTE — PROGRESS NOTES
[facility] - Clinic Note    Subjective      Ms. Null is a 67 y.o. female who presents to clinic for Follow-up (Need referral for prosthetics) and Fatigue    Patient has been feeling fatigued.  She needs a new right lower extremity prosthesis due to weight change.  It is not fitting as well as it was before.  Functional status is K2.  Has been also having cough.    PMH Janey has a past medical history of Anxiety, Depression, Encounter for long-term (current) use of high-risk medication, Fibromyalgia, Osteoporosis, and Rheumatoid arthritis involving multiple sites with positive rheumatoid factor.   PSXH Janey has a past surgical history that includes right leg ; Repair of recurrent incarcerated incisional hernia (Left, 4/13/2022); and Esophagogastroduodenoscopy (N/A, 4/5/2023).    Janey's family history is not on file.    Janey reports that she quit smoking about 6 months ago. Her smoking use included cigarettes. She has never used smokeless tobacco. She reports that she does not drink alcohol and does not use drugs.   ALG Janey is allergic to penicillins.   MED Janey has a current medication list which includes the following prescription(s): albuterol-ipratropium, fluticasone-umeclidin-vilanter, folic acid, humira(cf) pen, hydrocodone-acetaminophen, methotrexate, pantoprazole, sucralfate, and losartan.     Review of Systems   Constitutional:  Negative for chills and fever.   HENT:  Negative for congestion and rhinorrhea.    Eyes:  Negative for visual disturbance.   Respiratory:  Negative for cough, shortness of breath and wheezing.    Cardiovascular:  Negative for chest pain.   Gastrointestinal:  Negative for abdominal pain, constipation, diarrhea, nausea and vomiting.   Genitourinary:  Negative for dysuria.   Musculoskeletal:  Negative for myalgias.   Skin:  Negative for rash.   Neurological:  Negative for weakness and headaches.     Objective     BP (!) 160/90 (BP Location:  "Left arm, Patient Position: Sitting, BP Method: Medium (Manual))   Pulse 94   Temp 97.8 °F (36.6 °C) (Temporal)   Resp 18   Ht 4' 8" (1.422 m)   Wt 43.5 kg (96 lb)   SpO2 99%   BMI 21.52 kg/m²     Physical Exam  Vitals and nursing note reviewed.   Constitutional:       General: She is not in acute distress.     Appearance: Normal appearance. She is well-developed. She is not diaphoretic.   HENT:      Head: Normocephalic and atraumatic.      Right Ear: External ear normal.      Left Ear: External ear normal.   Eyes:      General:         Right eye: No discharge.         Left eye: No discharge.   Cardiovascular:      Rate and Rhythm: Normal rate and regular rhythm.      Heart sounds: Normal heart sounds.   Pulmonary:      Effort: Pulmonary effort is normal.      Breath sounds: No wheezing or rales.   Musculoskeletal:      Comments: Right LE amputation   Skin:     General: Skin is warm and dry.   Neurological:      Mental Status: She is alert and oriented to person, place, and time. Mental status is at baseline.   Psychiatric:         Mood and Affect: Mood normal.         Behavior: Behavior normal.         Thought Content: Thought content normal.         Judgment: Judgment normal.        Assessment/Plan     1. Cough, unspecified type  X-Ray Chest PA And Lateral    CBC Auto Differential    Comprehensive Metabolic Panel      2. Essential hypertension  losartan (COZAAR) 50 MG tablet      3. History of lower limb amputation  PROSTHESIS FOR HOME USE        Obtain chest x-ray and labs as above.  Start losartan for elevated blood pressure.  Prescription for new prosthesis has been sent.    1. Cough, unspecified type  -     X-Ray Chest PA And Lateral; Future; Expected date: 10/24/2023  -     CBC Auto Differential; Future; Expected date: 10/24/2023  -     Comprehensive Metabolic Panel; Future; Expected date: 10/24/2023    2. Essential hypertension  -     losartan (COZAAR) 50 MG tablet; Take 1 tablet (50 mg total) by " mouth once daily.  Dispense: 90 tablet; Refill: 3    3. History of lower limb amputation  -     PROSTHESIS FOR HOME USE      No future appointments.        Mumtaz Milan MD  Family Medicine  [facility]

## 2023-10-25 ENCOUNTER — HOSPITAL ENCOUNTER (OUTPATIENT)
Dept: RADIOLOGY | Facility: HOSPITAL | Age: 68
Discharge: HOME | End: 2023-10-25
Attending: FAMILY MEDICINE
Payer: MEDICARE

## 2023-10-25 DIAGNOSIS — R05.9 COUGH, UNSPECIFIED TYPE: ICD-10-CM

## 2023-10-25 PROCEDURE — 71046 X-RAY EXAM CHEST 2 VIEWS: CPT | Mod: TC

## 2023-10-25 PROCEDURE — 71046 X-RAY EXAM CHEST 2 VIEWS: CPT | Mod: 26,,, | Performed by: RADIOLOGY

## 2023-10-25 PROCEDURE — 71046 XR CHEST PA AND LATERAL: ICD-10-PCS | Mod: 26,,, | Performed by: RADIOLOGY

## 2023-10-30 ENCOUNTER — PATIENT MESSAGE (OUTPATIENT)
Dept: ADMINISTRATIVE | Facility: HOSPITAL | Age: 68
End: 2023-10-30
Payer: MEDICARE

## 2023-10-30 ENCOUNTER — PATIENT OUTREACH (OUTPATIENT)
Dept: ADMINISTRATIVE | Facility: HOSPITAL | Age: 68
End: 2023-10-30
Payer: MEDICARE

## 2023-10-30 DIAGNOSIS — J44.9 CHRONIC OBSTRUCTIVE PULMONARY DISEASE, UNSPECIFIED COPD TYPE: ICD-10-CM

## 2023-10-30 NOTE — TELEPHONE ENCOUNTER
----- Message from Esther Blevins sent at 10/30/2023 12:05 PM CDT -----  Contact: PT  Type:  Test Results    Who Called: CALL   Name of Test (Lab/Mammo/Etc): LAB AND XRAY  Date of Test: 10/25/23  Ordering Provider: Mumtaz Milan MD  Where the test was performed: [facility]  Would the patient rather a call back or a response via [facility]? CALL   Best Call Back Number: [phone] (home)   Additional Information:  THANK YOU

## 2023-10-30 NOTE — TELEPHONE ENCOUNTER
----- Message from Esther Blevins sent at 10/30/2023 12:09 PM CDT -----  Contact: PT  Type:  RX Refill Request    Who Called: PT   Refill or New Rx: REFILL   RX Name and Strength: fluticasone-umeclidin-vilanter (TRELEGY ELLIPTA) 200-62.5-25 mcg inhaler  How is the patient currently taki ng it? (ex. 1XDay): ONCE A DAY   Is this a 30 day or 90 day RX: 30  Preferred Pharmacy with phone number:  [id number] - [place], [place] - [address] [place] [address] AT [address]  [address]  [place] [address]  Phone: [phone] Fax: [phone]  Local or Mail Order:LOCAL   Ordering Provider:Mumtaz Milan MD   Would the patient rather a call back or a response via [facility]? CALL   Best Call Back Number:[phone] (home)   Additional Information: THANK YOU

## 2023-10-30 NOTE — PROGRESS NOTES
Population Health Chart Review & Patient Outreach Details      Further Action Needed If Patient Returns Outreach:      Portal message to pt to return message with remote BP reading        Updates Requested / Reviewed:     []  Care Everywhere    []     []  External Sources ([facility], [facility], [facility], etc.)    [] [facility]   [] [facility]   [] Other:    []  Care Team Updated   []  Removed  or Duplicate Orders   []  Immunization Reconciliation Completed / Queried    [] [place]   [] [place]   [] [place]   [] [place]      Health Maintenance Topics Addressed and Outreach Outcomes / Actions Taken:             Breast Cancer Screening []  Mammogram Order Placed    []  Mammogram Screening Scheduled    []  External Records Requested & Care Team Updated if Applicable    []  External Records Uploaded & Care Team Updated if Applicable    []  Pt Declined Scheduling Mammogram    []  Pt Will Schedule with External Provider / Order Routed & Care Team Updated if Applicable              Cervical Cancer Screening []  Pap Smear Scheduled in Primary Care or OBGYN    []  External Records Requested & Care Team Updated if Applicable       []  External Records Uploaded, Care Team Updated, & History Updated if Applicable    []  Patient Declined Scheduling Pap Smear    []  Patient Will Schedule with External Provider & Care Team Updated if Applicable                  Colorectal Cancer Screening []  Colonoscopy Case Request / Referral / Home Test Order Placed    []  External Records Requested & Care Team Updated if Applicable    []  External Records Uploaded, Care Team Updated, & History Updated if Applicable    []  Patient Declined Completing Colon Cancer Screening    []  Patient Will Schedule with External Provider & Care Team Updated if Applicable    []  Fit Kit Mailed (add the SmartPhrase under additional notes)    []  Reminded Patient to Complete Home Test                Diabetic Eye Exam []  Eye Exam Screening Order Placed    []   Eye Camera Scheduled or Optometry/Ophthalmology Referral Placed    []  External Records Requested & Care Team Updated if Applicable    []  External Records Uploaded, Care Team Updated, & History Updated if Applicable    []  Patient Declined Scheduling Eye Exam    []  Patient Will Schedule with External Provider & Care Team Updated if Applicable             Blood Pressure Control []  Primary Care Follow Up Visit Scheduled     []  Remote Blood Pressure Reading Captured    []  Patient Declined Remote Reading or Scheduling Appt - Escalated to PCP    []  Patient Will Call Back or Send Portal Message with Reading                 HbA1c & Other Labs []  Overdue Lab(s) Ordered    []  Overdue Lab(s) Scheduled    []  External Records Uploaded & Care Team Updated if Applicable    []  Primary Care Follow Up Visit Scheduled     []  Reminded Patient to Complete A1c Home Test    []  Patient Declined Scheduling Labs or Will Call Back to Schedule    []  Patient Will Schedule with External Provider / Order Routed, & Care Team Updated if Applicable           Primary Care Appointment []  Primary Care Appt Scheduled    []  Patient Declined Scheduling or Will Call Back to Schedule    []  Pt Established with External Provider, Updated Care Team, & Informed Pt to Notify Payor if Applicable           Medication Adherence /    Statin Use []  Primary Care Appointment Scheduled    []  Patient Reminded to  Prescription    []  Patient Declined, Provider Notified if Needed    []  Sent Provider Message to Review to Evaluate Pt for Statin, Add Exclusion Dx Codes, Document   Exclusion in Problem List, Change Statin Intensity Level to Moderate or High Intensity if Applicable                Osteoporosis Screening []  Dexa Order Placed    []  Dexa Appointment Scheduled    []  External Records Requested & Care Team Updated    []  External Records Uploaded, Care Team Updated, & History Updated if Applicable    []  Patient Declined Scheduling Dexa  or Will Call Back to Schedule    []  Patient Will Schedule with External Provider / Order Routed & Care Team Updated if Applicable       Additional Notes:

## 2023-11-09 ENCOUNTER — OFFICE VISIT (OUTPATIENT)
Dept: FAMILY MEDICINE | Facility: CLINIC | Age: 68
End: 2023-11-09
Payer: MEDICARE

## 2023-11-09 ENCOUNTER — LAB VISIT (OUTPATIENT)
Dept: LAB | Facility: HOSPITAL | Age: 68
End: 2023-11-09
Attending: STUDENT IN AN ORGANIZED HEALTH CARE EDUCATION/TRAINING PROGRAM
Payer: MEDICARE

## 2023-11-09 ENCOUNTER — TELEPHONE (OUTPATIENT)
Dept: PULMONOLOGY | Facility: CLINIC | Age: 68
End: 2023-11-09
Payer: MEDICARE

## 2023-11-09 VITALS
SYSTOLIC BLOOD PRESSURE: 164 MMHG | RESPIRATION RATE: 16 BRPM | HEIGHT: 56 IN | BODY MASS INDEX: 21.33 KG/M2 | WEIGHT: 94.81 LBS | OXYGEN SATURATION: 97 % | DIASTOLIC BLOOD PRESSURE: 86 MMHG | HEART RATE: 93 BPM

## 2023-11-09 DIAGNOSIS — I10 ESSENTIAL HYPERTENSION: ICD-10-CM

## 2023-11-09 DIAGNOSIS — D52.0 DIETARY FOLATE DEFICIENCY ANEMIA: ICD-10-CM

## 2023-11-09 DIAGNOSIS — D64.9 NORMOCYTIC ANEMIA: Primary | ICD-10-CM

## 2023-11-09 DIAGNOSIS — R60.9 EDEMA, UNSPECIFIED TYPE: ICD-10-CM

## 2023-11-09 DIAGNOSIS — F17.218 NICOTINE DEPENDENCE, CIGARETTES, WITH OTHER NICOTINE-INDUCED DISORDERS: ICD-10-CM

## 2023-11-09 DIAGNOSIS — J44.9 CHRONIC OBSTRUCTIVE PULMONARY DISEASE, UNSPECIFIED COPD TYPE: ICD-10-CM

## 2023-11-09 DIAGNOSIS — Z12.11 SCREEN FOR COLON CANCER: ICD-10-CM

## 2023-11-09 DIAGNOSIS — D64.9 NORMOCYTIC ANEMIA: ICD-10-CM

## 2023-11-09 LAB
FERRITIN SERPL-MCNC: 14 NG/ML (ref 20–300)
IRON SERPL-MCNC: 30 UG/DL (ref 30–160)
SATURATED IRON: 6 % (ref 20–50)
TOTAL IRON BINDING CAPACITY: 542 UG/DL (ref 250–450)
TRANSFERRIN SERPL-MCNC: 366 MG/DL (ref 200–375)
VIT B12 SERPL-MCNC: 382 PG/ML (ref 210–950)

## 2023-11-09 PROCEDURE — 3008F PR BODY MASS INDEX (BMI) DOCUMENTED: ICD-10-PCS | Mod: CPTII,S$GLB,, | Performed by: STUDENT IN AN ORGANIZED HEALTH CARE EDUCATION/TRAINING PROGRAM

## 2023-11-09 PROCEDURE — 82728 ASSAY OF FERRITIN: CPT | Performed by: STUDENT IN AN ORGANIZED HEALTH CARE EDUCATION/TRAINING PROGRAM

## 2023-11-09 PROCEDURE — 1159F MED LIST DOCD IN RCRD: CPT | Mod: CPTII,S$GLB,, | Performed by: STUDENT IN AN ORGANIZED HEALTH CARE EDUCATION/TRAINING PROGRAM

## 2023-11-09 PROCEDURE — 3288F FALL RISK ASSESSMENT DOCD: CPT | Mod: CPTII,S$GLB,, | Performed by: STUDENT IN AN ORGANIZED HEALTH CARE EDUCATION/TRAINING PROGRAM

## 2023-11-09 PROCEDURE — 3077F SYST BP >= 140 MM HG: CPT | Mod: CPTII,S$GLB,, | Performed by: STUDENT IN AN ORGANIZED HEALTH CARE EDUCATION/TRAINING PROGRAM

## 2023-11-09 PROCEDURE — 1160F RVW MEDS BY RX/DR IN RCRD: CPT | Mod: CPTII,S$GLB,, | Performed by: STUDENT IN AN ORGANIZED HEALTH CARE EDUCATION/TRAINING PROGRAM

## 2023-11-09 PROCEDURE — 1101F PT FALLS ASSESS-DOCD LE1/YR: CPT | Mod: CPTII,S$GLB,, | Performed by: STUDENT IN AN ORGANIZED HEALTH CARE EDUCATION/TRAINING PROGRAM

## 2023-11-09 PROCEDURE — 1159F PR MEDICATION LIST DOCUMENTED IN MEDICAL RECORD: ICD-10-PCS | Mod: CPTII,S$GLB,, | Performed by: STUDENT IN AN ORGANIZED HEALTH CARE EDUCATION/TRAINING PROGRAM

## 2023-11-09 PROCEDURE — 99999 PR PBB SHADOW E&M-EST. PATIENT-LVL V: CPT | Mod: PBBFAC,,, | Performed by: STUDENT IN AN ORGANIZED HEALTH CARE EDUCATION/TRAINING PROGRAM

## 2023-11-09 PROCEDURE — 82746 ASSAY OF FOLIC ACID SERUM: CPT | Performed by: STUDENT IN AN ORGANIZED HEALTH CARE EDUCATION/TRAINING PROGRAM

## 2023-11-09 PROCEDURE — 1126F PR PAIN SEVERITY QUANTIFIED, NO PAIN PRESENT: ICD-10-PCS | Mod: CPTII,S$GLB,, | Performed by: STUDENT IN AN ORGANIZED HEALTH CARE EDUCATION/TRAINING PROGRAM

## 2023-11-09 PROCEDURE — 1101F PR PT FALLS ASSESS DOC 0-1 FALLS W/OUT INJ PAST YR: ICD-10-PCS | Mod: CPTII,S$GLB,, | Performed by: STUDENT IN AN ORGANIZED HEALTH CARE EDUCATION/TRAINING PROGRAM

## 2023-11-09 PROCEDURE — 83540 ASSAY OF IRON: CPT | Performed by: STUDENT IN AN ORGANIZED HEALTH CARE EDUCATION/TRAINING PROGRAM

## 2023-11-09 PROCEDURE — 3077F PR MOST RECENT SYSTOLIC BLOOD PRESSURE >= 140 MM HG: ICD-10-PCS | Mod: CPTII,S$GLB,, | Performed by: STUDENT IN AN ORGANIZED HEALTH CARE EDUCATION/TRAINING PROGRAM

## 2023-11-09 PROCEDURE — 99214 OFFICE O/P EST MOD 30 MIN: CPT | Mod: S$GLB,,, | Performed by: STUDENT IN AN ORGANIZED HEALTH CARE EDUCATION/TRAINING PROGRAM

## 2023-11-09 PROCEDURE — 3079F PR MOST RECENT DIASTOLIC BLOOD PRESSURE 80-89 MM HG: ICD-10-PCS | Mod: CPTII,S$GLB,, | Performed by: STUDENT IN AN ORGANIZED HEALTH CARE EDUCATION/TRAINING PROGRAM

## 2023-11-09 PROCEDURE — 3079F DIAST BP 80-89 MM HG: CPT | Mod: CPTII,S$GLB,, | Performed by: STUDENT IN AN ORGANIZED HEALTH CARE EDUCATION/TRAINING PROGRAM

## 2023-11-09 PROCEDURE — 84466 ASSAY OF TRANSFERRIN: CPT | Performed by: STUDENT IN AN ORGANIZED HEALTH CARE EDUCATION/TRAINING PROGRAM

## 2023-11-09 PROCEDURE — 4010F PR ACE/ARB THEARPY RXD/TAKEN: ICD-10-PCS | Mod: CPTII,S$GLB,, | Performed by: STUDENT IN AN ORGANIZED HEALTH CARE EDUCATION/TRAINING PROGRAM

## 2023-11-09 PROCEDURE — 99999 PR PBB SHADOW E&M-EST. PATIENT-LVL V: ICD-10-PCS | Mod: PBBFAC,,, | Performed by: STUDENT IN AN ORGANIZED HEALTH CARE EDUCATION/TRAINING PROGRAM

## 2023-11-09 PROCEDURE — 82607 VITAMIN B-12: CPT | Performed by: STUDENT IN AN ORGANIZED HEALTH CARE EDUCATION/TRAINING PROGRAM

## 2023-11-09 PROCEDURE — 36415 COLL VENOUS BLD VENIPUNCTURE: CPT | Performed by: STUDENT IN AN ORGANIZED HEALTH CARE EDUCATION/TRAINING PROGRAM

## 2023-11-09 PROCEDURE — 99214 PR OFFICE/OUTPT VISIT, EST, LEVL IV, 30-39 MIN: ICD-10-PCS | Mod: S$GLB,,, | Performed by: STUDENT IN AN ORGANIZED HEALTH CARE EDUCATION/TRAINING PROGRAM

## 2023-11-09 PROCEDURE — 4010F ACE/ARB THERAPY RXD/TAKEN: CPT | Mod: CPTII,S$GLB,, | Performed by: STUDENT IN AN ORGANIZED HEALTH CARE EDUCATION/TRAINING PROGRAM

## 2023-11-09 PROCEDURE — 3008F BODY MASS INDEX DOCD: CPT | Mod: CPTII,S$GLB,, | Performed by: STUDENT IN AN ORGANIZED HEALTH CARE EDUCATION/TRAINING PROGRAM

## 2023-11-09 PROCEDURE — 3288F PR FALLS RISK ASSESSMENT DOCUMENTED: ICD-10-PCS | Mod: CPTII,S$GLB,, | Performed by: STUDENT IN AN ORGANIZED HEALTH CARE EDUCATION/TRAINING PROGRAM

## 2023-11-09 PROCEDURE — 1126F AMNT PAIN NOTED NONE PRSNT: CPT | Mod: CPTII,S$GLB,, | Performed by: STUDENT IN AN ORGANIZED HEALTH CARE EDUCATION/TRAINING PROGRAM

## 2023-11-09 PROCEDURE — 1160F PR REVIEW ALL MEDS BY PRESCRIBER/CLIN PHARMACIST DOCUMENTED: ICD-10-PCS | Mod: CPTII,S$GLB,, | Performed by: STUDENT IN AN ORGANIZED HEALTH CARE EDUCATION/TRAINING PROGRAM

## 2023-11-09 RX ORDER — LOSARTAN POTASSIUM 100 MG/1
100 TABLET ORAL DAILY
Qty: 90 TABLET | Refills: 3 | Status: SHIPPED | OUTPATIENT
Start: 2023-11-09 | End: 2024-11-08

## 2023-11-09 RX ORDER — FUROSEMIDE 40 MG/1
40 TABLET ORAL 2 TIMES DAILY
Qty: 60 TABLET | Refills: 11 | Status: SHIPPED | OUTPATIENT
Start: 2023-11-09 | End: 2024-11-08

## 2023-11-09 RX ORDER — AMITRIPTYLINE HYDROCHLORIDE 10 MG/1
20 TABLET, FILM COATED ORAL NIGHTLY
COMMUNITY
Start: 2023-10-27

## 2023-11-09 RX ORDER — FERROUS SULFATE 325(65) MG
325 TABLET ORAL
Qty: 90 TABLET | Refills: 3 | Status: SHIPPED | OUTPATIENT
Start: 2023-11-09

## 2023-11-09 NOTE — TELEPHONE ENCOUNTER
Called Pt to schedule an appointment due to receiving a referral from Josefina Durand MD at [facility].  No answer LMOM for her to call and schedule an appointment.

## 2023-11-09 NOTE — PROGRESS NOTES
[facility] Note    Subjective:    The HPI and pertinent ROS is included in the Diagnostic Impression Remarks section at the end of the note. Please see below for further details. Chief complaint is at end of note.     Janey is a pleasant intelligent patient who is here for evaluation.     Modified Medications    No medications on file       Data reviewed 274}  Previous medical records reviewed and summarized in plan section at end of note.      If you are due for any health screening(s) below please notify me so we can arrange them to be ordered and scheduled. Most healthy patients at your age complete them, but you are free to accept or refuse. If you can't do it, I'll definitely understand. If you can, I'd certainly appreciate it!     Tests to Keep You Healthy    Mammogram: DUE  Colon Cancer Screening: DUE  Last Blood Pressure <= 139/89 (11/9/2023): NO  Tobacco Cessation: Yes      The following portions of the patient's history were reviewed and updated as appropriate: allergies, current medications, past family history, past medical history, past social history, past surgical history and problem list.    She  has a past medical history of Anxiety, Depression, Encounter for long-term (current) use of high-risk medication, Fibromyalgia, Osteoporosis, and Rheumatoid arthritis involving multiple sites with positive rheumatoid factor.  She  has a past surgical history that includes right leg ; Repair of recurrent incarcerated incisional hernia (Left, 4/13/2022); and Esophagogastroduodenoscopy (N/A, 4/5/2023).    She  reports that she quit smoking about 7 months ago. Her smoking use included cigarettes. She has never used smokeless tobacco. She reports that she does not drink alcohol and does not use drugs.  She family history is not on file.    Review of patient's allergies indicates:   Allergen Reactions    Penicillins        Tobacco Use: Medium Risk (11/9/2023)    Patient History     Smoking  "Tobacco Use: Former     Smokeless Tobacco Use: Never     Passive Exposure: Not on file     Physical Examination  General appearance: alert, cooperative, no distress  Neck: no thyromegaly, no neck stiffness  Lungs: clear to auscultation, no wheezes, rales or rhonchi, symmetric air entry  Heart: normal rate, regular rhythm, normal S1, S2, no murmurs, rubs, clicks or gallops  Abdomen: soft, nontender, nondistended, no rigidity, rebound, or guarding.   Back: no point tenderness over spine  Extremities: RLE prothesis, AKA, w/ cane, LLE edema   Neurological:alert, oriented, normal speech, no new focal findings or movement disorder noted from baseline    BP Readings from Last 3 Encounters:   11/09/23 (!) 164/86   10/24/23 (!) 160/90   09/11/23 (!) 169/77     Wt Readings from Last 3 Encounters:   11/09/23 43 kg (94 lb 12.8 oz)   10/24/23 43.5 kg (96 lb)   09/11/23 42.6 kg (94 lb)     BP (!) 164/86 (BP Location: Left arm, Patient Position: Sitting, BP Method: Small (Manual))   Pulse 93   Resp 16   Ht 4' 8" (1.422 m)   Wt 43 kg (94 lb 12.8 oz)   SpO2 97%   BMI 21.25 kg/m²    274}  Laboratory: I have reviewed old labs below:    274}    Lab Results   Component Value Date    WBC 8.83 10/25/2023    HGB 9.4 (L) 10/25/2023    HCT 29.5 (L) 10/25/2023    MCV 89 10/25/2023     10/25/2023     10/25/2023    K 4.0 10/25/2023     10/25/2023    CALCIUM 9.1 10/25/2023    CO2 22 (L) 10/25/2023     (H) 10/25/2023    BUN 13 10/25/2023    CREATININE 0.7 10/25/2023    EGFRNORACEVR >60.0 10/25/2023    ANIONGAP 12 10/25/2023    PROT 7.2 10/25/2023    ALBUMIN 3.5 10/25/2023    BILITOT 0.3 10/25/2023    ALKPHOS 114 10/25/2023    ALT 19 10/25/2023    AST 25 10/25/2023    INR 0.9 04/04/2023      Lab reviewed by me: Particular labs of significance that I will monitor, workup, or treat to improve are mentioned below in diagnostic impression remarks.    Imaging/EKG: I have reviewed the pertinent results and my findings are " "noted in remarks.  274}    CC:   Chief Complaint   Patient presents with    Establish Care    Breathing Problem     Get short of breath and very tired and weak and no energy because of breathing.         274}    Assessment/Plan  Janey Null is a 67 y.o. female who presents to clinic with:  1. Normocytic anemia    2. Screen for colon cancer    3. Dietary folate deficiency anemia    4. Nicotine dependence, cigarettes, with other nicotine-induced disorders    5. Chronic obstructive pulmonary disease, unspecified COPD type    6. Edema, unspecified type    7. Essential hypertension       274}  Diagnostic Impression Remarks + HPI     Documentation entered by me for this encounter may have been done in part using speech-recognition technology. Although I have made an effort to ensure accuracy, "sound like" errors may exist and should be interpreted in context.     C/o worsening sob, WOLFF that improves with rest. Has copd. Uses nebs 8/day, poorly controlled with trellegy. Will send to pulmonology. PFT and LDCT. Echo d/t LE edema. Will start lasix. Close f/u. Possible nocturnal orthopnea     This is the extent of this pleasant patient's concerns at this present time. She did not feel chest pain upon exertion, dyspnea, nausea, vomiting, diaphoresis, or syncope. No pleuritic chest pain, unilateral leg swelling, calf tenderness, or calf pain. Negative for unintentional weight loss night sweats, hematuria, and fevers. Janey will return to clinic in a few months for further workup and reassessment or sooner as needed. She was instructed to call the clinic or go to the emergency department or urgent care immediately if her symptoms do not improve, worsens, or if any new symptoms develop. As we discussed that symptoms could worsen over the next 24 hours she was advised that if any increased swelling, pain, or numbness arise to go immediately to the ED. Patient knows to call any time if an emergency arises. Shared " decision making occurred and she verbalized understanding in agreement with this plan. I discussed imaging findings, diagnosis, possibilities, treatment options, medications, risks, and benefits. She had many questions regarding the options and long-term effects. All questions were answered. She expressed understanding after counseling regarding the diagnosis and recommendations. She was capable and demonstrated competence with understanding of these options. Shared decision making was performed resulting in her choosing the current treatment plan. Patient handout was given with instructions and recommendations. Advised the patient that if they become pregnant to alert us immediately to assess for medication changes. Having a healthy weight can decrease the risk of 13 cancers and is an important goal. I also discussed the importance of close follow up to discuss labs, change or modify her medications if needed, monitor side effects, and further evaluation of medical problems.     Additional workup planned: see labs ordered below.    See below for labs and meds ordered with associated diagnosis      1. Normocytic anemia  - Iron and TIBC; Future  - Ferritin; Future  - Vitamin B12; Future  - Folate; Future  - ferrous sulfate (FEOSOL) 325 mg (65 mg iron) Tab tablet; Take 1 tablet (325 mg total) by mouth daily with breakfast.  Dispense: 90 tablet; Refill: 3    2. Screen for colon cancer  - Ambulatory referral/consult to Gastroenterology; Future    3. Dietary folate deficiency anemia  - Vitamin B12; Future  - Folate; Future    4. Nicotine dependence, cigarettes, with other nicotine-induced disorders  - CT Chest Lung Screening Low Dose; Future    5. Chronic obstructive pulmonary disease, unspecified COPD type  - Complete PFT w/ bronchodilator; Future  - Ambulatory referral/consult to Pulmonology; Future    6. Edema, unspecified type  - furosemide (LASIX) 40 MG tablet; Take 1 tablet (40 mg total) by mouth 2 (two) times  daily.  Dispense: 60 tablet; Refill: 11  - Echo; Future    7. Essential hypertension  - losartan (COZAAR) 100 MG tablet; Take 1 tablet (100 mg total) by mouth once daily.  Dispense: 90 tablet; Refill: 3      Josefina Durand MD   274}    If you are due for any health screening(s) below please notify me so we can arrange them to be ordered and scheduled. Most healthy patients at your age complete them, but you are free to accept or refuse.     If you can't do it, I'll definitely understand. If you can, I'd certainly appreciate it!   Tests to Keep You Healthy    Mammogram: DUE  Colon Cancer Screening: DUE  Last Blood Pressure <= 139/89 (11/9/2023): NO  Tobacco Cessation: Yes

## 2023-11-10 LAB — FOLATE SERPL-MCNC: 33.8 NG/ML (ref 4–24)

## 2023-11-13 ENCOUNTER — TELEPHONE (OUTPATIENT)
Dept: FAMILY MEDICINE | Facility: CLINIC | Age: 68
End: 2023-11-13
Payer: MEDICARE

## 2023-11-14 ENCOUNTER — HOSPITAL ENCOUNTER (OUTPATIENT)
Dept: RADIOLOGY | Facility: HOSPITAL | Age: 68
Discharge: HOME | End: 2023-11-14
Attending: STUDENT IN AN ORGANIZED HEALTH CARE EDUCATION/TRAINING PROGRAM
Payer: MEDICARE

## 2023-11-14 DIAGNOSIS — F17.218 NICOTINE DEPENDENCE, CIGARETTES, WITH OTHER NICOTINE-INDUCED DISORDERS: ICD-10-CM

## 2023-11-14 PROCEDURE — 71271 CT CHEST LUNG SCREENING LOW DOSE: ICD-10-PCS | Mod: 26,,, | Performed by: RADIOLOGY

## 2023-11-14 PROCEDURE — 71271 CT THORAX LUNG CANCER SCR C-: CPT | Mod: 26,,, | Performed by: RADIOLOGY

## 2023-11-14 PROCEDURE — 71271 CT THORAX LUNG CANCER SCR C-: CPT | Mod: TC

## 2023-11-27 ENCOUNTER — OFFICE VISIT (OUTPATIENT)
Dept: FAMILY MEDICINE | Facility: CLINIC | Age: 68
End: 2023-11-27
Payer: MEDICARE

## 2023-11-27 VITALS
HEART RATE: 104 BPM | HEIGHT: 56 IN | BODY MASS INDEX: 21.23 KG/M2 | RESPIRATION RATE: 18 BRPM | SYSTOLIC BLOOD PRESSURE: 124 MMHG | DIASTOLIC BLOOD PRESSURE: 74 MMHG | WEIGHT: 94.38 LBS | OXYGEN SATURATION: 97 %

## 2023-11-27 DIAGNOSIS — Z12.31 ENCOUNTER FOR SCREENING MAMMOGRAM FOR MALIGNANT NEOPLASM OF BREAST: ICD-10-CM

## 2023-11-27 DIAGNOSIS — I10 ESSENTIAL HYPERTENSION: Primary | ICD-10-CM

## 2023-11-27 DIAGNOSIS — Z23 ENCOUNTER FOR VACCINATION: ICD-10-CM

## 2023-11-27 PROCEDURE — 3008F BODY MASS INDEX DOCD: CPT | Mod: CPTII,S$GLB,, | Performed by: STUDENT IN AN ORGANIZED HEALTH CARE EDUCATION/TRAINING PROGRAM

## 2023-11-27 PROCEDURE — 3288F FALL RISK ASSESSMENT DOCD: CPT | Mod: CPTII,S$GLB,, | Performed by: STUDENT IN AN ORGANIZED HEALTH CARE EDUCATION/TRAINING PROGRAM

## 2023-11-27 PROCEDURE — 1101F PR PT FALLS ASSESS DOC 0-1 FALLS W/OUT INJ PAST YR: ICD-10-PCS | Mod: CPTII,S$GLB,, | Performed by: STUDENT IN AN ORGANIZED HEALTH CARE EDUCATION/TRAINING PROGRAM

## 2023-11-27 PROCEDURE — 1101F PT FALLS ASSESS-DOCD LE1/YR: CPT | Mod: CPTII,S$GLB,, | Performed by: STUDENT IN AN ORGANIZED HEALTH CARE EDUCATION/TRAINING PROGRAM

## 2023-11-27 PROCEDURE — 1160F RVW MEDS BY RX/DR IN RCRD: CPT | Mod: CPTII,S$GLB,, | Performed by: STUDENT IN AN ORGANIZED HEALTH CARE EDUCATION/TRAINING PROGRAM

## 2023-11-27 PROCEDURE — 99214 OFFICE O/P EST MOD 30 MIN: CPT | Mod: S$GLB,,, | Performed by: STUDENT IN AN ORGANIZED HEALTH CARE EDUCATION/TRAINING PROGRAM

## 2023-11-27 PROCEDURE — 1125F AMNT PAIN NOTED PAIN PRSNT: CPT | Mod: CPTII,S$GLB,, | Performed by: STUDENT IN AN ORGANIZED HEALTH CARE EDUCATION/TRAINING PROGRAM

## 2023-11-27 PROCEDURE — 3008F PR BODY MASS INDEX (BMI) DOCUMENTED: ICD-10-PCS | Mod: CPTII,S$GLB,, | Performed by: STUDENT IN AN ORGANIZED HEALTH CARE EDUCATION/TRAINING PROGRAM

## 2023-11-27 PROCEDURE — 3078F DIAST BP <80 MM HG: CPT | Mod: CPTII,S$GLB,, | Performed by: STUDENT IN AN ORGANIZED HEALTH CARE EDUCATION/TRAINING PROGRAM

## 2023-11-27 PROCEDURE — G0009 ADMIN PNEUMOCOCCAL VACCINE: HCPCS | Mod: S$GLB,,, | Performed by: STUDENT IN AN ORGANIZED HEALTH CARE EDUCATION/TRAINING PROGRAM

## 2023-11-27 PROCEDURE — 3074F PR MOST RECENT SYSTOLIC BLOOD PRESSURE < 130 MM HG: ICD-10-PCS | Mod: CPTII,S$GLB,, | Performed by: STUDENT IN AN ORGANIZED HEALTH CARE EDUCATION/TRAINING PROGRAM

## 2023-11-27 PROCEDURE — 3288F PR FALLS RISK ASSESSMENT DOCUMENTED: ICD-10-PCS | Mod: CPTII,S$GLB,, | Performed by: STUDENT IN AN ORGANIZED HEALTH CARE EDUCATION/TRAINING PROGRAM

## 2023-11-27 PROCEDURE — G0009 PNEUMOCOCCAL CONJUGATE VACCINE 20-VALENT: ICD-10-PCS | Mod: S$GLB,,, | Performed by: STUDENT IN AN ORGANIZED HEALTH CARE EDUCATION/TRAINING PROGRAM

## 2023-11-27 PROCEDURE — 1159F MED LIST DOCD IN RCRD: CPT | Mod: CPTII,S$GLB,, | Performed by: STUDENT IN AN ORGANIZED HEALTH CARE EDUCATION/TRAINING PROGRAM

## 2023-11-27 PROCEDURE — 4010F ACE/ARB THERAPY RXD/TAKEN: CPT | Mod: CPTII,S$GLB,, | Performed by: STUDENT IN AN ORGANIZED HEALTH CARE EDUCATION/TRAINING PROGRAM

## 2023-11-27 PROCEDURE — 3074F SYST BP LT 130 MM HG: CPT | Mod: CPTII,S$GLB,, | Performed by: STUDENT IN AN ORGANIZED HEALTH CARE EDUCATION/TRAINING PROGRAM

## 2023-11-27 PROCEDURE — 99214 PR OFFICE/OUTPT VISIT, EST, LEVL IV, 30-39 MIN: ICD-10-PCS | Mod: S$GLB,,, | Performed by: STUDENT IN AN ORGANIZED HEALTH CARE EDUCATION/TRAINING PROGRAM

## 2023-11-27 PROCEDURE — 1159F PR MEDICATION LIST DOCUMENTED IN MEDICAL RECORD: ICD-10-PCS | Mod: CPTII,S$GLB,, | Performed by: STUDENT IN AN ORGANIZED HEALTH CARE EDUCATION/TRAINING PROGRAM

## 2023-11-27 PROCEDURE — 90677 PNEUMOCOCCAL CONJUGATE VACCINE 20-VALENT: ICD-10-PCS | Mod: S$GLB,,, | Performed by: STUDENT IN AN ORGANIZED HEALTH CARE EDUCATION/TRAINING PROGRAM

## 2023-11-27 PROCEDURE — 99999 PR PBB SHADOW E&M-EST. PATIENT-LVL IV: ICD-10-PCS | Mod: PBBFAC,,, | Performed by: STUDENT IN AN ORGANIZED HEALTH CARE EDUCATION/TRAINING PROGRAM

## 2023-11-27 PROCEDURE — 1125F PR PAIN SEVERITY QUANTIFIED, PAIN PRESENT: ICD-10-PCS | Mod: CPTII,S$GLB,, | Performed by: STUDENT IN AN ORGANIZED HEALTH CARE EDUCATION/TRAINING PROGRAM

## 2023-11-27 PROCEDURE — 3078F PR MOST RECENT DIASTOLIC BLOOD PRESSURE < 80 MM HG: ICD-10-PCS | Mod: CPTII,S$GLB,, | Performed by: STUDENT IN AN ORGANIZED HEALTH CARE EDUCATION/TRAINING PROGRAM

## 2023-11-27 PROCEDURE — 90677 PCV20 VACCINE IM: CPT | Mod: S$GLB,,, | Performed by: STUDENT IN AN ORGANIZED HEALTH CARE EDUCATION/TRAINING PROGRAM

## 2023-11-27 PROCEDURE — 99999 PR PBB SHADOW E&M-EST. PATIENT-LVL IV: CPT | Mod: PBBFAC,,, | Performed by: STUDENT IN AN ORGANIZED HEALTH CARE EDUCATION/TRAINING PROGRAM

## 2023-11-27 PROCEDURE — 1160F PR REVIEW ALL MEDS BY PRESCRIBER/CLIN PHARMACIST DOCUMENTED: ICD-10-PCS | Mod: CPTII,S$GLB,, | Performed by: STUDENT IN AN ORGANIZED HEALTH CARE EDUCATION/TRAINING PROGRAM

## 2023-11-27 PROCEDURE — 4010F PR ACE/ARB THEARPY RXD/TAKEN: ICD-10-PCS | Mod: CPTII,S$GLB,, | Performed by: STUDENT IN AN ORGANIZED HEALTH CARE EDUCATION/TRAINING PROGRAM

## 2023-11-27 RX ORDER — ETANERCEPT 50 MG/ML
50 SOLUTION SUBCUTANEOUS
COMMUNITY
Start: 2023-11-15

## 2023-11-27 RX ORDER — DICLOFENAC SODIUM 10 MG/G
2 GEL TOPICAL 4 TIMES DAILY
COMMUNITY
Start: 2023-11-16

## 2023-11-27 NOTE — PROGRESS NOTES
[facility] Note    Subjective:    The HPI and pertinent ROS is included in the Diagnostic Impression Remarks section at the end of the note. Please see below for further details. Chief complaint is at end of note.     Janey is a pleasant intelligent patient who is here for evaluation.     Modified Medications    No medications on file       Data reviewed 274}  Previous medical records reviewed and summarized in plan section at end of note.      If you are due for any health screening(s) below please notify me so we can arrange them to be ordered and scheduled. Most healthy patients at your age complete them, but you are free to accept or refuse. If you can't do it, I'll definitely understand. If you can, I'd certainly appreciate it!     Tests to Keep You Healthy    Mammogram: ORDERED BUT NOT SCHEDULED  Colon Cancer Screening: DUE  Last Blood Pressure <= 139/89 (11/27/2023): NO  Tobacco Cessation: Yes      The following portions of the patient's history were reviewed and updated as appropriate: allergies, current medications, past family history, past medical history, past social history, past surgical history and problem list.    She  has a past medical history of Anxiety, Depression, Encounter for long-term (current) use of high-risk medication, Fibromyalgia, Osteoporosis, and Rheumatoid arthritis involving multiple sites with positive rheumatoid factor.  She  has a past surgical history that includes right leg ; Repair of recurrent incarcerated incisional hernia (Left, 4/13/2022); and Esophagogastroduodenoscopy (N/A, 4/5/2023).    She  reports that she quit smoking about 7 months ago. Her smoking use included cigarettes. She has never used smokeless tobacco. She reports that she does not drink alcohol and does not use drugs.  She family history is not on file.    Review of patient's allergies indicates:   Allergen Reactions    Penicillins        Tobacco Use: Medium Risk (11/27/2023)     "Patient History     Smoking Tobacco Use: Former     Smokeless Tobacco Use: Never     Passive Exposure: Not on file     Physical Examination  General appearance: alert, cooperative, no distress  Neck: no thyromegaly, no neck stiffness  Lungs: clear to auscultation, no wheezes, rales or rhonchi, symmetric air entry  Heart: tachycardia  Abdomen: soft, nontender, nondistended, no rigidity, rebound, or guarding.   Back: no point tenderness over spine  Extremities:prosthesis on the RLE, LLE swelling   Neurological:alert, oriented, normal speech, no new focal findings or movement disorder noted from baseline    BP Readings from Last 3 Encounters:   11/27/23 124/74   11/09/23 (!) 164/86   10/24/23 (!) 160/90     Wt Readings from Last 3 Encounters:   11/27/23 42.8 kg (94 lb 6.4 oz)   11/09/23 43 kg (94 lb 12.8 oz)   10/24/23 43.5 kg (96 lb)     /74 (BP Location: Left arm, Patient Position: Sitting, BP Method: Small (Manual))   Pulse 104   Resp 18   Ht 4' 8" (1.422 m)   Wt 42.8 kg (94 lb 6.4 oz)   SpO2 97%   BMI 21.16 kg/m²    274}  Laboratory: I have reviewed old labs below:    274}    Lab Results   Component Value Date    WBC 8.83 10/25/2023    HGB 9.4 (L) 10/25/2023    HCT 29.5 (L) 10/25/2023    MCV 89 10/25/2023     10/25/2023     10/25/2023    K 4.0 10/25/2023     10/25/2023    CALCIUM 9.1 10/25/2023    CO2 22 (L) 10/25/2023     (H) 10/25/2023    BUN 13 10/25/2023    CREATININE 0.7 10/25/2023    EGFRNORACEVR >60.0 10/25/2023    ANIONGAP 12 10/25/2023    PROT 7.2 10/25/2023    ALBUMIN 3.5 10/25/2023    BILITOT 0.3 10/25/2023    ALKPHOS 114 10/25/2023    ALT 19 10/25/2023    AST 25 10/25/2023    INR 0.9 04/04/2023      Lab reviewed by me: Particular labs of significance that I will monitor, workup, or treat to improve are mentioned below in diagnostic impression remarks.    Imaging/EKG: I have reviewed the pertinent results and my findings are noted in remarks.  274}    CC:   Chief " "Complaint   Patient presents with    Follow-up        274}    Assessment/Plan  Janey Null is a 68 y.o. female who presents to clinic with:  1. Essential hypertension    2. Encounter for screening mammogram for malignant neoplasm of breast    3. Encounter for vaccination       274}  Diagnostic Impression Remarks + HPI     Documentation entered by me for this encounter may have been done in part using speech-recognition technology. Although I have made an effort to ensure accuracy, "sound like" errors may exist and should be interpreted in context.     Was not able to carry out plan d/t not having transportation. Will have transportation in near future. SOB better d/t change of weather. Advised carrying out plan from previous visit once transportation is obtained.    This is the extent of this pleasant patient's concerns at this present time. She did not feel chest pain upon exertion, dyspnea, nausea, vomiting, diaphoresis, or syncope. No pleuritic chest pain, unilateral leg swelling, calf tenderness, or calf pain. Negative for unintentional weight loss night sweats, hematuria, and fevers. Janey will return to clinic in a few months for further workup and reassessment or sooner as needed. She was instructed to call the clinic or go to the emergency department or urgent care immediately if her symptoms do not improve, worsens, or if any new symptoms develop. As we discussed that symptoms could worsen over the next 24 hours she was advised that if any increased swelling, pain, or numbness arise to go immediately to the ED. Patient knows to call any time if an emergency arises. Shared decision making occurred and she verbalized understanding in agreement with this plan. I discussed imaging findings, diagnosis, possibilities, treatment options, medications, risks, and benefits. She had many questions regarding the options and long-term effects. All questions were answered. She expressed understanding " after counseling regarding the diagnosis and recommendations. She was capable and demonstrated competence with understanding of these options. Shared decision making was performed resulting in her choosing the current treatment plan. Patient handout was given with instructions and recommendations. Advised the patient that if they become pregnant to alert us immediately to assess for medication changes. Having a healthy weight can decrease the risk of 13 cancers and is an important goal. I also discussed the importance of close follow up to discuss labs, change or modify her medications if needed, monitor side effects, and further evaluation of medical problems.     Additional workup planned: see labs ordered below.    See below for labs and meds ordered with associated diagnosis      1. Essential hypertension  - Lipid Panel; Future    2. Encounter for screening mammogram for malignant neoplasm of breast  - Mammo Digital Screening Bilat w/ Torin; Future    3. Encounter for vaccination  - diphth,pertus,acell,,tetanus (BOOSTRIX) 2.5-8-5 Lf-mcg-Lf/0.5mL Susp; Inject 0.5 mLs into the muscle once. for 1 dose  Dispense: 0.5 mL; Refill: 0  - (In Office Administered) Pneumococcal Conjugate Vaccine (20 Valent) (IM) (Preferred)      Josefina Durand MD   274}    If you are due for any health screening(s) below please notify me so we can arrange them to be ordered and scheduled. Most healthy patients at your age complete them, but you are free to accept or refuse.     If you can't do it, I'll definitely understand. If you can, I'd certainly appreciate it!   Tests to Keep You Healthy    Mammogram: ORDERED BUT NOT SCHEDULED  Colon Cancer Screening: DUE  Last Blood Pressure <= 139/89 (11/27/2023): NO  Tobacco Cessation: Yes

## 2023-12-18 DIAGNOSIS — R10.13 EPIGASTRIC PAIN: Primary | ICD-10-CM

## 2023-12-19 RX ORDER — PANTOPRAZOLE SODIUM 40 MG/1
40 TABLET, DELAYED RELEASE ORAL
Qty: 30 TABLET | Refills: 11 | OUTPATIENT
Start: 2023-12-19

## 2023-12-19 RX ORDER — PANTOPRAZOLE SODIUM 40 MG/1
40 TABLET, DELAYED RELEASE ORAL DAILY
Qty: 30 TABLET | Refills: 11 | Status: SHIPPED | OUTPATIENT
Start: 2023-12-19 | End: 2024-03-14 | Stop reason: SDUPTHER

## 2024-03-12 ENCOUNTER — PATIENT MESSAGE (OUTPATIENT)
Dept: FAMILY MEDICINE | Facility: CLINIC | Age: 69
End: 2024-03-12
Payer: MEDICARE

## 2024-03-12 DIAGNOSIS — Z78.0 ASYMPTOMATIC MENOPAUSAL STATE: Primary | ICD-10-CM

## 2024-03-14 DIAGNOSIS — R10.13 EPIGASTRIC PAIN: ICD-10-CM

## 2024-03-14 DIAGNOSIS — J44.9 CHRONIC OBSTRUCTIVE PULMONARY DISEASE, UNSPECIFIED COPD TYPE: ICD-10-CM

## 2024-03-14 RX ORDER — PANTOPRAZOLE SODIUM 40 MG/1
40 TABLET, DELAYED RELEASE ORAL DAILY
Qty: 90 TABLET | Refills: 2 | Status: SHIPPED | OUTPATIENT
Start: 2024-03-14

## 2024-03-14 NOTE — TELEPHONE ENCOUNTER
No care due was identified.  [facility] Embedded Care Due Messages. Reference number: [id number].   3/14/2024 9:37:27 AM CDT

## 2024-03-14 NOTE — TELEPHONE ENCOUNTER
----- Message from Devyn Enamorado sent at 3/14/2024  9:33 AM CDT -----  Contact: Self  Type:  RX Refill Request    Who Called:  Patient  Refill or New Rx:  Refill  RX Name and Strength:  fluticasone-umeclidin-vilanter (TRELEGY ELLIPTA) 200-62.5-25 mcg inhaler   pantoprazole (PROTONIX) 40 MG tablet  How is the patient currently taking it? (ex. 1XDay):  as directed  Is this a 30 day or 90 day RX:  90  Preferred Pharmacy with phone number:      [id number] - [place], [place] - [address] [address] AT [facility] [address]  [address]  [place] [address]  Phone: [phone] Fax: [phone]      Local or Mail Order:  Local  Ordering Provider:  Kizzy Beckford Call Back Number:  [phone]   Additional Information:

## 2024-03-14 NOTE — TELEPHONE ENCOUNTER
Refill Routing Note   Medication(s) are not appropriate for processing by [facility] for the following reason(s):        No active prescription written by provider    [facility] action(s):  Approve  Defer               Appointments  past 12m or future 3m with PCP    Date Provider   Last Visit   11/27/2023 Josefina Durand MD   Next Visit   Visit date not found Josefina Durand MD   ED visits in past 90 days: 0        Note composed:1:50 PM 03/14/2024

## 2024-03-20 ENCOUNTER — PATIENT MESSAGE (OUTPATIENT)
Dept: ADMINISTRATIVE | Facility: HOSPITAL | Age: 69
End: 2024-03-20
Payer: MEDICARE

## 2024-04-06 ENCOUNTER — HOSPITAL ENCOUNTER (EMERGENCY)
Facility: HOSPITAL | Age: 69
Discharge: HOME | End: 2024-04-06
Payer: MEDICARE

## 2024-04-06 VITALS
DIASTOLIC BLOOD PRESSURE: 65 MMHG | OXYGEN SATURATION: 100 % | TEMPERATURE: 97 F | HEIGHT: 56 IN | WEIGHT: 92 LBS | RESPIRATION RATE: 20 BRPM | SYSTOLIC BLOOD PRESSURE: 140 MMHG | BODY MASS INDEX: 20.7 KG/M2 | HEART RATE: 77 BPM

## 2024-04-06 DIAGNOSIS — S72.401A CLOSED FRACTURE OF DISTAL END OF RIGHT FEMUR, UNSPECIFIED FRACTURE MORPHOLOGY, INITIAL ENCOUNTER: Primary | ICD-10-CM

## 2024-04-06 DIAGNOSIS — M25.569 KNEE PAIN: ICD-10-CM

## 2024-04-06 PROCEDURE — 99284 EMERGENCY DEPT VISIT MOD MDM: CPT | Mod: 25

## 2024-04-06 PROCEDURE — 63600175 PHARM REV CODE 636 W HCPCS: Performed by: NURSE PRACTITIONER

## 2024-04-06 PROCEDURE — 73562 X-RAY EXAM OF KNEE 3: CPT | Mod: TC,RT

## 2024-04-06 PROCEDURE — 29505 APPLICATION LONG LEG SPLINT: CPT | Mod: RT

## 2024-04-06 PROCEDURE — 73562 X-RAY EXAM OF KNEE 3: CPT | Mod: 26,RT,, | Performed by: RADIOLOGY

## 2024-04-06 PROCEDURE — 96372 THER/PROPH/DIAG INJ SC/IM: CPT | Performed by: NURSE PRACTITIONER

## 2024-04-06 RX ORDER — HYDROMORPHONE HYDROCHLORIDE 1 MG/ML
1 INJECTION, SOLUTION INTRAMUSCULAR; INTRAVENOUS; SUBCUTANEOUS
Status: COMPLETED | OUTPATIENT
Start: 2024-04-06 | End: 2024-04-06

## 2024-04-06 RX ORDER — TRAMADOL HYDROCHLORIDE 50 MG/1
50 TABLET ORAL EVERY 8 HOURS PRN
Qty: 10 TABLET | Refills: 0 | Status: SHIPPED | OUTPATIENT
Start: 2024-04-06

## 2024-04-06 RX ADMIN — HYDROMORPHONE HYDROCHLORIDE 1 MG: 1 INJECTION, SOLUTION INTRAMUSCULAR; INTRAVENOUS; SUBCUTANEOUS at 12:04

## 2024-04-06 NOTE — ED PROVIDER NOTES
Encounter Date: 2024       History     Chief Complaint   Patient presents with    Fall     Pt c/o R knee pain after falling off two steps of stairs.     Knee Pain     Right knee     68-year-old female comes in with complaints of right knee pain.  States she missed a step and fell down 2 steps.  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  She was using her prosthetic at this time.  She had the BKA several years ago.  She did not hit her head, no LOC.  States pain in leg is 10/10      Review of patient's allergies indicates:   Allergen Reactions    Penicillins      Past Medical History:   Diagnosis Date    Anxiety     Depression     Encounter for long-term (current) use of high-risk medication     Fibromyalgia     Osteoporosis     Rheumatoid arthritis involving multiple sites with positive rheumatoid factor      Past Surgical History:   Procedure Laterality Date    ESOPHAGOGASTRODUODENOSCOPY N/A 2023    Procedure: EGD (ESOPHAGOGASTRODUODENOSCOPY);  Surgeon: Tyrell Cornelius MD;  Location: [facility] ENDO;  Service: General;  Laterality: N/A;    REPAIR OF RECURRENT INCARCERATED INCISIONAL HERNIA Left 2022    Procedure: REPAIR, HERNIA--RIGHT LATERAL POSITIONING;  Surgeon: Tyrell Cornelius MD;  Location: [facility] OR;  Service: General;  Laterality: Left;  left lumbar hernia    right leg       multiple surgeries ,amputation     History reviewed. No pertinent family history.  Social History     Tobacco Use    Smoking status: Former     Current packs/day: 0.00     Types: Cigarettes     Quit date: 2023     Years since quittin.0    Smokeless tobacco: Never   Substance Use Topics    Alcohol use: No    Drug use: Never     Review of Systems   Constitutional:  Negative for fever.   HENT:  Negative for sore throat.    Respiratory:  Negative for shortness of breath.    Cardiovascular:  Negative for chest pain.   Gastrointestinal:  Negative for nausea.   Genitourinary:  Negative for dysuria.    Musculoskeletal:  Negative for back pain.        Right leg pain   Skin:  Negative for rash.   Neurological:  Negative for weakness.   Hematological:  Does not bruise/bleed easily.       Physical Exam     Initial Vitals [04/06/24 1000]   BP Pulse Resp Temp SpO2   (S) (!) 209/94 96 20 97.4 °F (36.3 °C) 99 %      MAP       --         Physical Exam    Nursing note and vitals reviewed.  Constitutional: She appears well-developed and well-nourished.   HENT:   Head: Normocephalic and atraumatic.   Eyes: EOM are normal.   Neck: Neck supple.   Cardiovascular:  Normal rate and regular rhythm.           Pulmonary/Chest: Breath sounds normal. She has no wheezes.   Musculoskeletal:      Cervical back: Neck supple.      Comments: Old right BKA.  Sleeve in place with screw extending distally from sleep.  States has not removed sleeve due to pain.  Does not have prosthesis on currently     Neurological: She is alert and oriented to person, place, and time.   Skin: Skin is warm and dry. Capillary refill takes less than 2 seconds.   Psychiatric: She has a normal mood and affect.         ED Course   Procedures  Labs Reviewed - No data to display       Imaging Results               X-Ray Knee 3 View Right (Final result)  Result time 04/06/24 11:45:53      Final result by Medina Edmonds MD (04/06/24 11:45:53)                   Impression:      There is a mildly displaced distal femoral fracture and joint effusion.  There is irregularity of the proximal tibia without fracture plane visualized.  CT may be of benefit to rule out fracture in this region. This report was flagged in Epic as abnormal.      Electronically signed by: Medina Edmonds MD  Date:    04/06/2024  Time:    11:45               Narrative:    EXAMINATION:  XR KNEE 3 VIEW RIGHT    CLINICAL HISTORY:  Pain in unspecified knee    TECHNIQUE:  AP, lateral, and Merchant views of the right knee were performed.    COMPARISON:  None    FINDINGS:  The patient has a below the  knee amputation with surgical staples and radiodensity seen within the soft tissues.  There is a mildly displaced distal femoral fracture and small joint effusion.  There is irregularity of the proximal tibial bone but no distinct fracture plane is visualized in this region.                                       Medications   HYDROmorphone injection 1 mg (1 mg Intramuscular Given 4/6/24 1206)     Medical Decision Making  68-year-old female with right knee pain after a fall.  History of old right BKA.  Usually walks with prosthesis without assistance.      Differential diagnoses:  Fracture, dislocation, contusion, sprain    Amount and/or Complexity of Data Reviewed  Radiology: ordered. Decision-making details documented in ED Course.    Risk  Prescription drug management.               ED Course as of 04/06/24 1327   Sat Apr 06, 2024   1145 Discussed with Dr Leblanc. Will apply posterior splint and follow up with ortho outpt [NP]   1150 X-Ray Knee 3 View Right(!)  Distal tibial fracture [NP]   1230 States feels much better after pain med. 3/10 [NP]   1255 Splint applet to right leg from above knee to distal stump of BKA. Screw protruding thru splint distally. No tightness proximally [NP]   1308 Pt does go to pain management, receives hydrocodone rx. Will give 10 tramadol for break thru pain  [NP]      ED Course User Index  [NP] Noa Kurtz FNP                           Clinical Impression:  Final diagnoses:  [M25.569] Knee pain  [S72.401A] Closed fracture of distal end of right femur, unspecified fracture morphology, initial encounter (Primary)          ED Disposition Condition    Discharge Good          ED Prescriptions       Medication Sig Dispense Start Date End Date Auth. Provider    traMADoL (ULTRAM) 50 mg tablet Take 1 tablet (50 mg total) by mouth every 8 (eight) hours as needed for Pain. 10 tablet 4/6/2024 -- Noa Krutz FNP          Follow-up Information    None          Noa Kurtz,  [facility]  04/06/24 1312       Noa Kurtz, [facility]  04/06/24 1329

## 2024-04-06 NOTE — ED NOTES
Reports right knee after fall in which pt stumbled after missing step, denies striking head. Pain 10/10.

## 2024-04-08 ENCOUNTER — TELEPHONE (OUTPATIENT)
Dept: ORTHOPEDICS | Facility: CLINIC | Age: 69
End: 2024-04-08
Payer: MEDICARE

## 2024-04-08 ENCOUNTER — PATIENT OUTREACH (OUTPATIENT)
Dept: EMERGENCY MEDICINE | Facility: HOSPITAL | Age: 69
End: 2024-04-08

## 2024-04-08 NOTE — TELEPHONE ENCOUNTER
----- Message from Devin Fishman sent at 4/8/2024  7:36 AM CDT -----  Type: Needs Medical Advice    Who Called:  Pt    Best Call Back Number: [phone]    Additional Information: Pt wants to schedule yamilka from referral.  Please call back to advise, Thanks!

## 2024-04-11 ENCOUNTER — OFFICE VISIT (OUTPATIENT)
Dept: ORTHOPEDICS | Facility: CLINIC | Age: 69
End: 2024-04-11
Payer: MEDICARE

## 2024-04-11 VITALS — OXYGEN SATURATION: 99 % | HEART RATE: 102 BPM | BODY MASS INDEX: 20.68 KG/M2 | HEIGHT: 56 IN | WEIGHT: 91.94 LBS

## 2024-04-11 DIAGNOSIS — S72.401A CLOSED FRACTURE OF DISTAL END OF RIGHT FEMUR, UNSPECIFIED FRACTURE MORPHOLOGY, INITIAL ENCOUNTER: Primary | ICD-10-CM

## 2024-04-11 DIAGNOSIS — S72.401A CLOSED FRACTURE OF DISTAL END OF RIGHT FEMUR, UNSPECIFIED FRACTURE MORPHOLOGY, INITIAL ENCOUNTER: ICD-10-CM

## 2024-04-11 PROCEDURE — 99999 PR PBB SHADOW E&M-EST. PATIENT-LVL IV: CPT | Mod: PBBFAC,,, | Performed by: ORTHOPAEDIC SURGERY

## 2024-04-11 PROCEDURE — 1100F PTFALLS ASSESS-DOCD GE2>/YR: CPT | Mod: CPTII,S$GLB,, | Performed by: ORTHOPAEDIC SURGERY

## 2024-04-11 PROCEDURE — 99205 OFFICE O/P NEW HI 60 MIN: CPT | Mod: S$GLB,,, | Performed by: ORTHOPAEDIC SURGERY

## 2024-04-11 PROCEDURE — 4010F ACE/ARB THERAPY RXD/TAKEN: CPT | Mod: CPTII,S$GLB,, | Performed by: ORTHOPAEDIC SURGERY

## 2024-04-11 PROCEDURE — 1159F MED LIST DOCD IN RCRD: CPT | Mod: CPTII,S$GLB,, | Performed by: ORTHOPAEDIC SURGERY

## 2024-04-11 PROCEDURE — 3288F FALL RISK ASSESSMENT DOCD: CPT | Mod: CPTII,S$GLB,, | Performed by: ORTHOPAEDIC SURGERY

## 2024-04-11 PROCEDURE — 1125F AMNT PAIN NOTED PAIN PRSNT: CPT | Mod: CPTII,S$GLB,, | Performed by: ORTHOPAEDIC SURGERY

## 2024-04-11 PROCEDURE — 3008F BODY MASS INDEX DOCD: CPT | Mod: CPTII,S$GLB,, | Performed by: ORTHOPAEDIC SURGERY

## 2024-04-11 PROCEDURE — 1160F RVW MEDS BY RX/DR IN RCRD: CPT | Mod: CPTII,S$GLB,, | Performed by: ORTHOPAEDIC SURGERY

## 2024-04-11 NOTE — PROGRESS NOTES
Subjective:      Patient ID: Janey Null is a 68 y.o. female.    Chief Complaint: Pain and Injury of the Right Thigh    HPI    8-year-old female with a several day history of right knee pain.  She sustained accidental blunt trauma during the fall was seen in the emergncy department splinted and referred for further evaluation.  Denies any other complaints or prior problems   With her BKA stump.  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    COMPARISON:  None    FINDINGS:  The patient has a below the knee amputation with surgical staples and radiodensity seen within the soft tissues.  There is a mildly displaced distal femoral fracture and small joint effusion.  There is irregularity of the proximal tibial bone but no distinct fracture plane is visualized in this region.  Impression: There is a mildly displaced distal femoral fracture and joint effusion.  There is irregularity of the proximal tibia without fracture plane visualized.  CT may be of benefit to rule out fracture in this region. This report was flagged in Epic as abnormal.    Electronically signed by: Medina Edmonds MD  Date:    04/06/2024  Time:    11:45       My Radiographs Findings:    I have personally reviewed radiographs and concur with above findings    Assessment:        Encounter Diagnosis   Name Primary?    Closed fracture of distal end of right femur, unspecified fracture morphology, initial encounter          Plan:        I have discussed medical condition treatment options with her at length.  Due to her pain level she declines removal of the splint.  I have suggested that she start doing daily wound care and may need to see the orthotics/ prosthetist for a splint to support her distal femur in light of her short BKA stump.  We will need to keep her non weight-bearing and she understands this.  I have discussed ice elevation compressive wrapping as needed follow up with repeat radiographs in 3-4 weeks sooner if any questions or problems        Past Medical History:   Diagnosis Date    Anxiety     Depression     Encounter for long-term (current) use of high-risk medication     Fibromyalgia     Osteoporosis     Rheumatoid arthritis involving multiple sites with positive rheumatoid factor      Past Surgical History:   Procedure Laterality Date    ESOPHAGOGASTRODUODENOSCOPY N/A 4/5/2023    Procedure: EGD (ESOPHAGOGASTRODUODENOSCOPY);  Surgeon: Tyrell Cornelius MD;  Location: [facility] ENDO;  Service: General;  Laterality: N/A;    REPAIR OF RECURRENT INCARCERATED INCISIONAL HERNIA Left 4/13/2022    Procedure: REPAIR, HERNIA--RIGHT LATERAL POSITIONING;  Surgeon: Tyrell Cornelius MD;  Location: [facility] OR;  Service: General;  Laterality: Left;  left lumbar hernia    right leg       multiple surgeries ,amputation         Current Outpatient Medications:     albuterol-ipratropium (DUO-NEB) 2.5 mg-0.5 mg/3 mL nebulizer solution, INHALE 1 VIAL BY NEBULIZER ROUTE EVERY 6 HOURS AS NEEDED, Disp: 300 mL, Rfl: 2    amitriptyline (ELAVIL) 10 MG tablet, Take 20 mg by mouth every evening., Disp: , Rfl:     diclofenac sodium (VOLTAREN) 1 % Gel, Apply 2 g topically 4 (four) times daily., Disp: , Rfl:     ENBREL SURECLICK 50 mg/mL (1 mL) PnIj, Inject 50 mg into the skin., Disp: , Rfl:      ferrous sulfate (FEOSOL) 325 mg (65 mg iron) Tab tablet, Take 1 tablet (325 mg total) by mouth daily with breakfast., Disp: 90 tablet, Rfl: 3    fluticasone-umeclidin-vilanter (TRELEGY ELLIPTA) 200-62.5-25 mcg inhaler, Inhale 1 puff into the lungs once daily., Disp: 60 each, Rfl: 5    folic acid (FOLVITE) 1 MG tablet, TAKE 1 TABLET(1 MG) BY MOUTH EVERY DAY, Disp: 30 tablet, Rfl: 11    furosemide (LASIX) 40 MG tablet, Take 1 tablet (40 mg total) by mouth 2 (two) times daily., Disp: 60 tablet, Rfl: 11    hydrocodone-acetaminophen 10-325mg (NORCO)  mg Tab, Take 1 tablet by mouth every 6 (six) hours as needed. , Disp: , Rfl:     losartan (COZAAR) 100 MG tablet, Take 1 tablet (100 mg total) by mouth once daily., Disp: 90 tablet, Rfl: 3    methotrexate 2.5 MG Tab, Take by mouth., Disp: , Rfl:     pantoprazole (PROTONIX) 40 MG tablet, Take 1 tablet (40 mg total) by mouth once daily., Disp: 90 tablet, Rfl: 2    sucralfate (CARAFATE) 1 gram tablet, TAKE 1 TABLET(1 GRAM) BY MOUTH FOUR TIMES DAILY, Disp: 360 tablet, Rfl: 2    traMADoL (ULTRAM) 50 mg tablet, Take 1 tablet (50 mg total) by mouth every 8 (eight) hours as needed for Pain., Disp: 10 tablet, Rfl: 0    Review of patient's allergies indicates:   Allergen Reactions    Penicillins        History reviewed. No pertinent family history.  Social History     Occupational History    Not on file   Tobacco Use    Smoking status: Former     Current packs/day: 0.00     Types: Cigarettes     Quit date: 2023     Years since quittin.0    Smokeless tobacco: Never   Substance and Sexual Activity    Alcohol use: No    Drug use: Never    Sexual activity: Yes

## 2024-05-09 ENCOUNTER — HOSPITAL ENCOUNTER (OUTPATIENT)
Dept: RADIOLOGY | Facility: HOSPITAL | Age: 69
Discharge: HOME | End: 2024-05-09
Attending: ORTHOPAEDIC SURGERY
Payer: MEDICARE

## 2024-05-09 ENCOUNTER — OFFICE VISIT (OUTPATIENT)
Dept: ORTHOPEDICS | Facility: CLINIC | Age: 69
End: 2024-05-09
Payer: MEDICARE

## 2024-05-09 VITALS — HEART RATE: 85 BPM | WEIGHT: 91.94 LBS | OXYGEN SATURATION: 98 % | BODY MASS INDEX: 20.68 KG/M2 | HEIGHT: 56 IN

## 2024-05-09 DIAGNOSIS — S72.401A CLOSED FRACTURE OF DISTAL END OF RIGHT FEMUR, UNSPECIFIED FRACTURE MORPHOLOGY, INITIAL ENCOUNTER: ICD-10-CM

## 2024-05-09 DIAGNOSIS — S72.401D CLOSED FRACTURE OF DISTAL END OF RIGHT FEMUR WITH ROUTINE HEALING, UNSPECIFIED FRACTURE MORPHOLOGY, SUBSEQUENT ENCOUNTER: Primary | ICD-10-CM

## 2024-05-09 PROCEDURE — 1159F MED LIST DOCD IN RCRD: CPT | Mod: CPTII,S$GLB,, | Performed by: ORTHOPAEDIC SURGERY

## 2024-05-09 PROCEDURE — 4010F ACE/ARB THERAPY RXD/TAKEN: CPT | Mod: CPTII,S$GLB,, | Performed by: ORTHOPAEDIC SURGERY

## 2024-05-09 PROCEDURE — 99999 PR PBB SHADOW E&M-EST. PATIENT-LVL III: CPT | Mod: PBBFAC,,, | Performed by: ORTHOPAEDIC SURGERY

## 2024-05-09 PROCEDURE — 1126F AMNT PAIN NOTED NONE PRSNT: CPT | Mod: CPTII,S$GLB,, | Performed by: ORTHOPAEDIC SURGERY

## 2024-05-09 PROCEDURE — 73562 X-RAY EXAM OF KNEE 3: CPT | Mod: TC,PN,RT

## 2024-05-09 PROCEDURE — 1100F PTFALLS ASSESS-DOCD GE2>/YR: CPT | Mod: CPTII,S$GLB,, | Performed by: ORTHOPAEDIC SURGERY

## 2024-05-09 PROCEDURE — 73562 X-RAY EXAM OF KNEE 3: CPT | Mod: 26,RT,, | Performed by: RADIOLOGY

## 2024-05-09 PROCEDURE — 3288F FALL RISK ASSESSMENT DOCD: CPT | Mod: CPTII,S$GLB,, | Performed by: ORTHOPAEDIC SURGERY

## 2024-05-09 PROCEDURE — 99213 OFFICE O/P EST LOW 20 MIN: CPT | Mod: S$GLB,,, | Performed by: ORTHOPAEDIC SURGERY

## 2024-05-09 PROCEDURE — 1160F RVW MEDS BY RX/DR IN RCRD: CPT | Mod: CPTII,S$GLB,, | Performed by: ORTHOPAEDIC SURGERY

## 2024-05-09 PROCEDURE — 3008F BODY MASS INDEX DOCD: CPT | Mod: CPTII,S$GLB,, | Performed by: ORTHOPAEDIC SURGERY

## 2024-05-09 NOTE — PROGRESS NOTES
Subjective:      Patient ID: Janey Null is a 68 y.o. female.    Chief Complaint: Post-op Evaluation of the Right Lower Leg    HPI  Patient follow up on her right distal femur fracture.  Voices no complaints of pain  ROS      Objective:    Ortho Exam     Constitutional:   Patient is alert  and oriented in no acute distress  HEENT:  normocephalic atraumatic; PERRL EOMI  Neck:  Supple without adenopathy  Cardiovascular:  Normal rate and rhythm  Pulmonary:  Normal respiratory effort normal chest wall expansion  Abdominal:  Nonprotuberant nondistended  Musculoskeletal:  She has good knee/.  Range of motion  Neurological:  No focal defect; cranial nerves 2-12 grossly intact  Psychiatric/behavioral:  Mood and behavior normal      X-Ray Knee 3 View Right  EXAMINATION:  XR KNEE 3 VIEW RIGHT    CLINICAL HISTORY:  Unspecified fracture of lower end of right femur, initial encounter for closed fracture    TECHNIQUE:  AP and lateral views of the right knee.    COMPARISON:  04/06/2024.    FINDINGS:  There is a healing subacute comminuted oblique fracture involving the distal metadiaphysis of the femur with increasing sclerosis and callus formation.  Prior below the knee amputation.  Diffuse soft tissue swelling of the distal stump which has developed over the interval.  There is bone demineralization.    Electronically signed by: Velasquez Echevarria  Date:    05/09/2024  Time:    09:43       My Radiographs Findings:    I have personally reviewed radiographs and concur with above findings    Assessment:       Encounter Diagnosis   Name Primary?    Closed fracture of distal end of right femur with routine healing, unspecified fracture morphology, subsequent encounter Yes         Plan:       I have discussed medical condition treatment options with her at length.  With prosthetic wear may slowly advance weight-bearing over the next 6 weeks.  Follow up at that time for repeat radiographs sooner if any questions or problems         Past Medical History:   Diagnosis Date    Anxiety     Depression     Encounter for long-term (current) use of high-risk medication     Fibromyalgia     Osteoporosis     Rheumatoid arthritis involving multiple sites with positive rheumatoid factor      Past Surgical History:   Procedure Laterality Date    ESOPHAGOGASTRODUODENOSCOPY N/A 4/5/2023    Procedure: EGD (ESOPHAGOGASTRODUODENOSCOPY);  Surgeon: Tyrell Cornelius MD;  Location: [facility] ENDO;  Service: General;  Laterality: N/A;    REPAIR OF RECURRENT INCARCERATED INCISIONAL HERNIA Left 4/13/2022    Procedure: REPAIR, HERNIA--RIGHT LATERAL POSITIONING;  Surgeon: Tyrell Cornelius MD;  Location: [facility] OR;  Service: General;  Laterality: Left;  left lumbar hernia    right leg       multiple surgeries ,amputation         Current Outpatient Medications:     albuterol-ipratropium (DUO-NEB) 2.5 mg-0.5 mg/3 mL nebulizer solution, INHALE 1 VIAL BY NEBULIZER ROUTE EVERY 6 HOURS AS NEEDED, Disp: 300 mL, Rfl: 2    amitriptyline (ELAVIL) 10 MG tablet, Take 20 mg by mouth every evening., Disp: , Rfl:     diclofenac sodium (VOLTAREN) 1 % Gel, Apply 2 g topically 4 (four) times daily., Disp: , Rfl:     ENBREL SURECLICK 50 mg/mL (1 mL) PnIj, Inject 50 mg into the skin., Disp: , Rfl:     ferrous sulfate (FEOSOL) 325 mg (65 mg iron) Tab tablet, Take 1 tablet (325 mg total) by mouth daily with breakfast., Disp: 90 tablet, Rfl: 3    fluticasone-umeclidin-vilanter (TRELEGY ELLIPTA) 200-62.5-25 mcg inhaler, Inhale 1 puff into the lungs once daily., Disp: 60 each, Rfl: 5    folic acid (FOLVITE) 1 MG tablet, TAKE 1 TABLET(1 MG) BY MOUTH EVERY DAY, Disp: 30 tablet, Rfl: 11    furosemide (LASIX) 40 MG tablet, Take 1 tablet (40 mg total) by mouth 2 (two) times daily., Disp: 60 tablet, Rfl: 11    hydrocodone-acetaminophen 10-325mg (NORCO)  mg Tab, Take 1 tablet by mouth every 6 (six) hours as needed. , Disp: , Rfl:     losartan (COZAAR) 100 MG tablet, Take 1 tablet (100 mg  total) by mouth once daily., Disp: 90 tablet, Rfl: 3    methotrexate 2.5 MG Tab, Take by mouth., Disp: , Rfl:     pantoprazole (PROTONIX) 40 MG tablet, Take 1 tablet (40 mg total) by mouth once daily., Disp: 90 tablet, Rfl: 2    sucralfate (CARAFATE) 1 gram tablet, TAKE 1 TABLET(1 GRAM) BY MOUTH FOUR TIMES DAILY, Disp: 360 tablet, Rfl: 2    traMADoL (ULTRAM) 50 mg tablet, Take 1 tablet (50 mg total) by mouth every 8 (eight) hours as needed for Pain., Disp: 10 tablet, Rfl: 0    Review of patient's allergies indicates:   Allergen Reactions    Penicillins        No family history on file.  Social History     Occupational History    Not on file   Tobacco Use    Smoking status: Former     Current packs/day: 0.00     Types: Cigarettes     Quit date: 2023     Years since quittin.0    Smokeless tobacco: Never   Substance and Sexual Activity    Alcohol use: No    Drug use: Never    Sexual activity: Yes

## 2024-06-04 DIAGNOSIS — S72.401D CLOSED FRACTURE OF DISTAL END OF RIGHT FEMUR WITH ROUTINE HEALING, UNSPECIFIED FRACTURE MORPHOLOGY, SUBSEQUENT ENCOUNTER: Primary | ICD-10-CM

## 2024-07-08 ENCOUNTER — HOSPITAL ENCOUNTER (OUTPATIENT)
Dept: RADIOLOGY | Facility: HOSPITAL | Age: 69
Discharge: HOME | End: 2024-07-08
Attending: ORTHOPAEDIC SURGERY
Payer: MEDICARE

## 2024-07-08 ENCOUNTER — OFFICE VISIT (OUTPATIENT)
Dept: ORTHOPEDICS | Facility: CLINIC | Age: 69
End: 2024-07-08
Payer: MEDICARE

## 2024-07-08 DIAGNOSIS — S72.401D CLOSED FRACTURE OF DISTAL END OF RIGHT FEMUR WITH ROUTINE HEALING, UNSPECIFIED FRACTURE MORPHOLOGY, SUBSEQUENT ENCOUNTER: Primary | ICD-10-CM

## 2024-07-08 DIAGNOSIS — S72.401D CLOSED FRACTURE OF DISTAL END OF RIGHT FEMUR WITH ROUTINE HEALING, UNSPECIFIED FRACTURE MORPHOLOGY, SUBSEQUENT ENCOUNTER: ICD-10-CM

## 2024-07-08 PROCEDURE — 99214 OFFICE O/P EST MOD 30 MIN: CPT | Mod: S$GLB,,, | Performed by: ORTHOPAEDIC SURGERY

## 2024-07-08 PROCEDURE — 4010F ACE/ARB THERAPY RXD/TAKEN: CPT | Mod: CPTII,S$GLB,, | Performed by: ORTHOPAEDIC SURGERY

## 2024-07-08 PROCEDURE — 73562 X-RAY EXAM OF KNEE 3: CPT | Mod: TC,PN,RT

## 2024-07-08 PROCEDURE — 3288F FALL RISK ASSESSMENT DOCD: CPT | Mod: CPTII,S$GLB,, | Performed by: ORTHOPAEDIC SURGERY

## 2024-07-08 PROCEDURE — 1159F MED LIST DOCD IN RCRD: CPT | Mod: CPTII,S$GLB,, | Performed by: ORTHOPAEDIC SURGERY

## 2024-07-08 PROCEDURE — 1160F RVW MEDS BY RX/DR IN RCRD: CPT | Mod: CPTII,S$GLB,, | Performed by: ORTHOPAEDIC SURGERY

## 2024-07-08 PROCEDURE — 99999 PR PBB SHADOW E&M-EST. PATIENT-LVL III: CPT | Mod: PBBFAC,,, | Performed by: ORTHOPAEDIC SURGERY

## 2024-07-08 PROCEDURE — 73562 X-RAY EXAM OF KNEE 3: CPT | Mod: 26,RT,, | Performed by: RADIOLOGY

## 2024-07-08 PROCEDURE — 1101F PT FALLS ASSESS-DOCD LE1/YR: CPT | Mod: CPTII,S$GLB,, | Performed by: ORTHOPAEDIC SURGERY

## 2024-07-08 RX ORDER — SARILUMAB 200 MG/1.14ML
INJECTION, SOLUTION SUBCUTANEOUS
COMMUNITY
Start: 2024-06-28

## 2024-07-08 RX ORDER — TIZANIDINE 4 MG/1
4 TABLET ORAL EVERY 8 HOURS PRN
COMMUNITY
Start: 2024-06-20

## 2024-07-08 NOTE — PROGRESS NOTES
Subjective:      Patient ID: Janey Null is a 68 y.o. female.    Chief Complaint: Injury of the Right Knee (Pain 3/10)    HPI  Patient follow up on her right knee injury.  Pain is improving.  ROS      Objective:    Ortho Exam     Constitutional:   Patient is alert  and oriented in no acute distress  HEENT:  normocephalic atraumatic; PERRL EOMI  Neck:  Supple without adenopathy  Cardiovascular:  Normal rate and rhythm  Pulmonary:  Normal respiratory effort normal chest wall expansion  Abdominal:  Nonprotuberant nondistended  Musculoskeletal:  Patient comes in in a wheelchair today.  She has some diffuse stump edema no erythema no drainage  She has adequate knee range of motion some mild general thigh tenderness  Significant quad atrophy.  Neurological:  No focal defect; cranial nerves 2-12 grossly intact  Psychiatric/behavioral:  Mood and behavior normal      X-Ray Knee 3 View Right  EXAMINATION:  XR KNEE 3 VIEW RIGHT    CLINICAL HISTORY:  Unspecified fracture of lower end of right femur, subsequent encounter for closed fracture with routine healing    TECHNIQUE:  AP and lateral views of the right knee.    COMPARISON:  05/09/2024.    FINDINGS:  There is a healing late subacute comminuted oblique fracture involving the distal metadiaphysis of the femur with increasing sclerosis and callus formation.  Prior below the knee amputation.  Persistent soft tissue swelling of the distal stump.  Bone demineralization.    Electronically signed by: Velasquez Echevarria  Date:    07/08/2024  Time:    14:41       My Radiographs Findings:    I have personally reviewed radiographs and concur with above findings    Assessment:       Encounter Diagnosis   Name Primary?    Closed fracture of distal end of right femur with routine healing, unspecified fracture morphology, subsequent encounter Yes         Plan:       I have discussed medical condition and treatment options with her at length.  I have told her though in the next 6-8  weeks she can gradually advance weight-bearing.  Continue with range-of-motion exercises.  If she has increased pain she needs to back off of the weight-bearing.  Follow up radiographs in 6 weeks I will see her sooner if any questions or problems.        Past Medical History:   Diagnosis Date    Anxiety     Depression     Encounter for long-term (current) use of high-risk medication     Fibromyalgia     Osteoporosis     Rheumatoid arthritis involving multiple sites with positive rheumatoid factor      Past Surgical History:   Procedure Laterality Date    ESOPHAGOGASTRODUODENOSCOPY N/A 4/5/2023    Procedure: EGD (ESOPHAGOGASTRODUODENOSCOPY);  Surgeon: Tyrell Cornelius MD;  Location: [facility] ENDO;  Service: General;  Laterality: N/A;    REPAIR OF RECURRENT INCARCERATED INCISIONAL HERNIA Left 4/13/2022    Procedure: REPAIR, HERNIA--RIGHT LATERAL POSITIONING;  Surgeon: Tyrell Cornelius MD;  Location: [facility] OR;  Service: General;  Laterality: Left;  left lumbar hernia    right leg       multiple surgeries ,amputation         Current Outpatient Medications:     albuterol-ipratropium (DUO-NEB) 2.5 mg-0.5 mg/3 mL nebulizer solution, INHALE 1 VIAL BY NEBULIZER ROUTE EVERY 6 HOURS AS NEEDED, Disp: 300 mL, Rfl: 2    amitriptyline (ELAVIL) 10 MG tablet, Take 20 mg by mouth every evening., Disp: , Rfl:     diclofenac sodium (VOLTAREN) 1 % Gel, Apply 2 g topically 4 (four) times daily., Disp: , Rfl:     ferrous sulfate (FEOSOL) 325 mg (65 mg iron) Tab tablet, Take 1 tablet (325 mg total) by mouth daily with breakfast., Disp: 90 tablet, Rfl: 3    fluticasone-umeclidin-vilanter (TRELEGY ELLIPTA) 200-62.5-25 mcg inhaler, Inhale 1 puff into the lungs once daily., Disp: 60 each, Rfl: 5    folic acid (FOLVITE) 1 MG tablet, TAKE 1 TABLET(1 MG) BY MOUTH EVERY DAY, Disp: 30 tablet, Rfl: 11    furosemide (LASIX) 40 MG tablet, Take 1 tablet (40 mg total) by mouth 2 (two) times daily., Disp: 60 tablet, Rfl: 11     hydrocodone-acetaminophen 10-325mg (NORCO)  mg Tab, Take 1 tablet by mouth every 6 (six) hours as needed. , Disp: , Rfl:     KEVZARA 200 mg/1.14 mL PnIj, Inject into the skin., Disp: , Rfl:     losartan (COZAAR) 100 MG tablet, Take 1 tablet (100 mg total) by mouth once daily., Disp: 90 tablet, Rfl: 3    methotrexate 2.5 MG Tab, Take by mouth., Disp: , Rfl:     pantoprazole (PROTONIX) 40 MG tablet, Take 1 tablet (40 mg total) by mouth once daily., Disp: 90 tablet, Rfl: 2    sucralfate (CARAFATE) 1 gram tablet, TAKE 1 TABLET(1 GRAM) BY MOUTH FOUR TIMES DAILY, Disp: 360 tablet, Rfl: 2    tiZANidine (ZANAFLEX) 4 MG tablet, Take 4 mg by mouth every 8 (eight) hours as needed., Disp: , Rfl:     traMADoL (ULTRAM) 50 mg tablet, Take 1 tablet (50 mg total) by mouth every 8 (eight) hours as needed for Pain., Disp: 10 tablet, Rfl: 0    Review of patient's allergies indicates:   Allergen Reactions    Penicillins        No family history on file.  Social History     Occupational History    Not on file   Tobacco Use    Smoking status: Former     Current packs/day: 0.00     Types: Cigarettes     Quit date: 2023     Years since quittin.2    Smokeless tobacco: Never   Substance and Sexual Activity    Alcohol use: No    Drug use: Never    Sexual activity: Yes

## 2024-07-25 ENCOUNTER — PATIENT MESSAGE (OUTPATIENT)
Dept: FAMILY MEDICINE | Facility: CLINIC | Age: 69
End: 2024-07-25
Payer: MEDICARE

## 2024-08-12 ENCOUNTER — PATIENT MESSAGE (OUTPATIENT)
Dept: FAMILY MEDICINE | Facility: CLINIC | Age: 69
End: 2024-08-12
Payer: MEDICARE

## 2024-08-15 DIAGNOSIS — J44.9 CHRONIC OBSTRUCTIVE PULMONARY DISEASE, UNSPECIFIED COPD TYPE: ICD-10-CM

## 2024-08-15 NOTE — TELEPHONE ENCOUNTER
Care Due:                  Date            Visit Type   Department     Provider  --------------------------------------------------------------------------------                                EP -                              PRIMARY      [facility]  Last Visit: 11-      [facility]  Next Visit: None Scheduled  None         None Found                                                            Last  Test          Frequency    Reason                     Performed    Due Date  --------------------------------------------------------------------------------    CMP.........  12 months..  furosemide, losartan.....  10-   10-    [facility] Embedded Care Due Messages. Reference number: [id number].   8/15/2024 4:29:40 PM CDT

## 2024-08-27 ENCOUNTER — TELEPHONE (OUTPATIENT)
Dept: ADMINISTRATIVE | Facility: HOSPITAL | Age: 69
End: 2024-08-27
Payer: MEDICARE

## 2024-08-28 ENCOUNTER — PATIENT MESSAGE (OUTPATIENT)
Dept: ADMINISTRATIVE | Facility: HOSPITAL | Age: 69
End: 2024-08-28
Payer: MEDICARE

## 2024-08-29 DIAGNOSIS — Z12.11 SCREENING FOR COLON CANCER: ICD-10-CM

## 2024-09-19 ENCOUNTER — HOSPITAL ENCOUNTER (OUTPATIENT)
Dept: RADIOLOGY | Facility: HOSPITAL | Age: 69
Discharge: HOME | End: 2024-09-19
Attending: STUDENT IN AN ORGANIZED HEALTH CARE EDUCATION/TRAINING PROGRAM
Payer: MEDICARE

## 2024-09-19 ENCOUNTER — LAB VISIT (OUTPATIENT)
Dept: LAB | Facility: HOSPITAL | Age: 69
End: 2024-09-19
Attending: STUDENT IN AN ORGANIZED HEALTH CARE EDUCATION/TRAINING PROGRAM
Payer: MEDICARE

## 2024-09-19 ENCOUNTER — OFFICE VISIT (OUTPATIENT)
Dept: FAMILY MEDICINE | Facility: CLINIC | Age: 69
End: 2024-09-19
Payer: MEDICARE

## 2024-09-19 ENCOUNTER — TELEPHONE (OUTPATIENT)
Dept: FAMILY MEDICINE | Facility: CLINIC | Age: 69
End: 2024-09-19
Payer: MEDICARE

## 2024-09-19 VITALS
SYSTOLIC BLOOD PRESSURE: 140 MMHG | DIASTOLIC BLOOD PRESSURE: 70 MMHG | HEIGHT: 56 IN | OXYGEN SATURATION: 97 % | BODY MASS INDEX: 20.61 KG/M2 | HEART RATE: 86 BPM

## 2024-09-19 DIAGNOSIS — I10 ESSENTIAL HYPERTENSION: ICD-10-CM

## 2024-09-19 DIAGNOSIS — M80.80XD LOCALIZED OSTEOPOROSIS WITH CURRENT PATHOLOGICAL FRACTURE WITH ROUTINE HEALING, SUBSEQUENT ENCOUNTER: ICD-10-CM

## 2024-09-19 DIAGNOSIS — Z89.619: ICD-10-CM

## 2024-09-19 DIAGNOSIS — R10.13 EPIGASTRIC PAIN: ICD-10-CM

## 2024-09-19 DIAGNOSIS — E55.9 VITAMIN D DEFICIENCY DISEASE: ICD-10-CM

## 2024-09-19 DIAGNOSIS — R79.9 ABNORMAL FINDING OF BLOOD CHEMISTRY, UNSPECIFIED: ICD-10-CM

## 2024-09-19 DIAGNOSIS — J44.9 CHRONIC OBSTRUCTIVE PULMONARY DISEASE, UNSPECIFIED COPD TYPE: ICD-10-CM

## 2024-09-19 DIAGNOSIS — Z79.899 DRUG-INDUCED IMMUNODEFICIENCY: ICD-10-CM

## 2024-09-19 DIAGNOSIS — R60.9 EDEMA, UNSPECIFIED TYPE: ICD-10-CM

## 2024-09-19 DIAGNOSIS — Z78.0 ASYMPTOMATIC MENOPAUSAL STATE: ICD-10-CM

## 2024-09-19 DIAGNOSIS — K29.70 GASTRITIS, PRESENCE OF BLEEDING UNSPECIFIED, UNSPECIFIED CHRONICITY, UNSPECIFIED GASTRITIS TYPE: Primary | ICD-10-CM

## 2024-09-19 DIAGNOSIS — K29.70 GASTRITIS, PRESENCE OF BLEEDING UNSPECIFIED, UNSPECIFIED CHRONICITY, UNSPECIFIED GASTRITIS TYPE: ICD-10-CM

## 2024-09-19 DIAGNOSIS — D84.821 DRUG-INDUCED IMMUNODEFICIENCY: ICD-10-CM

## 2024-09-19 DIAGNOSIS — R79.9 ABNORMAL BLOOD CHEMISTRY: ICD-10-CM

## 2024-09-19 DIAGNOSIS — E61.1 IRON DEFICIENCY: ICD-10-CM

## 2024-09-19 DIAGNOSIS — E78.49 HYPERLIPIDEMIA DUE TO DIETARY FAT INTAKE: Primary | ICD-10-CM

## 2024-09-19 LAB
25(OH)D3+25(OH)D2 SERPL-MCNC: 9 NG/ML (ref 30–96)
ALBUMIN SERPL BCP-MCNC: 3.8 G/DL (ref 3.5–5.2)
ALP SERPL-CCNC: 85 U/L (ref 55–135)
ALT SERPL W/O P-5'-P-CCNC: 18 U/L (ref 10–44)
ANION GAP SERPL CALC-SCNC: 9 MMOL/L (ref 8–16)
AST SERPL-CCNC: 26 U/L (ref 10–40)
BASOPHILS # BLD AUTO: 0.03 K/UL (ref 0–0.2)
BASOPHILS NFR BLD: 0.6 % (ref 0–1.9)
BILIRUB SERPL-MCNC: 0.5 MG/DL (ref 0.1–1)
BUN SERPL-MCNC: 16 MG/DL (ref 8–23)
CALCIUM SERPL-MCNC: 9.1 MG/DL (ref 8.7–10.5)
CHLORIDE SERPL-SCNC: 109 MMOL/L (ref 95–110)
CHOLEST SERPL-MCNC: 251 MG/DL (ref 120–199)
CHOLEST/HDLC SERPL: 3.3 {RATIO} (ref 2–5)
CO2 SERPL-SCNC: 23 MMOL/L (ref 23–29)
CREAT SERPL-MCNC: 0.7 MG/DL (ref 0.5–1.4)
DIFFERENTIAL METHOD BLD: ABNORMAL
EOSINOPHIL # BLD AUTO: 0.4 K/UL (ref 0–0.5)
EOSINOPHIL NFR BLD: 8.7 % (ref 0–8)
ERYTHROCYTE [DISTWIDTH] IN BLOOD BY AUTOMATED COUNT: 14.2 % (ref 11.5–14.5)
EST. GFR  (NO RACE VARIABLE): >60 ML/MIN/1.73 M^2
ESTIMATED AVG GLUCOSE: 108 MG/DL (ref 68–131)
FERRITIN SERPL-MCNC: 40 NG/ML (ref 20–300)
GLUCOSE SERPL-MCNC: 96 MG/DL (ref 70–110)
HBA1C MFR BLD: 5.4 % (ref 4–5.6)
HCT VFR BLD AUTO: 35.1 % (ref 37–48.5)
HDLC SERPL-MCNC: 76 MG/DL (ref 40–75)
HDLC SERPL: 30.3 % (ref 20–50)
HGB BLD-MCNC: 11.7 G/DL (ref 12–16)
IMM GRANULOCYTES # BLD AUTO: 0.02 K/UL (ref 0–0.04)
IMM GRANULOCYTES NFR BLD AUTO: 0.4 % (ref 0–0.5)
IRON SERPL-MCNC: 117 UG/DL (ref 30–160)
LDLC SERPL CALC-MCNC: 154.2 MG/DL (ref 63–159)
LYMPHOCYTES # BLD AUTO: 1.5 K/UL (ref 1–4.8)
LYMPHOCYTES NFR BLD: 30.1 % (ref 18–48)
MCH RBC QN AUTO: 34.6 PG (ref 27–31)
MCHC RBC AUTO-ENTMCNC: 33.3 G/DL (ref 32–36)
MCV RBC AUTO: 104 FL (ref 82–98)
MONOCYTES # BLD AUTO: 0.6 K/UL (ref 0.3–1)
MONOCYTES NFR BLD: 11.7 % (ref 4–15)
NEUTROPHILS # BLD AUTO: 2.5 K/UL (ref 1.8–7.7)
NEUTROPHILS NFR BLD: 48.5 % (ref 38–73)
NONHDLC SERPL-MCNC: 175 MG/DL
NRBC BLD-RTO: 0 /100 WBC
PLATELET # BLD AUTO: 315 K/UL (ref 150–450)
PMV BLD AUTO: 8.5 FL (ref 9.2–12.9)
POTASSIUM SERPL-SCNC: 4.2 MMOL/L (ref 3.5–5.1)
PROT SERPL-MCNC: 6.7 G/DL (ref 6–8.4)
RBC # BLD AUTO: 3.38 M/UL (ref 4–5.4)
SATURATED IRON: 25 % (ref 20–50)
SODIUM SERPL-SCNC: 141 MMOL/L (ref 136–145)
TOTAL IRON BINDING CAPACITY: 477 UG/DL (ref 250–450)
TRANSFERRIN SERPL-MCNC: 322 MG/DL (ref 200–375)
TRIGL SERPL-MCNC: 104 MG/DL (ref 30–150)
TSH SERPL DL<=0.005 MIU/L-ACNC: 0.43 UIU/ML (ref 0.4–4)
WBC # BLD AUTO: 5.05 K/UL (ref 3.9–12.7)

## 2024-09-19 PROCEDURE — 3044F HG A1C LEVEL LT 7.0%: CPT | Mod: CPTII,S$GLB,, | Performed by: STUDENT IN AN ORGANIZED HEALTH CARE EDUCATION/TRAINING PROGRAM

## 2024-09-19 PROCEDURE — 82306 VITAMIN D 25 HYDROXY: CPT | Performed by: STUDENT IN AN ORGANIZED HEALTH CARE EDUCATION/TRAINING PROGRAM

## 2024-09-19 PROCEDURE — 77092 TBS I&R FX RSK QHP: CPT | Mod: ,,, | Performed by: RADIOLOGY

## 2024-09-19 PROCEDURE — 4010F ACE/ARB THERAPY RXD/TAKEN: CPT | Mod: CPTII,S$GLB,, | Performed by: STUDENT IN AN ORGANIZED HEALTH CARE EDUCATION/TRAINING PROGRAM

## 2024-09-19 PROCEDURE — 84466 ASSAY OF TRANSFERRIN: CPT | Performed by: STUDENT IN AN ORGANIZED HEALTH CARE EDUCATION/TRAINING PROGRAM

## 2024-09-19 PROCEDURE — 85025 COMPLETE CBC W/AUTO DIFF WBC: CPT | Performed by: STUDENT IN AN ORGANIZED HEALTH CARE EDUCATION/TRAINING PROGRAM

## 2024-09-19 PROCEDURE — 3078F DIAST BP <80 MM HG: CPT | Mod: CPTII,S$GLB,, | Performed by: STUDENT IN AN ORGANIZED HEALTH CARE EDUCATION/TRAINING PROGRAM

## 2024-09-19 PROCEDURE — 77080 DXA BONE DENSITY AXIAL: CPT | Mod: 26,,, | Performed by: RADIOLOGY

## 2024-09-19 PROCEDURE — 3288F FALL RISK ASSESSMENT DOCD: CPT | Mod: CPTII,S$GLB,, | Performed by: STUDENT IN AN ORGANIZED HEALTH CARE EDUCATION/TRAINING PROGRAM

## 2024-09-19 PROCEDURE — 84443 ASSAY THYROID STIM HORMONE: CPT | Performed by: STUDENT IN AN ORGANIZED HEALTH CARE EDUCATION/TRAINING PROGRAM

## 2024-09-19 PROCEDURE — 80053 COMPREHEN METABOLIC PANEL: CPT | Performed by: STUDENT IN AN ORGANIZED HEALTH CARE EDUCATION/TRAINING PROGRAM

## 2024-09-19 PROCEDURE — 83036 HEMOGLOBIN GLYCOSYLATED A1C: CPT | Performed by: STUDENT IN AN ORGANIZED HEALTH CARE EDUCATION/TRAINING PROGRAM

## 2024-09-19 PROCEDURE — 3008F BODY MASS INDEX DOCD: CPT | Mod: CPTII,S$GLB,, | Performed by: STUDENT IN AN ORGANIZED HEALTH CARE EDUCATION/TRAINING PROGRAM

## 2024-09-19 PROCEDURE — 1125F AMNT PAIN NOTED PAIN PRSNT: CPT | Mod: CPTII,S$GLB,, | Performed by: STUDENT IN AN ORGANIZED HEALTH CARE EDUCATION/TRAINING PROGRAM

## 2024-09-19 PROCEDURE — 1159F MED LIST DOCD IN RCRD: CPT | Mod: CPTII,S$GLB,, | Performed by: STUDENT IN AN ORGANIZED HEALTH CARE EDUCATION/TRAINING PROGRAM

## 2024-09-19 PROCEDURE — 80061 LIPID PANEL: CPT | Performed by: STUDENT IN AN ORGANIZED HEALTH CARE EDUCATION/TRAINING PROGRAM

## 2024-09-19 PROCEDURE — 82728 ASSAY OF FERRITIN: CPT | Performed by: STUDENT IN AN ORGANIZED HEALTH CARE EDUCATION/TRAINING PROGRAM

## 2024-09-19 PROCEDURE — 99999 PR PBB SHADOW E&M-EST. PATIENT-LVL III: CPT | Mod: PBBFAC,,, | Performed by: STUDENT IN AN ORGANIZED HEALTH CARE EDUCATION/TRAINING PROGRAM

## 2024-09-19 PROCEDURE — 77091 TBS TECHL CALCULATION ONLY: CPT

## 2024-09-19 PROCEDURE — 83540 ASSAY OF IRON: CPT | Performed by: STUDENT IN AN ORGANIZED HEALTH CARE EDUCATION/TRAINING PROGRAM

## 2024-09-19 PROCEDURE — 3077F SYST BP >= 140 MM HG: CPT | Mod: CPTII,S$GLB,, | Performed by: STUDENT IN AN ORGANIZED HEALTH CARE EDUCATION/TRAINING PROGRAM

## 2024-09-19 PROCEDURE — 1101F PT FALLS ASSESS-DOCD LE1/YR: CPT | Mod: CPTII,S$GLB,, | Performed by: STUDENT IN AN ORGANIZED HEALTH CARE EDUCATION/TRAINING PROGRAM

## 2024-09-19 PROCEDURE — 99214 OFFICE O/P EST MOD 30 MIN: CPT | Mod: S$GLB,,, | Performed by: STUDENT IN AN ORGANIZED HEALTH CARE EDUCATION/TRAINING PROGRAM

## 2024-09-19 RX ORDER — FUROSEMIDE 40 MG/1
40 TABLET ORAL 2 TIMES DAILY
Qty: 60 TABLET | Refills: 11 | Status: SHIPPED | OUTPATIENT
Start: 2024-09-19 | End: 2025-09-19

## 2024-09-19 RX ORDER — LOSARTAN POTASSIUM 100 MG/1
100 TABLET ORAL DAILY
Qty: 90 TABLET | Refills: 3 | Status: SHIPPED | OUTPATIENT
Start: 2024-09-19 | End: 2025-09-19

## 2024-09-19 RX ORDER — ALBUTEROL SULFATE 90 UG/1
2 INHALANT RESPIRATORY (INHALATION) EVERY 6 HOURS PRN
Qty: 8.5 G | Refills: 3 | Status: SHIPPED | OUTPATIENT
Start: 2024-09-19

## 2024-09-19 RX ORDER — PANTOPRAZOLE SODIUM 40 MG/1
40 TABLET, DELAYED RELEASE ORAL DAILY
Qty: 90 TABLET | Refills: 2 | Status: SHIPPED | OUTPATIENT
Start: 2024-09-19

## 2024-09-19 RX ORDER — SUCRALFATE 1 G/1
1 TABLET ORAL
Qty: 360 TABLET | Refills: 2 | Status: SHIPPED | OUTPATIENT
Start: 2024-09-19

## 2024-09-19 RX ORDER — ALENDRONATE SODIUM 70 MG/1
70 TABLET ORAL
Qty: 4 TABLET | Refills: 11 | Status: SHIPPED | OUTPATIENT
Start: 2024-09-19 | End: 2025-09-19

## 2024-09-19 RX ORDER — ATORVASTATIN CALCIUM 40 MG/1
40 TABLET, FILM COATED ORAL DAILY
Qty: 90 TABLET | Refills: 3 | Status: SHIPPED | OUTPATIENT
Start: 2024-09-19 | End: 2025-09-19

## 2024-09-19 NOTE — PROGRESS NOTES
SUBJECTIVE:    CHIEF COMPLAINT:   Chief Complaint   Patient presents with    Follow-up           274}    HISTORY OF PRESENT ILLNESS:  Janey Null is a 68 y.o. female who presents to the clinic today for a follow up     Patient reports that trelegy is help her copd however she feels like she still needs an rescue inhaler d/t her shortness of breath.     Patient has right bka and recently had right femur fracture and f/u w/ orthopedics    Patient has increased stressors d/t disease in the family     RA: patient f/u w/ rheumatologist and pain management and started on kevzara    Htn: uncontrolled. Ran out of medications    PAST MEDICAL HISTORY:     274}  Past Medical History:   Diagnosis Date    Anxiety     Depression     Encounter for long-term (current) use of high-risk medication     Fibromyalgia     Osteoporosis     Rheumatoid arthritis involving multiple sites with positive rheumatoid factor        PAST SURGICAL HISTORY:  Past Surgical History:   Procedure Laterality Date    ESOPHAGOGASTRODUODENOSCOPY N/A 2023    Procedure: EGD (ESOPHAGOGASTRODUODENOSCOPY);  Surgeon: Tyrell Cornelius MD;  Location: [facility] ENDO;  Service: General;  Laterality: N/A;    REPAIR OF RECURRENT INCARCERATED INCISIONAL HERNIA Left 2022    Procedure: REPAIR, HERNIA--RIGHT LATERAL POSITIONING;  Surgeon: Tyrell Cornelius MD;  Location: [facility] OR;  Service: General;  Laterality: Left;  left lumbar hernia    right leg       multiple surgeries ,amputation       SOCIAL HISTORY:  Social History     Socioeconomic History    Marital status:    Tobacco Use    Smoking status: Every Day     Current packs/day: 0.00     Average packs/day: 1 pack/day for 61.0 years (61.0 ttl pk-yrs)     Types: Cigarettes     Start date:      Last attempt to quit: 2023     Years since quittin.4    Smokeless tobacco: Never   Substance and Sexual Activity    Alcohol use: No    Drug use: Never    Sexual activity: Not Currently      Social Determinants of Health     Financial Resource Strain: Low Risk  (11/26/2023)    Overall Financial Resource Strain (CARDIA)     Difficulty of Paying Living Expenses: Not hard at all   Food Insecurity: No Food Insecurity (11/26/2023)    Hunger Vital Sign     Worried About Running Out of Food in the Last Year: Never true     Ran Out of Food in the Last Year: Never true   Transportation Needs: Unmet Transportation Needs (11/26/2023)    PRAPARE - Transportation     Lack of Transportation (Medical): Yes     Lack of Transportation (Non-Medical): No   Physical Activity: Unknown (11/26/2023)    Exercise Vital Sign     Days of Exercise per Week: 1 day   Recent Concern: Physical Activity - Insufficiently Active (11/26/2023)    Exercise Vital Sign     Days of Exercise per Week: 1 day     Minutes of Exercise per Session: 10 min   Stress: No Stress Concern Present (11/26/2023)    [employer] - Occupational Stress Questionnaire     Feeling of Stress : Not at all   Housing Stability: Unknown (11/26/2023)    Housing Stability Vital Sign     Unable to Pay for Housing in the Last Year: No     Unstable Housing in the Last Year: No       FAMILY HISTORY:       No family history on file.    ALLERGIES AND MEDICATIONS: updated and reviewed.      274}  Review of patient's allergies indicates:   Allergen Reactions    Penicillins      Medication List with Changes/Refills   New Medications    ALBUTEROL (VENTOLIN HFA) 90 MCG/ACTUATION INHALER    Inhale 2 puffs into the lungs every 6 (six) hours as needed for Wheezing. Rescue    ALENDRONATE (FOSAMAX) 70 MG TABLET    Take 1 tablet (70 mg total) by mouth every 7 days.   Current Medications    ALBUTEROL-IPRATROPIUM (DUO-NEB) 2.5 MG-0.5 MG/3 ML NEBULIZER SOLUTION    INHALE 1 VIAL BY NEBULIZER ROUTE EVERY 6 HOURS AS NEEDED    FOLIC ACID (FOLVITE) 1 MG TABLET    TAKE 1 TABLET(1 MG) BY MOUTH EVERY DAY    HYDROCODONE-ACETAMINOPHEN 10-325MG (NORCO)  MG TAB     Take 1 tablet by mouth every 6 (six) hours as needed.     KEVZARA 200 MG/1.14 ML PNIJ    Inject into the skin.    METHOTREXATE 2.5 MG TAB    Take by mouth.    TIZANIDINE (ZANAFLEX) 4 MG TABLET    Take 4 mg by mouth every 8 (eight) hours as needed.   Changed and/or Refilled Medications    Modified Medication Previous Medication    FLUTICASONE-UMECLIDIN-VILANTER (TRELEGY ELLIPTA) 200-62.5-25 MCG INHALER fluticasone-umeclidin-vilanter (TRELEGY ELLIPTA) 200-62.5-25 mcg inhaler       Inhale 1 puff into the lungs once daily.    Inhale 1 puff into the lungs once daily.    FUROSEMIDE (LASIX) 40 MG TABLET furosemide (LASIX) 40 MG tablet       Take 1 tablet (40 mg total) by mouth 2 (two) times daily.    Take 1 tablet (40 mg total) by mouth 2 (two) times daily.    LOSARTAN (COZAAR) 100 MG TABLET losartan (COZAAR) 100 MG tablet       Take 1 tablet (100 mg total) by mouth once daily.    Take 1 tablet (100 mg total) by mouth once daily.    PANTOPRAZOLE (PROTONIX) 40 MG TABLET pantoprazole (PROTONIX) 40 MG tablet       Take 1 tablet (40 mg total) by mouth once daily.    Take 1 tablet (40 mg total) by mouth once daily.    SUCRALFATE (CARAFATE) 1 GRAM TABLET sucralfate (CARAFATE) 1 gram tablet       Take 1 tablet (1 g total) by mouth 4 (four) times daily before meals and nightly.    TAKE 1 TABLET(1 GRAM) BY MOUTH FOUR TIMES DAILY   Discontinued Medications    AMITRIPTYLINE (ELAVIL) 10 MG TABLET    Take 20 mg by mouth every evening.    DICLOFENAC SODIUM (VOLTAREN) 1 % GEL    Apply 2 g topically 4 (four) times daily.    FERROUS SULFATE (FEOSOL) 325 MG (65 MG IRON) TAB TABLET    Take 1 tablet (325 mg total) by mouth daily with breakfast.    TRAMADOL (ULTRAM) 50 MG TABLET    Take 1 tablet (50 mg total) by mouth every 8 (eight) hours as needed for Pain.       SCREENING HISTORY:    274}  Health Maintenance         Date Due Completion Date    Lipid Panel Never done ---    TETANUS VACCINE Never done ---    Mammogram Never done ---     "Shingles Vaccine (1 of 2) Never done ---    Colorectal Cancer Screening Never done ---    Influenza Vaccine (1) 09/01/2024 11/9/2023    COVID-19 Vaccine (6 - 2023-24 season) 09/01/2024 10/29/2023    LDCT Lung Screen 11/14/2024 11/14/2023    DEXA Scan 09/19/2027 9/19/2024            REVIEW OF SYSTEMS:   Review of Systems   Constitutional:  Negative for chills, fever, malaise/fatigue and weight loss.   Respiratory:  Positive for cough, sputum production, shortness of breath and wheezing.    Cardiovascular:  Negative for chest pain, palpitations and leg swelling.   Gastrointestinal:  Negative for abdominal pain, heartburn, nausea and vomiting.   Musculoskeletal:  Positive for joint pain. Negative for back pain, myalgias and neck pain.   Neurological:  Negative for dizziness, tingling, tremors, sensory change and headaches.   Psychiatric/Behavioral:  Negative for depression. The patient is nervous/anxious.        PHYSICAL EXAM:      274}  BP (!) 140/70 (BP Location: Left arm, Patient Position: Sitting, BP Method: Medium (Manual))   Pulse 86   Ht 4' 8" (1.422 m)   SpO2 97%   BMI 20.61 kg/m²   Wt Readings from Last 3 Encounters:   05/09/24 41.7 kg (91 lb 14.9 oz)   04/11/24 41.7 kg (91 lb 14.9 oz)   04/06/24 41.7 kg (92 lb)     BP Readings from Last 3 Encounters:   09/19/24 (!) 140/70   04/06/24 (!) 140/65   11/27/23 124/74     Estimated body mass index is 20.61 kg/m² as calculated from the following:    Height as of this encounter: 4' 8" (1.422 m).    Weight as of 5/9/24: 41.7 kg (91 lb 14.9 oz).     Physical Exam  Constitutional:       Appearance: Normal appearance.   HENT:      Head: Normocephalic and atraumatic.      Nose: Nose normal.      Mouth/Throat:      Mouth: Mucous membranes are dry.      Pharynx: Oropharynx is clear.   Eyes:      Extraocular Movements: Extraocular movements intact.      Conjunctiva/sclera: Conjunctivae normal.   Cardiovascular:      Rate and Rhythm: Normal rate and regular rhythm. "   Pulmonary:      Effort: Pulmonary effort is normal.      Breath sounds: Normal breath sounds.   Abdominal:      General: Bowel sounds are normal.      Palpations: Abdomen is soft.   Musculoskeletal:      Cervical back: Normal range of motion.      Comments: Right BKA, in wheelchair   Skin:     General: Skin is warm.   Neurological:      General: No focal deficit present.      Mental Status: She is alert. Mental status is at baseline.   Psychiatric:         Mood and Affect: Mood normal.         Thought Content: Thought content normal.         LABS:   274}  I have reviewed old labs below:  Lab Results   Component Value Date    WBC 5.05 09/19/2024    HGB 11.7 (L) 09/19/2024    HCT 35.1 (L) 09/19/2024     (H) 09/19/2024     09/19/2024     10/25/2023    K 4.0 10/25/2023     10/25/2023    CALCIUM 9.1 10/25/2023    CO2 22 (L) 10/25/2023     (H) 10/25/2023    BUN 13 10/25/2023    CREATININE 0.7 10/25/2023    ANIONGAP 12 10/25/2023    ESTGFRAFRICA >60.0 05/20/2022    EGFRNONAA >60.0 05/20/2022    PROT 7.2 10/25/2023    ALBUMIN 3.5 10/25/2023    BILITOT 0.3 10/25/2023    ALKPHOS 114 10/25/2023    ALT 19 10/25/2023    AST 25 10/25/2023    INR 0.9 04/04/2023       ASSESSMENT AND PLAN:  274}  1. Gastritis, presence of bleeding unspecified, unspecified chronicity, unspecified gastritis type  Advised avoidance of triggers. Tobacco cessation advised  - sucralfate (CARAFATE) 1 gram tablet; Take 1 tablet (1 g total) by mouth 4 (four) times daily before meals and nightly.  Dispense: 360 tablet; Refill: 2  - pantoprazole (PROTONIX) 40 MG tablet; Take 1 tablet (40 mg total) by mouth once daily.  Dispense: 90 tablet; Refill: 2    2. Chronic obstructive pulmonary disease, unspecified COPD type  - fluticasone-umeclidin-vilanter (TRELEGY ELLIPTA) 200-62.5-25 mcg inhaler; Inhale 1 puff into the lungs once daily.  Dispense: 60 each; Refill: 5  - albuterol (VENTOLIN HFA) 90 mcg/actuation inhaler; Inhale 2  puffs into the lungs every 6 (six) hours as needed for Wheezing. Rescue  Dispense: 8.5 g; Refill: 3    3. Essential hypertension  - losartan (COZAAR) 100 MG tablet; Take 1 tablet (100 mg total) by mouth once daily.  Dispense: 90 tablet; Refill: 3  - Comprehensive Metabolic Panel; Future  - CBC Auto Differential; Future  - Lipid Panel; Future  - Hemoglobin A1C; Future    4. Edema, unspecified type  - furosemide (LASIX) 40 MG tablet; Take 1 tablet (40 mg total) by mouth 2 (two) times daily.  Dispense: 60 tablet; Refill: 11  - TSH; Future    5. History of lower limb amputation    6. Drug-induced immunodeficiency    7. Vitamin D deficiency disease  - Vitamin D 25-Hydroxy; Future    8. Iron deficiency  - Iron and TIBC; Future  - Ferritin; Future    9. Localized osteoporosis with current pathological fracture with routine healing, subsequent encounter         Orders Placed This Encounter   Procedures    Comprehensive Metabolic Panel    CBC Auto Differential    Lipid Panel    TSH    Vitamin D 25-Hydroxy    Hemoglobin A1C    Iron and TIBC    Ferritin       No follow-ups on file. or sooner as needed.

## 2024-09-20 DIAGNOSIS — E55.9 VITAMIN D DEFICIENCY DISEASE: Primary | ICD-10-CM

## 2024-09-20 RX ORDER — ERGOCALCIFEROL 1.25 MG/1
50000 CAPSULE ORAL
Qty: 12 CAPSULE | Refills: 3 | Status: SHIPPED | OUTPATIENT
Start: 2024-09-20

## 2024-10-02 ENCOUNTER — PATIENT OUTREACH (OUTPATIENT)
Dept: ADMINISTRATIVE | Facility: HOSPITAL | Age: 69
End: 2024-10-02
Payer: MEDICARE

## 2024-10-02 NOTE — PROGRESS NOTES
Population Health Chart Review & Patient Outreach Details      Additional [facility] Health Notes:               Updates Requested / Reviewed:      Updated Care Coordination Note, Care Everywhere, and Immunizations Reconciliation Completed or Queried: [facility] Topics Overdue:      [facility] Score: 2     Colon Cancer Screening  Uncontrolled BP    Influenza Vaccine  Tetanus Vaccine  Shingles/Zoster Vaccine                  Health Maintenance Topic(s) Outreach Outcomes & Actions Taken:    Breast Cancer Screening - Outreach Outcomes & Actions Taken  : Mammogram Screening Scheduled

## 2024-10-13 ENCOUNTER — HOSPITAL ENCOUNTER (INPATIENT)
Facility: HOSPITAL | Age: 69
LOS: 2 days | Discharge: HOME | DRG: 871 | End: 2024-10-15
Attending: EMERGENCY MEDICINE | Admitting: STUDENT IN AN ORGANIZED HEALTH CARE EDUCATION/TRAINING PROGRAM
Payer: MEDICARE

## 2024-10-13 DIAGNOSIS — A41.9 SEVERE SEPSIS: ICD-10-CM

## 2024-10-13 DIAGNOSIS — J18.9 PNEUMONIA OF RIGHT LOWER LOBE DUE TO INFECTIOUS ORGANISM: Primary | ICD-10-CM

## 2024-10-13 DIAGNOSIS — I95.9 HYPOTENSION, UNSPECIFIED HYPOTENSION TYPE: ICD-10-CM

## 2024-10-13 DIAGNOSIS — R65.20 SEVERE SEPSIS: ICD-10-CM

## 2024-10-13 DIAGNOSIS — R06.00 DYSPNEA, UNSPECIFIED TYPE: ICD-10-CM

## 2024-10-13 DIAGNOSIS — R09.02 HYPOXIA: ICD-10-CM

## 2024-10-13 DIAGNOSIS — R06.02 SHORTNESS OF BREATH: ICD-10-CM

## 2024-10-13 PROBLEM — J96.01 ACUTE RESPIRATORY FAILURE WITH HYPOXIA: Status: ACTIVE | Noted: 2024-10-13

## 2024-10-13 PROBLEM — F17.200 TOBACCO DEPENDENCY: Status: ACTIVE | Noted: 2024-10-13

## 2024-10-13 LAB
ALBUMIN SERPL BCP-MCNC: 3.7 G/DL (ref 3.5–5.2)
ALP SERPL-CCNC: 86 U/L (ref 55–135)
ALT SERPL W/O P-5'-P-CCNC: 30 U/L (ref 10–44)
ANION GAP SERPL CALC-SCNC: 14 MMOL/L (ref 8–16)
AST SERPL-CCNC: 25 U/L (ref 10–40)
BACTERIA #/AREA URNS HPF: ABNORMAL /HPF
BASOPHILS NFR BLD: 0 % (ref 0–1.9)
BILIRUB SERPL-MCNC: 0.9 MG/DL (ref 0.1–1)
BILIRUB UR QL STRIP: NEGATIVE
BNP SERPL-MCNC: 34 PG/ML (ref 0–99)
BUN SERPL-MCNC: 27 MG/DL (ref 8–23)
CALCIUM SERPL-MCNC: 8.9 MG/DL (ref 8.7–10.5)
CHLORIDE SERPL-SCNC: 109 MMOL/L (ref 95–110)
CLARITY UR: CLEAR
CO2 SERPL-SCNC: 15 MMOL/L (ref 23–29)
COLOR UR: YELLOW
CREAT SERPL-MCNC: 1.1 MG/DL (ref 0.5–1.4)
DIFFERENTIAL METHOD BLD: ABNORMAL
EOSINOPHIL NFR BLD: 2 % (ref 0–8)
ERYTHROCYTE [DISTWIDTH] IN BLOOD BY AUTOMATED COUNT: 15.1 % (ref 11.5–14.5)
EST. GFR  (NO RACE VARIABLE): 54.7 ML/MIN/1.73 M^2
GLUCOSE SERPL-MCNC: 115 MG/DL (ref 70–110)
GLUCOSE UR QL STRIP: NEGATIVE
HCT VFR BLD AUTO: 38.5 % (ref 37–48.5)
HGB BLD-MCNC: 12.7 G/DL (ref 12–16)
HGB UR QL STRIP: ABNORMAL
HYALINE CASTS #/AREA URNS LPF: 0 /LPF
IMM GRANULOCYTES # BLD AUTO: ABNORMAL K/UL (ref 0–0.04)
IMM GRANULOCYTES NFR BLD AUTO: ABNORMAL % (ref 0–0.5)
INFLUENZA A, MOLECULAR: NEGATIVE
INFLUENZA B, MOLECULAR: NEGATIVE
KETONES UR QL STRIP: NEGATIVE
LACTATE SERPL-SCNC: 2.6 MMOL/L (ref 0.5–2.2)
LACTATE SERPL-SCNC: 2.7 MMOL/L (ref 0.5–2.2)
LEUKOCYTE ESTERASE UR QL STRIP: ABNORMAL
LYMPHOCYTES NFR BLD: 12 % (ref 18–48)
MAGNESIUM SERPL-MCNC: 1.5 MG/DL (ref 1.6–2.6)
MCH RBC QN AUTO: 34.8 PG (ref 27–31)
MCHC RBC AUTO-ENTMCNC: 33 G/DL (ref 32–36)
MCV RBC AUTO: 106 FL (ref 82–98)
METAMYELOCYTES NFR BLD MANUAL: 1 %
MICROSCOPIC COMMENT: ABNORMAL
MONOCYTES NFR BLD: 14 % (ref 4–15)
NEUTROPHILS NFR BLD: 47 % (ref 38–73)
NEUTS BAND NFR BLD MANUAL: 24 %
NITRITE UR QL STRIP: NEGATIVE
NRBC BLD-RTO: 0 /100 WBC
PH UR STRIP: 6 [PH] (ref 5–8)
PLATELET # BLD AUTO: 287 K/UL (ref 150–450)
PLATELET BLD QL SMEAR: ABNORMAL
PMV BLD AUTO: 9.1 FL (ref 9.2–12.9)
POTASSIUM SERPL-SCNC: 3.3 MMOL/L (ref 3.5–5.1)
PROT SERPL-MCNC: 6.6 G/DL (ref 6–8.4)
PROT UR QL STRIP: ABNORMAL
RBC # BLD AUTO: 3.65 M/UL (ref 4–5.4)
RBC #/AREA URNS HPF: 2 /HPF (ref 0–4)
SARS-COV-2 RDRP RESP QL NAA+PROBE: NEGATIVE
SODIUM SERPL-SCNC: 138 MMOL/L (ref 136–145)
SP GR UR STRIP: <=1.005 (ref 1–1.03)
SPECIMEN SOURCE: NORMAL
SQUAMOUS #/AREA URNS HPF: 6 /HPF
TROPONIN I SERPL DL<=0.01 NG/ML-MCNC: 0.02 NG/ML (ref 0–0.03)
URN SPEC COLLECT METH UR: ABNORMAL
UROBILINOGEN UR STRIP-ACNC: NEGATIVE EU/DL
WBC # BLD AUTO: 8.98 K/UL (ref 3.9–12.7)
WBC #/AREA URNS HPF: 4 /HPF (ref 0–5)
YEAST URNS QL MICRO: ABNORMAL

## 2024-10-13 PROCEDURE — 96360 HYDRATION IV INFUSION INIT: CPT

## 2024-10-13 PROCEDURE — 71260 CT THORAX DX C+: CPT | Mod: TC

## 2024-10-13 PROCEDURE — 99900031 HC PATIENT EDUCATION (STAT)

## 2024-10-13 PROCEDURE — 99900035 HC TECH TIME PER 15 MIN (STAT)

## 2024-10-13 PROCEDURE — U0002 COVID-19 LAB TEST NON-CDC: HCPCS | Performed by: STUDENT IN AN ORGANIZED HEALTH CARE EDUCATION/TRAINING PROGRAM

## 2024-10-13 PROCEDURE — 94799 UNLISTED PULMONARY SVC/PX: CPT

## 2024-10-13 PROCEDURE — 71260 CT THORAX DX C+: CPT | Mod: 26,,, | Performed by: RADIOLOGY

## 2024-10-13 PROCEDURE — 83605 ASSAY OF LACTIC ACID: CPT | Mod: 91 | Performed by: EMERGENCY MEDICINE

## 2024-10-13 PROCEDURE — 27100171 HC OXYGEN HIGH FLOW UP TO 24 HOURS

## 2024-10-13 PROCEDURE — 93005 ELECTROCARDIOGRAM TRACING: CPT

## 2024-10-13 PROCEDURE — 80053 COMPREHEN METABOLIC PANEL: CPT | Performed by: EMERGENCY MEDICINE

## 2024-10-13 PROCEDURE — 83735 ASSAY OF MAGNESIUM: CPT | Performed by: EMERGENCY MEDICINE

## 2024-10-13 PROCEDURE — 94640 AIRWAY INHALATION TREATMENT: CPT

## 2024-10-13 PROCEDURE — 93010 ELECTROCARDIOGRAM REPORT: CPT | Mod: ,,, | Performed by: INTERNAL MEDICINE

## 2024-10-13 PROCEDURE — 83880 ASSAY OF NATRIURETIC PEPTIDE: CPT | Performed by: EMERGENCY MEDICINE

## 2024-10-13 PROCEDURE — 25000003 PHARM REV CODE 250: Performed by: STUDENT IN AN ORGANIZED HEALTH CARE EDUCATION/TRAINING PROGRAM

## 2024-10-13 PROCEDURE — 87040 BLOOD CULTURE FOR BACTERIA: CPT | Performed by: EMERGENCY MEDICINE

## 2024-10-13 PROCEDURE — 85007 BL SMEAR W/DIFF WBC COUNT: CPT | Performed by: EMERGENCY MEDICINE

## 2024-10-13 PROCEDURE — 25000003 PHARM REV CODE 250: Performed by: EMERGENCY MEDICINE

## 2024-10-13 PROCEDURE — 85027 COMPLETE CBC AUTOMATED: CPT | Performed by: EMERGENCY MEDICINE

## 2024-10-13 PROCEDURE — 25000003 PHARM REV CODE 250: Performed by: NURSE PRACTITIONER

## 2024-10-13 PROCEDURE — 84484 ASSAY OF TROPONIN QUANT: CPT | Performed by: EMERGENCY MEDICINE

## 2024-10-13 PROCEDURE — 25500020 PHARM REV CODE 255: Performed by: STUDENT IN AN ORGANIZED HEALTH CARE EDUCATION/TRAINING PROGRAM

## 2024-10-13 PROCEDURE — 25000242 PHARM REV CODE 250 ALT 637 W/ HCPCS: Performed by: EMERGENCY MEDICINE

## 2024-10-13 PROCEDURE — 99406 BEHAV CHNG SMOKING 3-10 MIN: CPT

## 2024-10-13 PROCEDURE — 12000002 HC ACUTE/MED SURGE SEMI-PRIVATE ROOM

## 2024-10-13 PROCEDURE — 63600175 PHARM REV CODE 636 W HCPCS: Performed by: STUDENT IN AN ORGANIZED HEALTH CARE EDUCATION/TRAINING PROGRAM

## 2024-10-13 PROCEDURE — 25000242 PHARM REV CODE 250 ALT 637 W/ HCPCS: Performed by: STUDENT IN AN ORGANIZED HEALTH CARE EDUCATION/TRAINING PROGRAM

## 2024-10-13 PROCEDURE — 27000190 HC CPAP FULL FACE MASK W/VALVE

## 2024-10-13 PROCEDURE — 99223 1ST HOSP IP/OBS HIGH 75: CPT | Mod: AI,,, | Performed by: STUDENT IN AN ORGANIZED HEALTH CARE EDUCATION/TRAINING PROGRAM

## 2024-10-13 PROCEDURE — 94660 CPAP INITIATION&MGMT: CPT

## 2024-10-13 PROCEDURE — 71045 X-RAY EXAM CHEST 1 VIEW: CPT | Mod: 26,,, | Performed by: RADIOLOGY

## 2024-10-13 PROCEDURE — 81000 URINALYSIS NONAUTO W/SCOPE: CPT | Performed by: EMERGENCY MEDICINE

## 2024-10-13 PROCEDURE — 83605 ASSAY OF LACTIC ACID: CPT | Performed by: STUDENT IN AN ORGANIZED HEALTH CARE EDUCATION/TRAINING PROGRAM

## 2024-10-13 PROCEDURE — 87502 INFLUENZA DNA AMP PROBE: CPT | Performed by: STUDENT IN AN ORGANIZED HEALTH CARE EDUCATION/TRAINING PROGRAM

## 2024-10-13 PROCEDURE — 94761 N-INVAS EAR/PLS OXIMETRY MLT: CPT

## 2024-10-13 PROCEDURE — 99285 EMERGENCY DEPT VISIT HI MDM: CPT | Mod: 25

## 2024-10-13 PROCEDURE — 5A09357 ASSISTANCE WITH RESPIRATORY VENTILATION, LESS THAN 24 CONSECUTIVE HOURS, CONTINUOUS POSITIVE AIRWAY PRESSURE: ICD-10-PCS | Performed by: INTERNAL MEDICINE

## 2024-10-13 PROCEDURE — 71045 X-RAY EXAM CHEST 1 VIEW: CPT | Mod: TC

## 2024-10-13 RX ORDER — ENOXAPARIN SODIUM 100 MG/ML
40 INJECTION SUBCUTANEOUS EVERY 24 HOURS
Status: DISCONTINUED | OUTPATIENT
Start: 2024-10-14 | End: 2024-10-15 | Stop reason: HOSPADM

## 2024-10-13 RX ORDER — SODIUM CHLORIDE, SODIUM LACTATE, POTASSIUM CHLORIDE, CALCIUM CHLORIDE 600; 310; 30; 20 MG/100ML; MG/100ML; MG/100ML; MG/100ML
INJECTION, SOLUTION INTRAVENOUS CONTINUOUS
Status: DISCONTINUED | OUTPATIENT
Start: 2024-10-13 | End: 2024-10-13

## 2024-10-13 RX ORDER — PANTOPRAZOLE SODIUM 40 MG/10ML
40 INJECTION, POWDER, LYOPHILIZED, FOR SOLUTION INTRAVENOUS DAILY
Status: DISCONTINUED | OUTPATIENT
Start: 2024-10-13 | End: 2024-10-15 | Stop reason: HOSPADM

## 2024-10-13 RX ORDER — FOLIC ACID 1 MG/1
1 TABLET ORAL DAILY
Status: DISCONTINUED | OUTPATIENT
Start: 2024-10-14 | End: 2024-10-15 | Stop reason: HOSPADM

## 2024-10-13 RX ORDER — HYDROCODONE BITARTRATE AND ACETAMINOPHEN 10; 325 MG/1; MG/1
1 TABLET ORAL EVERY 6 HOURS PRN
Status: DISCONTINUED | OUTPATIENT
Start: 2024-10-13 | End: 2024-10-15 | Stop reason: HOSPADM

## 2024-10-13 RX ORDER — ATORVASTATIN CALCIUM 40 MG/1
40 TABLET, FILM COATED ORAL DAILY
Status: DISCONTINUED | OUTPATIENT
Start: 2024-10-14 | End: 2024-10-15 | Stop reason: HOSPADM

## 2024-10-13 RX ORDER — ACETAMINOPHEN 325 MG/1
650 TABLET ORAL EVERY 6 HOURS PRN
Status: DISCONTINUED | OUTPATIENT
Start: 2024-10-13 | End: 2024-10-15 | Stop reason: HOSPADM

## 2024-10-13 RX ORDER — IPRATROPIUM BROMIDE AND ALBUTEROL SULFATE 2.5; .5 MG/3ML; MG/3ML
3 SOLUTION RESPIRATORY (INHALATION) ONCE
Status: COMPLETED | OUTPATIENT
Start: 2024-10-13 | End: 2024-10-13

## 2024-10-13 RX ORDER — METHYLPREDNISOLONE SOD SUCC 125 MG
125 VIAL (EA) INJECTION ONCE
Status: COMPLETED | OUTPATIENT
Start: 2024-10-13 | End: 2024-10-13

## 2024-10-13 RX ORDER — SODIUM CHLORIDE 9 MG/ML
1000 INJECTION, SOLUTION INTRAVENOUS
Status: COMPLETED | OUTPATIENT
Start: 2024-10-13 | End: 2024-10-13

## 2024-10-13 RX ORDER — IPRATROPIUM BROMIDE AND ALBUTEROL SULFATE 2.5; .5 MG/3ML; MG/3ML
3 SOLUTION RESPIRATORY (INHALATION)
Status: COMPLETED | OUTPATIENT
Start: 2024-10-13 | End: 2024-10-13

## 2024-10-13 RX ORDER — IPRATROPIUM BROMIDE AND ALBUTEROL SULFATE 2.5; .5 MG/3ML; MG/3ML
3 SOLUTION RESPIRATORY (INHALATION) EVERY 6 HOURS
Status: DISCONTINUED | OUTPATIENT
Start: 2024-10-13 | End: 2024-10-15 | Stop reason: HOSPADM

## 2024-10-13 RX ORDER — IBUPROFEN 200 MG
1 TABLET ORAL DAILY PRN
Status: DISCONTINUED | OUTPATIENT
Start: 2024-10-13 | End: 2024-10-15 | Stop reason: HOSPADM

## 2024-10-13 RX ORDER — SODIUM CHLORIDE 0.9 % (FLUSH) 0.9 %
10 SYRINGE (ML) INJECTION
Status: DISCONTINUED | OUTPATIENT
Start: 2024-10-13 | End: 2024-10-15 | Stop reason: HOSPADM

## 2024-10-13 RX ORDER — PREDNISONE 20 MG/1
40 TABLET ORAL DAILY
Status: DISCONTINUED | OUTPATIENT
Start: 2024-10-14 | End: 2024-10-15 | Stop reason: HOSPADM

## 2024-10-13 RX ORDER — MAGNESIUM SULFATE 1 G/100ML
1 INJECTION INTRAVENOUS ONCE
Status: COMPLETED | OUTPATIENT
Start: 2024-10-13 | End: 2024-10-13

## 2024-10-13 RX ORDER — LEVOFLOXACIN 5 MG/ML
750 INJECTION, SOLUTION INTRAVENOUS
Status: DISCONTINUED | OUTPATIENT
Start: 2024-10-13 | End: 2024-10-15

## 2024-10-13 RX ORDER — POTASSIUM CHLORIDE 7.45 MG/ML
10 INJECTION INTRAVENOUS
Status: COMPLETED | OUTPATIENT
Start: 2024-10-13 | End: 2024-10-13

## 2024-10-13 RX ADMIN — POTASSIUM CHLORIDE 10 MEQ: 10 INJECTION, SOLUTION INTRAVENOUS at 04:10

## 2024-10-13 RX ADMIN — IPRATROPIUM BROMIDE AND ALBUTEROL SULFATE 3 ML: .5; 2.5 SOLUTION RESPIRATORY (INHALATION) at 02:10

## 2024-10-13 RX ADMIN — HYDROCODONE BITARTRATE AND ACETAMINOPHEN 1 TABLET: 10; 325 TABLET ORAL at 08:10

## 2024-10-13 RX ADMIN — IPRATROPIUM BROMIDE AND ALBUTEROL SULFATE 3 ML: .5; 2.5 SOLUTION RESPIRATORY (INHALATION) at 06:10

## 2024-10-13 RX ADMIN — SODIUM CHLORIDE, POTASSIUM CHLORIDE, SODIUM LACTATE AND CALCIUM CHLORIDE: 600; 310; 30; 20 INJECTION, SOLUTION INTRAVENOUS at 04:10

## 2024-10-13 RX ADMIN — IOHEXOL 75 ML: 350 INJECTION, SOLUTION INTRAVENOUS at 02:10

## 2024-10-13 RX ADMIN — SODIUM CHLORIDE, POTASSIUM CHLORIDE, SODIUM LACTATE AND CALCIUM CHLORIDE 500 ML: 600; 310; 30; 20 INJECTION, SOLUTION INTRAVENOUS at 01:10

## 2024-10-13 RX ADMIN — MAGNESIUM SULFATE IN DEXTROSE 1 G: 10 INJECTION, SOLUTION INTRAVENOUS at 04:10

## 2024-10-13 RX ADMIN — LEVOFLOXACIN 750 MG: 750 INJECTION, SOLUTION INTRAVENOUS at 02:10

## 2024-10-13 RX ADMIN — IPRATROPIUM BROMIDE AND ALBUTEROL SULFATE 3 ML: .5; 2.5 SOLUTION RESPIRATORY (INHALATION) at 12:10

## 2024-10-13 RX ADMIN — PANTOPRAZOLE SODIUM 40 MG: 40 INJECTION, POWDER, FOR SOLUTION INTRAVENOUS at 02:10

## 2024-10-13 RX ADMIN — METHYLPREDNISOLONE SODIUM SUCCINATE 125 MG: 125 INJECTION, POWDER, FOR SOLUTION INTRAMUSCULAR; INTRAVENOUS at 02:10

## 2024-10-13 RX ADMIN — ACETAMINOPHEN 650 MG: 325 TABLET ORAL at 03:10

## 2024-10-13 RX ADMIN — SODIUM CHLORIDE 1000 ML: 9 INJECTION, SOLUTION INTRAVENOUS at 11:10

## 2024-10-13 RX ADMIN — POTASSIUM CHLORIDE 10 MEQ: 10 INJECTION, SOLUTION INTRAVENOUS at 02:10

## 2024-10-13 NOTE — ASSESSMENT & PLAN NOTE
Patient with Hypoxic Respiratory failure which is Acute.  she is not on home oxygen. Supplemental oxygen was provided and noted- Oxygen Concentration (%):  [40] 40    .   Signs/symptoms of respiratory failure include- tachypnea and increased work of breathing. Contributing diagnoses includes - COPD and Pneumonia Labs and images were reviewed. Patient Has not had a recent ABG. Will treat underlying causes and adjust management of respiratory failure as follows- Treatment per COPD and community acquired pneumonia

## 2024-10-13 NOTE — SUBJECTIVE & OBJECTIVE
Past Medical History:   Diagnosis Date    Anxiety     Depression     Encounter for long-term (current) use of high-risk medication     Fibromyalgia     Osteoporosis     Rheumatoid arthritis involving multiple sites with positive rheumatoid factor        Past Surgical History:   Procedure Laterality Date    ESOPHAGOGASTRODUODENOSCOPY N/A 4/5/2023    Procedure: EGD (ESOPHAGOGASTRODUODENOSCOPY);  Surgeon: Tyrell Cornelius MD;  Location: [facility] ENDO;  Service: General;  Laterality: N/A;    REPAIR OF RECURRENT INCARCERATED INCISIONAL HERNIA Left 4/13/2022    Procedure: REPAIR, HERNIA--RIGHT LATERAL POSITIONING;  Surgeon: Tyrell Cornelius MD;  Location: [facility] OR;  Service: General;  Laterality: Left;  left lumbar hernia    right leg       multiple surgeries ,amputation       Review of patient's allergies indicates:   Allergen Reactions    Penicillins        No current facility-administered medications on file prior to encounter.     Current Outpatient Medications on File Prior to Encounter   Medication Sig    albuterol (VENTOLIN HFA) 90 mcg/actuation inhaler Inhale 2 puffs into the lungs every 6 (six) hours as needed for Wheezing. Rescue    albuterol-ipratropium (DUO-NEB) 2.5 mg-0.5 mg/3 mL nebulizer solution INHALE 1 VIAL BY NEBULIZER ROUTE EVERY 6 HOURS AS NEEDED    alendronate (FOSAMAX) 70 MG tablet Take 1 tablet (70 mg total) by mouth every 7 days.    atorvastatin (LIPITOR) 40 MG tablet Take 1 tablet (40 mg total) by mouth once daily.    ergocalciferol (ERGOCALCIFEROL) 50,000 unit Cap Take 1 capsule (50,000 Units total) by mouth every 7 days.    fluticasone-umeclidin-vilanter (TRELEGY ELLIPTA) 200-62.5-25 mcg inhaler Inhale 1 puff into the lungs once daily.    folic acid (FOLVITE) 1 MG tablet TAKE 1 TABLET(1 MG) BY MOUTH EVERY DAY    furosemide (LASIX) 40 MG tablet Take 1 tablet (40 mg total) by mouth 2 (two) times daily.    hydrocodone-acetaminophen 10-325mg (NORCO)  mg Tab Take 1 tablet by mouth every  6 (six) hours as needed.     KEVZARA 200 mg/1.14 mL PnIj Inject into the skin.    losartan (COZAAR) 100 MG tablet Take 1 tablet (100 mg total) by mouth once daily.    methotrexate 2.5 MG Tab Take by mouth.    pantoprazole (PROTONIX) 40 MG tablet Take 1 tablet (40 mg total) by mouth once daily.    sucralfate (CARAFATE) 1 gram tablet Take 1 tablet (1 g total) by mouth 4 (four) times daily before meals and nightly.    tiZANidine (ZANAFLEX) 4 MG tablet Take 4 mg by mouth every 8 (eight) hours as needed.     Family History    None       Tobacco Use    Smoking status: Every Day     Current packs/day: 0.00     Average packs/day: 1 pack/day for 61.0 years (61.0 ttl pk-yrs)     Types: Cigarettes     Start date:      Last attempt to quit: 2023     Years since quittin.5    Smokeless tobacco: Never   Substance and Sexual Activity    Alcohol use: No    Drug use: Never    Sexual activity: Not Currently     Review of Systems   All other systems reviewed and are negative.    Objective:     Vital Signs (Most Recent):  Temp: 97.8 °F (36.6 °C) (10/13/24 1103)  Pulse: (!) 111 (10/13/24 1427)  Resp: (!) 35 (10/13/24 1427)  BP: (!) 100/54 (10/13/24 1317)  SpO2: (!) 93 % (10/13/24 1427) Vital Signs (24h Range):  Temp:  [97.8 °F (36.6 °C)] 97.8 °F (36.6 °C)  Pulse:  [] 111  Resp:  [22-42] 35  SpO2:  [92 %-99 %] 93 %  BP: ()/(43-54) 100/54     Weight: 47.6 kg (105 lb)  Body mass index is 22.72 kg/m².     Physical Exam     Vitals reviewed  General: NAD, elderly, frail appearing  Head: NC/AT  Eyes: EOMI, CONTRERAS  Cardiovascular: tachycardia, sinus rhythm  Pulmonary: increased Respiratory Rate, No respiratory distress  Gi: Soft, Non-tender  Extremities: Warm, No edema present, BKA right leg  Skin: Warm, dry  Neuro: Alert, Oriented x3, No focal Deficit  Psych: Appropriate mood and affect          Significant Labs: All pertinent labs within the past 24 hours have been reviewed.    Significant Imaging: I have reviewed all  pertinent imaging results/findings within the past 24 hours.

## 2024-10-13 NOTE — HPI
68 w/ hx of RA, COPD, Fibromyalgia, Hx of PUD presenting with SOB. Symptoms have been progressing over past 3 days. Endorses brown sputum. Endorses orthopnea. Denies LE swelling or abdominal swelling. Endorses fever to 100.7 at home. No NVD. No abdominal pain or chest pain. No hx of CHF. Patient currently smokes. Does not use O2 at home.

## 2024-10-13 NOTE — H&P
[facility] Medicine  History & Physical    Patient Name: Janey Null  MRN: [id number]  Patient Class: IP- Inpatient  Admission Date: 10/13/2024  Attending Physician: Walter Roth MD   Primary Care Provider: Josefina Durand MD         Patient information was obtained from patient, past medical records, and ER records.     Subjective:     Principal Problem:Severe sepsis    Chief Complaint:   Chief Complaint   Patient presents with    Shortness of Breath     Patient reports cough and feeling SOB since last night.  SOB increases with activity and when supine.  Denies chest pain.  Reports brown sputum.  Denies fever.        HPI: 68 w/ hx of RA, COPD, Fibromyalgia, Hx of PUD presenting with SOB. Symptoms have been progressing over past 3 days. Endorses brown sputum. Endorses orthopnea. Denies LE swelling or abdominal swelling. Endorses fever to 100.7 at home. No NVD. No abdominal pain or chest pain. No hx of CHF. Patient currently smokes. Does not use O2 at home.     Past Medical History:   Diagnosis Date    Anxiety     Depression     Encounter for long-term (current) use of high-risk medication     Fibromyalgia     Osteoporosis     Rheumatoid arthritis involving multiple sites with positive rheumatoid factor        Past Surgical History:   Procedure Laterality Date    ESOPHAGOGASTRODUODENOSCOPY N/A 4/5/2023    Procedure: EGD (ESOPHAGOGASTRODUODENOSCOPY);  Surgeon: Tyrell Cornelius MD;  Location: [facility] ENDO;  Service: General;  Laterality: N/A;    REPAIR OF RECURRENT INCARCERATED INCISIONAL HERNIA Left 4/13/2022    Procedure: REPAIR, HERNIA--RIGHT LATERAL POSITIONING;  Surgeon: Tyrell Cornelius MD;  Location: [facility] OR;  Service: General;  Laterality: Left;  left lumbar hernia    right leg       multiple surgeries ,amputation       Review of patient's allergies indicates:   Allergen Reactions    Penicillins        No current facility-administered medications on file  prior to encounter.     Current Outpatient Medications on File Prior to Encounter   Medication Sig    albuterol (VENTOLIN HFA) 90 mcg/actuation inhaler Inhale 2 puffs into the lungs every 6 (six) hours as needed for Wheezing. Rescue    albuterol-ipratropium (DUO-NEB) 2.5 mg-0.5 mg/3 mL nebulizer solution INHALE 1 VIAL BY NEBULIZER ROUTE EVERY 6 HOURS AS NEEDED    alendronate (FOSAMAX) 70 MG tablet Take 1 tablet (70 mg total) by mouth every 7 days.    atorvastatin (LIPITOR) 40 MG tablet Take 1 tablet (40 mg total) by mouth once daily.    ergocalciferol (ERGOCALCIFEROL) 50,000 unit Cap Take 1 capsule (50,000 Units total) by mouth every 7 days.    fluticasone-umeclidin-vilanter (TRELEGY ELLIPTA) 200-62.5-25 mcg inhaler Inhale 1 puff into the lungs once daily.    folic acid (FOLVITE) 1 MG tablet TAKE 1 TABLET(1 MG) BY MOUTH EVERY DAY    furosemide (LASIX) 40 MG tablet Take 1 tablet (40 mg total) by mouth 2 (two) times daily.    hydrocodone-acetaminophen 10-325mg (NORCO)  mg Tab Take 1 tablet by mouth every 6 (six) hours as needed.     KEVZARA 200 mg/1.14 mL PnIj Inject into the skin.    losartan (COZAAR) 100 MG tablet Take 1 tablet (100 mg total) by mouth once daily.    methotrexate 2.5 MG Tab Take by mouth.    pantoprazole (PROTONIX) 40 MG tablet Take 1 tablet (40 mg total) by mouth once daily.    sucralfate (CARAFATE) 1 gram tablet Take 1 tablet (1 g total) by mouth 4 (four) times daily before meals and nightly.    tiZANidine (ZANAFLEX) 4 MG tablet Take 4 mg by mouth every 8 (eight) hours as needed.     Family History    None       Tobacco Use    Smoking status: Every Day     Current packs/day: 0.00     Average packs/day: 1 pack/day for 61.0 years (61.0 ttl pk-yrs)     Types: Cigarettes     Start date:      Last attempt to quit: 2023     Years since quittin.5    Smokeless tobacco: Never   Substance and Sexual Activity    Alcohol use: No    Drug use: Never    Sexual activity: Not Currently     Review  of Systems   All other systems reviewed and are negative.    Objective:     Vital Signs (Most Recent):  Temp: 97.8 °F (36.6 °C) (10/13/24 1103)  Pulse: (!) 111 (10/13/24 1427)  Resp: (!) 35 (10/13/24 1427)  BP: (!) 100/54 (10/13/24 1317)  SpO2: (!) 93 % (10/13/24 1427) Vital Signs (24h Range):  Temp:  [97.8 °F (36.6 °C)] 97.8 °F (36.6 °C)  Pulse:  [] 111  Resp:  [22-42] 35  SpO2:  [92 %-99 %] 93 %  BP: ()/(43-54) 100/54     Weight: 47.6 kg (105 lb)  Body mass index is 22.72 kg/m².     Physical Exam     Vitals reviewed  General: NAD, elderly, frail appearing  Head: NC/AT  Eyes: EOMI, CONTRERAS  Cardiovascular: tachycardia, sinus rhythm  Pulmonary: increased Respiratory Rate, No respiratory distress  Gi: Soft, Non-tender  Extremities: Warm, No edema present, BKA right leg  Skin: Warm, dry  Neuro: Alert, Oriented x3, No focal Deficit  Psych: Appropriate mood and affect          Significant Labs: All pertinent labs within the past 24 hours have been reviewed.    Significant Imaging: I have reviewed all pertinent imaging results/findings within the past 24 hours.  Assessment/Plan:     * Severe sepsis  This patient does have evidence of infective focus  My overall impression is sepsis.  Source: Respiratory  Antibiotics given-   Antibiotics (72h ago, onward)      Start     Stop Route Frequency Ordered    10/13/24 1515  levoFLOXacin 750 mg/150 mL IVPB 750 mg         -- IV Every 48 hours (non-standard times) 10/13/24 1400          Latest lactate reviewed-  Recent Labs   Lab 10/13/24  1250   LACTATE 2.7*     Organ dysfunction indicated by Acute respiratory failure    Fluid challenge Actual Body weight- Patient will receive 30ml/kg actual body weight to calculate fluid bolus for treatment of septic shock.     Post- resuscitation assessment No - Post resuscitation assessment not needed       Will Not start Pressors- Levophed for MAP of 65  Source control achieved by: IV abx, f/u blood and sputum cultures.   F/U CT Chest  read    Acute respiratory failure with hypoxia  Patient with Hypoxic Respiratory failure which is Acute.  she is not on home oxygen. Supplemental oxygen was provided and noted- Oxygen Concentration (%):  [40] 40    .   Signs/symptoms of respiratory failure include- tachypnea and increased work of breathing. Contributing diagnoses includes - COPD and Pneumonia Labs and images were reviewed. Patient Has not had a recent ABG. Will treat underlying causes and adjust management of respiratory failure as follows- Treatment per COPD and severe sepsis  Currently on bipap due to increased WOB  Wean as tolerated  Once patient off bipap start IS and acapella    COPD (chronic obstructive pulmonary disease)  Patient's COPD is with exacerbation noted by continued dyspnea, use of accessory muscles for breathing, and worsening of baseline hypoxia currently.  Patient is currently on COPD Pathway. Continue scheduled inhalers  duoneb, Steroids, Antibiotics, and Supplemental oxygen and monitor respiratory status closely.     Tobacco abuse  Counseled on cessation  Nicotine patch PRN      Rheumatoid arthritis  Hold home medications        VTE Risk Mitigation (From admission, onward)           Ordered     enoxaparin injection 40 mg  Daily         10/13/24 1406     IP VTE HIGH RISK PATIENT  Once         10/13/24 1406     Place sequential compression device  Until discontinued         10/13/24 1406                                    Walter Roth MD  Department of Hospital Medicine  [place] - Emergency Dept

## 2024-10-13 NOTE — ED PROVIDER NOTES
Encounter Date: 10/13/2024       History     Chief Complaint   Patient presents with    Shortness of Breath     Patient reports cough and feeling SOB since last night.  SOB increases with activity and when supine.  Denies chest pain.  Reports brown sputum.  Denies fever.     Pleasant well-developed 60-year-old female comes emergency complaints of shortness of breath which started last night.  She also has a very mild cough.  The patient reports that she has a long history of rheumatoid arthritis and received an infusion yesterday which lasted a proximally 5 hours.  Unknown medication at that time.  Patient states that she has no chest pain or fevers or chills.  She has been decreased on amount of fluids that she has been drinking.  Blood pressure is 78/45 with a pulse of 100.  Respirations 22.  94% O2 saturation.  She appears to have some increased work of breathing.  Lung sounds are tight.  I am going to give the patient a breathing treatment.  Place her on BiPAP.  Give IV fluids.  Possible vasopressor medications if necessary.  Past medical history of rheumatoid arthritis osteoporosis fibromyalgia anxiety depression.      Review of patient's allergies indicates:   Allergen Reactions    Penicillins      Past Medical History:   Diagnosis Date    Anxiety     Depression     Encounter for long-term (current) use of high-risk medication     Fibromyalgia     Osteoporosis     Rheumatoid arthritis involving multiple sites with positive rheumatoid factor      Past Surgical History:   Procedure Laterality Date    ESOPHAGOGASTRODUODENOSCOPY N/A 4/5/2023    Procedure: EGD (ESOPHAGOGASTRODUODENOSCOPY);  Surgeon: Tyrell Cornelius MD;  Location: [facility] ENDO;  Service: General;  Laterality: N/A;    REPAIR OF RECURRENT INCARCERATED INCISIONAL HERNIA Left 4/13/2022    Procedure: REPAIR, HERNIA--RIGHT LATERAL POSITIONING;  Surgeon: Tyrell Cornelius MD;  Location: [facility] OR;  Service: General;  Laterality: Left;  left lumbar  hernia    right leg       multiple surgeries ,amputation     No family history on file.  Social History     Tobacco Use    Smoking status: Every Day     Current packs/day: 0.00     Average packs/day: 1 pack/day for 61.0 years (61.0 ttl pk-yrs)     Types: Cigarettes     Start date:      Last attempt to quit: 2023     Years since quittin.5    Smokeless tobacco: Never   Substance Use Topics    Alcohol use: No    Drug use: Never     Review of Systems   Constitutional:  Positive for fatigue. Negative for fever.   HENT:  Negative for sore throat.    Respiratory:  Positive for shortness of breath and wheezing. Negative for chest tightness.    Cardiovascular:  Negative for chest pain.   Gastrointestinal:  Negative for nausea.   Genitourinary:  Negative for dysuria.   Musculoskeletal:  Negative for back pain.   Skin:  Negative for rash.   Neurological:  Negative for weakness.   Hematological:  Does not bruise/bleed easily.   All other systems reviewed and are negative.      Physical Exam     Initial Vitals [10/13/24 1103]   BP Pulse Resp Temp SpO2   (!) 92/45 109 (!) 26 97.8 °F (36.6 °C) (!) 92 %      MAP       --         Physical Exam    Nursing note and vitals reviewed.  Constitutional: She appears well-developed and well-nourished.   HENT:   Head: Normocephalic and atraumatic.   Eyes: EOM are normal. Pupils are equal, round, and reactive to light.   Right pupil is 4 mm and appears fixed.  Left pupil is 3 mm and reactive.   Neck:   Normal range of motion.  Cardiovascular:  Normal rate, regular rhythm and normal heart sounds.           Pulmonary/Chest: She is in respiratory distress (Mild respiratory distress). She has wheezes. She exhibits no tenderness.   Abnormal breath sounds with decreased breath sounds on the right lower lobe.  Crackles present.  Decreased excursion on the right chest wall.   Abdominal: Abdomen is soft. Bowel sounds are normal. She exhibits no distension and no mass. There is no abdominal  tenderness. There is no rebound and no guarding.   Musculoskeletal:         General: Normal range of motion.      Cervical back: Normal range of motion.      Comments: Right BKA     Neurological: She is alert and oriented to person, place, and time. She has normal strength. GCS score is 15. GCS eye subscore is 4. GCS verbal subscore is 5. GCS motor subscore is 6.   Skin: Skin is warm and dry.   Psychiatric: She has a normal mood and affect. Her behavior is normal. Judgment and thought content normal.         ED Course   Procedures  Labs Reviewed   CBC W/ AUTO DIFFERENTIAL - Abnormal       Result Value    WBC 8.98      RBC 3.65 (*)     Hemoglobin 12.7      Hematocrit 38.5       (*)     MCH 34.8 (*)     MCHC 33.0      RDW 15.1 (*)     Platelets 287      MPV 9.1 (*)     Immature Granulocytes CANCELED      Immature Grans (Abs) CANCELED      nRBC 0      Gran % 47.0      Lymph % 12.0 (*)     Mono % 14.0      Eosinophil % 2.0      Basophil % 0.0      Bands 24.0      Metamyelocytes 1.0      Platelet Estimate Appears normal      Differential Method Manual     COMPREHENSIVE METABOLIC PANEL - Abnormal    Sodium 138      Potassium 3.3 (*)     Chloride 109      CO2 15 (*)     Glucose 115 (*)     BUN 27 (*)     Creatinine 1.1      Calcium 8.9      Total Protein 6.6      Albumin 3.7      Total Bilirubin 0.9      Alkaline Phosphatase 86      AST 25      ALT 30      eGFR 54.7 (*)     Anion Gap 14     LACTIC ACID, PLASMA - Abnormal    Lactate (Lactic Acid) 2.7 (*)    CULTURE, BLOOD   CULTURE, BLOOD   INFLUENZA A & B BY MOLECULAR   CULTURE, RESPIRATORY   TROPONIN I    Troponin I 0.021     B-TYPE NATRIURETIC PEPTIDE    BNP 34     MAGNESIUM   SARS-COV-2 RNA AMPLIFICATION, QUAL   URINALYSIS, REFLEX TO URINE CULTURE     EKG Readings: (Independently Interpreted)   Rate 102, sinus tachycardia, normal axis, normal QRS, consistent with LVH, possible old septal infarct.  Abnormal EKG.       Imaging Results              CT Chest With  Contrast (In process)                       X-Ray Chest AP (Final result)  Result time 10/13/24 12:08:16      Final result by Velasquez Echevarria MD (10/13/24 12:08:16)                   Impression:      Masslike infiltrate of the right lower lobe.  Correlate clinically with possible fever and/or elevated white count.  This should be followed until clear to exclude underlying suspicious pulmonary lesion.    This report was flagged in Epic as abnormal.      Electronically signed by: Velasquez Echevarria  Date:    10/13/2024  Time:    12:08               Narrative:    EXAMINATION:  XR CHEST AP PORTABLE    CLINICAL HISTORY:  shortness of breath;    TECHNIQUE:  Portable view of the chest was performed.    COMPARISON:  10/25/2023.    FINDINGS:  There is a masslike infiltrate of the right lower lobe.  Dependent atelectasis at both lung bases.  Heart size normal.  Trachea midline.    Bony thorax intact with demineralization.  Prior left axillary lyudmila dissection.                                       Medications   lactated ringers bolus 500 mL (500 mLs Intravenous New Bag 10/13/24 1358)   levoFLOXacin 750 mg/150 mL IVPB 750 mg (750 mg Intravenous New Bag 10/13/24 1413)   potassium chloride 10 mEq in 100 mL IVPB (0 mEq Intravenous Paused 10/13/24 1416)   atorvastatin tablet 40 mg (has no administration in time range)   folic acid tablet 1 mg (has no administration in time range)   pantoprazole injection 40 mg (40 mg Intravenous Given 10/13/24 1420)   sodium chloride 0.9% flush 10 mL (has no administration in time range)   enoxaparin injection 40 mg (has no administration in time range)   predniSONE tablet 40 mg (has no administration in time range)   albuterol-ipratropium 2.5 mg-0.5 mg/3 mL nebulizer solution 3 mL (has no administration in time range)   magnesium sulfate in dextrose IVPB (premix) 1 g (has no administration in time range)   iohexoL (OMNIPAQUE 350) injection 75 mL (has no administration in time range)    albuterol-ipratropium 2.5 mg-0.5 mg/3 mL nebulizer solution 3 mL (3 mLs Nebulization Given 10/13/24 1207)   0.9%  NaCl infusion (0 mLs Intravenous Stopped 10/13/24 1300)   albuterol-ipratropium 2.5 mg-0.5 mg/3 mL nebulizer solution 3 mL (3 mLs Nebulization Given 10/13/24 1402)   methylPREDNISolone sodium succinate injection 125 mg (125 mg Intravenous Given 10/13/24 1408)     Medical Decision Making                                    Clinical Impression:  Final diagnoses:  [R06.02] Shortness of breath  [J18.9] Pneumonia of right lower lobe due to infectious organism (Primary)  [R06.00] Dyspnea, unspecified type  [R09.02] Hypoxia  [I95.9] Hypotension, unspecified hypotension type          ED Disposition Condition    Admit                 Nish Roberson MD  10/13/24 3681

## 2024-10-13 NOTE — ASSESSMENT & PLAN NOTE
Patient with Hypoxic Respiratory failure which is Acute.  she is not on home oxygen. Supplemental oxygen was provided and noted- Oxygen Concentration (%):  [40] 40    .   Signs/symptoms of respiratory failure include- tachypnea and increased work of breathing. Contributing diagnoses includes - COPD and Pneumonia Labs and images were reviewed. Patient Has not had a recent ABG. Will treat underlying causes and adjust management of respiratory failure as follows- Treatment per COPD and severe sepsis  Currently on bipap due to increased WOB  Wean as tolerated  Once patient off bipap start IS and acapella

## 2024-10-13 NOTE — ASSESSMENT & PLAN NOTE
Patient's COPD is with exacerbation noted by continued dyspnea, use of accessory muscles for breathing, and worsening of baseline hypoxia currently.  Patient is currently on COPD Pathway. Continue scheduled inhalers  duoneb, Steroids, Antibiotics, and Supplemental oxygen and monitor respiratory status closely.

## 2024-10-13 NOTE — ASSESSMENT & PLAN NOTE
This patient does have evidence of infective focus  My overall impression is sepsis.  Source: Respiratory  Antibiotics given-   Antibiotics (72h ago, onward)      Start     Stop Route Frequency Ordered    10/13/24 1515  levoFLOXacin 750 mg/150 mL IVPB 750 mg         -- IV Every 48 hours (non-standard times) 10/13/24 1400          Latest lactate reviewed-  Recent Labs   Lab 10/13/24  1250   LACTATE 2.7*     Organ dysfunction indicated by Acute respiratory failure    Fluid challenge Actual Body weight- Patient will receive 30ml/kg actual body weight to calculate fluid bolus for treatment of septic shock.     Post- resuscitation assessment No - Post resuscitation assessment not needed       Will Not start Pressors- Levophed for MAP of 65  Source control achieved by: IV abx, f/u blood and sputum cultures.   F/U CT Chest read

## 2024-10-14 LAB
ALBUMIN SERPL BCP-MCNC: 2.8 G/DL (ref 3.5–5.2)
ALP SERPL-CCNC: 61 U/L (ref 55–135)
ALT SERPL W/O P-5'-P-CCNC: 22 U/L (ref 10–44)
ANION GAP SERPL CALC-SCNC: 11 MMOL/L (ref 8–16)
AST SERPL-CCNC: 19 U/L (ref 10–40)
BASOPHILS NFR BLD: 0 % (ref 0–1.9)
BILIRUB SERPL-MCNC: 0.8 MG/DL (ref 0.1–1)
BUN SERPL-MCNC: 17 MG/DL (ref 8–23)
CALCIUM SERPL-MCNC: 8.6 MG/DL (ref 8.7–10.5)
CHLORIDE SERPL-SCNC: 112 MMOL/L (ref 95–110)
CO2 SERPL-SCNC: 17 MMOL/L (ref 23–29)
CREAT SERPL-MCNC: 0.7 MG/DL (ref 0.5–1.4)
DIFFERENTIAL METHOD BLD: ABNORMAL
EOSINOPHIL NFR BLD: 0 % (ref 0–8)
ERYTHROCYTE [DISTWIDTH] IN BLOOD BY AUTOMATED COUNT: 15.2 % (ref 11.5–14.5)
EST. GFR  (NO RACE VARIABLE): >60 ML/MIN/1.73 M^2
GLUCOSE SERPL-MCNC: 133 MG/DL (ref 70–110)
HCT VFR BLD AUTO: 34.1 % (ref 37–48.5)
HGB BLD-MCNC: 11.2 G/DL (ref 12–16)
IMM GRANULOCYTES # BLD AUTO: ABNORMAL K/UL
IMM GRANULOCYTES NFR BLD AUTO: ABNORMAL %
LYMPHOCYTES NFR BLD: 6 % (ref 18–48)
MAGNESIUM SERPL-MCNC: 2 MG/DL (ref 1.6–2.6)
MCH RBC QN AUTO: 35 PG (ref 27–31)
MCHC RBC AUTO-ENTMCNC: 32.8 G/DL (ref 32–36)
MCV RBC AUTO: 107 FL (ref 82–98)
METAMYELOCYTES NFR BLD MANUAL: 6 %
MONOCYTES NFR BLD: 2 % (ref 4–15)
NEUTROPHILS NFR BLD: 67 % (ref 38–73)
NEUTS BAND NFR BLD MANUAL: 19 %
NRBC BLD-RTO: 0 /100 WBC
OHS QRS DURATION: 72 MS
OHS QTC CALCULATION: 432 MS
PHOSPHATE SERPL-MCNC: 3.2 MG/DL (ref 2.7–4.5)
PLATELET # BLD AUTO: 232 K/UL (ref 150–450)
PLATELET BLD QL SMEAR: ABNORMAL
PMV BLD AUTO: 9.7 FL (ref 9.2–12.9)
POTASSIUM SERPL-SCNC: 3.7 MMOL/L (ref 3.5–5.1)
PROT SERPL-MCNC: 5.5 G/DL (ref 6–8.4)
RBC # BLD AUTO: 3.2 M/UL (ref 4–5.4)
SODIUM SERPL-SCNC: 140 MMOL/L (ref 136–145)
WBC # BLD AUTO: 12.58 K/UL (ref 3.9–12.7)

## 2024-10-14 PROCEDURE — 80053 COMPREHEN METABOLIC PANEL: CPT | Performed by: STUDENT IN AN ORGANIZED HEALTH CARE EDUCATION/TRAINING PROGRAM

## 2024-10-14 PROCEDURE — 63600175 PHARM REV CODE 636 W HCPCS: Performed by: STUDENT IN AN ORGANIZED HEALTH CARE EDUCATION/TRAINING PROGRAM

## 2024-10-14 PROCEDURE — 25000003 PHARM REV CODE 250: Performed by: NURSE PRACTITIONER

## 2024-10-14 PROCEDURE — 84100 ASSAY OF PHOSPHORUS: CPT | Performed by: STUDENT IN AN ORGANIZED HEALTH CARE EDUCATION/TRAINING PROGRAM

## 2024-10-14 PROCEDURE — 27000221 HC OXYGEN, UP TO 24 HOURS

## 2024-10-14 PROCEDURE — 94640 AIRWAY INHALATION TREATMENT: CPT

## 2024-10-14 PROCEDURE — 25000242 PHARM REV CODE 250 ALT 637 W/ HCPCS: Performed by: STUDENT IN AN ORGANIZED HEALTH CARE EDUCATION/TRAINING PROGRAM

## 2024-10-14 PROCEDURE — 83735 ASSAY OF MAGNESIUM: CPT | Performed by: STUDENT IN AN ORGANIZED HEALTH CARE EDUCATION/TRAINING PROGRAM

## 2024-10-14 PROCEDURE — 85007 BL SMEAR W/DIFF WBC COUNT: CPT | Performed by: STUDENT IN AN ORGANIZED HEALTH CARE EDUCATION/TRAINING PROGRAM

## 2024-10-14 PROCEDURE — 94761 N-INVAS EAR/PLS OXIMETRY MLT: CPT

## 2024-10-14 PROCEDURE — 94660 CPAP INITIATION&MGMT: CPT

## 2024-10-14 PROCEDURE — 21400001 HC TELEMETRY ROOM

## 2024-10-14 PROCEDURE — 36415 COLL VENOUS BLD VENIPUNCTURE: CPT | Performed by: STUDENT IN AN ORGANIZED HEALTH CARE EDUCATION/TRAINING PROGRAM

## 2024-10-14 PROCEDURE — 25000003 PHARM REV CODE 250: Performed by: STUDENT IN AN ORGANIZED HEALTH CARE EDUCATION/TRAINING PROGRAM

## 2024-10-14 PROCEDURE — 85027 COMPLETE CBC AUTOMATED: CPT | Performed by: STUDENT IN AN ORGANIZED HEALTH CARE EDUCATION/TRAINING PROGRAM

## 2024-10-14 PROCEDURE — 99900031 HC PATIENT EDUCATION (STAT)

## 2024-10-14 RX ADMIN — IPRATROPIUM BROMIDE AND ALBUTEROL SULFATE 3 ML: .5; 2.5 SOLUTION RESPIRATORY (INHALATION) at 02:10

## 2024-10-14 RX ADMIN — ENOXAPARIN SODIUM 40 MG: 40 INJECTION SUBCUTANEOUS at 06:10

## 2024-10-14 RX ADMIN — HYDROCODONE BITARTRATE AND ACETAMINOPHEN 1 TABLET: 10; 325 TABLET ORAL at 06:10

## 2024-10-14 RX ADMIN — PREDNISONE 40 MG: 20 TABLET ORAL at 08:10

## 2024-10-14 RX ADMIN — IPRATROPIUM BROMIDE AND ALBUTEROL SULFATE 3 ML: .5; 2.5 SOLUTION RESPIRATORY (INHALATION) at 06:10

## 2024-10-14 RX ADMIN — HYDROCODONE BITARTRATE AND ACETAMINOPHEN 1 TABLET: 10; 325 TABLET ORAL at 04:10

## 2024-10-14 RX ADMIN — PANTOPRAZOLE SODIUM 40 MG: 40 INJECTION, POWDER, FOR SOLUTION INTRAVENOUS at 08:10

## 2024-10-14 RX ADMIN — ATORVASTATIN CALCIUM 40 MG: 40 TABLET, FILM COATED ORAL at 08:10

## 2024-10-14 RX ADMIN — IPRATROPIUM BROMIDE AND ALBUTEROL SULFATE 3 ML: .5; 2.5 SOLUTION RESPIRATORY (INHALATION) at 01:10

## 2024-10-14 RX ADMIN — FOLIC ACID 1 MG: 1 TABLET ORAL at 08:10

## 2024-10-14 RX ADMIN — IPRATROPIUM BROMIDE AND ALBUTEROL SULFATE 3 ML: .5; 2.5 SOLUTION RESPIRATORY (INHALATION) at 07:10

## 2024-10-14 NOTE — CARE UPDATE
10/14/24 1332   Patient Assessment/Suction   Level of Consciousness (AVPU) alert   Respiratory Effort Unlabored   Expansion/Accessory Muscles/Retractions no use of accessory muscles   All Lung Fields Breath Sounds Anterior:;Lateral:;coarse   Rhythm/Pattern, Respiratory no shortness of breath reported   Cough Frequency infrequent   Cough Type loose;nonproductive   PRE-TX-O2   Device (Oxygen Therapy) room air   SpO2 95 %   Pulse Oximetry Type Continuous   $ Pulse Oximetry - Multiple Charge Pulse Oximetry - Multiple   Oximetry Probe Status Assessed   Pulse 97   Resp 16   Aerosol Therapy   $ Aerosol Therapy Charges Aerosol Treatment   Daily Review of Necessity (SVN) completed   Respiratory Treatment Status (SVN) given   Treatment Route (SVN) mask   Patient Position semi-Martinez's   Post Treatment Assessment (SVN) breath sounds improved   Signs of Intolerance (SVN) none   Breath Sounds Post-Respiratory Treatment   Throughout All Fields Post-Treatment All Fields   Throughout All Fields Post-Treatment aeration increased   Post-treatment Heart Rate (beats/min) 94   Post-treatment Resp Rate (breaths/min) 18

## 2024-10-14 NOTE — NURSING
Moved pt to room #7. Placed on hospital bed for comfort. Max lift to bed. BPAP in room, not in use. Oxygen in use. IV fluids infusing. External Cath in place. Visualized sacrum. Free from breakdown or redness. Dqaughter in room.

## 2024-10-14 NOTE — PROGRESS NOTES
[facility] Emergency Dept  [facility]  Progress Note    Patient Name: Janey Null  MRN: [id number]  Patient Class: IP- Inpatient   Admission Date: 10/13/2024  Length of Stay: 1 days  Attending Physician: Connor Castillo DO  Primary Care Provider: Josefina Durand MD        Subjective:     Principal Problem:Severe sepsis    Interval History:   Chief Complaint   Patient presents with    Shortness of Breath       Patient reports cough and feeling SOB since last night.  SOB increases with activity and when supine.  Denies chest pain.  Reports brown sputum.  Denies fever.         HPI: 68 w/ hx of RA, COPD, Fibromyalgia, Hx of PUD presenting with SOB. Symptoms have been progressing over past 3 days. Endorses brown sputum. Endorses orthopnea. Denies LE swelling or abdominal swelling. Endorses fever to 100.7 at home. No NVD. No abdominal pain or chest pain. No hx of CHF. Patient currently smokes. Does not use O2 at home.     Review of Systems   Constitutional:  Positive for fatigue and fever.   HENT:  Positive for sinus pressure.    Eyes: Negative.    Respiratory:  Positive for shortness of breath and wheezing.    Cardiovascular:  Negative for chest pain and palpitations.   Gastrointestinal: Negative.    Endocrine: Negative.    Genitourinary: Negative.    Musculoskeletal: Negative.    Skin: Negative.    Allergic/Immunologic: Negative.    Neurological: Negative.    Hematological: Negative.    Psychiatric/Behavioral: Negative.       Objective:     Vital Signs (Most Recent):  Temp: 99.6 °F (37.6 °C) (10/14/24 0711)  Pulse: 98 (10/14/24 0714)  Resp: 20 (10/14/24 0714)  BP: (!) 98/53 (10/14/24 0711)  SpO2: (!) 94 % (10/14/24 0714) Vital Signs (24h Range):  Temp:  [97.8 °F (36.6 °C)-101.3 °F (38.5 °C)] 99.6 °F (37.6 °C)  Pulse:  [] 98  Resp:  [18-42] 20  SpO2:  [92 %-99 %] 94 %  BP: ()/(43-69) 98/53     Weight: 45.8 kg (101 lb)  Body mass index is 21.86 kg/m².    Intake/Output Summary (Last  24 hours) at 10/14/2024 0948  Last data filed at 10/14/2024 0422  Gross per 24 hour   Intake 120 ml   Output 1800 ml   Net -1680 ml      Physical Exam  Constitutional:       Appearance: She is ill-appearing.   HENT:      Head: Normocephalic and atraumatic.      Mouth/Throat:      Mouth: Mucous membranes are moist.   Eyes:      Extraocular Movements: Extraocular movements intact.      Pupils: Pupils are equal, round, and reactive to light.   Cardiovascular:      Rate and Rhythm: Normal rate and regular rhythm.   Pulmonary:      Effort: Pulmonary effort is normal.      Breath sounds: Wheezing present.   Abdominal:      General: Abdomen is flat. Bowel sounds are normal.      Palpations: Abdomen is soft.   Musculoskeletal:         General: Normal range of motion.      Cervical back: Normal range of motion.   Skin:     General: Skin is warm and dry.   Neurological:      General: No focal deficit present.      Mental Status: She is alert. Mental status is at baseline.   Psychiatric:         Mood and Affect: Mood normal.           Overview/Hospital Course:  Patient was seen in the emergency room still awaiting a room states she feels much better BiPAP has been discontinued on a nasal cannula satting in the upper 90s.  Patient admits that she has a upper respiratory tract infection she feels she has got it from her 9-year-old daughter.  Who was also sick.  She admits to an ongoing cough.  Feeling shortness of breath and generalized weakness patient was elevated WBCs at 12.58, hemoglobin 11.2 platelets of 232 sodium 140 potassium of 3.7 CO2 of 17 troponins unremarkable BNP normal     1. Extensive alveolar consolidation of the bilateral lower lobes consistent with multi lobar pneumonia.  Correlate clinically with possible fever and/or elevated white count.  This should be followed until clear.  2. Small pulmonary nodules.  Follow-up per Fleischner guidelines.  For multiple solid nodules all <6 mm, Fleischner Society 2017  "guidelines recommend no routine follow up for a low risk patient, or follow up with non-contrast chest CT at 12 months after discovery in a high risk patient.  3. Reactive intrathoracic lymphadenopathy.  This report was flagged in Epic as abnormal.  Significant Labs: All pertinent labs within the past 24 hours have been reviewed.  A1C:   Recent Labs   Lab 09/19/24  0944   HGBA1C 5.4     ABGs: No results for input(s): "PH", "PCO2", "HCO3", "POCSATURATED", "BE", "TOTALHB", "COHB", "METHB", "O2HB", "POCFIO2", "PO2" in the last 48 hours.  Bilirubin:   Recent Labs   Lab 09/19/24  0944 10/13/24  1157 10/14/24  0611   BILITOT 0.5 0.9 0.8     BMP:   Recent Labs   Lab 10/14/24  0611   *      K 3.7   *   CO2 17*   BUN 17   CREATININE 0.7   CALCIUM 8.6*   MG 2.0     CBC:   Recent Labs   Lab 10/13/24  1157 10/14/24  0401   WBC 8.98 12.58   HGB 12.7 11.2*   HCT 38.5 34.1*    232     CMP:   Recent Labs   Lab 10/13/24  1157 10/14/24  0611    140   K 3.3* 3.7    112*   CO2 15* 17*   * 133*   BUN 27* 17   CREATININE 1.1 0.7   CALCIUM 8.9 8.6*   PROT 6.6 5.5*   ALBUMIN 3.7 2.8*   BILITOT 0.9 0.8   ALKPHOS 86 61   AST 25 19   ALT 30 22   ANIONGAP 14 11       Significant Imaging: I have reviewed all pertinent imaging results/findings within the past 24 hours.    Assessment/Plan:    Severe sepsis felt secondary to a multifocal pneumonia.  Patient was has been initiated on broad-spectrum antibiotic therapy    We will continue treated with levofloxacin 750 mg IV q.day    Atrovent albuterol    And prednisone      COPD.  With a height hypoxic respiratory failure patient was currently off BiPAP  Treating with prednisone 40 mg q.day  Duo nebs    Tobacco dependency is undergoing counseling has been initiated on nicotine patch    Acute hypoxic respiratory failure combination of pneumonia and COPD    Rheumatoid arthritis currently stable      VTE prophylaxis on Lovenox    GI prophylaxis on " Protonix    Patient has had treatment she was overall feeling better off BiPAP weaned down oxygen as tolerated patient is in no respiratory distress currently.  Awaiting cultures    Active Diagnoses:    Diagnosis Date Noted POA    PRINCIPAL PROBLEM:  Severe sepsis [A41.9, R65.20] 10/13/2024 Yes    Acute respiratory failure with hypoxia [J96.01] 10/13/2024 Yes    Tobacco dependency [F17.200] 10/13/2024 Unknown    COPD (chronic obstructive pulmonary disease) [J44.9] 04/05/2023 Yes    Tobacco abuse [Z72.0] 04/05/2023 Yes    Rheumatoid arthritis [M06.9] 04/04/2023 Yes      Problems Resolved During this Admission:     VTE Risk Mitigation (From admission, onward)           Ordered     enoxaparin injection 40 mg  Daily         10/13/24 1406     IP VTE HIGH RISK PATIENT  Once         10/13/24 1406     Place sequential compression device  Until discontinued         10/13/24 1406                       Connor Castillo DO  Department of Hospital Medicine   [facility] - Emergency Dept

## 2024-10-14 NOTE — PLAN OF CARE
Problem: Adult Inpatient Plan of Care  Goal: Plan of Care Review  Outcome: Progressing  Goal: Patient-Specific Goal (Individualized)  Outcome: Progressing  Goal: Absence of Hospital-Acquired Illness or Injury  Outcome: Progressing  Goal: Optimal Comfort and Wellbeing  Outcome: Progressing  Goal: Readiness for Transition of Care  Outcome: Progressing     Problem: Gas Exchange Impaired  Goal: Optimal Gas Exchange  Outcome: Progressing     Problem: Fatigue  Goal: Improved Activity Tolerance  Outcome: Progressing     Problem: Fall Injury Risk  Goal: Absence of Fall and Fall-Related Injury  Outcome: Progressing     Problem: Sepsis/Septic Shock  Goal: Optimal Coping  Outcome: Progressing  Goal: Absence of Bleeding  Outcome: Progressing  Goal: Blood Glucose Level Within Targeted Range  Outcome: Progressing  Goal: Absence of Infection Signs and Symptoms  Outcome: Progressing  Goal: Optimal Nutrition Intake  Outcome: Progressing

## 2024-10-14 NOTE — PLAN OF CARE
Able to obtain at this time.  Pt being transferred to floor.     10/14/24 1529   Discharge Assessment   Assessment Type Discharge Planning Assessment

## 2024-10-14 NOTE — CARE UPDATE
10/14/24 0714   Patient Assessment/Suction   Level of Consciousness (AVPU) alert   Respiratory Effort Normal;Unlabored   Expansion/Accessory Muscles/Retractions no use of accessory muscles   All Lung Fields Breath Sounds Anterior:;Lateral:   RUTH Breath Sounds coarse;rhonchi   LLL Breath Sounds coarse   RUL Breath Sounds coarse   RML Breath Sounds coarse;rhonchi   RLL Breath Sounds coarse   Rhythm/Pattern, Respiratory pattern regular;depth regular   Cough Frequency infrequent   Cough Type loose;nonproductive   PRE-TX-O2   Device (Oxygen Therapy) nasal cannula   $ Is the patient on Low Flow Oxygen? Yes   Flow (L/min) (Oxygen Therapy) 2   SpO2 (!) 94 %   Pulse Oximetry Type Continuous   $ Pulse Oximetry - Multiple Charge Pulse Oximetry - Multiple   Oximetry Probe Status Assessed   Pulse 98   Resp 20   Aerosol Therapy   $ Aerosol Therapy Charges Aerosol Treatment   Daily Review of Necessity (SVN) completed   Respiratory Treatment Status (SVN) given   Treatment Route (SVN) mask   Patient Position semi-Martinez's   Post Treatment Assessment (SVN) breath sounds improved   Signs of Intolerance (SVN) none   Breath Sounds Post-Respiratory Treatment   Throughout All Fields Post-Treatment All Fields   Throughout All Fields Post-Treatment aeration increased   Post-treatment Heart Rate (beats/min) 94   Post-treatment Resp Rate (breaths/min) 18   Preset CPAP/BiPAP Settings   Mode Of Delivery Standby;BiPAP   $ CPAP/BiPAP Daily Charge BiPAP/CPAP Daily   Equipment Type V60   Airway Device Type small full face mask   Education   $ Education Oxygen;Bronchodilator

## 2024-10-15 VITALS
HEIGHT: 57 IN | WEIGHT: 101 LBS | SYSTOLIC BLOOD PRESSURE: 122 MMHG | TEMPERATURE: 98 F | DIASTOLIC BLOOD PRESSURE: 59 MMHG | OXYGEN SATURATION: 95 % | RESPIRATION RATE: 20 BRPM | BODY MASS INDEX: 21.79 KG/M2 | HEART RATE: 85 BPM

## 2024-10-15 LAB
ALBUMIN SERPL BCP-MCNC: 2.4 G/DL (ref 3.5–5.2)
ALBUMIN SERPL BCP-MCNC: 2.5 G/DL (ref 3.5–5.2)
ALP SERPL-CCNC: 52 U/L (ref 55–135)
ALP SERPL-CCNC: 52 U/L (ref 55–135)
ALT SERPL W/O P-5'-P-CCNC: 22 U/L (ref 10–44)
ALT SERPL W/O P-5'-P-CCNC: 23 U/L (ref 10–44)
ANION GAP SERPL CALC-SCNC: 11 MMOL/L (ref 8–16)
ANION GAP SERPL CALC-SCNC: 9 MMOL/L (ref 8–16)
AST SERPL-CCNC: 20 U/L (ref 10–40)
AST SERPL-CCNC: 24 U/L (ref 10–40)
BASOPHILS # BLD AUTO: 0.07 K/UL (ref 0–0.2)
BASOPHILS NFR BLD: 0 % (ref 0–1.9)
BASOPHILS NFR BLD: 0.5 % (ref 0–1.9)
BILIRUB SERPL-MCNC: 0.4 MG/DL (ref 0.1–1)
BILIRUB SERPL-MCNC: 0.4 MG/DL (ref 0.1–1)
BUN SERPL-MCNC: 13 MG/DL (ref 8–23)
BUN SERPL-MCNC: 15 MG/DL (ref 8–23)
CALCIUM SERPL-MCNC: 8 MG/DL (ref 8.7–10.5)
CALCIUM SERPL-MCNC: 8.1 MG/DL (ref 8.7–10.5)
CHLORIDE SERPL-SCNC: 113 MMOL/L (ref 95–110)
CHLORIDE SERPL-SCNC: 114 MMOL/L (ref 95–110)
CO2 SERPL-SCNC: 17 MMOL/L (ref 23–29)
CO2 SERPL-SCNC: 18 MMOL/L (ref 23–29)
CREAT SERPL-MCNC: 0.5 MG/DL (ref 0.5–1.4)
CREAT SERPL-MCNC: 0.5 MG/DL (ref 0.5–1.4)
DIFFERENTIAL METHOD BLD: ABNORMAL
DIFFERENTIAL METHOD BLD: ABNORMAL
EOSINOPHIL # BLD AUTO: 0 K/UL (ref 0–0.5)
EOSINOPHIL NFR BLD: 0 % (ref 0–8)
EOSINOPHIL NFR BLD: 0.2 % (ref 0–8)
ERYTHROCYTE [DISTWIDTH] IN BLOOD BY AUTOMATED COUNT: 15 % (ref 11.5–14.5)
ERYTHROCYTE [DISTWIDTH] IN BLOOD BY AUTOMATED COUNT: 15.1 % (ref 11.5–14.5)
EST. GFR  (NO RACE VARIABLE): >60 ML/MIN/1.73 M^2
EST. GFR  (NO RACE VARIABLE): >60 ML/MIN/1.73 M^2
GLUCOSE SERPL-MCNC: 96 MG/DL (ref 70–110)
GLUCOSE SERPL-MCNC: 99 MG/DL (ref 70–110)
HCT VFR BLD AUTO: 28 % (ref 37–48.5)
HCT VFR BLD AUTO: 30 % (ref 37–48.5)
HGB BLD-MCNC: 10 G/DL (ref 12–16)
HGB BLD-MCNC: 9.5 G/DL (ref 12–16)
IMM GRANULOCYTES # BLD AUTO: 0.13 K/UL (ref 0–0.04)
IMM GRANULOCYTES # BLD AUTO: ABNORMAL K/UL (ref 0–0.04)
IMM GRANULOCYTES NFR BLD AUTO: 1 % (ref 0–0.5)
IMM GRANULOCYTES NFR BLD AUTO: ABNORMAL % (ref 0–0.5)
LYMPHOCYTES # BLD AUTO: 0.8 K/UL (ref 1–4.8)
LYMPHOCYTES NFR BLD: 5.9 % (ref 18–48)
LYMPHOCYTES NFR BLD: 7 % (ref 18–48)
MAGNESIUM SERPL-MCNC: 2.1 MG/DL (ref 1.6–2.6)
MCH RBC QN AUTO: 35.1 PG (ref 27–31)
MCH RBC QN AUTO: 35.1 PG (ref 27–31)
MCHC RBC AUTO-ENTMCNC: 33.3 G/DL (ref 32–36)
MCHC RBC AUTO-ENTMCNC: 33.9 G/DL (ref 32–36)
MCV RBC AUTO: 103 FL (ref 82–98)
MCV RBC AUTO: 105 FL (ref 82–98)
METAMYELOCYTES NFR BLD MANUAL: 1 %
MONOCYTES # BLD AUTO: 0.4 K/UL (ref 0.3–1)
MONOCYTES NFR BLD: 2 % (ref 4–15)
MONOCYTES NFR BLD: 3.4 % (ref 4–15)
NEUTROPHILS # BLD AUTO: 11.4 K/UL (ref 1.8–7.7)
NEUTROPHILS NFR BLD: 81 % (ref 38–73)
NEUTROPHILS NFR BLD: 89 % (ref 38–73)
NEUTS BAND NFR BLD MANUAL: 9 %
NRBC BLD-RTO: 0 /100 WBC
NRBC BLD-RTO: 0 /100 WBC
PHOSPHATE SERPL-MCNC: 2.7 MG/DL (ref 2.7–4.5)
PLATELET # BLD AUTO: 215 K/UL (ref 150–450)
PLATELET # BLD AUTO: 218 K/UL (ref 150–450)
PLATELET BLD QL SMEAR: ABNORMAL
PMV BLD AUTO: 8.5 FL (ref 9.2–12.9)
PMV BLD AUTO: 8.9 FL (ref 9.2–12.9)
POTASSIUM SERPL-SCNC: 3.2 MMOL/L (ref 3.5–5.1)
POTASSIUM SERPL-SCNC: 3.4 MMOL/L (ref 3.5–5.1)
PROT SERPL-MCNC: 5 G/DL (ref 6–8.4)
PROT SERPL-MCNC: 5.2 G/DL (ref 6–8.4)
RBC # BLD AUTO: 2.71 M/UL (ref 4–5.4)
RBC # BLD AUTO: 2.85 M/UL (ref 4–5.4)
SODIUM SERPL-SCNC: 141 MMOL/L (ref 136–145)
SODIUM SERPL-SCNC: 141 MMOL/L (ref 136–145)
WBC # BLD AUTO: 11.59 K/UL (ref 3.9–12.7)
WBC # BLD AUTO: 12.81 K/UL (ref 3.9–12.7)

## 2024-10-15 PROCEDURE — 94761 N-INVAS EAR/PLS OXIMETRY MLT: CPT

## 2024-10-15 PROCEDURE — 85007 BL SMEAR W/DIFF WBC COUNT: CPT | Performed by: STUDENT IN AN ORGANIZED HEALTH CARE EDUCATION/TRAINING PROGRAM

## 2024-10-15 PROCEDURE — 99900031 HC PATIENT EDUCATION (STAT)

## 2024-10-15 PROCEDURE — 27000221 HC OXYGEN, UP TO 24 HOURS

## 2024-10-15 PROCEDURE — 94640 AIRWAY INHALATION TREATMENT: CPT

## 2024-10-15 PROCEDURE — 83735 ASSAY OF MAGNESIUM: CPT | Performed by: STUDENT IN AN ORGANIZED HEALTH CARE EDUCATION/TRAINING PROGRAM

## 2024-10-15 PROCEDURE — 25000242 PHARM REV CODE 250 ALT 637 W/ HCPCS: Performed by: STUDENT IN AN ORGANIZED HEALTH CARE EDUCATION/TRAINING PROGRAM

## 2024-10-15 PROCEDURE — 85027 COMPLETE CBC AUTOMATED: CPT | Performed by: STUDENT IN AN ORGANIZED HEALTH CARE EDUCATION/TRAINING PROGRAM

## 2024-10-15 PROCEDURE — 80053 COMPREHEN METABOLIC PANEL: CPT | Performed by: STUDENT IN AN ORGANIZED HEALTH CARE EDUCATION/TRAINING PROGRAM

## 2024-10-15 PROCEDURE — 25000003 PHARM REV CODE 250: Performed by: INTERNAL MEDICINE

## 2024-10-15 PROCEDURE — 63600175 PHARM REV CODE 636 W HCPCS: Performed by: STUDENT IN AN ORGANIZED HEALTH CARE EDUCATION/TRAINING PROGRAM

## 2024-10-15 PROCEDURE — 25000003 PHARM REV CODE 250: Performed by: STUDENT IN AN ORGANIZED HEALTH CARE EDUCATION/TRAINING PROGRAM

## 2024-10-15 PROCEDURE — 36415 COLL VENOUS BLD VENIPUNCTURE: CPT | Performed by: STUDENT IN AN ORGANIZED HEALTH CARE EDUCATION/TRAINING PROGRAM

## 2024-10-15 PROCEDURE — 85025 COMPLETE CBC W/AUTO DIFF WBC: CPT | Performed by: STUDENT IN AN ORGANIZED HEALTH CARE EDUCATION/TRAINING PROGRAM

## 2024-10-15 PROCEDURE — 84100 ASSAY OF PHOSPHORUS: CPT | Performed by: STUDENT IN AN ORGANIZED HEALTH CARE EDUCATION/TRAINING PROGRAM

## 2024-10-15 PROCEDURE — 25000003 PHARM REV CODE 250: Performed by: NURSE PRACTITIONER

## 2024-10-15 RX ORDER — LEVOFLOXACIN 500 MG/1
500 TABLET, FILM COATED ORAL DAILY
Qty: 7 TABLET | Refills: 0 | Status: ON HOLD | OUTPATIENT
Start: 2024-10-15 | End: 2024-10-25

## 2024-10-15 RX ORDER — METHYLPREDNISOLONE 4 MG/1
TABLET ORAL
Qty: 21 EACH | Refills: 0 | Status: ON HOLD | OUTPATIENT
Start: 2024-10-15 | End: 2024-10-25 | Stop reason: HOSPADM

## 2024-10-15 RX ORDER — POTASSIUM CHLORIDE 20 MEQ/1
40 TABLET, EXTENDED RELEASE ORAL ONCE
Status: COMPLETED | OUTPATIENT
Start: 2024-10-15 | End: 2024-10-15

## 2024-10-15 RX ORDER — LEVOFLOXACIN 500 MG/1
500 TABLET, FILM COATED ORAL DAILY
Status: DISCONTINUED | OUTPATIENT
Start: 2024-10-15 | End: 2024-10-15 | Stop reason: HOSPADM

## 2024-10-15 RX ADMIN — PREDNISONE 40 MG: 20 TABLET ORAL at 08:10

## 2024-10-15 RX ADMIN — HYDROCODONE BITARTRATE AND ACETAMINOPHEN 1 TABLET: 10; 325 TABLET ORAL at 12:10

## 2024-10-15 RX ADMIN — HYDROCODONE BITARTRATE AND ACETAMINOPHEN 1 TABLET: 10; 325 TABLET ORAL at 08:10

## 2024-10-15 RX ADMIN — PANTOPRAZOLE SODIUM 40 MG: 40 INJECTION, POWDER, FOR SOLUTION INTRAVENOUS at 08:10

## 2024-10-15 RX ADMIN — POTASSIUM CHLORIDE 40 MEQ: 1500 TABLET, EXTENDED RELEASE ORAL at 08:10

## 2024-10-15 RX ADMIN — ATORVASTATIN CALCIUM 40 MG: 40 TABLET, FILM COATED ORAL at 08:10

## 2024-10-15 RX ADMIN — IPRATROPIUM BROMIDE AND ALBUTEROL SULFATE 3 ML: .5; 2.5 SOLUTION RESPIRATORY (INHALATION) at 12:10

## 2024-10-15 RX ADMIN — IPRATROPIUM BROMIDE AND ALBUTEROL SULFATE 3 ML: .5; 2.5 SOLUTION RESPIRATORY (INHALATION) at 07:10

## 2024-10-15 RX ADMIN — FOLIC ACID 1 MG: 1 TABLET ORAL at 08:10

## 2024-10-15 NOTE — PLAN OF CARE
Problem: Adult Inpatient Plan of Care  Goal: Plan of Care Review  Outcome: Met  Goal: Patient-Specific Goal (Individualized)  Outcome: Met  Goal: Absence of Hospital-Acquired Illness or Injury  Outcome: Met  Goal: Optimal Comfort and Wellbeing  Outcome: Met  Goal: Readiness for Transition of Care  Outcome: Met     Problem: Gas Exchange Impaired  Goal: Optimal Gas Exchange  Outcome: Met     Problem: Fatigue  Goal: Improved Activity Tolerance  Outcome: Met     Problem: Fall Injury Risk  Goal: Absence of Fall and Fall-Related Injury  Outcome: Met     Problem: Sepsis/Septic Shock  Goal: Optimal Coping  Outcome: Met  Goal: Absence of Bleeding  Outcome: Met  Goal: Blood Glucose Level Within Targeted Range  Outcome: Met  Goal: Absence of Infection Signs and Symptoms  Outcome: Met  Goal: Optimal Nutrition Intake  Outcome: Met     Problem: Skin Injury Risk Increased  Goal: Skin Health and Integrity  Outcome: Met

## 2024-10-15 NOTE — PLAN OF CARE
Problem: Adult Inpatient Plan of Care  Goal: Plan of Care Review  Outcome: Progressing  Goal: Readiness for Transition of Care  Outcome: Progressing     Problem: Gas Exchange Impaired  Goal: Optimal Gas Exchange  Outcome: Progressing     Problem: Fatigue  Goal: Improved Activity Tolerance  Outcome: Progressing     Problem: Fall Injury Risk  Goal: Absence of Fall and Fall-Related Injury  Outcome: Progressing     Problem: Sepsis/Septic Shock  Goal: Optimal Coping  Outcome: Progressing  Goal: Absence of Bleeding  Outcome: Progressing  Goal: Absence of Infection Signs and Symptoms  Outcome: Progressing  Goal: Optimal Nutrition Intake  Outcome: Progressing     Problem: Skin Injury Risk Increased  Goal: Skin Health and Integrity  Outcome: Progressing   POC reviewed at bedside. Questions and concerns addressed. VSS. Placed bed in low and locked position. Call light within reach. Side rails up x2. Instructed to call for any needs. Verbalized understanding of all instructions. Frequent rounds.

## 2024-10-15 NOTE — PLAN OF CARE
[facility] Care Unit  Initial Discharge Assessment       Primary Care Provider: Josefina Durand MD    Admission Diagnosis: Shortness of breath [R06.02]  Hypoxia [R09.02]  Severe sepsis [A41.9, R65.20]  Hypotension, unspecified hypotension type [I95.9]  Pneumonia of right lower lobe due to infectious organism [J18.9]  Dyspnea, unspecified type [R06.00]    Admission Date: 10/13/2024  Expected Discharge Date: 10/16/2024    Transition of Care Barriers: None    Payor: HU[employer] MANAGED MEDICARE / Plan: HU[employer] MEDICARE HMO / Product Type: Capitation /     Extended Emergency Contact Information  Primary Emergency Contact: Owen Null  Address: [address] [place] [address] [facility]  Home Phone: [phone]  Mobile Phone: [phone]  Relation: Spouse  Preferred language: English   needed? No    Discharge Plan A: Home with family  Discharge Plan B: Home      [employer] [id number] [place] [id number] AT [facility] [address]  [address] [place] [id number]  Phone: [phone] Fax: [phone]    Discharge assessment completed with pt at bedside.  Verified information on facesheet.  Reports lives at listed address with spouse, daughter and dependent child; is independent with activities and drives self to apts.  Denies HH, HD, blood thinners, recent hospital stay within 30 days, opt services.  Uses grab bar, WC, nebulizer and prosthetic.  Discharge plan is home.  Family to provide transportation home at discharge.    Initial Assessment (most recent)       Adult Discharge Assessment - 10/15/24 1134          Discharge Assessment    Assessment Type Discharge Planning Assessment     Confirmed/corrected address, phone number and insurance Yes     Confirmed Demographics Correct on Facesheet     Source of Information patient     People in Home child(blanco), adult;child(blanco), dependent;spouse     Do you expect to return to your current  living situation? Yes     Do you have help at home or someone to help you manage your care at home? No     Prior to hospitilization cognitive status: Alert/Oriented     Current cognitive status: Alert/Oriented     Walking or Climbing Stairs Difficulty yes     Walking or Climbing Stairs ambulation difficulty, requires equipment     Mobility Management has prosthetic leg, uses WC.     Dressing/Bathing Difficulty no     Equipment Currently Used at Home wheelchair;nebulizer;grab bar     Readmission within 30 days? No     Patient currently being followed by outpatient case management? No     Do you currently have service(s) that help you manage your care at home? No     Do you take prescription medications? Yes     Do you have prescription coverage? Yes     Do you have any problems affording any of your prescribed medications? No     Is the patient taking medications as prescribed? yes     Who is going to help you get home at discharge? family     How do you get to doctors appointments? car, drives self     Are you on dialysis? No     Do you take coumadin? No     Discharge Plan A Home with family     Discharge Plan B Home     DME Needed Upon Discharge  none     Discharge Plan discussed with: Patient     Transition of Care Barriers None

## 2024-10-15 NOTE — DISCHARGE SUMMARY
[facility] Medicine  Discharge Summary      Patient Name: Janey Null  MRN: [id number]  Admission Date: 10/13/2024  Hospital Length of Stay: 2 days  Discharge Date and Time:  10/15/2024 11:58 AM  Attending Physician: Connor Castillo DO   Discharging Provider: Connor Castillo DO  Discharge Provider Team: Networked reference to record PCT   Primary Care Provider: Josefina Durand MD        HPI: rincipal Problem:Severe sepsis     Chief Complaint:        Chief Complaint   Patient presents with    Shortness of Breath       Patient reports cough and feeling SOB since last night.  SOB increases with activity and when supine.  Denies chest pain.  Reports brown sputum.  Denies fever.         HPI: 68 w/ hx of RA, COPD, Fibromyalgia, Hx of PUD presenting with SOB. Symptoms have been progressing over past 3 days. Endorses brown sputum. Endorses orthopnea. Denies LE swelling or abdominal swelling. Endorses fever to 100.7 at home. No NVD. No abdominal pain or chest pain. No hx of CHF. Patient currently smokes. Does not use O2 at home.           Past Medical History:   Diagnosis Date    Anxiety      Depression      Encounter for long-term (current) use of high-risk medication      Fibromyalgia      Osteoporosis      Rheumatoid arthritis involving multiple sites with positive rheumatoid factor        * No surgery found *      Hospital Course:  Patient is clinically doing well she was able to be weaned off the BiPAP quickly.  Overall wheezing has dramatically improved she is back on room air.  Patient feels that she is back to her baseline.  She will be treated with the Levaquin weaning dose steroids.  In his overall shown good and generalized improvement and feels that she is ready for discharge home laboratory studies show white cells 11.59, hemoglobin of 10 platelets of 215 potassium of 3.4 on replacement chloride of 113 CO2 of 17 stable negative gap BUN of 13 creatinine 0.5  blood cultures have remained negative     1. Extensive alveolar consolidation of the bilateral lower lobes consistent with multi lobar pneumonia.  Correlate clinically with possible fever and/or elevated white count.  This should be followed until clear.  2. Small pulmonary nodules.  Follow-up per Fleischner guidelines.  For multiple solid nodules all <6 mm, Fleischner Society 2017 guidelines recommend no routine follow up for a low risk patient, or follow up with non-contrast chest CT at 12 months after discovery in a high risk patient.  3. Reactive intrathoracic lymphadenopathy.  This report was flagged in Epic as abnorma    mpression:     Masslike infiltrate of the right lower lobe.  Correlate clinically with possible fever and/or elevated white count.  This should be followed until clear to exclude underlying suspicious pulmonary lesion.     This report was flagged in Epic as abnormal.    Patient reports she has good family support at home  is there she has a 25-year-old daughter that is moving in she was going to adopted 9-year-old child that she would like to get back to help care for vitals remained stable patient was afebrile blood pressure 122/59 95% on room air  Consults:   Consults (From admission, onward)          Status Ordering Provider     Consult Respiratory Therapy if no hx of CHF  Once        Provider:  (Not yet assigned)    Acknowledged FAHAD BRUNER        Severe sepsis secondary to community-acquired pneumonia patient will be discharged on Levaquin 500 mg q.day for another 7 days    Tobacco abuse patient has been smoking up until admission.  Patient agrees to attempted try to stop smoking we discussed risks and benefits of this decision    Acute respiratory failure with hypoxia secondary to pneumonia and COPD    COPD not requiring oxygen at home continue with tobacco abuse continue inhalation therapy.  Patient will go home on a weaning dose of steroids    History of rheumatoid  arthritis    Patient was currently stable has a good family plan.  Needs to follow up with the primary care in 1 week patient understands if her conditions worse and not to hesitate to return back to the hospital    Final Active Diagnoses:    Diagnosis Date Noted POA    PRINCIPAL PROBLEM:  Severe sepsis [A41.9, R65.20] 10/13/2024 Yes    Acute respiratory failure with hypoxia [J96.01] 10/13/2024 Yes    Tobacco dependency [F17.200] 10/13/2024 Yes    COPD (chronic obstructive pulmonary disease) [J44.9] 04/05/2023 Yes    Tobacco abuse [Z72.0] 04/05/2023 Yes    Rheumatoid arthritis [M06.9] 04/04/2023 Yes      Problems Resolved During this Admission:      Discharged Condition: fair    Disposition: Home or Self Care    Follow Up:    Patient Instructions:   No discharge procedures on file.  Medications:  Reconciled Home Medications:      Medication List        START taking these medications      levoFLOXacin 500 MG tablet  Commonly known as: LEVAQUIN  Take 1 tablet (500 mg total) by mouth once daily.     methylPREDNISolone 4 mg tablet  Commonly known as: MEDROL DOSEPACK  use as directed            CONTINUE taking these medications      albuterol 90 mcg/actuation inhaler  Commonly known as: VENTOLIN HFA  Inhale 2 puffs into the lungs every 6 (six) hours as needed for Wheezing. Rescue     albuterol-ipratropium 2.5 mg-0.5 mg/3 mL nebulizer solution  Commonly known as: DUO-NEB  INHALE 1 VIAL BY NEBULIZER ROUTE EVERY 6 HOURS AS NEEDED     alendronate 70 MG tablet  Commonly known as: FOSAMAX  Take 1 tablet (70 mg total) by mouth every 7 days.     atorvastatin 40 MG tablet  Commonly known as: LIPITOR  Take 1 tablet (40 mg total) by mouth once daily.     ergocalciferol 50,000 unit Cap  Commonly known as: ERGOCALCIFEROL  Take 1 capsule (50,000 Units total) by mouth every 7 days.     fluticasone-umeclidin-vilanter 200-62.5-25 mcg inhaler  Commonly known as: TRELEGY ELLIPTA  Inhale 1 puff into the lungs once daily.     folic acid 1 MG  tablet  Commonly known as: FOLVITE  TAKE 1 TABLET(1 MG) BY MOUTH EVERY DAY     furosemide 40 MG tablet  Commonly known as: LASIX  Take 1 tablet (40 mg total) by mouth 2 (two) times daily.     HYDROcodone-acetaminophen  mg per tablet  Commonly known as: NORCO  Take 1 tablet by mouth every 6 (six) hours as needed.     KEVZARA 200 mg/1.14 mL Pnij  Generic drug: sarilumab  Inject into the skin.     losartan 100 MG tablet  Commonly known as: COZAAR  Take 1 tablet (100 mg total) by mouth once daily.     methotrexate 2.5 MG Tab  Take by mouth.     pantoprazole 40 MG tablet  Commonly known as: PROTONIX  Take 1 tablet (40 mg total) by mouth once daily.     sucralfate 1 gram tablet  Commonly known as: CARAFATE  Take 1 tablet (1 g total) by mouth 4 (four) times daily before meals and nightly.     tiZANidine 4 MG tablet  Commonly known as: ZANAFLEX  Take 4 mg by mouth every 8 (eight) hours as needed.              Significant Diagnostic Studies: Labs: BMP:   Recent Labs   Lab 10/14/24  0611 10/15/24  0501 10/15/24  0920   * 96 99    141 141   K 3.7 3.2* 3.4*   * 114* 113*   CO2 17* 18* 17*   BUN 17 15 13   CREATININE 0.7 0.5 0.5   CALCIUM 8.6* 8.0* 8.1*   MG 2.0 2.1  --     and CMP   Recent Labs   Lab 10/14/24  0611 10/15/24  0501 10/15/24  0920    141 141   K 3.7 3.2* 3.4*   * 114* 113*   CO2 17* 18* 17*   * 96 99   BUN 17 15 13   CREATININE 0.7 0.5 0.5   CALCIUM 8.6* 8.0* 8.1*   PROT 5.5* 5.0* 5.2*   ALBUMIN 2.8* 2.4* 2.5*   BILITOT 0.8 0.4 0.4   ALKPHOS 61 52* 52*   AST 19 20 24   ALT 22 22 23   ANIONGAP 11 9 11     Microbiology: Blood Culture   Lab Results   Component Value Date    LABBLOO No Growth to date 10/13/2024    LABBLOO No Growth to date 10/13/2024     Radiology: X-Ray: CXR: X-Ray Chest 1 View (CXR): No results found for this visit on 10/13/24.  CT scan: CT ABDOMEN PELVIS WITH CONTRAST: No results found for this visit on 10/13/24.    Pending Diagnostic Studies:       None           Indwelling Lines/Drains at time of discharge:   Lines/Drains/Airways       Drain  Duration             Female External Urinary Catheter w/ Suction 10/13/24 1600 1 day                    Time spent on the discharge of patient: 37 minutes         Connor Castillo DO  Department of [facility] Medicine  [place] [facility]

## 2024-10-15 NOTE — PLAN OF CARE
10/15/24 0933   Medicare Message   Important Message from Medicare regarding Discharge Appeal Rights Given to patient/caregiver;Explained to patient/caregiver;Signed/date by patient/caregiver   Date IMM was signed 10/15/24   Time IMM was signed 0933

## 2024-10-15 NOTE — PLAN OF CARE
Pt clear for DC from case management standpoint. Discharging to home.  Hospital follow up appointment scheduled with pcp office and added to AVS.       10/15/24 1226   Final Note   Assessment Type Final Discharge Note   Anticipated Discharge Disposition Home   Hospital Resources/Appts/Education Provided Appointments scheduled and added to AVS

## 2024-10-17 ENCOUNTER — PATIENT OUTREACH (OUTPATIENT)
Dept: ADMINISTRATIVE | Facility: CLINIC | Age: 69
End: 2024-10-17
Payer: MEDICARE

## 2024-10-17 NOTE — PROGRESS NOTES
C3 nurse attempted to contact Janey Null  for a TCC post hospital discharge follow up call. Not available. The patient has a scheduled HOSFU appointment with Janice Jones MD on 10/23/24 @ 9430.

## 2024-10-18 LAB
BACTERIA BLD CULT: NORMAL
BACTERIA BLD CULT: NORMAL

## 2024-10-22 ENCOUNTER — HOSPITAL ENCOUNTER (INPATIENT)
Facility: HOSPITAL | Age: 69
LOS: 3 days | Discharge: HOME HEALTH | DRG: 193 | End: 2024-10-25
Attending: EMERGENCY MEDICINE | Admitting: INTERNAL MEDICINE
Payer: MEDICARE

## 2024-10-22 DIAGNOSIS — J96.01 SEPSIS WITH ACUTE HYPOXIC RESPIRATORY FAILURE WITHOUT SEPTIC SHOCK, DUE TO UNSPECIFIED ORGANISM: ICD-10-CM

## 2024-10-22 DIAGNOSIS — R65.20 SEPSIS WITH ACUTE HYPOXIC RESPIRATORY FAILURE WITHOUT SEPTIC SHOCK, DUE TO UNSPECIFIED ORGANISM: ICD-10-CM

## 2024-10-22 DIAGNOSIS — J18.9 PNEUMONIA OF LEFT LOWER LOBE DUE TO INFECTIOUS ORGANISM: Primary | ICD-10-CM

## 2024-10-22 DIAGNOSIS — R06.02 SHORTNESS OF BREATH: ICD-10-CM

## 2024-10-22 DIAGNOSIS — E87.4 METABOLIC ACIDOSIS WITH RESPIRATORY ACIDOSIS: ICD-10-CM

## 2024-10-22 DIAGNOSIS — R06.00 DYSPNEA: ICD-10-CM

## 2024-10-22 DIAGNOSIS — A41.9 SEPSIS WITH ACUTE HYPOXIC RESPIRATORY FAILURE WITHOUT SEPTIC SHOCK, DUE TO UNSPECIFIED ORGANISM: ICD-10-CM

## 2024-10-22 LAB
ALBUMIN SERPL BCP-MCNC: 3.4 G/DL (ref 3.5–5.2)
ALP SERPL-CCNC: 86 U/L (ref 40–150)
ALT SERPL W/O P-5'-P-CCNC: 25 U/L (ref 10–44)
ANION GAP SERPL CALC-SCNC: 14 MMOL/L (ref 8–16)
AST SERPL-CCNC: 21 U/L (ref 10–40)
BASOPHILS # BLD AUTO: ABNORMAL K/UL (ref 0–0.2)
BASOPHILS NFR BLD: 0 % (ref 0–1.9)
BILIRUB SERPL-MCNC: 1 MG/DL (ref 0.1–1)
BILIRUB UR QL STRIP: NEGATIVE
BILIRUB UR QL STRIP: NEGATIVE
BNP SERPL-MCNC: 82 PG/ML (ref 0–99)
BUN SERPL-MCNC: 19 MG/DL (ref 8–23)
CALCIUM SERPL-MCNC: 9 MG/DL (ref 8.7–10.5)
CHLORIDE SERPL-SCNC: 106 MMOL/L (ref 95–110)
CLARITY UR: CLEAR
CLARITY UR: CLEAR
CO2 SERPL-SCNC: 18 MMOL/L (ref 23–29)
COLOR UR: YELLOW
COLOR UR: YELLOW
CREAT SERPL-MCNC: 0.8 MG/DL (ref 0.5–1.4)
DIFFERENTIAL METHOD BLD: ABNORMAL
EOSINOPHIL # BLD AUTO: ABNORMAL K/UL (ref 0–0.5)
EOSINOPHIL NFR BLD: 0 % (ref 0–8)
ERYTHROCYTE [DISTWIDTH] IN BLOOD BY AUTOMATED COUNT: 15.2 % (ref 11.5–14.5)
EST. GFR  (NO RACE VARIABLE): >60 ML/MIN/1.73 M^2
GLUCOSE SERPL-MCNC: 96 MG/DL (ref 70–110)
GLUCOSE UR QL STRIP: NEGATIVE
GLUCOSE UR QL STRIP: NEGATIVE
HCT VFR BLD AUTO: 38.3 % (ref 37–48.5)
HCV AB SERPL QL IA: NORMAL
HGB BLD-MCNC: 12.8 G/DL (ref 12–16)
HGB UR QL STRIP: NEGATIVE
HGB UR QL STRIP: NEGATIVE
HIV 1+2 AB+HIV1 P24 AG SERPL QL IA: NORMAL
IMM GRANULOCYTES # BLD AUTO: ABNORMAL K/UL (ref 0–0.04)
IMM GRANULOCYTES NFR BLD AUTO: ABNORMAL % (ref 0–0.5)
INFLUENZA A, MOLECULAR: NEGATIVE
INFLUENZA B, MOLECULAR: NEGATIVE
KETONES UR QL STRIP: NEGATIVE
KETONES UR QL STRIP: NEGATIVE
LACTATE SERPL-SCNC: 3.4 MMOL/L (ref 0.5–2.2)
LACTATE SERPL-SCNC: 4.8 MMOL/L (ref 0.5–2.2)
LEUKOCYTE ESTERASE UR QL STRIP: NEGATIVE
LEUKOCYTE ESTERASE UR QL STRIP: NEGATIVE
LYMPHOCYTES # BLD AUTO: ABNORMAL K/UL (ref 1–4.8)
LYMPHOCYTES NFR BLD: 9 % (ref 18–48)
MAGNESIUM SERPL-MCNC: 1.6 MG/DL (ref 1.6–2.6)
MCH RBC QN AUTO: 34.8 PG (ref 27–31)
MCHC RBC AUTO-ENTMCNC: 33.4 G/DL (ref 32–36)
MCV RBC AUTO: 104 FL (ref 82–98)
MONOCYTES # BLD AUTO: ABNORMAL K/UL (ref 0.3–1)
MONOCYTES NFR BLD: 4 % (ref 4–15)
NEUTROPHILS # BLD AUTO: ABNORMAL K/UL (ref 1.8–7.7)
NEUTROPHILS NFR BLD: 75 % (ref 38–73)
NEUTS BAND NFR BLD MANUAL: 12 %
NITRITE UR QL STRIP: NEGATIVE
NITRITE UR QL STRIP: NEGATIVE
NRBC BLD-RTO: 0 /100 WBC
PH UR STRIP: 6 [PH] (ref 5–8)
PH UR STRIP: 6 [PH] (ref 5–8)
PLATELET # BLD AUTO: 419 K/UL (ref 150–450)
PMV BLD AUTO: 9.4 FL (ref 9.2–12.9)
POTASSIUM SERPL-SCNC: 4.1 MMOL/L (ref 3.5–5.1)
PROCALCITONIN SERPL IA-MCNC: 15.91 NG/ML
PROT SERPL-MCNC: 6.8 G/DL (ref 6–8.4)
PROT UR QL STRIP: NEGATIVE
PROT UR QL STRIP: NEGATIVE
RBC # BLD AUTO: 3.68 M/UL (ref 4–5.4)
SARS-COV-2 RDRP RESP QL NAA+PROBE: NEGATIVE
SODIUM SERPL-SCNC: 138 MMOL/L (ref 136–145)
SP GR UR STRIP: 1.01 (ref 1–1.03)
SP GR UR STRIP: 1.01 (ref 1–1.03)
SPECIMEN SOURCE: NORMAL
TROPONIN I SERPL DL<=0.01 NG/ML-MCNC: 0.02 NG/ML (ref 0–0.03)
URN SPEC COLLECT METH UR: NORMAL
URN SPEC COLLECT METH UR: NORMAL
UROBILINOGEN UR STRIP-ACNC: NEGATIVE EU/DL
UROBILINOGEN UR STRIP-ACNC: NEGATIVE EU/DL
WBC # BLD AUTO: 7.63 K/UL (ref 3.9–12.7)

## 2024-10-22 PROCEDURE — 94640 AIRWAY INHALATION TREATMENT: CPT

## 2024-10-22 PROCEDURE — 27100107 HC POCKET PEAK FLOW METER

## 2024-10-22 PROCEDURE — 96374 THER/PROPH/DIAG INJ IV PUSH: CPT

## 2024-10-22 PROCEDURE — 85025 COMPLETE CBC W/AUTO DIFF WBC: CPT | Performed by: NURSE PRACTITIONER

## 2024-10-22 PROCEDURE — 25000003 PHARM REV CODE 250: Performed by: INTERNAL MEDICINE

## 2024-10-22 PROCEDURE — 87502 INFLUENZA DNA AMP PROBE: CPT | Performed by: NURSE PRACTITIONER

## 2024-10-22 PROCEDURE — 81003 URINALYSIS AUTO W/O SCOPE: CPT | Performed by: NURSE PRACTITIONER

## 2024-10-22 PROCEDURE — 80053 COMPREHEN METABOLIC PANEL: CPT | Performed by: NURSE PRACTITIONER

## 2024-10-22 PROCEDURE — 96375 TX/PRO/DX INJ NEW DRUG ADDON: CPT

## 2024-10-22 PROCEDURE — 21400001 HC TELEMETRY ROOM

## 2024-10-22 PROCEDURE — 94799 UNLISTED PULMONARY SVC/PX: CPT

## 2024-10-22 PROCEDURE — 83605 ASSAY OF LACTIC ACID: CPT | Performed by: NURSE PRACTITIONER

## 2024-10-22 PROCEDURE — 96361 HYDRATE IV INFUSION ADD-ON: CPT

## 2024-10-22 PROCEDURE — 83735 ASSAY OF MAGNESIUM: CPT | Performed by: NURSE PRACTITIONER

## 2024-10-22 PROCEDURE — 84145 PROCALCITONIN (PCT): CPT | Performed by: NURSE PRACTITIONER

## 2024-10-22 PROCEDURE — 71045 X-RAY EXAM CHEST 1 VIEW: CPT | Mod: TC

## 2024-10-22 PROCEDURE — 99406 BEHAV CHNG SMOKING 3-10 MIN: CPT

## 2024-10-22 PROCEDURE — 93010 ELECTROCARDIOGRAM REPORT: CPT | Mod: ,,, | Performed by: INTERNAL MEDICINE

## 2024-10-22 PROCEDURE — 83880 ASSAY OF NATRIURETIC PEPTIDE: CPT | Performed by: NURSE PRACTITIONER

## 2024-10-22 PROCEDURE — 25000003 PHARM REV CODE 250: Performed by: NURSE PRACTITIONER

## 2024-10-22 PROCEDURE — 63600175 PHARM REV CODE 636 W HCPCS: Performed by: EMERGENCY MEDICINE

## 2024-10-22 PROCEDURE — 71045 X-RAY EXAM CHEST 1 VIEW: CPT | Mod: 26,,, | Performed by: RADIOLOGY

## 2024-10-22 PROCEDURE — 63600175 PHARM REV CODE 636 W HCPCS: Performed by: INTERNAL MEDICINE

## 2024-10-22 PROCEDURE — 94640 AIRWAY INHALATION TREATMENT: CPT | Mod: XB

## 2024-10-22 PROCEDURE — 93005 ELECTROCARDIOGRAM TRACING: CPT

## 2024-10-22 PROCEDURE — 87040 BLOOD CULTURE FOR BACTERIA: CPT | Performed by: NURSE PRACTITIONER

## 2024-10-22 PROCEDURE — 94761 N-INVAS EAR/PLS OXIMETRY MLT: CPT

## 2024-10-22 PROCEDURE — 99900035 HC TECH TIME PER 15 MIN (STAT)

## 2024-10-22 PROCEDURE — 99285 EMERGENCY DEPT VISIT HI MDM: CPT | Mod: 25

## 2024-10-22 PROCEDURE — 86803 HEPATITIS C AB TEST: CPT | Performed by: EMERGENCY MEDICINE

## 2024-10-22 PROCEDURE — 87635 SARS-COV-2 COVID-19 AMP PRB: CPT | Performed by: NURSE PRACTITIONER

## 2024-10-22 PROCEDURE — 25000242 PHARM REV CODE 250 ALT 637 W/ HCPCS: Performed by: NURSE PRACTITIONER

## 2024-10-22 PROCEDURE — 84484 ASSAY OF TROPONIN QUANT: CPT | Performed by: NURSE PRACTITIONER

## 2024-10-22 PROCEDURE — 63600175 PHARM REV CODE 636 W HCPCS: Performed by: NURSE PRACTITIONER

## 2024-10-22 PROCEDURE — 25000242 PHARM REV CODE 250 ALT 637 W/ HCPCS: Performed by: INTERNAL MEDICINE

## 2024-10-22 PROCEDURE — 36415 COLL VENOUS BLD VENIPUNCTURE: CPT | Performed by: NURSE PRACTITIONER

## 2024-10-22 PROCEDURE — 99900031 HC PATIENT EDUCATION (STAT)

## 2024-10-22 PROCEDURE — 25000242 PHARM REV CODE 250 ALT 637 W/ HCPCS: Performed by: EMERGENCY MEDICINE

## 2024-10-22 PROCEDURE — 87389 HIV-1 AG W/HIV-1&-2 AB AG IA: CPT | Performed by: EMERGENCY MEDICINE

## 2024-10-22 RX ORDER — CEFEPIME HYDROCHLORIDE 2 G/1
2 INJECTION, POWDER, FOR SOLUTION INTRAVENOUS
Status: COMPLETED | OUTPATIENT
Start: 2024-10-22 | End: 2024-10-22

## 2024-10-22 RX ORDER — IPRATROPIUM BROMIDE AND ALBUTEROL SULFATE 2.5; .5 MG/3ML; MG/3ML
3 SOLUTION RESPIRATORY (INHALATION)
Status: DISCONTINUED | OUTPATIENT
Start: 2024-10-22 | End: 2024-10-22

## 2024-10-22 RX ORDER — PANTOPRAZOLE SODIUM 40 MG/1
40 TABLET, DELAYED RELEASE ORAL DAILY
Status: DISCONTINUED | OUTPATIENT
Start: 2024-10-23 | End: 2024-10-25 | Stop reason: HOSPADM

## 2024-10-22 RX ORDER — METHYLPREDNISOLONE SOD SUCC 125 MG
125 VIAL (EA) INJECTION
Status: COMPLETED | OUTPATIENT
Start: 2024-10-22 | End: 2024-10-22

## 2024-10-22 RX ORDER — PREDNISONE 20 MG/1
40 TABLET ORAL DAILY
Status: DISCONTINUED | OUTPATIENT
Start: 2024-10-23 | End: 2024-10-25 | Stop reason: HOSPADM

## 2024-10-22 RX ORDER — ENOXAPARIN SODIUM 100 MG/ML
30 INJECTION SUBCUTANEOUS EVERY 24 HOURS
Status: DISCONTINUED | OUTPATIENT
Start: 2024-10-22 | End: 2024-10-25 | Stop reason: HOSPADM

## 2024-10-22 RX ORDER — SUCRALFATE 1 G/1
1 TABLET ORAL
Status: DISCONTINUED | OUTPATIENT
Start: 2024-10-22 | End: 2024-10-25 | Stop reason: HOSPADM

## 2024-10-22 RX ORDER — CEFTRIAXONE 1 G/1
1 INJECTION, POWDER, FOR SOLUTION INTRAMUSCULAR; INTRAVENOUS
Status: DISCONTINUED | OUTPATIENT
Start: 2024-10-23 | End: 2024-10-22

## 2024-10-22 RX ORDER — SODIUM CHLORIDE 0.9 % (FLUSH) 0.9 %
10 SYRINGE (ML) INJECTION
Status: DISCONTINUED | OUTPATIENT
Start: 2024-10-22 | End: 2024-10-25 | Stop reason: HOSPADM

## 2024-10-22 RX ORDER — IPRATROPIUM BROMIDE AND ALBUTEROL SULFATE 2.5; .5 MG/3ML; MG/3ML
3 SOLUTION RESPIRATORY (INHALATION)
Status: DISCONTINUED | OUTPATIENT
Start: 2024-10-22 | End: 2024-10-25 | Stop reason: HOSPADM

## 2024-10-22 RX ORDER — CEFEPIME HYDROCHLORIDE 2 G/1
2 INJECTION, POWDER, FOR SOLUTION INTRAVENOUS
Status: DISCONTINUED | OUTPATIENT
Start: 2024-10-23 | End: 2024-10-25 | Stop reason: HOSPADM

## 2024-10-22 RX ORDER — LOSARTAN POTASSIUM 25 MG/1
100 TABLET ORAL DAILY
Status: DISCONTINUED | OUTPATIENT
Start: 2024-10-23 | End: 2024-10-25 | Stop reason: HOSPADM

## 2024-10-22 RX ORDER — ATORVASTATIN CALCIUM 40 MG/1
40 TABLET, FILM COATED ORAL DAILY
Status: DISCONTINUED | OUTPATIENT
Start: 2024-10-23 | End: 2024-10-25 | Stop reason: HOSPADM

## 2024-10-22 RX ORDER — TIZANIDINE 4 MG/1
4 TABLET ORAL EVERY 8 HOURS PRN
Status: DISCONTINUED | OUTPATIENT
Start: 2024-10-22 | End: 2024-10-25 | Stop reason: HOSPADM

## 2024-10-22 RX ORDER — DOXYCYCLINE HYCLATE 100 MG
100 TABLET ORAL
Status: COMPLETED | OUTPATIENT
Start: 2024-10-22 | End: 2024-10-22

## 2024-10-22 RX ORDER — IPRATROPIUM BROMIDE AND ALBUTEROL SULFATE 2.5; .5 MG/3ML; MG/3ML
3 SOLUTION RESPIRATORY (INHALATION)
Status: COMPLETED | OUTPATIENT
Start: 2024-10-22 | End: 2024-10-22

## 2024-10-22 RX ORDER — HYDROCODONE BITARTRATE AND ACETAMINOPHEN 10; 325 MG/1; MG/1
1 TABLET ORAL EVERY 6 HOURS PRN
Status: DISCONTINUED | OUTPATIENT
Start: 2024-10-22 | End: 2024-10-25 | Stop reason: HOSPADM

## 2024-10-22 RX ORDER — AZITHROMYCIN 250 MG/1
250 TABLET, FILM COATED ORAL DAILY
Status: DISCONTINUED | OUTPATIENT
Start: 2024-10-23 | End: 2024-10-25 | Stop reason: HOSPADM

## 2024-10-22 RX ADMIN — CEFEPIME 2 G: 2 INJECTION, POWDER, FOR SOLUTION INTRAVENOUS at 04:10

## 2024-10-22 RX ADMIN — IPRATROPIUM BROMIDE AND ALBUTEROL SULFATE 3 ML: .5; 2.5 SOLUTION RESPIRATORY (INHALATION) at 05:10

## 2024-10-22 RX ADMIN — IPRATROPIUM BROMIDE AND ALBUTEROL SULFATE 3 ML: .5; 2.5 SOLUTION RESPIRATORY (INHALATION) at 03:10

## 2024-10-22 RX ADMIN — SUCRALFATE 1 G: 1 TABLET ORAL at 08:10

## 2024-10-22 RX ADMIN — SODIUM CHLORIDE, POTASSIUM CHLORIDE, SODIUM LACTATE AND CALCIUM CHLORIDE 500 ML: 600; 310; 30; 20 INJECTION, SOLUTION INTRAVENOUS at 04:10

## 2024-10-22 RX ADMIN — METHYLPREDNISOLONE SODIUM SUCCINATE 125 MG: 125 INJECTION, POWDER, FOR SOLUTION INTRAMUSCULAR; INTRAVENOUS at 04:10

## 2024-10-22 RX ADMIN — VANCOMYCIN HYDROCHLORIDE 1000 MG: 1 INJECTION, POWDER, LYOPHILIZED, FOR SOLUTION INTRAVENOUS at 11:10

## 2024-10-22 RX ADMIN — SODIUM CHLORIDE, POTASSIUM CHLORIDE, SODIUM LACTATE AND CALCIUM CHLORIDE 428 ML: 600; 310; 30; 20 INJECTION, SOLUTION INTRAVENOUS at 06:10

## 2024-10-22 RX ADMIN — SODIUM CHLORIDE 1000 ML: 9 INJECTION, SOLUTION INTRAVENOUS at 08:10

## 2024-10-22 RX ADMIN — DOXYCYCLINE HYCLATE 100 MG: 100 TABLET, COATED ORAL at 04:10

## 2024-10-22 RX ADMIN — IPRATROPIUM BROMIDE AND ALBUTEROL SULFATE 3 ML: .5; 2.5 SOLUTION RESPIRATORY (INHALATION) at 07:10

## 2024-10-22 RX ADMIN — SODIUM CHLORIDE, POTASSIUM CHLORIDE, SODIUM LACTATE AND CALCIUM CHLORIDE 500 ML: 600; 310; 30; 20 INJECTION, SOLUTION INTRAVENOUS at 05:10

## 2024-10-22 RX ADMIN — ENOXAPARIN SODIUM 30 MG: 30 INJECTION SUBCUTANEOUS at 07:10

## 2024-10-23 ENCOUNTER — TELEPHONE (OUTPATIENT)
Dept: FAMILY MEDICINE | Facility: CLINIC | Age: 69
End: 2024-10-23
Payer: MEDICARE

## 2024-10-23 LAB
ANION GAP SERPL CALC-SCNC: 11 MMOL/L (ref 8–16)
BASOPHILS # BLD AUTO: ABNORMAL K/UL (ref 0–0.2)
BASOPHILS NFR BLD: 0 % (ref 0–1.9)
BUN SERPL-MCNC: 16 MG/DL (ref 8–23)
CALCIUM SERPL-MCNC: 8.4 MG/DL (ref 8.7–10.5)
CHLORIDE SERPL-SCNC: 111 MMOL/L (ref 95–110)
CO2 SERPL-SCNC: 16 MMOL/L (ref 23–29)
CREAT SERPL-MCNC: 0.6 MG/DL (ref 0.5–1.4)
DIFFERENTIAL METHOD BLD: ABNORMAL
EOSINOPHIL # BLD AUTO: ABNORMAL K/UL (ref 0–0.5)
EOSINOPHIL NFR BLD: 0 % (ref 0–8)
ERYTHROCYTE [DISTWIDTH] IN BLOOD BY AUTOMATED COUNT: 15.2 % (ref 11.5–14.5)
EST. GFR  (NO RACE VARIABLE): >60 ML/MIN/1.73 M^2
GLUCOSE SERPL-MCNC: 148 MG/DL (ref 70–110)
HCT VFR BLD AUTO: 30.9 % (ref 37–48.5)
HGB BLD-MCNC: 10.6 G/DL (ref 12–16)
IMM GRANULOCYTES # BLD AUTO: ABNORMAL K/UL (ref 0–0.04)
IMM GRANULOCYTES NFR BLD AUTO: ABNORMAL % (ref 0–0.5)
LACTATE SERPL-SCNC: 2 MMOL/L (ref 0.5–2.2)
LYMPHOCYTES # BLD AUTO: ABNORMAL K/UL (ref 1–4.8)
LYMPHOCYTES NFR BLD: 4 % (ref 18–48)
MCH RBC QN AUTO: 34.9 PG (ref 27–31)
MCHC RBC AUTO-ENTMCNC: 34.3 G/DL (ref 32–36)
MCV RBC AUTO: 102 FL (ref 82–98)
METAMYELOCYTES NFR BLD MANUAL: 3 %
MONOCYTES # BLD AUTO: ABNORMAL K/UL (ref 0.3–1)
MONOCYTES NFR BLD: 5 % (ref 4–15)
NEUTROPHILS NFR BLD: 64 % (ref 38–73)
NEUTS BAND NFR BLD MANUAL: 24 %
NRBC BLD-RTO: 0 /100 WBC
PLATELET # BLD AUTO: 412 K/UL (ref 150–450)
PMV BLD AUTO: 9.5 FL (ref 9.2–12.9)
POTASSIUM SERPL-SCNC: 3.5 MMOL/L (ref 3.5–5.1)
RBC # BLD AUTO: 3.04 M/UL (ref 4–5.4)
SODIUM SERPL-SCNC: 138 MMOL/L (ref 136–145)
WBC # BLD AUTO: 12.14 K/UL (ref 3.9–12.7)

## 2024-10-23 PROCEDURE — 63600175 PHARM REV CODE 636 W HCPCS: Performed by: INTERNAL MEDICINE

## 2024-10-23 PROCEDURE — 97162 PT EVAL MOD COMPLEX 30 MIN: CPT

## 2024-10-23 PROCEDURE — 80048 BASIC METABOLIC PNL TOTAL CA: CPT | Performed by: INTERNAL MEDICINE

## 2024-10-23 PROCEDURE — 85007 BL SMEAR W/DIFF WBC COUNT: CPT | Performed by: INTERNAL MEDICINE

## 2024-10-23 PROCEDURE — 83605 ASSAY OF LACTIC ACID: CPT | Performed by: INTERNAL MEDICINE

## 2024-10-23 PROCEDURE — 85027 COMPLETE CBC AUTOMATED: CPT | Performed by: INTERNAL MEDICINE

## 2024-10-23 PROCEDURE — 99900031 HC PATIENT EDUCATION (STAT)

## 2024-10-23 PROCEDURE — 25000003 PHARM REV CODE 250: Performed by: INTERNAL MEDICINE

## 2024-10-23 PROCEDURE — 63700000 PHARM REV CODE 250 ALT 637 W/O HCPCS: Performed by: INTERNAL MEDICINE

## 2024-10-23 PROCEDURE — 25000242 PHARM REV CODE 250 ALT 637 W/ HCPCS: Performed by: INTERNAL MEDICINE

## 2024-10-23 PROCEDURE — 21400001 HC TELEMETRY ROOM

## 2024-10-23 PROCEDURE — 97110 THERAPEUTIC EXERCISES: CPT

## 2024-10-23 PROCEDURE — 94761 N-INVAS EAR/PLS OXIMETRY MLT: CPT

## 2024-10-23 PROCEDURE — 94640 AIRWAY INHALATION TREATMENT: CPT

## 2024-10-23 PROCEDURE — 27000221 HC OXYGEN, UP TO 24 HOURS

## 2024-10-23 RX ORDER — METOPROLOL TARTRATE 25 MG/1
12.5 TABLET ORAL 2 TIMES DAILY
Status: DISCONTINUED | OUTPATIENT
Start: 2024-10-23 | End: 2024-10-25 | Stop reason: HOSPADM

## 2024-10-23 RX ORDER — IPRATROPIUM BROMIDE AND ALBUTEROL SULFATE 2.5; .5 MG/3ML; MG/3ML
SOLUTION RESPIRATORY (INHALATION)
Status: DISPENSED
Start: 2024-10-23 | End: 2024-10-23

## 2024-10-23 RX ORDER — IPRATROPIUM BROMIDE AND ALBUTEROL SULFATE 2.5; .5 MG/3ML; MG/3ML
3 SOLUTION RESPIRATORY (INHALATION) EVERY 4 HOURS PRN
Status: COMPLETED | OUTPATIENT
Start: 2024-10-23 | End: 2024-10-23

## 2024-10-23 RX ORDER — IPRATROPIUM BROMIDE AND ALBUTEROL SULFATE 2.5; .5 MG/3ML; MG/3ML
3 SOLUTION RESPIRATORY (INHALATION) EVERY 4 HOURS PRN
Status: DISCONTINUED | OUTPATIENT
Start: 2024-10-23 | End: 2024-10-25 | Stop reason: HOSPADM

## 2024-10-23 RX ADMIN — PREDNISONE 40 MG: 20 TABLET ORAL at 09:10

## 2024-10-23 RX ADMIN — CEFEPIME 2 G: 2 INJECTION, POWDER, FOR SOLUTION INTRAVENOUS at 05:10

## 2024-10-23 RX ADMIN — ATORVASTATIN CALCIUM 40 MG: 40 TABLET, FILM COATED ORAL at 09:10

## 2024-10-23 RX ADMIN — IPRATROPIUM BROMIDE AND ALBUTEROL SULFATE 3 ML: .5; 2.5 SOLUTION RESPIRATORY (INHALATION) at 07:10

## 2024-10-23 RX ADMIN — ENOXAPARIN SODIUM 30 MG: 30 INJECTION SUBCUTANEOUS at 04:10

## 2024-10-23 RX ADMIN — IPRATROPIUM BROMIDE AND ALBUTEROL SULFATE 3 ML: .5; 2.5 SOLUTION RESPIRATORY (INHALATION) at 04:10

## 2024-10-23 RX ADMIN — SODIUM CHLORIDE 500 ML: 9 INJECTION, SOLUTION INTRAVENOUS at 05:10

## 2024-10-23 RX ADMIN — SUCRALFATE 1 G: 1 TABLET ORAL at 08:10

## 2024-10-23 RX ADMIN — SUCRALFATE 1 G: 1 TABLET ORAL at 04:10

## 2024-10-23 RX ADMIN — METOPROLOL TARTRATE 12.5 MG: 25 TABLET, FILM COATED ORAL at 08:10

## 2024-10-23 RX ADMIN — SUCRALFATE 1 G: 1 TABLET ORAL at 05:10

## 2024-10-23 RX ADMIN — VANCOMYCIN HYDROCHLORIDE 750 MG: 750 INJECTION, POWDER, LYOPHILIZED, FOR SOLUTION INTRAVENOUS at 09:10

## 2024-10-23 RX ADMIN — LOSARTAN POTASSIUM 100 MG: 25 TABLET, FILM COATED ORAL at 09:10

## 2024-10-23 RX ADMIN — CEFEPIME 2 G: 2 INJECTION, POWDER, FOR SOLUTION INTRAVENOUS at 04:10

## 2024-10-23 RX ADMIN — AZITHROMYCIN DIHYDRATE 250 MG: 250 TABLET ORAL at 09:10

## 2024-10-23 RX ADMIN — IPRATROPIUM BROMIDE AND ALBUTEROL SULFATE 3 ML: .5; 2.5 SOLUTION RESPIRATORY (INHALATION) at 12:10

## 2024-10-23 RX ADMIN — PANTOPRAZOLE SODIUM 40 MG: 40 TABLET, DELAYED RELEASE ORAL at 09:10

## 2024-10-23 RX ADMIN — SUCRALFATE 1 G: 1 TABLET ORAL at 11:10

## 2024-10-23 NOTE — TELEPHONE ENCOUNTER
----- Message from Halima sent at 10/23/2024  3:13 PM CDT -----  She is trying to go ahead and her scheduled for hospital f/u because she missed her appt this morning due to being in hospital in [place].  ----- Message -----  From: Tracy Pandey LPN  Sent: 10/23/2024   3:04 PM CDT  To: Halima Ashley    Check your appts.. discharge date 11/25/24?  ----- Message -----  From: Halima Ashley  Sent: 10/23/2024   3:01 PM CDT  To: [facility] Clinical Support    Type:  Appointment Request for Hospital F/U    Caller is requesting a Hospital F/U appointment.      Name of Caller:  Amanda from [facility] in [place]    When is the first available appointment?   11/4/24    Would the patient rather a call back or a response via [facility]?   Call back    Best Call Back Number:   [phone]    Additional Information:  Needing to get her in by 11/1/24. She is expected to discharge 11/25/24.  Please call back to advise. Thanks!

## 2024-10-24 ENCOUNTER — PATIENT MESSAGE (OUTPATIENT)
Dept: FAMILY MEDICINE | Facility: CLINIC | Age: 69
End: 2024-10-24
Payer: MEDICARE

## 2024-10-24 LAB
ANION GAP SERPL CALC-SCNC: 8 MMOL/L (ref 8–16)
BASOPHILS # BLD AUTO: ABNORMAL K/UL (ref 0–0.2)
BASOPHILS NFR BLD: 0 % (ref 0–1.9)
BUN SERPL-MCNC: 18 MG/DL (ref 8–23)
CALCIUM SERPL-MCNC: 8.4 MG/DL (ref 8.7–10.5)
CHLORIDE SERPL-SCNC: 112 MMOL/L (ref 95–110)
CO2 SERPL-SCNC: 19 MMOL/L (ref 23–29)
CREAT SERPL-MCNC: 0.6 MG/DL (ref 0.5–1.4)
CRP SERPL-MCNC: 142 MG/L (ref 0–8.2)
DIFFERENTIAL METHOD BLD: ABNORMAL
EOSINOPHIL # BLD AUTO: ABNORMAL K/UL (ref 0–0.5)
EOSINOPHIL NFR BLD: 0 % (ref 0–8)
ERYTHROCYTE [DISTWIDTH] IN BLOOD BY AUTOMATED COUNT: 15.7 % (ref 11.5–14.5)
EST. GFR  (NO RACE VARIABLE): >60 ML/MIN/1.73 M^2
FERRITIN SERPL-MCNC: 328 NG/ML (ref 20–300)
GLUCOSE SERPL-MCNC: 96 MG/DL (ref 70–110)
HCT VFR BLD AUTO: 31.2 % (ref 37–48.5)
HGB BLD-MCNC: 10.5 G/DL (ref 12–16)
IMM GRANULOCYTES # BLD AUTO: ABNORMAL K/UL (ref 0–0.04)
IMM GRANULOCYTES NFR BLD AUTO: ABNORMAL % (ref 0–0.5)
LYMPHOCYTES # BLD AUTO: ABNORMAL K/UL (ref 1–4.8)
LYMPHOCYTES NFR BLD: 6 % (ref 18–48)
MCH RBC QN AUTO: 35.2 PG (ref 27–31)
MCHC RBC AUTO-ENTMCNC: 33.7 G/DL (ref 32–36)
MCV RBC AUTO: 105 FL (ref 82–98)
MONOCYTES # BLD AUTO: ABNORMAL K/UL (ref 0.3–1)
MONOCYTES NFR BLD: 3 % (ref 4–15)
NEUTROPHILS NFR BLD: 85 % (ref 38–73)
NEUTS BAND NFR BLD MANUAL: 6 %
NRBC BLD-RTO: 0 /100 WBC
OHS QRS DURATION: 58 MS
OHS QTC CALCULATION: 461 MS
PLATELET # BLD AUTO: 443 K/UL (ref 150–450)
PMV BLD AUTO: 9.3 FL (ref 9.2–12.9)
POTASSIUM SERPL-SCNC: 3.7 MMOL/L (ref 3.5–5.1)
RBC # BLD AUTO: 2.98 M/UL (ref 4–5.4)
RETICS/RBC NFR AUTO: 2.4 % (ref 0.5–2.5)
SODIUM SERPL-SCNC: 139 MMOL/L (ref 136–145)
VANCOMYCIN TROUGH SERPL-MCNC: 3.9 UG/ML (ref 10–22)
WBC # BLD AUTO: 16.33 K/UL (ref 3.9–12.7)

## 2024-10-24 PROCEDURE — 99900035 HC TECH TIME PER 15 MIN (STAT)

## 2024-10-24 PROCEDURE — 25000242 PHARM REV CODE 250 ALT 637 W/ HCPCS: Performed by: INTERNAL MEDICINE

## 2024-10-24 PROCEDURE — 27000646 HC AEROBIKA DEVICE

## 2024-10-24 PROCEDURE — 63600175 PHARM REV CODE 636 W HCPCS: Performed by: INTERNAL MEDICINE

## 2024-10-24 PROCEDURE — 94799 UNLISTED PULMONARY SVC/PX: CPT

## 2024-10-24 PROCEDURE — 25000003 PHARM REV CODE 250: Performed by: INTERNAL MEDICINE

## 2024-10-24 PROCEDURE — 85045 AUTOMATED RETICULOCYTE COUNT: CPT | Performed by: INTERNAL MEDICINE

## 2024-10-24 PROCEDURE — 86140 C-REACTIVE PROTEIN: CPT | Performed by: INTERNAL MEDICINE

## 2024-10-24 PROCEDURE — 85027 COMPLETE CBC AUTOMATED: CPT | Performed by: INTERNAL MEDICINE

## 2024-10-24 PROCEDURE — 94761 N-INVAS EAR/PLS OXIMETRY MLT: CPT

## 2024-10-24 PROCEDURE — 21400001 HC TELEMETRY ROOM

## 2024-10-24 PROCEDURE — 97530 THERAPEUTIC ACTIVITIES: CPT

## 2024-10-24 PROCEDURE — 63700000 PHARM REV CODE 250 ALT 637 W/O HCPCS: Performed by: INTERNAL MEDICINE

## 2024-10-24 PROCEDURE — 94640 AIRWAY INHALATION TREATMENT: CPT

## 2024-10-24 PROCEDURE — 99900031 HC PATIENT EDUCATION (STAT)

## 2024-10-24 PROCEDURE — 85007 BL SMEAR W/DIFF WBC COUNT: CPT | Performed by: INTERNAL MEDICINE

## 2024-10-24 PROCEDURE — 80048 BASIC METABOLIC PNL TOTAL CA: CPT | Performed by: INTERNAL MEDICINE

## 2024-10-24 PROCEDURE — 63600175 PHARM REV CODE 636 W HCPCS: Performed by: NURSE PRACTITIONER

## 2024-10-24 PROCEDURE — 94664 DEMO&/EVAL PT USE INHALER: CPT

## 2024-10-24 PROCEDURE — 80202 ASSAY OF VANCOMYCIN: CPT | Performed by: INTERNAL MEDICINE

## 2024-10-24 PROCEDURE — 82728 ASSAY OF FERRITIN: CPT | Performed by: INTERNAL MEDICINE

## 2024-10-24 RX ORDER — PROCHLORPERAZINE EDISYLATE 5 MG/ML
5 INJECTION INTRAMUSCULAR; INTRAVENOUS EVERY 6 HOURS PRN
Status: DISCONTINUED | OUTPATIENT
Start: 2024-10-24 | End: 2024-10-25 | Stop reason: HOSPADM

## 2024-10-24 RX ORDER — AMLODIPINE BESYLATE 5 MG/1
5 TABLET ORAL DAILY
Status: DISCONTINUED | OUTPATIENT
Start: 2024-10-24 | End: 2024-10-25 | Stop reason: HOSPADM

## 2024-10-24 RX ORDER — ONDANSETRON HYDROCHLORIDE 2 MG/ML
4 INJECTION, SOLUTION INTRAVENOUS EVERY 6 HOURS PRN
Status: DISCONTINUED | OUTPATIENT
Start: 2024-10-24 | End: 2024-10-25 | Stop reason: HOSPADM

## 2024-10-24 RX ADMIN — IPRATROPIUM BROMIDE AND ALBUTEROL SULFATE 3 ML: .5; 2.5 SOLUTION RESPIRATORY (INHALATION) at 12:10

## 2024-10-24 RX ADMIN — SUCRALFATE 1 G: 1 TABLET ORAL at 08:10

## 2024-10-24 RX ADMIN — PANTOPRAZOLE SODIUM 40 MG: 40 TABLET, DELAYED RELEASE ORAL at 09:10

## 2024-10-24 RX ADMIN — METOPROLOL TARTRATE 12.5 MG: 25 TABLET, FILM COATED ORAL at 08:10

## 2024-10-24 RX ADMIN — VANCOMYCIN HYDROCHLORIDE 750 MG: 750 INJECTION, POWDER, LYOPHILIZED, FOR SOLUTION INTRAVENOUS at 10:10

## 2024-10-24 RX ADMIN — IPRATROPIUM BROMIDE AND ALBUTEROL SULFATE 3 ML: .5; 2.5 SOLUTION RESPIRATORY (INHALATION) at 06:10

## 2024-10-24 RX ADMIN — IPRATROPIUM BROMIDE AND ALBUTEROL SULFATE 3 ML: .5; 2.5 SOLUTION RESPIRATORY (INHALATION) at 04:10

## 2024-10-24 RX ADMIN — IPRATROPIUM BROMIDE AND ALBUTEROL SULFATE 3 ML: .5; 2.5 SOLUTION RESPIRATORY (INHALATION) at 07:10

## 2024-10-24 RX ADMIN — ATORVASTATIN CALCIUM 40 MG: 40 TABLET, FILM COATED ORAL at 09:10

## 2024-10-24 RX ADMIN — SUCRALFATE 1 G: 1 TABLET ORAL at 04:10

## 2024-10-24 RX ADMIN — HYDROCODONE BITARTRATE AND ACETAMINOPHEN 1 TABLET: 10; 325 TABLET ORAL at 07:10

## 2024-10-24 RX ADMIN — PREDNISONE 40 MG: 20 TABLET ORAL at 09:10

## 2024-10-24 RX ADMIN — CEFEPIME 2 G: 2 INJECTION, POWDER, FOR SOLUTION INTRAVENOUS at 05:10

## 2024-10-24 RX ADMIN — ONDANSETRON 4 MG: 2 INJECTION INTRAMUSCULAR; INTRAVENOUS at 05:10

## 2024-10-24 RX ADMIN — AMLODIPINE BESYLATE 5 MG: 5 TABLET ORAL at 09:10

## 2024-10-24 RX ADMIN — ENOXAPARIN SODIUM 30 MG: 30 INJECTION SUBCUTANEOUS at 04:10

## 2024-10-24 RX ADMIN — METOPROLOL TARTRATE 12.5 MG: 25 TABLET, FILM COATED ORAL at 09:10

## 2024-10-24 RX ADMIN — AZITHROMYCIN DIHYDRATE 250 MG: 250 TABLET ORAL at 09:10

## 2024-10-24 RX ADMIN — LOSARTAN POTASSIUM 100 MG: 25 TABLET, FILM COATED ORAL at 09:10

## 2024-10-24 RX ADMIN — CEFEPIME 2 G: 2 INJECTION, POWDER, FOR SOLUTION INTRAVENOUS at 04:10

## 2024-10-25 ENCOUNTER — TELEPHONE (OUTPATIENT)
Dept: ADMINISTRATIVE | Facility: CLINIC | Age: 69
End: 2024-10-25
Payer: MEDICARE

## 2024-10-25 ENCOUNTER — PATIENT OUTREACH (OUTPATIENT)
Dept: ADMINISTRATIVE | Facility: OTHER | Age: 69
End: 2024-10-25
Payer: MEDICARE

## 2024-10-25 VITALS
HEIGHT: 57 IN | OXYGEN SATURATION: 96 % | TEMPERATURE: 99 F | RESPIRATION RATE: 20 BRPM | WEIGHT: 90.81 LBS | DIASTOLIC BLOOD PRESSURE: 64 MMHG | HEART RATE: 89 BPM | BODY MASS INDEX: 19.59 KG/M2 | SYSTOLIC BLOOD PRESSURE: 145 MMHG

## 2024-10-25 LAB
ALBUMIN SERPL BCP-MCNC: 2.5 G/DL (ref 3.5–5.2)
ALP SERPL-CCNC: 75 U/L (ref 40–150)
ALT SERPL W/O P-5'-P-CCNC: 17 U/L (ref 10–44)
ANION GAP SERPL CALC-SCNC: 9 MMOL/L (ref 8–16)
AST SERPL-CCNC: 16 U/L (ref 10–40)
BASOPHILS # BLD AUTO: 0.02 K/UL (ref 0–0.2)
BASOPHILS NFR BLD: 0.2 % (ref 0–1.9)
BILIRUB SERPL-MCNC: 0.3 MG/DL (ref 0.1–1)
BUN SERPL-MCNC: 18 MG/DL (ref 8–23)
CALCIUM SERPL-MCNC: 8.8 MG/DL (ref 8.7–10.5)
CHLORIDE SERPL-SCNC: 113 MMOL/L (ref 95–110)
CO2 SERPL-SCNC: 19 MMOL/L (ref 23–29)
CREAT SERPL-MCNC: 0.6 MG/DL (ref 0.5–1.4)
DIFFERENTIAL METHOD BLD: ABNORMAL
EOSINOPHIL # BLD AUTO: 0 K/UL (ref 0–0.5)
EOSINOPHIL NFR BLD: 0.2 % (ref 0–8)
ERYTHROCYTE [DISTWIDTH] IN BLOOD BY AUTOMATED COUNT: 15.3 % (ref 11.5–14.5)
EST. GFR  (NO RACE VARIABLE): >60 ML/MIN/1.73 M^2
GLUCOSE SERPL-MCNC: 82 MG/DL (ref 70–110)
HCT VFR BLD AUTO: 30.7 % (ref 37–48.5)
HGB BLD-MCNC: 10.4 G/DL (ref 12–16)
IMM GRANULOCYTES # BLD AUTO: 0.04 K/UL (ref 0–0.04)
IMM GRANULOCYTES NFR BLD AUTO: 0.4 % (ref 0–0.5)
LYMPHOCYTES # BLD AUTO: 1.4 K/UL (ref 1–4.8)
LYMPHOCYTES NFR BLD: 14.2 % (ref 18–48)
MAGNESIUM SERPL-MCNC: 1.8 MG/DL (ref 1.6–2.6)
MCH RBC QN AUTO: 35.1 PG (ref 27–31)
MCHC RBC AUTO-ENTMCNC: 33.9 G/DL (ref 32–36)
MCV RBC AUTO: 104 FL (ref 82–98)
MONOCYTES # BLD AUTO: 1 K/UL (ref 0.3–1)
MONOCYTES NFR BLD: 10.4 % (ref 4–15)
NEUTROPHILS # BLD AUTO: 7.3 K/UL (ref 1.8–7.7)
NEUTROPHILS NFR BLD: 74.6 % (ref 38–73)
NRBC BLD-RTO: 0 /100 WBC
PHOSPHATE SERPL-MCNC: 1.8 MG/DL (ref 2.7–4.5)
PLATELET # BLD AUTO: 452 K/UL (ref 150–450)
PMV BLD AUTO: 8.8 FL (ref 9.2–12.9)
POTASSIUM SERPL-SCNC: 3.5 MMOL/L (ref 3.5–5.1)
PROT SERPL-MCNC: 5.8 G/DL (ref 6–8.4)
RBC # BLD AUTO: 2.96 M/UL (ref 4–5.4)
SODIUM SERPL-SCNC: 141 MMOL/L (ref 136–145)
WBC # BLD AUTO: 9.81 K/UL (ref 3.9–12.7)

## 2024-10-25 PROCEDURE — 63700000 PHARM REV CODE 250 ALT 637 W/O HCPCS: Performed by: INTERNAL MEDICINE

## 2024-10-25 PROCEDURE — 94761 N-INVAS EAR/PLS OXIMETRY MLT: CPT

## 2024-10-25 PROCEDURE — 25000242 PHARM REV CODE 250 ALT 637 W/ HCPCS: Performed by: INTERNAL MEDICINE

## 2024-10-25 PROCEDURE — 85025 COMPLETE CBC W/AUTO DIFF WBC: CPT | Performed by: STUDENT IN AN ORGANIZED HEALTH CARE EDUCATION/TRAINING PROGRAM

## 2024-10-25 PROCEDURE — 36415 COLL VENOUS BLD VENIPUNCTURE: CPT | Performed by: STUDENT IN AN ORGANIZED HEALTH CARE EDUCATION/TRAINING PROGRAM

## 2024-10-25 PROCEDURE — 99238 HOSP IP/OBS DSCHRG MGMT 30/<: CPT | Mod: ,,, | Performed by: STUDENT IN AN ORGANIZED HEALTH CARE EDUCATION/TRAINING PROGRAM

## 2024-10-25 PROCEDURE — 25000003 PHARM REV CODE 250: Performed by: STUDENT IN AN ORGANIZED HEALTH CARE EDUCATION/TRAINING PROGRAM

## 2024-10-25 PROCEDURE — 99900035 HC TECH TIME PER 15 MIN (STAT)

## 2024-10-25 PROCEDURE — 83735 ASSAY OF MAGNESIUM: CPT | Performed by: STUDENT IN AN ORGANIZED HEALTH CARE EDUCATION/TRAINING PROGRAM

## 2024-10-25 PROCEDURE — 1111F DSCHRG MED/CURRENT MED MERGE: CPT | Mod: CPTII,,, | Performed by: STUDENT IN AN ORGANIZED HEALTH CARE EDUCATION/TRAINING PROGRAM

## 2024-10-25 PROCEDURE — 25000003 PHARM REV CODE 250: Performed by: INTERNAL MEDICINE

## 2024-10-25 PROCEDURE — 84100 ASSAY OF PHOSPHORUS: CPT | Performed by: STUDENT IN AN ORGANIZED HEALTH CARE EDUCATION/TRAINING PROGRAM

## 2024-10-25 PROCEDURE — 97165 OT EVAL LOW COMPLEX 30 MIN: CPT

## 2024-10-25 PROCEDURE — 63600175 PHARM REV CODE 636 W HCPCS: Performed by: INTERNAL MEDICINE

## 2024-10-25 PROCEDURE — 97110 THERAPEUTIC EXERCISES: CPT

## 2024-10-25 PROCEDURE — 80053 COMPREHEN METABOLIC PANEL: CPT | Performed by: STUDENT IN AN ORGANIZED HEALTH CARE EDUCATION/TRAINING PROGRAM

## 2024-10-25 RX ORDER — SODIUM,POTASSIUM PHOSPHATES 280-250MG
2 POWDER IN PACKET (EA) ORAL EVERY 4 HOURS
Status: COMPLETED | OUTPATIENT
Start: 2024-10-25 | End: 2024-10-25

## 2024-10-25 RX ORDER — PREDNISONE 20 MG/1
40 TABLET ORAL DAILY
Qty: 6 TABLET | Refills: 0 | Status: SHIPPED | OUTPATIENT
Start: 2024-10-26 | End: 2024-10-29

## 2024-10-25 RX ORDER — LEVOFLOXACIN 750 MG/1
750 TABLET ORAL DAILY
Qty: 4 TABLET | Refills: 0 | Status: SHIPPED | OUTPATIENT
Start: 2024-10-25 | End: 2024-10-29

## 2024-10-25 RX ADMIN — AMLODIPINE BESYLATE 5 MG: 5 TABLET ORAL at 08:10

## 2024-10-25 RX ADMIN — AZITHROMYCIN DIHYDRATE 250 MG: 250 TABLET ORAL at 08:10

## 2024-10-25 RX ADMIN — PANTOPRAZOLE SODIUM 40 MG: 40 TABLET, DELAYED RELEASE ORAL at 08:10

## 2024-10-25 RX ADMIN — POTASSIUM & SODIUM PHOSPHATES POWDER PACK 280-160-250 MG 2 PACKET: 280-160-250 PACK at 08:10

## 2024-10-25 RX ADMIN — SUCRALFATE 1 G: 1 TABLET ORAL at 05:10

## 2024-10-25 RX ADMIN — PREDNISONE 40 MG: 20 TABLET ORAL at 08:10

## 2024-10-25 RX ADMIN — METOPROLOL TARTRATE 12.5 MG: 25 TABLET, FILM COATED ORAL at 08:10

## 2024-10-25 RX ADMIN — HYDROCODONE BITARTRATE AND ACETAMINOPHEN 1 TABLET: 10; 325 TABLET ORAL at 08:10

## 2024-10-25 RX ADMIN — POTASSIUM & SODIUM PHOSPHATES POWDER PACK 280-160-250 MG 2 PACKET: 280-160-250 PACK at 11:10

## 2024-10-25 RX ADMIN — IPRATROPIUM BROMIDE AND ALBUTEROL SULFATE 3 ML: .5; 2.5 SOLUTION RESPIRATORY (INHALATION) at 06:10

## 2024-10-25 RX ADMIN — ATORVASTATIN CALCIUM 40 MG: 40 TABLET, FILM COATED ORAL at 08:10

## 2024-10-25 RX ADMIN — LOSARTAN POTASSIUM 100 MG: 25 TABLET, FILM COATED ORAL at 08:10

## 2024-10-25 RX ADMIN — CEFEPIME 2 G: 2 INJECTION, POWDER, FOR SOLUTION INTRAVENOUS at 05:10

## 2024-10-26 PROCEDURE — G0180 MD CERTIFICATION HHA PATIENT: HCPCS | Mod: ,,, | Performed by: STUDENT IN AN ORGANIZED HEALTH CARE EDUCATION/TRAINING PROGRAM

## 2024-10-28 ENCOUNTER — PATIENT OUTREACH (OUTPATIENT)
Dept: ADMINISTRATIVE | Facility: CLINIC | Age: 69
End: 2024-10-28
Payer: MEDICARE

## 2024-10-28 LAB
BACTERIA BLD CULT: NORMAL
BACTERIA BLD CULT: NORMAL

## 2024-10-29 DIAGNOSIS — J44.9 CHRONIC OBSTRUCTIVE PULMONARY DISEASE, UNSPECIFIED COPD TYPE: ICD-10-CM

## 2024-10-29 RX ORDER — IPRATROPIUM BROMIDE AND ALBUTEROL SULFATE 2.5; .5 MG/3ML; MG/3ML
3 SOLUTION RESPIRATORY (INHALATION) EVERY 6 HOURS PRN
Qty: 1080 ML | Refills: 3 | Status: SHIPPED | OUTPATIENT
Start: 2024-10-29 | End: 2024-10-31 | Stop reason: SDUPTHER

## 2024-10-30 ENCOUNTER — LAB VISIT (OUTPATIENT)
Dept: LAB | Facility: HOSPITAL | Age: 69
End: 2024-10-30
Attending: STUDENT IN AN ORGANIZED HEALTH CARE EDUCATION/TRAINING PROGRAM
Payer: MEDICARE

## 2024-10-30 ENCOUNTER — TELEPHONE (OUTPATIENT)
Dept: FAMILY MEDICINE | Facility: CLINIC | Age: 69
End: 2024-10-30
Payer: MEDICARE

## 2024-10-30 DIAGNOSIS — J44.9 COPD (CHRONIC OBSTRUCTIVE PULMONARY DISEASE): ICD-10-CM

## 2024-10-30 DIAGNOSIS — J43.1 PANLOBULAR EMPHYSEMA: Primary | ICD-10-CM

## 2024-10-30 DIAGNOSIS — J18.9 UNRESOLVED PNEUMONIA: Primary | ICD-10-CM

## 2024-10-30 LAB
ANION GAP SERPL CALC-SCNC: 11 MMOL/L (ref 8–16)
BUN SERPL-MCNC: 23 MG/DL (ref 8–23)
CALCIUM SERPL-MCNC: 9.4 MG/DL (ref 8.7–10.5)
CHLORIDE SERPL-SCNC: 103 MMOL/L (ref 95–110)
CO2 SERPL-SCNC: 25 MMOL/L (ref 23–29)
CREAT SERPL-MCNC: 0.6 MG/DL (ref 0.5–1.4)
EST. GFR  (NO RACE VARIABLE): >60 ML/MIN/1.73 M^2
GLUCOSE SERPL-MCNC: 113 MG/DL (ref 70–110)
POTASSIUM SERPL-SCNC: 5 MMOL/L (ref 3.5–5.1)
SODIUM SERPL-SCNC: 139 MMOL/L (ref 136–145)

## 2024-10-30 PROCEDURE — 36415 COLL VENOUS BLD VENIPUNCTURE: CPT | Performed by: STUDENT IN AN ORGANIZED HEALTH CARE EDUCATION/TRAINING PROGRAM

## 2024-10-30 PROCEDURE — 80048 BASIC METABOLIC PNL TOTAL CA: CPT | Performed by: STUDENT IN AN ORGANIZED HEALTH CARE EDUCATION/TRAINING PROGRAM

## 2024-10-31 ENCOUNTER — TELEPHONE (OUTPATIENT)
Dept: FAMILY MEDICINE | Facility: CLINIC | Age: 69
End: 2024-10-31
Payer: MEDICARE

## 2024-10-31 DIAGNOSIS — J44.9 CHRONIC OBSTRUCTIVE PULMONARY DISEASE, UNSPECIFIED COPD TYPE: ICD-10-CM

## 2024-10-31 RX ORDER — IPRATROPIUM BROMIDE AND ALBUTEROL SULFATE 2.5; .5 MG/3ML; MG/3ML
3 SOLUTION RESPIRATORY (INHALATION) EVERY 6 HOURS PRN
Qty: 1080 ML | Refills: 3 | Status: SHIPPED | OUTPATIENT
Start: 2024-10-31

## 2024-11-04 ENCOUNTER — OFFICE VISIT (OUTPATIENT)
Dept: FAMILY MEDICINE | Facility: CLINIC | Age: 69
End: 2024-11-04
Payer: MEDICARE

## 2024-11-04 ENCOUNTER — LAB VISIT (OUTPATIENT)
Dept: LAB | Facility: HOSPITAL | Age: 69
End: 2024-11-04
Attending: STUDENT IN AN ORGANIZED HEALTH CARE EDUCATION/TRAINING PROGRAM
Payer: MEDICARE

## 2024-11-04 ENCOUNTER — TELEPHONE (OUTPATIENT)
Dept: FAMILY MEDICINE | Facility: CLINIC | Age: 69
End: 2024-11-04
Payer: MEDICARE

## 2024-11-04 VITALS
OXYGEN SATURATION: 96 % | HEIGHT: 57 IN | HEART RATE: 100 BPM | DIASTOLIC BLOOD PRESSURE: 72 MMHG | SYSTOLIC BLOOD PRESSURE: 134 MMHG | BODY MASS INDEX: 20.24 KG/M2 | WEIGHT: 93.81 LBS

## 2024-11-04 DIAGNOSIS — D64.9 ANEMIA, UNSPECIFIED: ICD-10-CM

## 2024-11-04 DIAGNOSIS — Z23 ENCOUNTER FOR VACCINATION: ICD-10-CM

## 2024-11-04 DIAGNOSIS — J18.9 UNRESOLVED PNEUMONIA: Primary | ICD-10-CM

## 2024-11-04 DIAGNOSIS — J43.2 CENTRILOBULAR EMPHYSEMA: Primary | ICD-10-CM

## 2024-11-04 LAB
ANION GAP SERPL CALC-SCNC: 12 MMOL/L (ref 8–16)
BUN SERPL-MCNC: 25 MG/DL (ref 8–23)
CALCIUM SERPL-MCNC: 9.6 MG/DL (ref 8.7–10.5)
CHLORIDE SERPL-SCNC: 105 MMOL/L (ref 95–110)
CO2 SERPL-SCNC: 20 MMOL/L (ref 23–29)
CREAT SERPL-MCNC: 0.6 MG/DL (ref 0.5–1.4)
EST. GFR  (NO RACE VARIABLE): >60 ML/MIN/1.73 M^2
GLUCOSE SERPL-MCNC: 105 MG/DL (ref 70–110)
POTASSIUM SERPL-SCNC: 4.5 MMOL/L (ref 3.5–5.1)
SODIUM SERPL-SCNC: 137 MMOL/L (ref 136–145)

## 2024-11-04 PROCEDURE — 99999 PR PBB SHADOW E&M-EST. PATIENT-LVL IV: CPT | Mod: PBBFAC,,, | Performed by: STUDENT IN AN ORGANIZED HEALTH CARE EDUCATION/TRAINING PROGRAM

## 2024-11-04 PROCEDURE — 36415 COLL VENOUS BLD VENIPUNCTURE: CPT | Performed by: STUDENT IN AN ORGANIZED HEALTH CARE EDUCATION/TRAINING PROGRAM

## 2024-11-04 PROCEDURE — 80048 BASIC METABOLIC PNL TOTAL CA: CPT | Performed by: STUDENT IN AN ORGANIZED HEALTH CARE EDUCATION/TRAINING PROGRAM

## 2024-11-04 RX ORDER — PREDNISONE 5 MG/1
5 TABLET ORAL
COMMUNITY
Start: 2024-10-21

## 2024-11-04 NOTE — TELEPHONE ENCOUNTER
----- Message from Adriano sent at 11/4/2024  3:56 PM CST -----  Regarding: order  Contact: Ann Olivas  Type:  Patient Returning Call    Who Called:Ann Olivas  Who Left Message for Patient:nurse  Does the patient know what this is regarding?:order for a nebulizer sent to [facility]/ please fax to: [phone] and # [phone]  Would the patient rather a call back or a response via [facility]? Please send today  Best Call Back Number:[phone]  Additional Information: please let patient know order has been sent

## 2024-11-04 NOTE — TELEPHONE ENCOUNTER
Pt informed nebulizer sent to [facility]. Pt notified and voiced understanding.

## 2024-11-04 NOTE — TELEPHONE ENCOUNTER
----- Message from Connor sent at 11/4/2024  4:09 PM CST -----  Regarding: Returning call  Type:  Patient Returning Call    Who Called:PT    Who Left Message for Patient:Tracy    Does the patient know what this is regarding?:    Would the patient rather a call back or a response via [facility]? Call back    Best Call Back Number:[phone]      Additional Information:   Please advise -- Thank you

## 2024-11-04 NOTE — TELEPHONE ENCOUNTER
Nebulizer order sent to [facility] 10/31/24.   Attempted to contact pt to notify, no answer.   TYRONE

## 2024-11-05 NOTE — PROGRESS NOTES
SUBJECTIVE:    CHIEF COMPLAINT:   Chief Complaint   Patient presents with    Follow-up     Hospital f/u           274}    HISTORY OF PRESENT ILLNESS:  Janey Null is a 68 y.o. female who presents to the clinic today   History of Present Illness    CHIEF COMPLAINT:  Ms. Null presents today for follow-up after recent hospitalization for pneumonia.    RECENT PNEUMONIA AND RESPIRATORY STATUS:  She reports two recent hospitalizations for pneumonia causing shortness of breath and fever. She finished course of antibiotics upon discharge. Her respiratory status is improving daily, but she still experiences shortness of breath, worse in the mornings. She uses nebulizer treatments thrice daily when breathing is difficult, a Trelegy device daily for respiratory support, and has an albuterol inhaler for rescue use.    COPD:  She reports a history of COPD diagnosis but denies ever having undergone a pulmonary function test, including at the time of initial diagnosis.    RHEUMATOID ARTHRITIS:  She is under the care rheumatologist in [place] for management of rheumatoid arthritis.    HOME HEALTH CARE:  Following recent hospitalization, she receives weekly nurse visits for services including labs. Occupational therapy and physical therapy services are provided twice weekly.    BONE HEALTH:  A bone density screen earlier this year revealed weak bones. She confirms long-term use of low-dose prednisone for chronic condition management, aware of the need for ongoing bone health monitoring due to increased fracture risk.    MEDICATIONS:  She uses nebulizer treatments, a Trelegy device, and an albuterol inhaler for respiratory support. She also takes long-term, low-dose prednisone.    SOCIAL HISTORY:  She denies feeling stressed, anxious, or overwhelmed. She reports occasional worries but denies constant or excessive worry or trouble falling asleep due to racing thoughts.      ROS:  General: +fever, -chills, -fatigue,  -weight gain, -weight loss  Eyes: -vision changes, -redness, -discharge  ENT: -ear pain, -nasal congestion, -sore throat  Cardiovascular: -chest pain, -palpitations, -lower extremity edema  Respiratory: -cough, +shortness of breath  Gastrointestinal: -abdominal pain, -nausea, -vomiting, -diarrhea, -constipation, -blood in stool  Genitourinary: -dysuria, -hematuria, -frequency  Musculoskeletal: -joint pain, -muscle pain  Skin: -rash, -lesion  Neurological: -headache, -dizziness, -numbness, -tingling  Psychiatric: -anxiety, -depression, -sleep difficulty          PAST MEDICAL HISTORY:     274}  Past Medical History:   Diagnosis Date    Anxiety     Depression     Encounter for long-term (current) use of high-risk medication     Fibromyalgia     Osteoporosis     Rheumatoid arthritis involving multiple sites with positive rheumatoid factor        PAST SURGICAL HISTORY:  Past Surgical History:   Procedure Laterality Date    ESOPHAGOGASTRODUODENOSCOPY N/A 2023    Procedure: EGD (ESOPHAGOGASTRODUODENOSCOPY);  Surgeon: Tyrell Cornelius MD;  Location: [facility] ENDO;  Service: General;  Laterality: N/A;    REPAIR OF RECURRENT INCARCERATED INCISIONAL HERNIA Left 2022    Procedure: REPAIR, HERNIA--RIGHT LATERAL POSITIONING;  Surgeon: Tyrell Cornelius MD;  Location: [facility] OR;  Service: General;  Laterality: Left;  left lumbar hernia    right leg       multiple surgeries ,amputation       SOCIAL HISTORY:  Social History     Socioeconomic History    Marital status:    Tobacco Use    Smoking status: Every Day     Current packs/day: 0.00     Average packs/day: 1 pack/day for 61.0 years (61.0 ttl pk-yrs)     Types: Cigarettes     Start date:      Last attempt to quit: 2023     Years since quittin.5    Smokeless tobacco: Never   Substance and Sexual Activity    Alcohol use: No    Drug use: Never    Sexual activity: Not Currently     Social Drivers of Health     Financial Resource Strain: Low Risk   Symptoms reduced/Coping skills acquired/Insight displayed/Accepted support (10/23/2024)    Overall Financial Resource Strain (CARDIA)     Difficulty of Paying Living Expenses: Not hard at all   Food Insecurity: No Food Insecurity (10/23/2024)    Hunger Vital Sign     Worried About Running Out of Food in the Last Year: Never true     Ran Out of Food in the Last Year: Never true   Transportation Needs: No Transportation Needs (10/23/2024)    TRANSPORTATION NEEDS     Transportation : No   Physical Activity: Inactive (10/23/2024)    Exercise Vital Sign     Days of Exercise per Week: 0 days     Minutes of Exercise per Session: 0 min   Stress: Patient Unable To Answer (10/23/2024)    [employer] - Occupational Stress Questionnaire     Feeling of Stress : Patient unable to answer   Housing Stability: Low Risk  (10/23/2024)    Housing Stability Vital Sign     Unable to Pay for Housing in the Last Year: No     Homeless in the Last Year: No       FAMILY HISTORY:       No family history on file.    ALLERGIES AND MEDICATIONS: updated and reviewed.      274}  Review of patient's allergies indicates:   Allergen Reactions    Penicillins      Medication List with Changes/Refills   Current Medications    ALBUTEROL (VENTOLIN HFA) 90 MCG/ACTUATION INHALER    Inhale 2 puffs into the lungs every 6 (six) hours as needed for Wheezing. Rescue    ALBUTEROL-IPRATROPIUM (DUO-NEB) 2.5 MG-0.5 MG/3 ML NEBULIZER SOLUTION    Take 3 mLs by nebulization every 6 (six) hours as needed for Wheezing.    ALENDRONATE (FOSAMAX) 70 MG TABLET    Take 1 tablet (70 mg total) by mouth every 7 days.    ATORVASTATIN (LIPITOR) 40 MG TABLET    Take 1 tablet (40 mg total) by mouth once daily.    ERGOCALCIFEROL (ERGOCALCIFEROL) 50,000 UNIT CAP    Take 1 capsule (50,000 Units total) by mouth every 7 days.    FLUTICASONE-UMECLIDIN-VILANTER (TRELEGY ELLIPTA) 200-62.5-25 MCG INHALER    Inhale 1 puff into the lungs once daily.    FOLIC ACID (FOLVITE) 1 MG TABLET    TAKE 1 TABLET(1 MG) BY MOUTH EVERY DAY    FUROSEMIDE  "(LASIX) 40 MG TABLET    Take 1 tablet (40 mg total) by mouth 2 (two) times daily.    HYDROCODONE-ACETAMINOPHEN 10-325MG (NORCO)  MG TAB    Take 1 tablet by mouth every 6 (six) hours as needed.     LOSARTAN (COZAAR) 100 MG TABLET    Take 1 tablet (100 mg total) by mouth once daily.    METHOTREXATE 2.5 MG TAB    Take by mouth.    PANTOPRAZOLE (PROTONIX) 40 MG TABLET    Take 1 tablet (40 mg total) by mouth once daily.    PREDNISONE (DELTASONE) 5 MG TABLET    Take 5 mg by mouth.    SUCRALFATE (CARAFATE) 1 GRAM TABLET    Take 1 tablet (1 g total) by mouth 4 (four) times daily before meals and nightly.    TIZANIDINE (ZANAFLEX) 4 MG TABLET    Take 4 mg by mouth every 8 (eight) hours as needed.   Discontinued Medications    LEVOFLOXACIN (LEVAQUIN) 750 MG TABLET    Take 1 tablet (750 mg total) by mouth once daily. for 4 days       SCREENING HISTORY:    274}  Health Maintenance         Date Due Completion Date    TETANUS VACCINE Never done ---    Mammogram Never done ---    Shingles Vaccine (1 of 2) Never done ---    Colorectal Cancer Screening Never done ---    COVID-19 Vaccine (6 - 2024-25 season) 09/01/2024 10/29/2023    LDCT Lung Screen 10/13/2025 10/13/2024    DEXA Scan 09/19/2027 9/19/2024    Lipid Panel 09/19/2029 9/19/2024            REVIEW OF SYSTEMS:   ROS    PHYSICAL EXAM:      274}  /72 (BP Location: Right arm, Patient Position: Sitting)   Pulse 100   Ht 4' 9" (1.448 m)   Wt 42.5 kg (93 lb 12.8 oz)   SpO2 96%   BMI 20.30 kg/m²   Wt Readings from Last 3 Encounters:   11/04/24 42.5 kg (93 lb 12.8 oz)   10/22/24 41.2 kg (90 lb 13.3 oz)   10/13/24 45.8 kg (101 lb)     BP Readings from Last 3 Encounters:   11/04/24 134/72   10/25/24 (!) 145/64   10/15/24 (!) 122/59     Estimated body mass index is 20.3 kg/m² as calculated from the following:    Height as of this encounter: 4' 9" (1.448 m).    Weight as of this encounter: 42.5 kg (93 lb 12.8 oz).     Physical Exam  Constitutional:       Appearance: " Normal appearance.   HENT:      Head: Normocephalic and atraumatic.      Nose: Nose normal.      Mouth/Throat:      Mouth: Mucous membranes are dry.      Pharynx: Oropharynx is clear.   Eyes:      Extraocular Movements: Extraocular movements intact.      Conjunctiva/sclera: Conjunctivae normal.   Cardiovascular:      Rate and Rhythm: Normal rate and regular rhythm.   Pulmonary:      Effort: Pulmonary effort is normal.      Breath sounds: Normal breath sounds.   Abdominal:      General: Bowel sounds are normal.   Musculoskeletal:      Cervical back: Normal range of motion.      Comments: R BKA   Skin:     General: Skin is warm.   Neurological:      General: No focal deficit present.      Mental Status: She is alert. Mental status is at baseline.   Psychiatric:         Mood and Affect: Mood normal.         Thought Content: Thought content normal.         LABS:   274}  I have reviewed old labs below:  Lab Results   Component Value Date    WBC 9.81 10/25/2024    HGB 10.4 (L) 10/25/2024    HCT 30.7 (L) 10/25/2024     (H) 10/25/2024     (H) 10/25/2024     11/04/2024    K 4.5 11/04/2024     11/04/2024    CALCIUM 9.6 11/04/2024    PHOS 1.8 (L) 10/25/2024    CO2 20 (L) 11/04/2024     11/04/2024    BUN 25 (H) 11/04/2024    CREATININE 0.6 11/04/2024    ANIONGAP 12 11/04/2024    ESTGFRAFRICA >60.0 05/20/2022    EGFRNONAA >60.0 05/20/2022    PROT 5.8 (L) 10/25/2024    ALBUMIN 2.5 (L) 10/25/2024    BILITOT 0.3 10/25/2024    ALKPHOS 75 10/25/2024    ALT 17 10/25/2024    AST 16 10/25/2024    INR 0.9 04/04/2023    CHOL 251 (H) 09/19/2024    TRIG 104 09/19/2024    HDL 76 (H) 09/19/2024    LDLCALC 154.2 09/19/2024    TSH 0.429 09/19/2024    HGBA1C 5.4 09/19/2024       ASSESSMENT AND PLAN:  274}  Assessment & Plan    IMPRESSION:  Patient recently hospitalized twice for pneumonia, now improving daily  Using nebulizer treatments and Trelegy device for respiratory support  Noted history of COPD, but no  prior pulmonary function testing  Recent CT showed lung nodules, requiring monitoring due to smoking history  Blood work results reviewed, showing normal kidney function and electrolytes  Bone density screen earlier this year revealed osteoporosis  Considering pulmonologist referral due to ongoing respiratory issues and device usage    CHRONIC STEROID USE:  - Discussed the impact of long-term steroid use on bone health and fracture risk.  - Continued low-dose prednisone for chronic condition.    FLU VACCINATION:  - Administered flu vaccine in office.    RESPIRATORY ISSUES:  - Referred to pulmonologist for evaluation of respiratory issues and potential pulmonary function testing.    FOLLOW UP:  - Contact the office if they do not receive a call from the pulmonologist.        1. Centrilobular emphysema  - Ambulatory referral/consult to Pulmonology; Future  - Complete PFT w/ bronchodilator; Future    2. Encounter for vaccination  - influenza (adjuvanted) (Fluad) 45 mcg/0.5 mL IM vaccine (> or = 66 yo) 0.5 mL         Orders Placed This Encounter   Procedures    Ambulatory referral/consult to Pulmonology    Complete PFT w/ bronchodilator       Follow up in about 6 weeks (around 12/16/2024). or sooner as needed.

## 2024-11-11 ENCOUNTER — EXTERNAL HOME HEALTH (OUTPATIENT)
Dept: HOME HEALTH SERVICES | Facility: HOSPITAL | Age: 69
End: 2024-11-11
Payer: MEDICARE

## 2024-11-11 ENCOUNTER — TELEPHONE (OUTPATIENT)
Dept: FAMILY MEDICINE | Facility: CLINIC | Age: 69
End: 2024-11-11
Payer: MEDICARE

## 2024-11-11 NOTE — TELEPHONE ENCOUNTER
----- Message from Janette sent at 11/11/2024  9:04 AM CST -----  Regarding: advice  Type:  Needs Medical Advice    Who Called: pt    Best Call Back Number: [phone] fax: [phone]      Additional Information: pt st that [facility] still has not received orders for nebulizer. They need orders, pt demographic, and clinic notes. Pt wants a call back to see if she can get a script for her bad cough.  please call to discuss.

## 2024-11-11 NOTE — TELEPHONE ENCOUNTER
Please print nebulizer order, demographic, office note and manually fax to [facility] at  as they have not received it the two times I faxed it.

## 2024-11-11 NOTE — TELEPHONE ENCOUNTER
Faxed manually order for nebulizer, face sheet and office note to [facility] [phone]

## 2024-11-12 ENCOUNTER — LAB VISIT (OUTPATIENT)
Dept: LAB | Facility: HOSPITAL | Age: 69
End: 2024-11-12
Attending: STUDENT IN AN ORGANIZED HEALTH CARE EDUCATION/TRAINING PROGRAM
Payer: MEDICARE

## 2024-11-12 DIAGNOSIS — J18.9 PNEUMONIA: Primary | ICD-10-CM

## 2024-11-12 LAB
ANION GAP SERPL CALC-SCNC: 12 MMOL/L (ref 8–16)
BUN SERPL-MCNC: 19 MG/DL (ref 8–23)
CALCIUM SERPL-MCNC: 9.7 MG/DL (ref 8.7–10.5)
CHLORIDE SERPL-SCNC: 108 MMOL/L (ref 95–110)
CO2 SERPL-SCNC: 19 MMOL/L (ref 23–29)
CREAT SERPL-MCNC: 0.6 MG/DL (ref 0.5–1.4)
EST. GFR  (NO RACE VARIABLE): >60 ML/MIN/1.73 M^2
GLUCOSE SERPL-MCNC: 131 MG/DL (ref 70–110)
POTASSIUM SERPL-SCNC: 4.6 MMOL/L (ref 3.5–5.1)
SODIUM SERPL-SCNC: 139 MMOL/L (ref 136–145)

## 2024-11-12 PROCEDURE — 36415 COLL VENOUS BLD VENIPUNCTURE: CPT | Performed by: STUDENT IN AN ORGANIZED HEALTH CARE EDUCATION/TRAINING PROGRAM

## 2024-11-12 PROCEDURE — 80048 BASIC METABOLIC PNL TOTAL CA: CPT | Performed by: STUDENT IN AN ORGANIZED HEALTH CARE EDUCATION/TRAINING PROGRAM

## 2024-11-14 ENCOUNTER — E-VISIT (OUTPATIENT)
Dept: FAMILY MEDICINE | Facility: CLINIC | Age: 69
End: 2024-11-14
Payer: MEDICARE

## 2024-11-14 ENCOUNTER — PATIENT MESSAGE (OUTPATIENT)
Dept: FAMILY MEDICINE | Facility: CLINIC | Age: 69
End: 2024-11-14

## 2024-11-14 ENCOUNTER — TELEPHONE (OUTPATIENT)
Dept: FAMILY MEDICINE | Facility: CLINIC | Age: 69
End: 2024-11-14
Payer: MEDICARE

## 2024-11-14 DIAGNOSIS — J18.9 PNEUMONIA DUE TO INFECTIOUS ORGANISM, UNSPECIFIED LATERALITY, UNSPECIFIED PART OF LUNG: Primary | ICD-10-CM

## 2024-11-14 RX ORDER — DOXYCYCLINE 100 MG/1
100 CAPSULE ORAL 2 TIMES DAILY
Qty: 20 CAPSULE | Refills: 0 | Status: SHIPPED | OUTPATIENT
Start: 2024-11-14

## 2024-11-14 RX ORDER — BUDESONIDE 0.5 MG/2ML
0.5 INHALANT ORAL DAILY
Qty: 20 ML | Refills: 0 | Status: SHIPPED | OUTPATIENT
Start: 2024-11-14 | End: 2025-11-14

## 2024-11-14 RX ORDER — PROMETHAZINE HYDROCHLORIDE AND DEXTROMETHORPHAN HYDROBROMIDE 6.25; 15 MG/5ML; MG/5ML
5 SYRUP ORAL EVERY 4 HOURS PRN
Qty: 100 ML | Refills: 0 | Status: SHIPPED | OUTPATIENT
Start: 2024-11-14 | End: 2024-11-24

## 2024-11-14 RX ORDER — BENZONATATE 200 MG/1
200 CAPSULE ORAL 3 TIMES DAILY PRN
Qty: 30 CAPSULE | Refills: 0 | Status: SHIPPED | OUTPATIENT
Start: 2024-11-14 | End: 2024-11-15 | Stop reason: SDUPTHER

## 2024-11-14 NOTE — PROGRESS NOTES
SUBJECTIVE:    CHIEF COMPLAINT:   Chief Complaint   Patient presents with    General Illness     Entered automatically based on patient selection in [employer].           274}    HISTORY OF PRESENT ILLNESS:  Janey Null is a 68 y.o. female who presents to the clinic today for persistent productive coughing that is worsening and persistent since her last hospital stay for pneumonia. Patient was referred to pulmonologist but office was not able to reach patient. Patient has COPD.    PAST MEDICAL HISTORY:     274}  Past Medical History:   Diagnosis Date    Anxiety     Depression     Encounter for long-term (current) use of high-risk medication     Fibromyalgia     Osteoporosis     Rheumatoid arthritis involving multiple sites with positive rheumatoid factor        PAST SURGICAL HISTORY:  Past Surgical History:   Procedure Laterality Date    ESOPHAGOGASTRODUODENOSCOPY N/A 2023    Procedure: EGD (ESOPHAGOGASTRODUODENOSCOPY);  Surgeon: Tyrell Cornelius MD;  Location: [facility] ENDO;  Service: General;  Laterality: N/A;    REPAIR OF RECURRENT INCARCERATED INCISIONAL HERNIA Left 2022    Procedure: REPAIR, HERNIA--RIGHT LATERAL POSITIONING;  Surgeon: Tyrell Cornelius MD;  Location: [facility] OR;  Service: General;  Laterality: Left;  left lumbar hernia    right leg       multiple surgeries ,amputation       SOCIAL HISTORY:  Social History     Socioeconomic History    Marital status:    Tobacco Use    Smoking status: Every Day     Current packs/day: 0.00     Average packs/day: 1 pack/day for 61.0 years (61.0 ttl pk-yrs)     Types: Cigarettes     Start date:      Last attempt to quit: 2023     Years since quittin.6    Smokeless tobacco: Never   Substance and Sexual Activity    Alcohol use: No    Drug use: Never    Sexual activity: Not Currently     Social Drivers of Health     Financial Resource Strain: Low Risk  (10/23/2024)    Overall Financial Resource Strain (CARDIA)     Difficulty of  Paying Living Expenses: Not hard at all   Food Insecurity: No Food Insecurity (10/23/2024)    Hunger Vital Sign     Worried About Running Out of Food in the Last Year: Never true     Ran Out of Food in the Last Year: Never true   Transportation Needs: No Transportation Needs (10/23/2024)    TRANSPORTATION NEEDS     Transportation : No   Physical Activity: Inactive (10/23/2024)    Exercise Vital Sign     Days of Exercise per Week: 0 days     Minutes of Exercise per Session: 0 min   Stress: Patient Unable To Answer (10/23/2024)    [employer] - Occupational Stress Questionnaire     Feeling of Stress : Patient unable to answer   Housing Stability: Low Risk  (10/23/2024)    Housing Stability Vital Sign     Unable to Pay for Housing in the Last Year: No     Homeless in the Last Year: No       FAMILY HISTORY:       No family history on file.    ALLERGIES AND MEDICATIONS: updated and reviewed.      274}  Review of patient's allergies indicates:   Allergen Reactions    Penicillins      Medication List with Changes/Refills   New Medications    BENZONATATE (TESSALON) 200 MG CAPSULE    Take 1 capsule (200 mg total) by mouth 3 (three) times daily as needed for Cough.    BUDESONIDE (PULMICORT) 0.5 MG/2 ML NEBULIZER SOLUTION    Take 2 mLs (0.5 mg total) by nebulization once daily. Controller    DOXYCYCLINE (VIBRAMYCIN) 100 MG CAP    Take 1 capsule (100 mg total) by mouth 2 (two) times daily.    PROMETHAZINE-DEXTROMETHORPHAN (PROMETHAZINE-DM) 6.25-15 MG/5 ML SYRP    Take 5 mLs by mouth every 4 (four) hours as needed (cough).   Current Medications    ALBUTEROL (VENTOLIN HFA) 90 MCG/ACTUATION INHALER    Inhale 2 puffs into the lungs every 6 (six) hours as needed for Wheezing. Rescue    ALBUTEROL-IPRATROPIUM (DUO-NEB) 2.5 MG-0.5 MG/3 ML NEBULIZER SOLUTION    Take 3 mLs by nebulization every 6 (six) hours as needed for Wheezing.    ALENDRONATE (FOSAMAX) 70 MG TABLET    Take 1 tablet (70 mg total) by mouth  every 7 days.    ATORVASTATIN (LIPITOR) 40 MG TABLET    Take 1 tablet (40 mg total) by mouth once daily.    ERGOCALCIFEROL (ERGOCALCIFEROL) 50,000 UNIT CAP    Take 1 capsule (50,000 Units total) by mouth every 7 days.    FLUTICASONE-UMECLIDIN-VILANTER (TRELEGY ELLIPTA) 200-62.5-25 MCG INHALER    Inhale 1 puff into the lungs once daily.    FOLIC ACID (FOLVITE) 1 MG TABLET    TAKE 1 TABLET(1 MG) BY MOUTH EVERY DAY    FUROSEMIDE (LASIX) 40 MG TABLET    Take 1 tablet (40 mg total) by mouth 2 (two) times daily.    HYDROCODONE-ACETAMINOPHEN 10-325MG (NORCO)  MG TAB    Take 1 tablet by mouth every 6 (six) hours as needed.     LOSARTAN (COZAAR) 100 MG TABLET    Take 1 tablet (100 mg total) by mouth once daily.    METHOTREXATE 2.5 MG TAB    Take by mouth.    PANTOPRAZOLE (PROTONIX) 40 MG TABLET    Take 1 tablet (40 mg total) by mouth once daily.    PREDNISONE (DELTASONE) 5 MG TABLET    Take 5 mg by mouth.    SUCRALFATE (CARAFATE) 1 GRAM TABLET    Take 1 tablet (1 g total) by mouth 4 (four) times daily before meals and nightly.    TIZANIDINE (ZANAFLEX) 4 MG TABLET    Take 4 mg by mouth every 8 (eight) hours as needed.       SCREENING HISTORY:    274}  Health Maintenance         Date Due Completion Date    TETANUS VACCINE Never done ---    Mammogram Never done ---    Shingles Vaccine (1 of 2) Never done ---    Colorectal Cancer Screening Never done ---    COVID-19 Vaccine (6 - 2024-25 season) 09/01/2024 10/29/2023    LDCT Lung Screen 10/13/2025 10/13/2024    DEXA Scan 09/19/2027 9/19/2024    Lipid Panel 09/19/2029 9/19/2024            REVIEW OF SYSTEMS:   Review of Systems   Constitutional:  Negative for chills, fever, malaise/fatigue and weight loss.   Respiratory:  Positive for cough and shortness of breath. Negative for sputum production and wheezing.        PHYSICAL EXAM:      274}  There were no vitals taken for this visit.  Wt Readings from Last 3 Encounters:   11/04/24 42.5 kg (93 lb 12.8 oz)   10/22/24 41.2 kg  "(90 lb 13.3 oz)   10/13/24 45.8 kg (101 lb)     BP Readings from Last 3 Encounters:   11/04/24 134/72   10/25/24 (!) 145/64   10/15/24 (!) 122/59     Estimated body mass index is 20.3 kg/m² as calculated from the following:    Height as of 11/4/24: 4' 9" (1.448 m).    Weight as of 11/4/24: 42.5 kg (93 lb 12.8 oz).     Physical Exam    LABS:   274}  I have reviewed old labs below:  Lab Results   Component Value Date    WBC 9.81 10/25/2024    HGB 10.4 (L) 10/25/2024    HCT 30.7 (L) 10/25/2024     (H) 10/25/2024     (H) 10/25/2024     11/12/2024    K 4.6 11/12/2024     11/12/2024    CALCIUM 9.7 11/12/2024    PHOS 1.8 (L) 10/25/2024    CO2 19 (L) 11/12/2024     (H) 11/12/2024    BUN 19 11/12/2024    CREATININE 0.6 11/12/2024    ANIONGAP 12 11/12/2024    ESTGFRAFRICA >60.0 05/20/2022    EGFRNONAA >60.0 05/20/2022    PROT 5.8 (L) 10/25/2024    ALBUMIN 2.5 (L) 10/25/2024    BILITOT 0.3 10/25/2024    ALKPHOS 75 10/25/2024    ALT 17 10/25/2024    AST 16 10/25/2024    INR 0.9 04/04/2023    CHOL 251 (H) 09/19/2024    TRIG 104 09/19/2024    HDL 76 (H) 09/19/2024    LDLCALC 154.2 09/19/2024    TSH 0.429 09/19/2024    HGBA1C 5.4 09/19/2024       ASSESSMENT AND PLAN:  274}  Assessment & Plan             1. Pneumonia due to infectious organism, unspecified laterality, unspecified part of lung  - X-Ray Chest PA And Lateral; Future  - budesonide (PULMICORT) 0.5 mg/2 mL nebulizer solution; Take 2 mLs (0.5 mg total) by nebulization once daily. Controller  Dispense: 20 mL; Refill: 0  - doxycycline (VIBRAMYCIN) 100 MG Cap; Take 1 capsule (100 mg total) by mouth 2 (two) times daily.  Dispense: 20 capsule; Refill: 0  - promethazine-dextromethorphan (PROMETHAZINE-DM) 6.25-15 mg/5 mL Syrp; Take 5 mLs by mouth every 4 (four) hours as needed (cough).  Dispense: 100 mL; Refill: 0  - benzonatate (TESSALON) 200 MG capsule; Take 1 capsule (200 mg total) by mouth 3 (three) times daily as needed for Cough.  Dispense: " 30 capsule; Refill: 0  - Ambulatory referral/consult to Pulmonology; Future         Orders Placed This Encounter   Procedures    X-Ray Chest PA And Lateral    Ambulatory referral/consult to Pulmonology       No follow-ups on file. or sooner as needed.        16 minute encounter

## 2024-11-14 NOTE — TELEPHONE ENCOUNTER
----- Message from Nicky sent at 11/14/2024  8:46 AM CST -----  Regarding: persistant cough  Contact: pt  Type:  Needs Medical Advice    Who Called: pt    Symptoms (please be specific):  persistant cough     How long has patient had these symptoms:  since oct 25    Pharmacy name and phone #:    [id number] - [place], [place] - [address]  [address]  [place] [address]  Phone: [phone] Fax: [phone]    Would the patient rather a call back or a response via [facility]? Call back    Best Call Back Number: [phone]    Additional Information:  pt has a worsening cough since leaving the hosp on oct 25.  Pt can not come in today and there are no appts on Friday available.  Pt requesting a return call with advice on what to do.

## 2024-11-15 ENCOUNTER — TELEPHONE (OUTPATIENT)
Dept: FAMILY MEDICINE | Facility: CLINIC | Age: 69
End: 2024-11-15
Payer: MEDICARE

## 2024-11-15 DIAGNOSIS — J18.9 PNEUMONIA DUE TO INFECTIOUS ORGANISM, UNSPECIFIED LATERALITY, UNSPECIFIED PART OF LUNG: ICD-10-CM

## 2024-11-15 RX ORDER — BENZONATATE 200 MG/1
200 CAPSULE ORAL 3 TIMES DAILY PRN
Qty: 30 CAPSULE | Refills: 0 | Status: SHIPPED | OUTPATIENT
Start: 2024-11-15 | End: 2024-11-25

## 2024-11-15 NOTE — TELEPHONE ENCOUNTER
Spoke with the patient to reschedule appt on 12/20, due to MD being out of clinic. She voiced acceptance.

## 2024-11-19 ENCOUNTER — LAB VISIT (OUTPATIENT)
Dept: LAB | Facility: HOSPITAL | Age: 69
End: 2024-11-19
Attending: STUDENT IN AN ORGANIZED HEALTH CARE EDUCATION/TRAINING PROGRAM
Payer: MEDICARE

## 2024-11-19 DIAGNOSIS — J18.9 UNRESOLVED PNEUMONIA: Primary | ICD-10-CM

## 2024-11-19 LAB
ANION GAP SERPL CALC-SCNC: 11 MMOL/L (ref 8–16)
BUN SERPL-MCNC: 20 MG/DL (ref 8–23)
CALCIUM SERPL-MCNC: 9.3 MG/DL (ref 8.7–10.5)
CHLORIDE SERPL-SCNC: 108 MMOL/L (ref 95–110)
CO2 SERPL-SCNC: 20 MMOL/L (ref 23–29)
CREAT SERPL-MCNC: 0.7 MG/DL (ref 0.5–1.4)
EST. GFR  (NO RACE VARIABLE): >60 ML/MIN/1.73 M^2
GLUCOSE SERPL-MCNC: 102 MG/DL (ref 70–110)
POTASSIUM SERPL-SCNC: 4.2 MMOL/L (ref 3.5–5.1)
SODIUM SERPL-SCNC: 139 MMOL/L (ref 136–145)

## 2024-11-19 PROCEDURE — 36415 COLL VENOUS BLD VENIPUNCTURE: CPT | Performed by: STUDENT IN AN ORGANIZED HEALTH CARE EDUCATION/TRAINING PROGRAM

## 2024-11-19 PROCEDURE — 80048 BASIC METABOLIC PNL TOTAL CA: CPT | Performed by: STUDENT IN AN ORGANIZED HEALTH CARE EDUCATION/TRAINING PROGRAM

## 2024-11-21 ENCOUNTER — TELEPHONE (OUTPATIENT)
Dept: FAMILY MEDICINE | Facility: CLINIC | Age: 69
End: 2024-11-21
Payer: MEDICARE

## 2024-11-21 DIAGNOSIS — J44.9 CHRONIC OBSTRUCTIVE PULMONARY DISEASE, UNSPECIFIED COPD TYPE: ICD-10-CM

## 2024-11-21 NOTE — TELEPHONE ENCOUNTER
Returned call to patient. Refills available at [facility] pharmacy.  No answer LVM

## 2024-11-21 NOTE — TELEPHONE ENCOUNTER
----- Message from Adriano sent at 11/21/2024 11:13 AM CST -----  Regarding: refill  Contact: patient  Type:  RX Refill Request    Who Called: patient  Refill or New Rx:refill  RX Name and Strength:albuterol (VENTOLIN HFA) 90 mcg/actuation inhaler  How is the patient currently taking it? (ex. 1XDay):  Is this a 30 day or 90 day RX:  Preferred Pharmacy with phone number:  [facility] #[id number] - [place] [address] [address]  [address] [address]  Phone: [phone] Fax: [phone]  Local or Mail Order:local  Ordering Provider:Kizzy  Would the patient rather a call back or a response via [facility]? refill  Best Call Back Number:[phone]  Additional Information:

## 2024-12-11 DIAGNOSIS — J44.9 CHRONIC OBSTRUCTIVE PULMONARY DISEASE, UNSPECIFIED COPD TYPE: ICD-10-CM

## 2024-12-11 RX ORDER — ALBUTEROL SULFATE 90 UG/1
2 INHALANT RESPIRATORY (INHALATION) EVERY 6 HOURS PRN
Qty: 8.5 G | Refills: 3 | Status: SHIPPED | OUTPATIENT
Start: 2024-12-11

## 2024-12-11 NOTE — TELEPHONE ENCOUNTER
No care due was identified.  [facility] Embedded Care Due Messages. Reference number: [id number].   12/11/2024 2:20:20 PM CST

## 2024-12-19 ENCOUNTER — DOCUMENT SCAN (OUTPATIENT)
Dept: HOME HEALTH SERVICES | Facility: HOSPITAL | Age: 69
End: 2024-12-19
Payer: MEDICARE

## 2025-01-17 DIAGNOSIS — I10 ESSENTIAL HYPERTENSION: ICD-10-CM

## 2025-01-17 DIAGNOSIS — K29.70 GASTRITIS, PRESENCE OF BLEEDING UNSPECIFIED, UNSPECIFIED CHRONICITY, UNSPECIFIED GASTRITIS TYPE: ICD-10-CM

## 2025-01-17 RX ORDER — PANTOPRAZOLE SODIUM 40 MG/1
40 TABLET, DELAYED RELEASE ORAL DAILY
Qty: 90 TABLET | Refills: 2 | Status: CANCELLED | OUTPATIENT
Start: 2025-01-17

## 2025-01-17 RX ORDER — LOSARTAN POTASSIUM 100 MG/1
100 TABLET ORAL DAILY
Qty: 90 TABLET | Refills: 3 | Status: CANCELLED | OUTPATIENT
Start: 2025-01-17 | End: 2026-01-17

## 2025-01-17 NOTE — TELEPHONE ENCOUNTER
----- Message from Adriano sent at 1/17/2025  8:31 AM CST -----  Regarding: refill  Contact: patient  Type:  RX Refill Request    Who Called: patient  Refill or New Rx:refill  RX Name and Strength:losartan (COZAAR) 100 MG tablet    and    folic acid (FOLVITE) 1 MG tablet    and    pantoprazole (PROTONIX) 40 MG tablet  How is the patient currently taking it? (ex. 1XDay):  Is this a 30 day or 90 day RX:  Preferred Pharmacy with phone number:  [id number] - [place], [place] - [address] [place] [address] AT [facility] [address]  [address] [place] [address] [place] [address]  Phone: [phone] Fax: [phone]  Local or Mail Order:local  Ordering Provider:Kizzy  Would the patient rather a call back or a response via [facility]?   Best Call Back Number:[phone]  Additional Information:

## 2025-01-17 NOTE — TELEPHONE ENCOUNTER
No care due was identified.  [facility] Embedded Care Due Messages. Reference number: [id number].   1/17/2025 8:28:42 AM CST

## 2025-02-19 ENCOUNTER — PATIENT OUTREACH (OUTPATIENT)
Dept: ADMINISTRATIVE | Facility: HOSPITAL | Age: 70
End: 2025-02-19
Payer: MEDICARE

## 2025-02-19 DIAGNOSIS — Z12.31 ENCOUNTER FOR SCREENING MAMMOGRAM FOR BREAST CANCER: Primary | ICD-10-CM

## 2025-02-28 DIAGNOSIS — J44.9 CHRONIC OBSTRUCTIVE PULMONARY DISEASE, UNSPECIFIED COPD TYPE: ICD-10-CM

## 2025-02-28 NOTE — TELEPHONE ENCOUNTER
No care due was identified.  [facility] Embedded Care Due Messages. Reference number: [id number].   2/28/2025 11:54:20 AM CST

## 2025-02-28 NOTE — TELEPHONE ENCOUNTER
----- Message from Josefina Durand MD sent at 2/28/2025 11:53 AM CST -----  Please pend refill  ----- Message -----  From: Nahomy Melton  Sent: 2/28/2025  11:50 AM CST  To: Josefina Durand MD    Type:  RX Refill RequestWho Called:  ptRefill or New Rx:  refillRX Name and Strength:  fluticasone-umeclidin-vilanter (TRELEGY ELLIPTA) 200-62.5-25 mcg inhalerHow is the patient currently taking it? (ex. 1XDay):  as directedIs this a 30 day or 90 day RX:  90Preferred Pharmacy with phone number:  [id number] - [place] [address] AT [facility] [address] [facility] [address] [address] [address]Sfgkd: [phone] Fax: [phone] or Mail Order:  localOrdering Provider:  Kevin the patient rather a call back or a response via [facility]? [place] Call Back Number:  [phone] Information:  She's almost out of the medication.   Please call back to advise, thank you!

## 2025-02-28 NOTE — TELEPHONE ENCOUNTER
Refill Decision Note   Janey Tennille  is requesting a refill authorization.  Brief Assessment and Rationale for Refill:  Approve     Medication Therapy Plan:        Comments:     Note composed:12:51 PM 02/28/2025

## 2025-03-03 ENCOUNTER — TELEPHONE (OUTPATIENT)
Dept: FAMILY MEDICINE | Facility: CLINIC | Age: 70
End: 2025-03-03
Payer: MEDICARE

## 2025-03-03 DIAGNOSIS — J44.9 CHRONIC OBSTRUCTIVE PULMONARY DISEASE, UNSPECIFIED COPD TYPE: ICD-10-CM

## 2025-03-03 NOTE — TELEPHONE ENCOUNTER
----- Message from Nahomy sent at 3/3/2025  8:07 AM CST -----    ----- Message -----  From: Josefina Durand MD  Sent: 2/28/2025  11:53 AM CST  To: Nahomy Melton    Please send to my staff  ----- Message -----  From: Nahomy Melton  Sent: 2/28/2025  11:50 AM CST  To: Josefina Durand MD    Type:  RX Refill RequestWho Called:  ptRefill or New Rx:  refillRX Name and Strength:  fluticasone-umeclidin-vilanter (TRELEGY ELLIPTA) 200-62.5-25 mcg inhalerHow is the patient currently taking it? (ex. 1XDay):  as directedIs this a 30 day or 90 day RX:  90Preferred Pharmacy with phone number:  [facility] #[id number] - [place] [address] AT [facility] [address] [facility] [address] [facility] [address] [address] [address]Jaueq: [phone] Fax: [phone] or Mail Order:  localOrdering Provider:  Kevin the patient rather a call back or a response via [facility]? [facility] Call Back Number:  [phone] Information:  She's almost out of the medication.   Please call back to advise, thank you!

## 2025-03-03 NOTE — TELEPHONE ENCOUNTER
Returned call to pt.   TRELEGY sent into pharmacy 3/3/25.   Recommended pt contact pharmacy.     Pt voiced understanding

## 2025-03-03 NOTE — TELEPHONE ENCOUNTER
----- Message from Nahomy sent at 3/3/2025  9:07 AM CST -----    ----- Message -----  From: Josefina Durand MD  Sent: 3/3/2025   8:13 AM CST  To: Nahomy Melton    Please send to my staff or the refill pharmacy.  ----- Message -----  From: Nahomy Melton  Sent: 3/3/2025   8:08 AM CST  To: Josefina Durand MD      ----- Message -----  From: Josefina Durand MD  Sent: 2/28/2025  11:53 AM CST  To: Nahomy Melton    Please send to my staff  ----- Message -----  From: Nahomy Melton  Sent: 2/28/2025  11:50 AM CST  To: Josefina Durand MD    Type:  RX Refill RequestWho Called:  ptRefill or New Rx:  refillRX Name and Strength:  fluticasone-umeclidin-vilanter (TRELEGY ELLIPTA) 200-62.5-25 mcg inhalerHow is the patient currently taking it? (ex. 1XDay):  as directedIs this a 30 day or 90 day RX:  90Preferred Pharmacy with phone number:  [facility] #[id number] - [place], [place] [address] AT [facility] OF [facility] [address] & [facility] [address] [address] [address]Uxnen: [phone] Fax: [phone] or Mail Order:  localOrdering Provider:  Kevin the patient rather a call back or a response via [facility]? [facility] Call Back Number:  [phone] Information:  She's almost out of the medication.   Please call back to advise, thank you!

## 2025-03-12 ENCOUNTER — OFFICE VISIT (OUTPATIENT)
Dept: FAMILY MEDICINE | Facility: CLINIC | Age: 70
End: 2025-03-12
Payer: MEDICARE

## 2025-03-12 ENCOUNTER — RESULTS FOLLOW-UP (OUTPATIENT)
Dept: FAMILY MEDICINE | Facility: CLINIC | Age: 70
End: 2025-03-12

## 2025-03-12 ENCOUNTER — HOSPITAL ENCOUNTER (OUTPATIENT)
Dept: RADIOLOGY | Facility: HOSPITAL | Age: 70
Discharge: HOME | End: 2025-03-12
Attending: STUDENT IN AN ORGANIZED HEALTH CARE EDUCATION/TRAINING PROGRAM
Payer: MEDICARE

## 2025-03-12 VITALS
BODY MASS INDEX: 20.21 KG/M2 | OXYGEN SATURATION: 99 % | SYSTOLIC BLOOD PRESSURE: 118 MMHG | DIASTOLIC BLOOD PRESSURE: 60 MMHG | RESPIRATION RATE: 17 BRPM | WEIGHT: 93.69 LBS | HEART RATE: 88 BPM | HEIGHT: 57 IN

## 2025-03-12 DIAGNOSIS — J44.9 CHRONIC OBSTRUCTIVE PULMONARY DISEASE, UNSPECIFIED COPD TYPE: ICD-10-CM

## 2025-03-12 DIAGNOSIS — Z89.619: ICD-10-CM

## 2025-03-12 DIAGNOSIS — R50.9 FEVER, UNSPECIFIED FEVER CAUSE: ICD-10-CM

## 2025-03-12 DIAGNOSIS — Z12.11 ENCOUNTER FOR SCREENING FOR MALIGNANT NEOPLASM OF COLON: ICD-10-CM

## 2025-03-12 DIAGNOSIS — J43.2 CENTRILOBULAR EMPHYSEMA: ICD-10-CM

## 2025-03-12 DIAGNOSIS — A49.9 BACTERIAL INFECTION: ICD-10-CM

## 2025-03-12 DIAGNOSIS — G44.83 HEADACHE AFTER COUGH: ICD-10-CM

## 2025-03-12 DIAGNOSIS — M06.9 RHEUMATOID ARTHRITIS, INVOLVING UNSPECIFIED SITE, UNSPECIFIED WHETHER RHEUMATOID FACTOR PRESENT: ICD-10-CM

## 2025-03-12 DIAGNOSIS — R05.9 COUGH, UNSPECIFIED TYPE: Primary | ICD-10-CM

## 2025-03-12 DIAGNOSIS — Z12.31 ENCOUNTER FOR SCREENING MAMMOGRAM FOR MALIGNANT NEOPLASM OF BREAST: ICD-10-CM

## 2025-03-12 LAB
CTP QC/QA: YES
CTP QC/QA: YES
POC MOLECULAR INFLUENZA A AGN: NEGATIVE
POC MOLECULAR INFLUENZA B AGN: NEGATIVE
SARS-COV-2 RDRP RESP QL NAA+PROBE: NEGATIVE

## 2025-03-12 PROCEDURE — 71046 X-RAY EXAM CHEST 2 VIEWS: CPT | Mod: TC

## 2025-03-12 PROCEDURE — 99999 PR PBB SHADOW E&M-EST. PATIENT-LVL V: CPT | Mod: PBBFAC,,, | Performed by: STUDENT IN AN ORGANIZED HEALTH CARE EDUCATION/TRAINING PROGRAM

## 2025-03-12 PROCEDURE — 71046 X-RAY EXAM CHEST 2 VIEWS: CPT | Mod: 26,,, | Performed by: RADIOLOGY

## 2025-03-12 RX ORDER — FLUTICASONE PROPIONATE 50 MCG
1 SPRAY, SUSPENSION (ML) NASAL DAILY
Qty: 18.2 ML | Refills: 0 | Status: SHIPPED | OUTPATIENT
Start: 2025-03-12

## 2025-03-12 RX ORDER — AZELASTINE 1 MG/ML
1 SPRAY, METERED NASAL 2 TIMES DAILY
Qty: 18.2 ML | Refills: 0 | Status: SHIPPED | OUTPATIENT
Start: 2025-03-12 | End: 2026-03-12

## 2025-03-12 RX ORDER — GUAIFENESIN 600 MG/1
1200 TABLET, EXTENDED RELEASE ORAL 2 TIMES DAILY
Qty: 30 TABLET | Refills: 0 | Status: SHIPPED | OUTPATIENT
Start: 2025-03-12

## 2025-03-12 RX ORDER — ALBUTEROL SULFATE 90 UG/1
2 INHALANT RESPIRATORY (INHALATION) EVERY 6 HOURS PRN
Qty: 8.5 G | Refills: 3 | Status: SHIPPED | OUTPATIENT
Start: 2025-03-12

## 2025-03-12 RX ORDER — AZITHROMYCIN 250 MG/1
TABLET, FILM COATED ORAL
Qty: 6 TABLET | Refills: 0 | Status: SHIPPED | OUTPATIENT
Start: 2025-03-12 | End: 2025-03-17

## 2025-03-12 RX ORDER — PREDNISONE 5 MG/1
5 TABLET ORAL DAILY
Qty: 4 TABLET | Refills: 0 | Status: SHIPPED | OUTPATIENT
Start: 2025-03-12

## 2025-03-12 RX ORDER — ALBUTEROL SULFATE 90 UG/1
2 INHALANT RESPIRATORY (INHALATION) EVERY 6 HOURS PRN
Qty: 8.5 G | Refills: 3 | OUTPATIENT
Start: 2025-03-12

## 2025-03-12 RX ORDER — LEVOCETIRIZINE DIHYDROCHLORIDE 5 MG/1
5 TABLET, FILM COATED ORAL NIGHTLY
Qty: 30 TABLET | Refills: 11 | Status: SHIPPED | OUTPATIENT
Start: 2025-03-12 | End: 2026-03-12

## 2025-03-12 NOTE — TELEPHONE ENCOUNTER
No care due was identified.  [facility] Embedded Care Due Messages. Reference number: [id number].   3/12/2025 11:43:05 AM CDT

## 2025-03-12 NOTE — TELEPHONE ENCOUNTER
Refill Decision Note   Janey Null  is requesting a refill authorization.  Brief Assessment and Rationale for Refill:  Quick Discontinue     Medication Therapy Plan:  Receipt confirmed by pharmacy (3/12/2025 12:01 PM CDT)      Comments:     Note composed:5:37 PM 03/12/2025

## 2025-03-12 NOTE — PROGRESS NOTES
SUBJECTIVE:    CHIEF COMPLAINT:   Chief Complaint   Patient presents with    Illness     Pt complains of not feeling good, cough fever headache. Pending testing for flu and covid.           274}    HISTORY OF PRESENT ILLNESS:  Janey Null is a 69 y.o. female who presents to the clinic today   History of Present Illness    CHIEF COMPLAINT:  Ms. Null presents today for cough and wheezing    RESPIRATORY SYMPTOMS:  She reports cough that started yesterday with wheezing throughout the day and shortness of breath. Her current inhalers are not providing symptom relief. She reports allergy symptoms prior to recent illness but denies taking any allergy medications or using nasal sprays. Patient f/o congestion and sinus pressure    MEDICAL HISTORY:  She has history of pneumonia in October. Previous pulmonology evaluation revealed presence of mucus with otherwise normal lung findings.    MEDICATIONS:  She requires refills for Trelegy and rescue inhaler. Over-the-counter medications taken for symptoms have been ineffective.    SLEEP:  She reports difficulty sleeping at night due to pain.    SOCIAL HISTORY:  She continues to smoke and reports previous attempts to quit.      ROS:  General: -fever, -chills, -fatigue, -weight gain, -weight loss  Eyes: -vision changes, -redness, -discharge  ENT: -ear pain, -nasal congestion, -sore throat  Cardiovascular: -chest pain, -palpitations, -lower extremity edema  Respiratory: +cough, +shortness of breath, +wheezing  Gastrointestinal: -abdominal pain, -nausea, -vomiting, -diarrhea, -constipation, -blood in stool  Genitourinary: -dysuria, -hematuria, -frequency  Musculoskeletal: -joint pain, -muscle pain  Skin: -rash, -lesion  Neurological: -headache, -dizziness, -numbness, -tingling  Psychiatric: -anxiety, -depression, +sleep difficulty          PAST MEDICAL HISTORY:     274}  Past Medical History:   Diagnosis Date    Anxiety     Depression     Encounter for long-term (current)  use of high-risk medication     Fibromyalgia     Osteoporosis     Rheumatoid arthritis involving multiple sites with positive rheumatoid factor        PAST SURGICAL HISTORY:  Past Surgical History:   Procedure Laterality Date    ESOPHAGOGASTRODUODENOSCOPY N/A 4/5/2023    Procedure: EGD (ESOPHAGOGASTRODUODENOSCOPY);  Surgeon: Tyrell Cornelius MD;  Location: [facility] ENDO;  Service: General;  Laterality: N/A;    REPAIR OF RECURRENT INCARCERATED INCISIONAL HERNIA Left 4/13/2022    Procedure: REPAIR, HERNIA--RIGHT LATERAL POSITIONING;  Surgeon: Tyrell Cornelius MD;  Location: [facility] OR;  Service: General;  Laterality: Left;  left lumbar hernia    right leg       multiple surgeries ,amputation       SOCIAL HISTORY:  Social History[1]    FAMILY HISTORY:       No family history on file.    ALLERGIES AND MEDICATIONS: updated and reviewed.      274}  Review of patient's allergies indicates:   Allergen Reactions    Penicillins      Medication List with Changes/Refills   New Medications    AZELASTINE (ASTELIN) 137 MCG (0.1 %) NASAL SPRAY    1 spray (137 mcg total) by Nasal route 2 (two) times daily.    AZITHROMYCIN (Z-EMELIA) 250 MG TABLET    Take 2 tablets by mouth on day 1; Take 1 tablet by mouth on days 2-5    FLUTICASONE PROPIONATE (FLONASE) 50 MCG/ACTUATION NASAL SPRAY    1 spray (50 mcg total) by Each Nostril route once daily.    GUAIFENESIN (MUCINEX) 600 MG 12 HR TABLET    Take 2 tablets (1,200 mg total) by mouth 2 (two) times daily.    LEVOCETIRIZINE (XYZAL) 5 MG TABLET    Take 1 tablet (5 mg total) by mouth every evening.   Current Medications    ALBUTEROL-IPRATROPIUM (DUO-NEB) 2.5 MG-0.5 MG/3 ML NEBULIZER SOLUTION    Take 3 mLs by nebulization every 6 (six) hours as needed for Wheezing.    ALENDRONATE (FOSAMAX) 70 MG TABLET    Take 1 tablet (70 mg total) by mouth every 7 days.    ATORVASTATIN (LIPITOR) 40 MG TABLET    Take 1 tablet (40 mg total) by mouth once daily.    ERGOCALCIFEROL (ERGOCALCIFEROL) 50,000 UNIT  CAP    Take 1 capsule (50,000 Units total) by mouth every 7 days.    FOLIC ACID (FOLVITE) 1 MG TABLET    TAKE 1 TABLET(1 MG) BY MOUTH EVERY DAY    FUROSEMIDE (LASIX) 40 MG TABLET    Take 1 tablet (40 mg total) by mouth 2 (two) times daily.    HYDROCODONE-ACETAMINOPHEN 10-325MG (NORCO)  MG TAB    Take 1 tablet by mouth every 6 (six) hours as needed.     LOSARTAN (COZAAR) 100 MG TABLET    Take 1 tablet (100 mg total) by mouth once daily.    METHOTREXATE 2.5 MG TAB    Take by mouth.    PANTOPRAZOLE (PROTONIX) 40 MG TABLET    Take 1 tablet (40 mg total) by mouth once daily.    SUCRALFATE (CARAFATE) 1 GRAM TABLET    Take 1 tablet (1 g total) by mouth 4 (four) times daily before meals and nightly.    TIZANIDINE (ZANAFLEX) 4 MG TABLET    Take 4 mg by mouth every 8 (eight) hours as needed.   Changed and/or Refilled Medications    Modified Medication Previous Medication    ALBUTEROL (VENTOLIN HFA) 90 MCG/ACTUATION INHALER albuterol (VENTOLIN HFA) 90 mcg/actuation inhaler       Inhale 2 puffs into the lungs every 6 (six) hours as needed for Wheezing. Rescue    Inhale 2 puffs into the lungs every 6 (six) hours as needed for Wheezing. Rescue    FLUTICASONE-UMECLIDIN-VILANTER (TRELEGY ELLIPTA) 200-62.5-25 MCG INHALER fluticasone-umeclidin-vilanter (TRELEGY ELLIPTA) 200-62.5-25 mcg inhaler       Inhale 1 puff into the lungs once daily.    Inhale 1 puff into the lungs once daily.    PREDNISONE (DELTASONE) 5 MG TABLET predniSONE (DELTASONE) 5 MG tablet       Take 1 tablet (5 mg total) by mouth once daily.    Take 5 mg by mouth.   Discontinued Medications    BUDESONIDE (PULMICORT) 0.5 MG/2 ML NEBULIZER SOLUTION    Take 2 mLs (0.5 mg total) by nebulization once daily. Controller    DOXYCYCLINE (VIBRAMYCIN) 100 MG CAP    Take 1 capsule (100 mg total) by mouth 2 (two) times daily.       SCREENING HISTORY:    274}  Health Maintenance         Date Due Completion Date    TETANUS VACCINE Never done ---    Mammogram Never done ---     "Shingles Vaccine (1 of 2) Never done ---    Colorectal Cancer Screening Never done ---    COVID-19 Vaccine (6 - 2024-25 season) 09/01/2024 10/29/2023    LDCT Lung Screen 12/12/2025 12/12/2024    DEXA Scan 09/19/2027 9/19/2024    Lipid Panel 09/19/2029 9/19/2024            REVIEW OF SYSTEMS:   ROS    PHYSICAL EXAM:      274}  /60   Pulse 88   Resp 17   Ht 4' 9.01" (1.448 m)   Wt 42.5 kg (93 lb 11.1 oz)   SpO2 99%   BMI 20.27 kg/m²   Wt Readings from Last 3 Encounters:   03/12/25 42.5 kg (93 lb 11.1 oz)   11/04/24 42.5 kg (93 lb 12.8 oz)   10/22/24 41.2 kg (90 lb 13.3 oz)     BP Readings from Last 3 Encounters:   03/12/25 118/60   11/04/24 134/72   10/25/24 (!) 145/64     Estimated body mass index is 20.27 kg/m² as calculated from the following:    Height as of this encounter: 4' 9.01" (1.448 m).    Weight as of this encounter: 42.5 kg (93 lb 11.1 oz).     Physical Exam  HENT:      Head: Normocephalic and atraumatic.      Nose: Nose normal.      Mouth/Throat:      Mouth: Mucous membranes are dry.      Pharynx: Oropharynx is clear.   Eyes:      Extraocular Movements: Extraocular movements intact.      Conjunctiva/sclera: Conjunctivae normal.   Cardiovascular:      Rate and Rhythm: Normal rate and regular rhythm.   Pulmonary:      Effort: Pulmonary effort is normal.      Breath sounds: Normal breath sounds.   Abdominal:      General: Bowel sounds are normal.      Palpations: Abdomen is soft.   Musculoskeletal:      Cervical back: Normal range of motion.      Comments: Right LE amputation   Skin:     General: Skin is warm.   Neurological:      General: No focal deficit present.      Mental Status: She is alert. Mental status is at baseline.   Psychiatric:         Mood and Affect: Mood normal.         Thought Content: Thought content normal.         LABS:   274}  I have reviewed old labs below:  Lab Results   Component Value Date    WBC 9.81 10/25/2024    HGB 10.4 (L) 10/25/2024    HCT 30.7 (L) 10/25/2024    "  (H) 10/25/2024     (H) 10/25/2024     11/19/2024    K 4.2 11/19/2024     11/19/2024    CALCIUM 9.3 11/19/2024    PHOS 1.8 (L) 10/25/2024    CO2 20 (L) 11/19/2024     11/19/2024    BUN 20 11/19/2024    CREATININE 0.7 11/19/2024    ANIONGAP 11 11/19/2024    ESTGFRAFRICA >60.0 05/20/2022    EGFRNONAA >60.0 05/20/2022    PROT 5.8 (L) 10/25/2024    ALBUMIN 2.5 (L) 10/25/2024    BILITOT 0.3 10/25/2024    ALKPHOS 75 10/25/2024    ALT 17 10/25/2024    AST 16 10/25/2024    INR 0.9 04/04/2023    CHOL 251 (H) 09/19/2024    TRIG 104 09/19/2024    HDL 76 (H) 09/19/2024    LDLCALC 154.2 09/19/2024    TSH 0.429 09/19/2024    HGBA1C 5.4 09/19/2024       ASSESSMENT AND PLAN:  274}  Assessment & Plan    IMPRESSION:  Evaluated patient presenting with cough, wheezing, and shortness of breath  Considered possible upper respiratory infection vs. exacerbation of underlying condition  Assessed current medication regimen, noting limited effectiveness  Reviewed recent history of pneumonia from October CT  Noted patient's arthritis and recent medication changes by rheumatologist  Considered impact of smoking on respiratory symptoms  Assessed for potential allergic component to symptoms    ASTHMA:  - Refilled Trelegy (inhaled corticosteroid/long-acting muscarinic antagonist/long-acting beta-agonist).  - Refilled albuterol rescue inhaler.  - Ms. Null reports coughing frequently, experiencing wheezing and dyspnea.  - Noted that inhalers and nebulizers have been ineffective in managing symptoms.  - Evaluated the patient's wheezing, which occurs throughout the day and has recently started.  - Assessed that the patient's asthma symptoms are not well-controlled.  - Prescribed multiple medications to address the patient's symptoms, including steroids.    ACUTE COUGH:  - Ms. Null reports a cough that started yesterday, producing sputum.  - Acknowledged the cough as part of the patient's current illness.  -  Prescribed medications for congestion and cough symptoms.  - Initiated antibiotic therapy for respiratory symptoms.  - Ordered flu and COVID-19 swab tests.  - Instructed the patient to check the patient portal for flu test results.    ALLERGIC RHINITIS:  - Ms. Null reports experiencing allergy symptoms prior to the current illness.  - Evaluated the patient for congestion, rhinorrhea, and sneezing.  - Ms. Null reports experiencing post-nasal drip.  - Assessed the patient's history of sinus problems and allergies.  - Prescribed decongestants for congestion and post-nasal drip.  - Prescribed nasal sprays for post-nasal drip.    CHRONIC PHARYNGITIS:  - Examined the patient's throat and inquired about odynophagia.    SLEEP DISORDER:  - Ms. Null reports difficulty sleeping at night due to pain.  - Assessed that sleep issues are related to pain and not due to racing thoughts or anxiety.  - Instructed the patient to inform the pain clinic about sleep difficulties due to pain.    NICOTINE DEPENDENCE:  - Recommend considering smoking cessation.  - Ms. Null confirms current smoking habit.  - Inquired about the patient's thoughts on quitting smoking.  - Noted that the patient has attempted smoking cessation in the past.    HISTORY OF PNEUMONIA:  - Recalled the patient's history of pneumonia from a previous CT in October.  - Inquired about frequency of pneumonia and past pulmonology consultations.  - Noted that the patient was previously seen by a pulmonologist who determined the issue was mucus-related and released the patient from care.    FOLLOW-UP AND PREVENTIVE CARE:  - Discussed eligibility for mammogram and importance of cancer screenings.  - Explained process for upcoming colon cancer screening using at-home stool sa  mple collection.  - Blood pressure recheck ordered.  - Follow up in a few months.  - Contact the office if symptoms do not improve.        1. Cough, unspecified type  - POCT Influenza A/B  Molecular  - POCT COVID-19 Rapid Screening    2. Headache after cough  - POCT Influenza A/B Molecular  - POCT COVID-19 Rapid Screening    3. Fever, unspecified fever cause  - POCT Influenza A/B Molecular  - POCT COVID-19 Rapid Screening    4. Rheumatoid arthritis, involving unspecified site, unspecified whether rheumatoid factor present  - uncontrolled. Patient following up w/ rheumatologist and pain management. Recently start new medications     5. History of lower limb amputation  - wheelchair dependent    6. Centrilobular emphysema  - X-Ray Chest PA And Lateral; Future  Controlled previously. Followed by pulmonologist previously. Stable on inhalers    7. Bacterial infection  - predniSONE (DELTASONE) 5 MG tablet; Take 1 tablet (5 mg total) by mouth once daily.  Dispense: 4 tablet; Refill: 0  - azithromycin (Z-EMELIA) 250 MG tablet; Take 2 tablets by mouth on day 1; Take 1 tablet by mouth on days 2-5  Dispense: 6 tablet; Refill: 0  - fluticasone propionate (FLONASE) 50 mcg/actuation nasal spray; 1 spray (50 mcg total) by Each Nostril route once daily.  Dispense: 18.2 mL; Refill: 0  - azelastine (ASTELIN) 137 mcg (0.1 %) nasal spray; 1 spray (137 mcg total) by Nasal route 2 (two) times daily.  Dispense: 18.2 mL; Refill: 0  - guaiFENesin (MUCINEX) 600 mg 12 hr tablet; Take 2 tablets (1,200 mg total) by mouth 2 (two) times daily.  Dispense: 30 tablet; Refill: 0  - levocetirizine (XYZAL) 5 MG tablet; Take 1 tablet (5 mg total) by mouth every evening.  Dispense: 30 tablet; Refill: 11    8. Chronic obstructive pulmonary disease, unspecified COPD type  - fluticasone-umeclidin-vilanter (TRELEGY ELLIPTA) 200-62.5-25 mcg inhaler; Inhale 1 puff into the lungs once daily.  Dispense: 180 each; Refill: 3  - albuterol (VENTOLIN HFA) 90 mcg/actuation inhaler; Inhale 2 puffs into the lungs every 6 (six) hours as needed for Wheezing. Rescue  Dispense: 8.5 g; Refill: 3    9. Encounter for screening mammogram for malignant neoplasm of  breast  - Mammo Digital Screening Bilat w/ Torin (XPD); Future    10. Encounter for screening for malignant neoplasm of colon  - Cologuard Screening (Multitarget Stool DNA); Future  - Cologuard Screening (Multitarget Stool DNA)         Orders Placed This Encounter   Procedures    Cologuard Screening (Multitarget Stool DNA)    Mammo Digital Screening Bilat w/ Torin (XPD)    X-Ray Chest PA And Lateral    POCT Influenza A/B Molecular    POCT COVID-19 Rapid Screening       Follow up in about 3 months (around 2025). or sooner as needed.                 [1]   Social History  Socioeconomic History    Marital status:    Tobacco Use    Smoking status: Every Day     Current packs/day: 0.00     Average packs/day: 1 pack/day for 61.0 years (61.0 ttl pk-yrs)     Types: Cigarettes     Start date:      Last attempt to quit: 2023     Years since quittin.9    Smokeless tobacco: Never   Substance and Sexual Activity    Alcohol use: No    Drug use: Never    Sexual activity: Not Currently     Social Drivers of Health     Financial Resource Strain: Low Risk  (3/11/2025)    Overall Financial Resource Strain (CARDIA)     Difficulty of Paying Living Expenses: Not hard at all   Food Insecurity: No Food Insecurity (3/11/2025)    Hunger Vital Sign     Worried About Running Out of Food in the Last Year: Never true     Ran Out of Food in the Last Year: Never true   Transportation Needs: No Transportation Needs (3/11/2025)    PRAPARE - Transportation     Lack of Transportation (Medical): No     Lack of Transportation (Non-Medical): No   Physical Activity: Inactive (3/11/2025)    Exercise Vital Sign     Days of Exercise per Week: 0 days     Minutes of Exercise per Session: 0 min   Stress: Stress Concern Present (3/11/2025)    [employer] [place] [employer] - Occupational Stress Questionnaire     Feeling of Stress : To some extent   Housing Stability: Low Risk  (3/11/2025)    Housing Stability Vital Sign     Unable  to Pay for Housing in the Last Year: No     Number of Times Moved in the Last Year: 0     Homeless in the Last Year: No

## 2025-03-12 NOTE — TELEPHONE ENCOUNTER
Refill Request              --->Care Gap information included in message below if applicable.       Automatic Epic Generated Protocol Data:        Requested Prescriptions   Pending Prescriptions Disp Refills    albuterol (VENTOLIN HFA) 90 mcg/actuation inhaler 8.5 g 3     Sig: Inhale 2 puffs into the lungs every 6 (six) hours as needed for Wheezing. Rescue       There is no refill protocol information for this order       fluticasone-umeclidin-vilanter (TRELEGY ELLIPTA) 200-62.5-25 mcg inhaler 180 each 3     Sig: Inhale 1 puff into the lungs once daily.       There is no refill protocol information for this order           Appointments  past 12m or future 3m with PCP    Date Provider   Last Visit   3/12/2025 Josefina Durand MD   Next Visit   Visit date not found Josefina Durand MD   ED visits in past 90 days: 0        Note composed:11:42 AM 03/12/2025

## 2025-03-29 ENCOUNTER — HOSPITAL ENCOUNTER (EMERGENCY)
Facility: HOSPITAL | Age: 70
Discharge: HOME | End: 2025-03-29
Attending: EMERGENCY MEDICINE
Payer: MEDICARE

## 2025-03-29 VITALS
RESPIRATION RATE: 20 BRPM | OXYGEN SATURATION: 95 % | WEIGHT: 100 LBS | SYSTOLIC BLOOD PRESSURE: 111 MMHG | TEMPERATURE: 98 F | HEART RATE: 91 BPM | BODY MASS INDEX: 22.5 KG/M2 | DIASTOLIC BLOOD PRESSURE: 58 MMHG | HEIGHT: 56 IN

## 2025-03-29 DIAGNOSIS — E83.42 HYPOMAGNESEMIA: ICD-10-CM

## 2025-03-29 DIAGNOSIS — R06.02 SHORTNESS OF BREATH: ICD-10-CM

## 2025-03-29 DIAGNOSIS — J44.1 COPD EXACERBATION: Primary | ICD-10-CM

## 2025-03-29 LAB
ABSOLUTE EOSINOPHIL (OHS): 0.44 K/UL
ABSOLUTE MONOCYTE (OHS): 0.64 K/UL (ref 0.3–1)
ABSOLUTE NEUTROPHIL COUNT (OHS): 2.83 K/UL (ref 1.8–7.7)
ALBUMIN SERPL BCP-MCNC: 3 G/DL (ref 3.5–5.2)
ALP SERPL-CCNC: 122 UNIT/L (ref 40–150)
ALT SERPL W/O P-5'-P-CCNC: 18 UNIT/L (ref 10–44)
ANION GAP (OHS): 12 MMOL/L (ref 8–16)
AST SERPL-CCNC: 35 UNIT/L (ref 11–45)
BASOPHILS # BLD AUTO: 0.05 K/UL
BASOPHILS NFR BLD AUTO: 1 %
BILIRUB SERPL-MCNC: 0.6 MG/DL (ref 0.1–1)
BNP SERPL-MCNC: 18 PG/ML (ref 0–99)
BUN SERPL-MCNC: 10 MG/DL (ref 8–23)
CALCIUM SERPL-MCNC: 8.9 MG/DL (ref 8.7–10.5)
CHLORIDE SERPL-SCNC: 107 MMOL/L (ref 95–110)
CO2 SERPL-SCNC: 17 MMOL/L (ref 23–29)
CREAT SERPL-MCNC: 0.6 MG/DL (ref 0.5–1.4)
ERYTHROCYTE [DISTWIDTH] IN BLOOD BY AUTOMATED COUNT: 17.7 % (ref 11.5–14.5)
GFR SERPLBLD CREATININE-BSD FMLA CKD-EPI: >60 ML/MIN/1.73/M2
GLUCOSE SERPL-MCNC: 98 MG/DL (ref 70–110)
HCT VFR BLD AUTO: 31.4 % (ref 37–48.5)
HGB BLD-MCNC: 10.6 GM/DL (ref 12–16)
HOLD SPECIMEN: NORMAL
IMM GRANULOCYTES # BLD AUTO: 0.03 K/UL (ref 0–0.04)
IMM GRANULOCYTES NFR BLD AUTO: 0.6 % (ref 0–0.5)
INFLUENZA A MOLECULAR (OHS): NEGATIVE
INFLUENZA B MOLECULAR (OHS): NEGATIVE
INR PPP: 1 (ref 0.8–1.2)
LACTATE SERPL-SCNC: 0.8 MMOL/L (ref 0.5–2.2)
LYMPHOCYTES # BLD AUTO: 1 K/UL (ref 1–4.8)
MAGNESIUM SERPL-MCNC: 1.4 MG/DL (ref 1.6–2.6)
MCH RBC QN AUTO: 32.2 PG (ref 27–50)
MCHC RBC AUTO-ENTMCNC: 33.8 G/DL (ref 32–36)
MCV RBC AUTO: 95 FL (ref 82–98)
NUCLEATED RBC (/100WBC) (OHS): 0 /100 WBC
PLATELET # BLD AUTO: 466 K/UL (ref 150–450)
PMV BLD AUTO: 8.4 FL (ref 9.2–12.9)
POTASSIUM SERPL-SCNC: 3.8 MMOL/L (ref 3.5–5.1)
PROT SERPL-MCNC: 6.8 GM/DL (ref 6–8.4)
PROTHROMBIN TIME: 11 SECONDS (ref 9–12.5)
RBC # BLD AUTO: 3.29 M/UL (ref 4–5.4)
RELATIVE EOSINOPHIL (OHS): 8.8 %
RELATIVE LYMPHOCYTE (OHS): 20 % (ref 18–48)
RELATIVE MONOCYTE (OHS): 12.8 % (ref 4–15)
RELATIVE NEUTROPHIL (OHS): 56.8 % (ref 38–73)
SARS-COV-2 RDRP RESP QL NAA+PROBE: NEGATIVE
SODIUM SERPL-SCNC: 136 MMOL/L (ref 136–145)
TROPONIN I SERPL DL<=0.01 NG/ML-MCNC: 0.01 NG/ML
WBC # BLD AUTO: 4.99 K/UL (ref 3.9–12.7)

## 2025-03-29 PROCEDURE — 99285 EMERGENCY DEPT VISIT HI MDM: CPT | Mod: 25

## 2025-03-29 PROCEDURE — 93005 ELECTROCARDIOGRAM TRACING: CPT | Performed by: INTERNAL MEDICINE

## 2025-03-29 PROCEDURE — 25000003 PHARM REV CODE 250: Performed by: NURSE PRACTITIONER

## 2025-03-29 PROCEDURE — 84484 ASSAY OF TROPONIN QUANT: CPT | Performed by: NURSE PRACTITIONER

## 2025-03-29 PROCEDURE — 93010 ELECTROCARDIOGRAM REPORT: CPT | Mod: ,,, | Performed by: INTERNAL MEDICINE

## 2025-03-29 PROCEDURE — 83735 ASSAY OF MAGNESIUM: CPT | Performed by: NURSE PRACTITIONER

## 2025-03-29 PROCEDURE — 96374 THER/PROPH/DIAG INJ IV PUSH: CPT

## 2025-03-29 PROCEDURE — 87502 INFLUENZA DNA AMP PROBE: CPT | Performed by: NURSE PRACTITIONER

## 2025-03-29 PROCEDURE — 71045 X-RAY EXAM CHEST 1 VIEW: CPT | Mod: TC

## 2025-03-29 PROCEDURE — 83605 ASSAY OF LACTIC ACID: CPT | Performed by: NURSE PRACTITIONER

## 2025-03-29 PROCEDURE — 85025 COMPLETE CBC W/AUTO DIFF WBC: CPT | Performed by: NURSE PRACTITIONER

## 2025-03-29 PROCEDURE — 80053 COMPREHEN METABOLIC PANEL: CPT | Performed by: NURSE PRACTITIONER

## 2025-03-29 PROCEDURE — 63600175 PHARM REV CODE 636 W HCPCS: Mod: JZ,TB | Performed by: NURSE PRACTITIONER

## 2025-03-29 PROCEDURE — U0002 COVID-19 LAB TEST NON-CDC: HCPCS | Performed by: NURSE PRACTITIONER

## 2025-03-29 PROCEDURE — 85610 PROTHROMBIN TIME: CPT | Performed by: NURSE PRACTITIONER

## 2025-03-29 PROCEDURE — 83880 ASSAY OF NATRIURETIC PEPTIDE: CPT | Performed by: NURSE PRACTITIONER

## 2025-03-29 PROCEDURE — 94760 N-INVAS EAR/PLS OXIMETRY 1: CPT

## 2025-03-29 PROCEDURE — 36415 COLL VENOUS BLD VENIPUNCTURE: CPT | Performed by: EMERGENCY MEDICINE

## 2025-03-29 PROCEDURE — 71045 X-RAY EXAM CHEST 1 VIEW: CPT | Mod: 26,,, | Performed by: RADIOLOGY

## 2025-03-29 RX ORDER — DOXYCYCLINE 100 MG/1
100 CAPSULE ORAL 2 TIMES DAILY
Qty: 20 CAPSULE | Refills: 0 | Status: SHIPPED | OUTPATIENT
Start: 2025-03-29 | End: 2025-04-08

## 2025-03-29 RX ORDER — LANOLIN ALCOHOL/MO/W.PET/CERES
400 CREAM (GRAM) TOPICAL
Status: COMPLETED | OUTPATIENT
Start: 2025-03-29 | End: 2025-03-29

## 2025-03-29 RX ORDER — BENZONATATE 200 MG/1
200 CAPSULE ORAL 3 TIMES DAILY PRN
Qty: 60 CAPSULE | Refills: 0 | Status: SHIPPED | OUTPATIENT
Start: 2025-03-29

## 2025-03-29 RX ORDER — DOXYCYCLINE HYCLATE 100 MG
100 TABLET ORAL
Status: COMPLETED | OUTPATIENT
Start: 2025-03-29 | End: 2025-03-29

## 2025-03-29 RX ORDER — PREDNISONE 20 MG/1
TABLET ORAL
Qty: 18 TABLET | Refills: 0 | Status: SHIPPED | OUTPATIENT
Start: 2025-03-29 | End: 2025-04-01 | Stop reason: SDUPTHER

## 2025-03-29 RX ORDER — METHYLPREDNISOLONE SOD SUCC 125 MG
125 VIAL (EA) INJECTION
Status: COMPLETED | OUTPATIENT
Start: 2025-03-29 | End: 2025-03-29

## 2025-03-29 RX ADMIN — Medication 400 MG: at 12:03

## 2025-03-29 RX ADMIN — DOXYCYCLINE HYCLATE 100 MG: 100 TABLET, COATED ORAL at 12:03

## 2025-03-29 RX ADMIN — METHYLPREDNISOLONE SODIUM SUCCINATE 125 MG: 125 INJECTION, POWDER, FOR SOLUTION INTRAMUSCULAR; INTRAVENOUS at 12:03

## 2025-03-29 NOTE — DISCHARGE INSTRUCTIONS
Rest, increase fluids, lots of water and liquids.  Tylenol and or Motrin as needed if not contraindicated.  Continue prescribed medications.  Continue breathing treatments at home.  X-ray negative for pneumonia, showing progression of COPD.  No acute findings on testing today requiring additional test in the ED or admission.  Negative COVID and flu.  Call clinic for office recheck.  Return as needed.  Stop smoking
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
## 2025-03-31 LAB
OHS QRS DURATION: 74 MS
OHS QTC CALCULATION: 435 MS

## 2025-04-01 ENCOUNTER — OFFICE VISIT (OUTPATIENT)
Dept: FAMILY MEDICINE | Facility: CLINIC | Age: 70
End: 2025-04-01
Payer: MEDICARE

## 2025-04-01 VITALS
WEIGHT: 101.44 LBS | SYSTOLIC BLOOD PRESSURE: 136 MMHG | OXYGEN SATURATION: 99 % | RESPIRATION RATE: 16 BRPM | DIASTOLIC BLOOD PRESSURE: 68 MMHG | HEIGHT: 57 IN | HEART RATE: 90 BPM | BODY MASS INDEX: 21.88 KG/M2

## 2025-04-01 DIAGNOSIS — E61.1 IRON DEFICIENCY: ICD-10-CM

## 2025-04-01 DIAGNOSIS — J43.2 CENTRILOBULAR EMPHYSEMA: Primary | ICD-10-CM

## 2025-04-01 DIAGNOSIS — Z12.31 ENCOUNTER FOR SCREENING MAMMOGRAM FOR MALIGNANT NEOPLASM OF BREAST: ICD-10-CM

## 2025-04-01 DIAGNOSIS — Z12.11 ENCOUNTER FOR SCREENING FOR MALIGNANT NEOPLASM OF COLON: ICD-10-CM

## 2025-04-01 PROCEDURE — 3078F DIAST BP <80 MM HG: CPT | Mod: CPTII,S$GLB,, | Performed by: STUDENT IN AN ORGANIZED HEALTH CARE EDUCATION/TRAINING PROGRAM

## 2025-04-01 PROCEDURE — 1126F AMNT PAIN NOTED NONE PRSNT: CPT | Mod: CPTII,S$GLB,, | Performed by: STUDENT IN AN ORGANIZED HEALTH CARE EDUCATION/TRAINING PROGRAM

## 2025-04-01 PROCEDURE — 1101F PT FALLS ASSESS-DOCD LE1/YR: CPT | Mod: CPTII,S$GLB,, | Performed by: STUDENT IN AN ORGANIZED HEALTH CARE EDUCATION/TRAINING PROGRAM

## 2025-04-01 PROCEDURE — 3075F SYST BP GE 130 - 139MM HG: CPT | Mod: CPTII,S$GLB,, | Performed by: STUDENT IN AN ORGANIZED HEALTH CARE EDUCATION/TRAINING PROGRAM

## 2025-04-01 PROCEDURE — 99214 OFFICE O/P EST MOD 30 MIN: CPT | Mod: S$GLB,,, | Performed by: STUDENT IN AN ORGANIZED HEALTH CARE EDUCATION/TRAINING PROGRAM

## 2025-04-01 PROCEDURE — 4010F ACE/ARB THERAPY RXD/TAKEN: CPT | Mod: CPTII,S$GLB,, | Performed by: STUDENT IN AN ORGANIZED HEALTH CARE EDUCATION/TRAINING PROGRAM

## 2025-04-01 PROCEDURE — 3288F FALL RISK ASSESSMENT DOCD: CPT | Mod: CPTII,S$GLB,, | Performed by: STUDENT IN AN ORGANIZED HEALTH CARE EDUCATION/TRAINING PROGRAM

## 2025-04-01 PROCEDURE — 1159F MED LIST DOCD IN RCRD: CPT | Mod: CPTII,S$GLB,, | Performed by: STUDENT IN AN ORGANIZED HEALTH CARE EDUCATION/TRAINING PROGRAM

## 2025-04-01 PROCEDURE — 99999 PR PBB SHADOW E&M-EST. PATIENT-LVL V: CPT | Mod: PBBFAC,,, | Performed by: STUDENT IN AN ORGANIZED HEALTH CARE EDUCATION/TRAINING PROGRAM

## 2025-04-01 PROCEDURE — 3008F BODY MASS INDEX DOCD: CPT | Mod: CPTII,S$GLB,, | Performed by: STUDENT IN AN ORGANIZED HEALTH CARE EDUCATION/TRAINING PROGRAM

## 2025-04-01 RX ORDER — FERROUS SULFATE 325(65) MG
325 TABLET ORAL
Qty: 90 TABLET | Refills: 3 | Status: SHIPPED | OUTPATIENT
Start: 2025-04-01

## 2025-04-01 RX ORDER — PREDNISONE 20 MG/1
20 TABLET ORAL DAILY
Qty: 4 TABLET | Refills: 0 | Status: SHIPPED | OUTPATIENT
Start: 2025-04-01 | End: 2025-04-05

## 2025-04-01 NOTE — PROGRESS NOTES
"SUBJECTIVE:    CHIEF COMPLAINT:   Chief Complaint   Patient presents with    Follow-up     Hospital follow up   Pt had a copd flare up  Saturday and was in the emergency room  Pt states the medication is helping  Pt interested in Beztri prescription.           274}    HISTORY OF PRESENT ILLNESS:  Janey Null is a 69 y.o. female who presents to the clinic today   History of Present Illness    CHIEF COMPLAINT:  Ms. Null presents today for follow up after recent emergency room visit for COPD exacerbation    RECENT COPD EXACERBATION:  She presented to ER on Saturday for COPD exacerbation. "Chest xray showed diffuse interstitial prominence, increased. This may be due to worsening chronic interstitial lung disease. Viral infection or bronchitis superimposed upon chronic changes included in the differential."  She received prednisone, antibiotics, and tessalon in the ER. She reports improved breathing with prednisone and is currently using nebulizer 3 times daily. Her symptoms worsen when lying down, particularly at night, leading to increased illness in the morning. She has found that staying up later has helped alleviate this issue.    PULMONARY HISTORY:  She saw a pulmonologist at [facility] a couple months ago who assessed her condition and concluded it was mucus-related. No follow-up was scheduled as her lungs were determined to be in good condition.    ROS:  General: -fever, -chills, -fatigue, -weight gain, -weight loss  Eyes: -vision changes, -redness, -discharge  ENT: -ear pain, -nasal congestion, -sore throat  Cardiovascular: -chest pain, -palpitations, -lower extremity edema  Respiratory: -cough, -shortness of breath  Gastrointestinal: -abdominal pain, -nausea, -vomiting, -diarrhea, -constipation, -blood in stool  Genitourinary: -dysuria, -hematuria, -frequency  Musculoskeletal: -joint pain, -muscle pain  Skin: -rash, -lesion  Neurological: -headache, -dizziness, -numbness, -tingling  Psychiatric: " -anxiety, -depression, -sleep difficulty          PAST MEDICAL HISTORY:     274}  Past Medical History:   Diagnosis Date    Anxiety     Depression     Encounter for long-term (current) use of high-risk medication     Fibromyalgia     Osteoporosis     Rheumatoid arthritis involving multiple sites with positive rheumatoid factor        PAST SURGICAL HISTORY:  Past Surgical History:   Procedure Laterality Date    ESOPHAGOGASTRODUODENOSCOPY N/A 4/5/2023    Procedure: EGD (ESOPHAGOGASTRODUODENOSCOPY);  Surgeon: Tyrell Cornelius MD;  Location: [facility] ENDO;  Service: General;  Laterality: N/A;    REPAIR OF RECURRENT INCARCERATED INCISIONAL HERNIA Left 4/13/2022    Procedure: REPAIR, HERNIA--RIGHT LATERAL POSITIONING;  Surgeon: Tyrell Cornelius MD;  Location: [facility] OR;  Service: General;  Laterality: Left;  left lumbar hernia    right leg       multiple surgeries ,amputation       SOCIAL HISTORY:  Social History[1]    FAMILY HISTORY:       No family history on file.    ALLERGIES AND MEDICATIONS: updated and reviewed.      274}  Review of patient's allergies indicates:   Allergen Reactions    Penicillins      Medication List with Changes/Refills   New Medications    FERROUS SULFATE (FEOSOL) 325 MG (65 MG IRON) TAB TABLET    Take 1 tablet (325 mg total) by mouth daily with breakfast.   Current Medications    ALBUTEROL (VENTOLIN HFA) 90 MCG/ACTUATION INHALER    Inhale 2 puffs into the lungs every 6 (six) hours as needed for Wheezing. Rescue    ALBUTEROL-IPRATROPIUM (DUO-NEB) 2.5 MG-0.5 MG/3 ML NEBULIZER SOLUTION    Take 3 mLs by nebulization every 6 (six) hours as needed for Wheezing.    ALENDRONATE (FOSAMAX) 70 MG TABLET    Take 1 tablet (70 mg total) by mouth every 7 days.    ATORVASTATIN (LIPITOR) 40 MG TABLET    Take 1 tablet (40 mg total) by mouth once daily.    AZELASTINE (ASTELIN) 137 MCG (0.1 %) NASAL SPRAY    1 spray (137 mcg total) by Nasal route 2 (two) times daily.    BENZONATATE (TESSALON) 200 MG CAPSULE     Take 1 capsule (200 mg total) by mouth 3 (three) times daily as needed for Cough.    DOXYCYCLINE (VIBRAMYCIN) 100 MG CAP    Take 1 capsule (100 mg total) by mouth 2 (two) times daily. for 10 days    ERGOCALCIFEROL (ERGOCALCIFEROL) 50,000 UNIT CAP    Take 1 capsule (50,000 Units total) by mouth every 7 days.    FLUTICASONE PROPIONATE (FLONASE) 50 MCG/ACTUATION NASAL SPRAY    1 spray (50 mcg total) by Each Nostril route once daily.    FLUTICASONE-UMECLIDIN-VILANTER (TRELEGY ELLIPTA) 200-62.5-25 MCG INHALER    Inhale 1 puff into the lungs once daily.    FOLIC ACID (FOLVITE) 1 MG TABLET    TAKE 1 TABLET(1 MG) BY MOUTH EVERY DAY    FUROSEMIDE (LASIX) 40 MG TABLET    Take 1 tablet (40 mg total) by mouth 2 (two) times daily.    GUAIFENESIN (MUCINEX) 600 MG 12 HR TABLET    Take 2 tablets (1,200 mg total) by mouth 2 (two) times daily.    HYDROCODONE-ACETAMINOPHEN 10-325MG (NORCO)  MG TAB    Take 1 tablet by mouth every 6 (six) hours as needed.     LEVOCETIRIZINE (XYZAL) 5 MG TABLET    Take 1 tablet (5 mg total) by mouth every evening.    LOSARTAN (COZAAR) 100 MG TABLET    Take 1 tablet (100 mg total) by mouth once daily.    METHOTREXATE 2.5 MG TAB    Take by mouth.    PANTOPRAZOLE (PROTONIX) 40 MG TABLET    Take 1 tablet (40 mg total) by mouth once daily.    SUCRALFATE (CARAFATE) 1 GRAM TABLET    Take 1 tablet (1 g total) by mouth 4 (four) times daily before meals and nightly.    TIZANIDINE (ZANAFLEX) 4 MG TABLET    Take 4 mg by mouth every 8 (eight) hours as needed.   Changed and/or Refilled Medications    Modified Medication Previous Medication    PREDNISONE (DELTASONE) 20 MG TABLET predniSONE (DELTASONE) 20 MG tablet       Take 1 tablet (20 mg total) by mouth once daily. TAKE 3 TABLETS FOR 3 DAYS; TAKE 2 FOR 3 DAYS; TAKE 1 FOR 3 DAYS for 4 days    TAKE 3 TABLETS FOR 3 DAYS; TAKE 2 FOR 3 DAYS; TAKE 1 FOR 3 DAYS       SCREENING HISTORY:    274}  Health Maintenance         Date Due Completion Date    TETANUS VACCINE  "Never done ---    Mammogram Never done ---    Shingles Vaccine (1 of 2) Never done ---    Colorectal Cancer Screening Never done ---    COVID-19 Vaccine (6 - 2024-25 season) 09/01/2024 10/29/2023    LDCT Lung Screen 12/12/2025 12/12/2024    DEXA Scan 09/19/2027 9/19/2024    Lipid Panel 09/19/2029 9/19/2024            REVIEW OF SYSTEMS:   ROS    PHYSICAL EXAM:      274}  /68 (BP Location: Left arm, Patient Position: Sitting)   Pulse 90   Resp 16   Ht 4' 8.77" (1.442 m)   Wt 46 kg (101 lb 6.6 oz)   SpO2 99%   BMI 22.12 kg/m²   Wt Readings from Last 3 Encounters:   04/01/25 46 kg (101 lb 6.6 oz)   03/29/25 45.4 kg (100 lb)   03/12/25 42.5 kg (93 lb 11.1 oz)     BP Readings from Last 3 Encounters:   04/01/25 136/68   03/29/25 (!) 111/58   03/12/25 118/60     Estimated body mass index is 22.12 kg/m² as calculated from the following:    Height as of this encounter: 4' 8.77" (1.442 m).    Weight as of this encounter: 46 kg (101 lb 6.6 oz).     Physical Exam  HENT:      Head: Normocephalic and atraumatic.      Nose: Nose normal.      Mouth/Throat:      Mouth: Mucous membranes are dry.      Pharynx: Oropharynx is clear.   Eyes:      Extraocular Movements: Extraocular movements intact.      Conjunctiva/sclera: Conjunctivae normal.   Cardiovascular:      Rate and Rhythm: Normal rate and regular rhythm.   Pulmonary:      Effort: Pulmonary effort is normal.      Breath sounds: Normal breath sounds.   Abdominal:      General: Bowel sounds are normal.      Palpations: Abdomen is soft.   Musculoskeletal:      Cervical back: Normal range of motion.      Comments: R BKA    Skin:     General: Skin is warm.   Neurological:      General: No focal deficit present.      Mental Status: She is alert. Mental status is at baseline.   Psychiatric:         Mood and Affect: Mood normal.         Thought Content: Thought content normal.         LABS:   274}  I have reviewed old labs below:  Lab Results   Component Value Date    WBC " 4.99 03/29/2025    HGB 10.6 (L) 03/29/2025    HCT 31.4 (L) 03/29/2025    MCV 95 03/29/2025     (H) 03/29/2025     03/29/2025    K 3.8 03/29/2025     03/29/2025    CALCIUM 8.9 03/29/2025    PHOS 1.8 (L) 10/25/2024    CO2 17 (L) 03/29/2025     11/19/2024    BUN 10 03/29/2025    CREATININE 0.6 03/29/2025    ANIONGAP 12 03/29/2025    ESTGFRAFRICA >60.0 05/20/2022    EGFRNONAA >60.0 05/20/2022    PROT 5.8 (L) 10/25/2024    ALBUMIN 3.0 (L) 03/29/2025    BILITOT 0.6 03/29/2025    ALKPHOS 122 03/29/2025    ALT 18 03/29/2025    AST 35 03/29/2025    INR 1.0 03/29/2025    CHOL 251 (H) 09/19/2024    TRIG 104 09/19/2024    HDL 76 (H) 09/19/2024    LDLCALC 154.2 09/19/2024    TSH 0.429 09/19/2024    HGBA1C 5.4 09/19/2024       ASSESSMENT AND PLAN:  274}  Assessment & Plan    IMPRESSION:  Reviewed recent ER visit for COPD exacerbation.  Assessed current COPD management, noting improvement with prednisone but recognizing need for long-term alternative due to side effects.    CHRONIC OBSTRUCTIVE PULMONARY DISEASE (COPD) EXACERBATION:  - Monitored the patient's condition following an emergency room visit due to a severe COPD exacerbation, characterized by excessive mucus production and dyspnea.  - Noted improvement in the patient's condition post-hospital treatment and with prednisone use.  - Evaluated chest XRs taken at the hospital, which were reported as clear.  - Observed reduced mucus accumulation when the patient stays awake longer.  - Extended prednisone prescription for a short duration due to its effectiveness in improving respiratory function.  - Instructed the patient to stay up later to help prevent mucus buildup during sleep.  - Prescribed oral medications including prednisone, antibiotics, and antitussive syrup as per hospital recommendations.  - Continued the patient's current regimen of using a nebulizer 3 times daily for COPD management.  - Acknowledged the patient's report of improved  breathing with prednisone use.    FOLLOW-UP:  - Referred the patient to pulmonologist NP Coby Cannon for comprehensive COPD management and potential oxygen therapy evaluation, with instructions to follow up as soon as possible.        1. Centrilobular emphysema  - Ambulatory referral/consult to Pulmonology; Future  - predniSONE (DELTASONE) 20 MG tablet; Take 1 tablet (20 mg total) by mouth once daily. TAKE 3 TABLETS FOR 3 DAYS; TAKE 2 FOR 3 DAYS; TAKE 1 FOR 3 DAYS for 4 days  Dispense: 4 tablet; Refill: 0    2. Encounter for screening mammogram for malignant neoplasm of breast  - Mammo Digital Screening Bilat w/ Torin (XPD); Future    3. Encounter for screening for malignant neoplasm of colon  - Fecal Immunochemical Test (iFOBT); Future  - Fecal Immunochemical Test (iFOBT)    4. Iron deficiency  - ferrous sulfate (FEOSOL) 325 mg (65 mg iron) Tab tablet; Take 1 tablet (325 mg total) by mouth daily with breakfast.  Dispense: 90 tablet; Refill: 3    Orders Placed This Encounter   Procedures    Mammo Digital Screening Bilat w/ Torin (XPD)    Fecal Immunochemical Test (iFOBT)    Ambulatory referral/consult to Pulmonology       No follow-ups on file. or sooner as needed.                 [1]   Social History  Socioeconomic History    Marital status:    Tobacco Use    Smoking status: Every Day     Current packs/day: 0.00     Average packs/day: 1 pack/day for 61.0 years (61.0 ttl pk-yrs)     Types: Cigarettes     Start date:      Last attempt to quit: 2023     Years since quittin.9    Smokeless tobacco: Never   Substance and Sexual Activity    Alcohol use: No    Drug use: Never    Sexual activity: Not Currently     Social Drivers of Health     Financial Resource Strain: Low Risk  (3/11/2025)    Overall Financial Resource Strain (CARDIA)     Difficulty of Paying Living Expenses: Not hard at all   Food Insecurity: No Food Insecurity (3/11/2025)    Hunger Vital Sign     Worried About Running Out of Food in  the Last Year: Never true     Ran Out of Food in the Last Year: Never true   Transportation Needs: No Transportation Needs (3/11/2025)    PRAPARE - Transportation     Lack of Transportation (Medical): No     Lack of Transportation (Non-Medical): No   Physical Activity: Inactive (3/11/2025)    Exercise Vital Sign     Days of Exercise per Week: 0 days     Minutes of Exercise per Session: 0 min   Stress: Stress Concern Present (3/11/2025)    [employer] - Occupational Stress Questionnaire     Feeling of Stress : To some extent   Housing Stability: Low Risk  (3/11/2025)    Housing Stability Vital Sign     Unable to Pay for Housing in the Last Year: No     Number of Times Moved in the Last Year: 0     Homeless in the Last Year: No

## 2025-04-13 ENCOUNTER — HOSPITAL ENCOUNTER (INPATIENT)
Facility: HOSPITAL | Age: 70
LOS: 3 days | Discharge: HOME HEALTH | DRG: 871 | End: 2025-04-16
Attending: EMERGENCY MEDICINE | Admitting: STUDENT IN AN ORGANIZED HEALTH CARE EDUCATION/TRAINING PROGRAM
Payer: MEDICARE

## 2025-04-13 DIAGNOSIS — J44.1 COPD EXACERBATION: ICD-10-CM

## 2025-04-13 DIAGNOSIS — A41.9 SEPTIC SHOCK: Primary | ICD-10-CM

## 2025-04-13 DIAGNOSIS — R65.21 SEPTIC SHOCK: Primary | ICD-10-CM

## 2025-04-13 DIAGNOSIS — J96.01 ACUTE RESPIRATORY FAILURE WITH HYPOXIA: ICD-10-CM

## 2025-04-13 DIAGNOSIS — Z13.6 SCREENING FOR CARDIOVASCULAR CONDITION: ICD-10-CM

## 2025-04-13 DIAGNOSIS — T78.2XXA ANAPHYLAXIS, INITIAL ENCOUNTER: ICD-10-CM

## 2025-04-13 DIAGNOSIS — J96.01 ACUTE HYPOXEMIC RESPIRATORY FAILURE: ICD-10-CM

## 2025-04-13 DIAGNOSIS — R07.9 CHEST PAIN: ICD-10-CM

## 2025-04-13 DIAGNOSIS — J18.9 PNEUMONIA OF LEFT LOWER LOBE DUE TO INFECTIOUS ORGANISM: ICD-10-CM

## 2025-04-13 PROBLEM — Z71.89 ADVANCED CARE PLANNING/COUNSELING DISCUSSION: Status: ACTIVE | Noted: 2025-04-13

## 2025-04-13 PROBLEM — N17.9 AKI (ACUTE KIDNEY INJURY): Status: ACTIVE | Noted: 2025-04-13

## 2025-04-13 PROBLEM — N30.00 ACUTE CYSTITIS: Status: ACTIVE | Noted: 2025-04-13

## 2025-04-13 PROBLEM — Z99.3 WHEELCHAIR DEPENDENCE: Status: ACTIVE | Noted: 2025-04-13

## 2025-04-13 LAB
ABSOLUTE NEUTROPHIL MANUAL (OHS): 14.6 K/UL
ALBUMIN SERPL BCP-MCNC: 2.4 G/DL (ref 3.5–5.2)
ALLENS TEST: ABNORMAL
ALP SERPL-CCNC: 193 UNIT/L (ref 40–150)
ALT SERPL W/O P-5'-P-CCNC: 30 UNIT/L (ref 10–44)
ANION GAP (OHS): 18 MMOL/L (ref 8–16)
ANISOCYTOSIS BLD QL SMEAR: SLIGHT
AST SERPL-CCNC: 25 UNIT/L (ref 11–45)
BACTERIA #/AREA URNS HPF: ABNORMAL /HPF
BILIRUB SERPL-MCNC: 0.4 MG/DL (ref 0.1–1)
BILIRUB UR QL STRIP.AUTO: NEGATIVE
BUN SERPL-MCNC: 50 MG/DL (ref 8–23)
CALCIUM SERPL-MCNC: 8.8 MG/DL (ref 8.7–10.5)
CHLORIDE SERPL-SCNC: 100 MMOL/L (ref 95–110)
CLARITY UR: CLEAR
CO2 SERPL-SCNC: 19 MMOL/L (ref 23–29)
COLOR UR AUTO: YELLOW
CREAT SERPL-MCNC: 1.8 MG/DL (ref 0.5–1.4)
DELSYS: ABNORMAL
EOSINOPHIL NFR BLD MANUAL: 1 % (ref 0–8)
ERYTHROCYTE [DISTWIDTH] IN BLOOD BY AUTOMATED COUNT: 17.7 % (ref 11.5–14.5)
FIO2: 21
GFR SERPLBLD CREATININE-BSD FMLA CKD-EPI: 30 ML/MIN/1.73/M2
GLUCOSE SERPL-MCNC: 131 MG/DL (ref 70–110)
GLUCOSE UR QL STRIP: NEGATIVE
HCO3 UR-SCNC: 19 MMOL/L (ref 24–28)
HCT VFR BLD AUTO: 30.2 % (ref 37–48.5)
HGB BLD-MCNC: 9.9 GM/DL (ref 12–16)
HGB UR QL STRIP: NEGATIVE
HYALINE CASTS #/AREA URNS LPF: 0 /LPF (ref 0–1)
INFLUENZA A MOLECULAR (OHS): NEGATIVE
INFLUENZA B MOLECULAR (OHS): NEGATIVE
KETONES UR QL STRIP: NEGATIVE
LACTATE SERPL-SCNC: 2.1 MMOL/L (ref 0.5–2.2)
LACTATE SERPL-SCNC: 2.5 MMOL/L (ref 0.5–2.2)
LEUKOCYTE ESTERASE UR QL STRIP: ABNORMAL
LYMPHOCYTES NFR BLD MANUAL: 4 % (ref 18–48)
MCH RBC QN AUTO: 32.5 PG (ref 27–31)
MCHC RBC AUTO-ENTMCNC: 32.8 G/DL (ref 32–36)
MCV RBC AUTO: 99 FL (ref 82–98)
MICROSCOPIC COMMENT: ABNORMAL
MODE: ABNORMAL
MONOCYTES NFR BLD MANUAL: 3 % (ref 4–15)
NEUTROPHILS NFR BLD MANUAL: 75 % (ref 38–73)
NEUTS BAND NFR BLD MANUAL: 17 %
NITRITE UR QL STRIP: NEGATIVE
NUCLEATED RBC (/100WBC) (OHS): 0 /100 WBC
PCO2 BLDA: 27.3 MMHG (ref 35–45)
PH SMN: 7.45 [PH] (ref 7.35–7.45)
PH UR STRIP: 6 [PH]
PLATELET # BLD AUTO: 361 K/UL (ref 150–450)
PLATELET BLD QL SMEAR: ABNORMAL
PMV BLD AUTO: 9.3 FL (ref 9.2–12.9)
PO2 BLDA: 53 MMHG (ref 80–100)
POC BE: -5 MMOL/L
POC SATURATED O2: 89 % (ref 95–100)
POC TCO2: 20 MMOL/L (ref 23–27)
POIKILOCYTOSIS BLD QL SMEAR: SLIGHT
POTASSIUM SERPL-SCNC: 3.7 MMOL/L (ref 3.5–5.1)
PROT SERPL-MCNC: 6.7 GM/DL (ref 6–8.4)
PROT UR QL STRIP: ABNORMAL
RBC # BLD AUTO: 3.05 M/UL (ref 4–5.4)
RBC #/AREA URNS HPF: 3 /HPF (ref 0–4)
SAMPLE: ABNORMAL
SARS-COV-2 RDRP RESP QL NAA+PROBE: NEGATIVE
SITE: ABNORMAL
SODIUM SERPL-SCNC: 137 MMOL/L (ref 136–145)
SP GR UR STRIP: 1.01
SP02: 97
SQUAMOUS #/AREA URNS HPF: 10 /HPF
TROPONIN I SERPL DL<=0.01 NG/ML-MCNC: 0.01 NG/ML
UROBILINOGEN UR STRIP-ACNC: NEGATIVE EU/DL
WBC # BLD AUTO: 15.88 K/UL (ref 3.9–12.7)
WBC #/AREA URNS HPF: 25 /HPF (ref 0–5)

## 2025-04-13 PROCEDURE — 96372 THER/PROPH/DIAG INJ SC/IM: CPT | Performed by: EMERGENCY MEDICINE

## 2025-04-13 PROCEDURE — 71045 X-RAY EXAM CHEST 1 VIEW: CPT | Mod: TC

## 2025-04-13 PROCEDURE — 83605 ASSAY OF LACTIC ACID: CPT | Performed by: EMERGENCY MEDICINE

## 2025-04-13 PROCEDURE — 99406 BEHAV CHNG SMOKING 3-10 MIN: CPT

## 2025-04-13 PROCEDURE — 87040 BLOOD CULTURE FOR BACTERIA: CPT | Performed by: EMERGENCY MEDICINE

## 2025-04-13 PROCEDURE — 25000003 PHARM REV CODE 250: Performed by: STUDENT IN AN ORGANIZED HEALTH CARE EDUCATION/TRAINING PROGRAM

## 2025-04-13 PROCEDURE — 63600175 PHARM REV CODE 636 W HCPCS: Performed by: STUDENT IN AN ORGANIZED HEALTH CARE EDUCATION/TRAINING PROGRAM

## 2025-04-13 PROCEDURE — 85007 BL SMEAR W/DIFF WBC COUNT: CPT | Performed by: EMERGENCY MEDICINE

## 2025-04-13 PROCEDURE — 94761 N-INVAS EAR/PLS OXIMETRY MLT: CPT

## 2025-04-13 PROCEDURE — 36600 WITHDRAWAL OF ARTERIAL BLOOD: CPT

## 2025-04-13 PROCEDURE — 99223 1ST HOSP IP/OBS HIGH 75: CPT | Mod: AI,,, | Performed by: STUDENT IN AN ORGANIZED HEALTH CARE EDUCATION/TRAINING PROGRAM

## 2025-04-13 PROCEDURE — 51798 US URINE CAPACITY MEASURE: CPT

## 2025-04-13 PROCEDURE — 93010 ELECTROCARDIOGRAM REPORT: CPT | Mod: ,,, | Performed by: INTERNAL MEDICINE

## 2025-04-13 PROCEDURE — 93005 ELECTROCARDIOGRAM TRACING: CPT | Performed by: INTERNAL MEDICINE

## 2025-04-13 PROCEDURE — U0002 COVID-19 LAB TEST NON-CDC: HCPCS | Performed by: STUDENT IN AN ORGANIZED HEALTH CARE EDUCATION/TRAINING PROGRAM

## 2025-04-13 PROCEDURE — 81003 URINALYSIS AUTO W/O SCOPE: CPT | Performed by: EMERGENCY MEDICINE

## 2025-04-13 PROCEDURE — 82803 BLOOD GASES ANY COMBINATION: CPT

## 2025-04-13 PROCEDURE — 96367 TX/PROPH/DG ADDL SEQ IV INF: CPT

## 2025-04-13 PROCEDURE — 94799 UNLISTED PULMONARY SVC/PX: CPT

## 2025-04-13 PROCEDURE — 99291 CRITICAL CARE FIRST HOUR: CPT

## 2025-04-13 PROCEDURE — 94640 AIRWAY INHALATION TREATMENT: CPT

## 2025-04-13 PROCEDURE — 80053 COMPREHEN METABOLIC PANEL: CPT | Performed by: EMERGENCY MEDICINE

## 2025-04-13 PROCEDURE — 27000221 HC OXYGEN, UP TO 24 HOURS

## 2025-04-13 PROCEDURE — 99900035 HC TECH TIME PER 15 MIN (STAT)

## 2025-04-13 PROCEDURE — 84484 ASSAY OF TROPONIN QUANT: CPT | Performed by: EMERGENCY MEDICINE

## 2025-04-13 PROCEDURE — 25000242 PHARM REV CODE 250 ALT 637 W/ HCPCS: Performed by: STUDENT IN AN ORGANIZED HEALTH CARE EDUCATION/TRAINING PROGRAM

## 2025-04-13 PROCEDURE — 94760 N-INVAS EAR/PLS OXIMETRY 1: CPT

## 2025-04-13 PROCEDURE — 96375 TX/PRO/DX INJ NEW DRUG ADDON: CPT

## 2025-04-13 PROCEDURE — 96361 HYDRATE IV INFUSION ADD-ON: CPT

## 2025-04-13 PROCEDURE — 96365 THER/PROPH/DIAG IV INF INIT: CPT

## 2025-04-13 PROCEDURE — 87086 URINE CULTURE/COLONY COUNT: CPT | Performed by: EMERGENCY MEDICINE

## 2025-04-13 PROCEDURE — 87502 INFLUENZA DNA AMP PROBE: CPT | Performed by: STUDENT IN AN ORGANIZED HEALTH CARE EDUCATION/TRAINING PROGRAM

## 2025-04-13 PROCEDURE — 20600001 HC STEP DOWN PRIVATE ROOM

## 2025-04-13 PROCEDURE — 94640 AIRWAY INHALATION TREATMENT: CPT | Mod: XB

## 2025-04-13 PROCEDURE — 63600175 PHARM REV CODE 636 W HCPCS: Performed by: EMERGENCY MEDICINE

## 2025-04-13 PROCEDURE — 96360 HYDRATION IV INFUSION INIT: CPT | Mod: 59

## 2025-04-13 PROCEDURE — 71045 X-RAY EXAM CHEST 1 VIEW: CPT | Mod: 26,,, | Performed by: RADIOLOGY

## 2025-04-13 PROCEDURE — 94644 CONT INHLJ TX 1ST HOUR: CPT

## 2025-04-13 PROCEDURE — 25000003 PHARM REV CODE 250: Performed by: EMERGENCY MEDICINE

## 2025-04-13 PROCEDURE — 25000242 PHARM REV CODE 250 ALT 637 W/ HCPCS: Performed by: EMERGENCY MEDICINE

## 2025-04-13 RX ORDER — ATORVASTATIN CALCIUM 40 MG/1
40 TABLET, FILM COATED ORAL DAILY
Status: DISCONTINUED | OUTPATIENT
Start: 2025-04-14 | End: 2025-04-16 | Stop reason: HOSPADM

## 2025-04-13 RX ORDER — PANTOPRAZOLE SODIUM 40 MG/1
40 TABLET, DELAYED RELEASE ORAL DAILY
Status: DISCONTINUED | OUTPATIENT
Start: 2025-04-14 | End: 2025-04-16 | Stop reason: HOSPADM

## 2025-04-13 RX ORDER — LEVOFLOXACIN 5 MG/ML
750 INJECTION, SOLUTION INTRAVENOUS
Status: COMPLETED | OUTPATIENT
Start: 2025-04-13 | End: 2025-04-13

## 2025-04-13 RX ORDER — EPINEPHRINE 0.3 MG/.3ML
INJECTION SUBCUTANEOUS
Status: DISPENSED
Start: 2025-04-13 | End: 2025-04-14

## 2025-04-13 RX ORDER — ENOXAPARIN SODIUM 100 MG/ML
30 INJECTION SUBCUTANEOUS EVERY 24 HOURS
Status: DISCONTINUED | OUTPATIENT
Start: 2025-04-14 | End: 2025-04-16 | Stop reason: HOSPADM

## 2025-04-13 RX ORDER — SODIUM CHLORIDE 0.9 % (FLUSH) 0.9 %
10 SYRINGE (ML) INJECTION
Status: DISCONTINUED | OUTPATIENT
Start: 2025-04-13 | End: 2025-04-16 | Stop reason: HOSPADM

## 2025-04-13 RX ORDER — LANOLIN ALCOHOL/MO/W.PET/CERES
800 CREAM (GRAM) TOPICAL
Status: DISCONTINUED | OUTPATIENT
Start: 2025-04-13 | End: 2025-04-16 | Stop reason: HOSPADM

## 2025-04-13 RX ORDER — SODIUM,POTASSIUM PHOSPHATES 280-250MG
2 POWDER IN PACKET (EA) ORAL
Status: DISCONTINUED | OUTPATIENT
Start: 2025-04-13 | End: 2025-04-16 | Stop reason: HOSPADM

## 2025-04-13 RX ORDER — POLYETHYLENE GLYCOL 3350 17 G/17G
17 POWDER, FOR SOLUTION ORAL 2 TIMES DAILY PRN
Status: DISCONTINUED | OUTPATIENT
Start: 2025-04-13 | End: 2025-04-16 | Stop reason: HOSPADM

## 2025-04-13 RX ORDER — METHYLPREDNISOLONE SOD SUCC 125 MG
125 VIAL (EA) INJECTION
Status: COMPLETED | OUTPATIENT
Start: 2025-04-13 | End: 2025-04-13

## 2025-04-13 RX ORDER — IPRATROPIUM BROMIDE AND ALBUTEROL SULFATE 2.5; .5 MG/3ML; MG/3ML
3 SOLUTION RESPIRATORY (INHALATION) EVERY 6 HOURS
Status: DISCONTINUED | OUTPATIENT
Start: 2025-04-13 | End: 2025-04-16 | Stop reason: HOSPADM

## 2025-04-13 RX ORDER — IPRATROPIUM BROMIDE AND ALBUTEROL SULFATE 2.5; .5 MG/3ML; MG/3ML
3 SOLUTION RESPIRATORY (INHALATION)
Status: COMPLETED | OUTPATIENT
Start: 2025-04-13 | End: 2025-04-13

## 2025-04-13 RX ORDER — EPINEPHRINE 0.3 MG/.3ML
0.3 INJECTION SUBCUTANEOUS ONCE
Status: COMPLETED | OUTPATIENT
Start: 2025-04-13 | End: 2025-04-13

## 2025-04-13 RX ORDER — LEVOFLOXACIN 5 MG/ML
750 INJECTION, SOLUTION INTRAVENOUS
Status: DISCONTINUED | OUTPATIENT
Start: 2025-04-15 | End: 2025-04-16

## 2025-04-13 RX ORDER — PREDNISONE 20 MG/1
40 TABLET ORAL DAILY
Status: DISCONTINUED | OUTPATIENT
Start: 2025-04-14 | End: 2025-04-15

## 2025-04-13 RX ORDER — OXYCODONE HYDROCHLORIDE 5 MG/1
5 TABLET ORAL EVERY 6 HOURS PRN
Refills: 0 | Status: DISCONTINUED | OUTPATIENT
Start: 2025-04-13 | End: 2025-04-16 | Stop reason: HOSPADM

## 2025-04-13 RX ORDER — SODIUM CHLORIDE, SODIUM LACTATE, POTASSIUM CHLORIDE, CALCIUM CHLORIDE 600; 310; 30; 20 MG/100ML; MG/100ML; MG/100ML; MG/100ML
INJECTION, SOLUTION INTRAVENOUS CONTINUOUS
Status: DISCONTINUED | OUTPATIENT
Start: 2025-04-13 | End: 2025-04-15

## 2025-04-13 RX ORDER — EPINEPHRINE 1 MG/ML
0.3 INJECTION, SOLUTION, CONCENTRATE INTRAVENOUS
Status: DISCONTINUED | OUTPATIENT
Start: 2025-04-13 | End: 2025-04-13

## 2025-04-13 RX ORDER — SUCRALFATE 1 G/1
1 TABLET ORAL
Status: DISCONTINUED | OUTPATIENT
Start: 2025-04-13 | End: 2025-04-16 | Stop reason: HOSPADM

## 2025-04-13 RX ORDER — ALBUTEROL SULFATE 0.83 MG/ML
7.5 SOLUTION RESPIRATORY (INHALATION)
Status: COMPLETED | OUTPATIENT
Start: 2025-04-13 | End: 2025-04-13

## 2025-04-13 RX ORDER — IBUPROFEN 200 MG
1 TABLET ORAL DAILY PRN
Status: DISCONTINUED | OUTPATIENT
Start: 2025-04-13 | End: 2025-04-16 | Stop reason: HOSPADM

## 2025-04-13 RX ADMIN — SUCRALFATE 1 G: 1 TABLET ORAL at 09:04

## 2025-04-13 RX ADMIN — IPRATROPIUM BROMIDE AND ALBUTEROL SULFATE 3 ML: .5; 3 SOLUTION RESPIRATORY (INHALATION) at 07:04

## 2025-04-13 RX ADMIN — SODIUM CHLORIDE, POTASSIUM CHLORIDE, SODIUM LACTATE AND CALCIUM CHLORIDE: 600; 310; 30; 20 INJECTION, SOLUTION INTRAVENOUS at 04:04

## 2025-04-13 RX ADMIN — AZITHROMYCIN MONOHYDRATE 500 MG: 500 INJECTION, POWDER, LYOPHILIZED, FOR SOLUTION INTRAVENOUS at 02:04

## 2025-04-13 RX ADMIN — METHYLPREDNISOLONE SODIUM SUCCINATE 125 MG: 125 INJECTION, POWDER, FOR SOLUTION INTRAMUSCULAR; INTRAVENOUS at 02:04

## 2025-04-13 RX ADMIN — IPRATROPIUM BROMIDE AND ALBUTEROL SULFATE 3 ML: .5; 3 SOLUTION RESPIRATORY (INHALATION) at 02:04

## 2025-04-13 RX ADMIN — ALBUTEROL SULFATE 7.5 MG: 2.5 SOLUTION RESPIRATORY (INHALATION) at 03:04

## 2025-04-13 RX ADMIN — LEVOFLOXACIN 750 MG: 5 INJECTION, SOLUTION INTRAVENOUS at 03:04

## 2025-04-13 RX ADMIN — EPINEPHRINE 0.3 MG: 0.3 INJECTION INTRAMUSCULAR at 03:04

## 2025-04-13 RX ADMIN — SODIUM CHLORIDE, POTASSIUM CHLORIDE, SODIUM LACTATE AND CALCIUM CHLORIDE 1335 ML: 600; 310; 30; 20 INJECTION, SOLUTION INTRAVENOUS at 02:04

## 2025-04-13 NOTE — ASSESSMENT & PLAN NOTE
Patient's COPD is with exacerbation noted by continued dyspnea currently.  Patient is currently on COPD Pathway. Continue scheduled inhalers duonebs, Steroids, Antibiotics, and Supplemental oxygen and monitor respiratory status closely.

## 2025-04-13 NOTE — ASSESSMENT & PLAN NOTE
Advance Care Planning     Date: 04/13/2025    [facility]  I engaged the patient in a voluntary conversation about advance care planning and we specifically addressed what the goals of care would be moving forward, in light of the patient's change in clinical status, specifically worsening COPD.  We did specifically address the patient's likely prognosis, which is fair .  We explored the patient's values and preferences for future care.  The patient endorses that what is most important right now is to focus on curative/life-prolongation (regardless of treatment burdens)    Accordingly, we have decided that the best plan to meet the patient's goals includes continuing with treatment    A total of 15 min was spent on advance care planning, goals of care discussion, emotional support, formulating and communicating prognosis and exploring burden/benefit of various approaches of treatment. This discussion occurred on a fully voluntary basis with the verbal consent of the patient and/or family.

## 2025-04-13 NOTE — HPI
Patient is a 69 year old who has a history of RA, gastric cancer, and a long history of Tobacco use presenting with shortness of breath. Symptoms started Friday. No CP. Coughing up bill sputum. Subjective fever. No NV. 3 episodes of diarrhea last night but none today. Patient still smokes cigarettes. No sick contacts.

## 2025-04-13 NOTE — RESPIRATORY THERAPY
ABG  Recent Labs   Lab 04/13/25  1603   PH 7.450   PO2 53*   PCO2 27.3*   HCO3 19.0*   BE -5*        04/13/25 1603   PRE-TX-O2   Device (Oxygen Therapy) room air   SpO2 97 %   Pulse (!) 116   Resp 20   Blood Gas Puncture   Blood Gas Type arterial   Arterial Site left;radial artery   Collateral Circulation Verified Casimiro's Test   Site Preparation alcohol   Pressure Held yes   Distal Pulse Present yes   Hematoma Present no   Sample Obtained/Sent to Lab yes   Oxygen Amount FiO2 (specify)  (21)   Number of Attempts for ABG? 1   Unsuccessful ABG Attempts  0   Attempted By? estelita rdz rrt   Labs   $ Was an ABG obtained? Arterial Puncture;POCT - Blood gas   $ Labs Tech Time 15 min   Critical Value Communication   Date Result Received 04/13/25   Time Result Received 1603   Resulting Department of Critical Value Respiratory   Who communicated critical value from resulting department? estelita rdz rrt   Critical Test #1 pco2   Critical Test #1 Result 27.3   Critical Test #2 po2   Critical Test #2 Result 53   Critical Test #3  hco3   Critical Test #3 Result 19.0   Critical Test #4 be   Critical Test #4 Result -5   Critical Test #5 tco2   Critical Test #5 Result 20   Name of Notified Physician/Designee MD Myriam   Date Notified 04/13/25   Time Notified 1603   Read Back Verification Yes   Physician Directive place on oxygen   Provider Notification   Reason for Communication Critical lab value   Provider Name MD Myriam   Provider Role Attending physician   Method of Communication Face to face   Response See orders   Notification Time 1603

## 2025-04-13 NOTE — RESPIRATORY THERAPY
04/13/25 1414   Patient Assessment/Suction   Level of Consciousness (AVPU) alert   Respiratory Effort Normal;Mild;Short of breath   Expansion/Accessory Muscles/Retractions no retractions;no use of accessory muscles   All Lung Fields Breath Sounds Anterior:;Posterior:;Lateral:;equal bilaterally;diminished   Rhythm/Pattern, Respiratory depth regular;pattern regular;shortness of breath   Cough Frequency infrequent   Cough Type good;dry;nonproductive   PRE-TX-O2   Device (Oxygen Therapy) room air   SpO2 (!) 94 %   Pulse Oximetry Type Continuous   $ Pulse Oximetry - Single Charge Pulse Oximetry - Single   Pulse 105   Resp (!) 22   Aerosol Therapy   $ Aerosol Therapy Charges Aerosol Treatment   Daily Review of Necessity (SVN) completed   Respiratory Treatment Status (SVN) given   Treatment Route (SVN) oxygen;mouthpiece utilized   Patient Position HOB elevated   Post Treatment Assessment (SVN) breath sounds improved   Signs of Intolerance (SVN) none   Breath Sounds Post-Respiratory Treatment   Throughout All Fields Post-Treatment All Fields   Throughout All Fields Post-Treatment aeration increased   Post-treatment Heart Rate (beats/min) 101   Post-treatment Resp Rate (breaths/min) 21   Equipment Change   $ RT Equipment Treatment nebulizer;Aerosol treatment mask

## 2025-04-13 NOTE — ED PROVIDER NOTES
History     Chief Complaint   Patient presents with    Shortness of Breath     SOB present progressively over the last three days.  Patient reports cough with sputum.  Chest pain reported mid sternal with deep breathing and coughing.       HPI:  Janey Null is a 69 y.o. female with PMH as below who presents to the [facility] emergency department for evaluation of gradual onset, worsening SOB with associated cough productive of beige sputum x2 days. She also notes central chest pain.     PCP: Josefina Durand MD    Review of patient's allergies indicates:   Allergen Reactions    Azithromycin Anaphylaxis    Penicillins       Past Medical History:   Diagnosis Date    Anxiety     COPD (chronic obstructive pulmonary disease)     Depression     Encounter for long-term (current) use of high-risk medication     Fibromyalgia     Osteoporosis     Rheumatoid arthritis involving multiple sites with positive rheumatoid factor      Past Surgical History:   Procedure Laterality Date    ESOPHAGOGASTRODUODENOSCOPY N/A 2023    Procedure: EGD (ESOPHAGOGASTRODUODENOSCOPY);  Surgeon: Tyrell Cornelius MD;  Location: [facility] ENDO;  Service: General;  Laterality: N/A;    REPAIR OF RECURRENT INCARCERATED INCISIONAL HERNIA Left 2022    Procedure: REPAIR, HERNIA--RIGHT LATERAL POSITIONING;  Surgeon: Tyrell Cornelius MD;  Location: [facility] OR;  Service: General;  Laterality: Left;  left lumbar hernia    right leg       multiple surgeries ,amputation       No family history on file.  Social History     Tobacco Use    Smoking status: Every Day     Current packs/day: 0.00     Average packs/day: 1 pack/day for 61.0 years (61.0 ttl pk-yrs)     Types: Cigarettes     Start date:      Last attempt to quit: 2023     Years since quittin.0    Smokeless tobacco: Never   Substance and Sexual Activity    Alcohol use: No    Drug use: Never    Sexual activity: Not Currently      Review of Systems     Review of  Systems   Constitutional: Negative.  Negative for fever.   HENT: Negative.     Eyes: Negative.    Respiratory:  Positive for shortness of breath.    Cardiovascular:  Positive for chest pain.   Gastrointestinal: Negative.    Endocrine: Negative.    Genitourinary: Negative.    Musculoskeletal: Negative.    Skin: Negative.    Allergic/Immunologic: Negative.    Neurological: Negative.    Hematological: Negative.    Psychiatric/Behavioral: Negative.     All other systems reviewed and are negative.       Physical Exam     Initial Vitals [04/13/25 1400]   BP Pulse Resp Temp SpO2   132/66 107 (!) 22 98 °F (36.7 °C) (!) 92 %      MAP       --          Nursing notes and vital signs reviewed.  Constitutional: Patient is in moderate distress.   Head: Normocephalic. Atraumatic.   Eyes:  Conjunctivae are not pale. No scleral icterus.   ENT: Mucous membranes moist.   Neck: Supple.   Cardiovascular: tachycardic rate. Regular rhythm. No murmurs, rubs, or gallops    Pulmonary: tachypnea. Decreased air exchange. Faint RLL crackles. No current wheezing.   Abdominal: Non-distended.   Musculoskeletal: Moves all extremities. No obvious deformities.   Skin: Warm and dry.   Neurological:  Alert, awake, and appropriate. Normal speech. No acute lateralizing neurologic deficits appreciated.   Psychiatric: Normal affect.       ED Course   Critical Care    Date/Time: 4/13/2025 3:29 PM    Performed by: Luis Antonio Miguel MD  Authorized by: Luis Antonio Miguel MD  Direct patient critical care time: 15 minutes  Additional history critical care time: 5 minutes  Ordering / reviewing critical care time: 5 minutes  Documentation critical care time: 5 minutes  Consulting other physicians critical care time: 5 minutes  Total critical care time (exclusive of procedural time) : 35 minutes  Critical care time was exclusive of separately billable procedures and treating other patients and teaching time.  Critical care was necessary to treat or prevent  imminent or life-threatening deterioration of the following conditions: respiratory failure and sepsis (septic shock, COPD).  Critical care was time spent personally by me on the following activities: blood draw for specimens, development of treatment plan with patient or surrogate, interpretation of cardiac output measurements, evaluation of patient's response to treatment, examination of patient, obtaining history from patient or surrogate, ordering and performing treatments and interventions, ordering and review of laboratory studies, ordering and review of radiographic studies, pulse oximetry, re-evaluation of patient's condition, review of old charts and discussions with consultants.        Vitals:    04/13/25 1512 04/13/25 1516 04/13/25 1522 04/13/25 1526   BP: (!) 88/47 (!) 98/49 (!) 98/47    Pulse:  (!) 112 (!) 114 109   Resp:  (!) 23 (!) 27 (!) 21   Temp:       TempSrc:       SpO2:  99% 99% 96%   Weight:       Height:        04/13/25 1532 04/13/25 1537 04/13/25 1540 04/13/25 1542   BP: (!) 101/44 (!) 89/44  133/62   Pulse: (!) 111 110  (!) 118   Resp: (!) 22 18 20    Temp:       TempSrc:       SpO2: 100% 100%  99%   Weight:       Height:        04/13/25 1547 04/13/25 1553 04/13/25 1603 04/13/25 1606   BP: (!) 134/55 (!) 122/54     Pulse: (!) 120 (!) 119 (!) 116 (!) 116   Resp:  (!) 22 20 (!) 22   Temp:       TempSrc:       SpO2: 100% 98% 97% 98%   Weight:       Height:        04/13/25 1623 04/13/25 1636 04/13/25 1639   BP: (!) 106/55 (!) 99/49    Pulse: (!) 112 (!) 114    Resp: (!) 29  19   Temp: 97.6 °F (36.4 °C)     TempSrc: Oral     SpO2: 98% 100%    Weight:      Height:        Lab Results Interpreted as Abnormal:  Labs Reviewed   COMPREHENSIVE METABOLIC PANEL - Abnormal       Result Value    Sodium 137      Potassium 3.7      Chloride 100      CO2 19 (*)     Glucose 131 (*)     BUN 50 (*)     Creatinine 1.8 (*)     Calcium 8.8      Protein Total 6.7      Albumin 2.4 (*)     Bilirubin Total 0.4      ALP  193 (*)     AST 25      ALT 30      Anion Gap 18 (*)     eGFR 30 (*)    CBC WITH DIFFERENTIAL - Abnormal    WBC 15.88 (*)     RBC 3.05 (*)     HGB 9.9 (*)     HCT 30.2 (*)     MCV 99 (*)     MCH 32.5 (*)     MCHC 32.8      RDW 17.7 (*)     Platelet Count 361      MPV 9.3      Nucleated RBC 0     MANUAL DIFFERENTIAL - Abnormal    Gran # (ANC) 14.6      Segmented Neutrophil % 75.0 (*)     Bands % 17.0      Lymphocyte % 4.0 (*)     Monocyte % 3.0 (*)     Eosinophil % 1.0      Platelet Estimate Appears Normal      Anisocytosis Slight      Poik Slight     ISTAT PROCEDURE - Abnormal    POC PH 7.450      POC PCO2 27.3 (*)     POC PO2 53 (*)     POC HCO3 19.0 (*)     POC BE -5 (*)     POC SATURATED O2 89      POC TCO2 20 (*)     Sample ARTERIAL      Site LR      Allens Test Pass      DelSys Room Air      Mode SPONT      FiO2 21      Sp02 97     LACTIC ACID, PLASMA - Normal    Lactic Acid Level 2.1      Narrative:     Falsely low lactic acid results can be found in samples containing >=13.0 mg/dL total bilirubin and/or >=3.5 mg/dL direct bilirubin.    TROPONIN I - Normal    Troponin-I 0.012     CULTURE, BLOOD   CULTURE, BLOOD   CBC W/ AUTO DIFFERENTIAL    Narrative:     The following orders were created for panel order CBC auto differential.  Procedure                               Abnormality         Status                     ---------                               -----------         ------                     CBC with Differential[[id number]]       Abnormal            Final result               Manual Differential[[id number]]         Abnormal            Final result                 Please view results for these tests on the individual orders.   URINALYSIS, REFLEX TO URINE CULTURE   LACTIC ACID, PLASMA   GREY TOP URINE HOLD   URINALYSIS MICROSCOPIC      All Lab Results:  Results for orders placed or performed during the hospital encounter of 04/13/25   Comprehensive metabolic panel    Collection Time: 04/13/25  2:15 PM    Result Value Ref Range    Sodium 137 136 - 145 mmol/L    Potassium 3.7 3.5 - 5.1 mmol/L    Chloride 100 95 - 110 mmol/L    CO2 19 (L) 23 - 29 mmol/L    Glucose 131 (H) 70 - 110 mg/dL    BUN 50 (H) 8 - 23 mg/dL    Creatinine 1.8 (H) 0.5 - 1.4 mg/dL    Calcium 8.8 8.7 - 10.5 mg/dL    Protein Total 6.7 6.0 - 8.4 gm/dL    Albumin 2.4 (L) 3.5 - 5.2 g/dL    Bilirubin Total 0.4 0.1 - 1.0 mg/dL     (H) 40 - 150 unit/L    AST 25 11 - 45 unit/L    ALT 30 10 - 44 unit/L    Anion Gap 18 (H) 8 - 16 mmol/L    eGFR 30 (L) >60 mL/min/1.73/m2   CBC with Differential    Collection Time: 04/13/25  2:15 PM   Result Value Ref Range    WBC 15.88 (H) 3.90 - 12.70 K/uL    RBC 3.05 (L) 4.00 - 5.40 M/uL    HGB 9.9 (L) 12.0 - 16.0 gm/dL    HCT 30.2 (L) 37.0 - 48.5 %    MCV 99 (H) 82 - 98 fL    MCH 32.5 (H) 27.0 - 31.0 pg    MCHC 32.8 32.0 - 36.0 g/dL    RDW 17.7 (H) 11.5 - 14.5 %    Platelet Count 361 150 - 450 K/uL    MPV 9.3 9.2 - 12.9 fL    Nucleated RBC 0 <=0 /100 WBC   Troponin I    Collection Time: 04/13/25  2:15 PM   Result Value Ref Range    Troponin-I 0.012 <=0.026 ng/mL   Manual Differential    Collection Time: 04/13/25  2:15 PM   Result Value Ref Range    Gran # (ANC) 14.6 K/uL    Segmented Neutrophil % 75.0 (H) 38.0 - 73.0 %    Bands % 17.0 %    Lymphocyte % 4.0 (L) 18.0 - 48.0 %    Monocyte % 3.0 (L) 4.0 - 15.0 %    Eosinophil % 1.0 0.0 - 8.0 %    Platelet Estimate Appears Normal      Anisocytosis Slight     Poik Slight    Lactic acid, plasma #1    Collection Time: 04/13/25  2:21 PM   Result Value Ref Range    Lactic Acid Level 2.1 0.5 - 2.2 mmol/L   ISTAT PROCEDURE    Collection Time: 04/13/25  4:03 PM   Result Value Ref Range    POC PH 7.450 7.35 - 7.45    POC PCO2 27.3 (LL) 35 - 45 mmHg    POC PO2 53 (LL) 80 - 100 mmHg    POC HCO3 19.0 (L) 24 - 28 mmol/L    POC BE -5 (L) -2 to 2 mmol/L    POC SATURATED O2 89 95 - 100 %    POC TCO2 20 (L) 23 - 27 mmol/L    Sample ARTERIAL     Site LR     Allens Test Pass     DelSys  Room Air     Mode SPONT     FiO2 21     Sp02 97      Imaging Results              X-Ray Chest AP Portable (Final result)  Result time 04/13/25 15:27:00      Final result by Alicia Pham MD (04/13/25 15:27:00)                   Impression:      Interval detrimental change in the radiographic appearance of the chest with differential considerations provided above.      Electronically signed by: Fili Pham MD  Date:    04/13/2025  Time:    15:27               Narrative:    EXAMINATION:  XR CHEST AP PORTABLE    CLINICAL HISTORY:  Sepsis;    TECHNIQUE:  A single portable semi-upright AP chest radiograph was acquired.    COMPARISON:  Chest x-ray-03/29/2025    FINDINGS:  The cardiac silhouette is not enlarged.  There is prominence of the central pulmonary vasculature.  There is atherosclerotic calcification present within the thoracic aortic arch.  There is faint airspace opacity in an acinar pattern in the mid and lower lung zones centrally.  There is band like atelectasis and/or pneumonitis present in the lower lobes bilaterally, left greater than right.  Differential considerations include multifocal pneumonitis, aspiration, cardiac decompensation, or a combination of these entities.  The upper lung zones are clear.  No pneumothorax.                                       The emergency physician reviewed the vital signs / test results outlined above.     ED Discussion     ED Course as of 04/13/25 1711   Sun Apr 13, 2025   1405 The EKG taken at 14:03 was reviewed and independently interpreted by the ED physician:  Physician interpreting: Luis Antonio Miguel  Rhythm: sinus tachycardia   Rate: 108 bpm  No ST-T changes concerning for acute ischemia  Normal intervals   [ND]   1540 Sepsis Perfusion Exam: I attest that a sepsis perfusion exam was performed within 6 hours of sepsis, severe sepsis, or septic shock presentation, following fluid resuscitation.   [ND]   1709 Patient developed severe SOB, hypotension 70s/40s  mmHg, and tight air exchange shortly after azithromycin was given. No history of azithromycin allergy. Symptoms improved with epinephrine.  [ND]      ED Course User Index  [ND] Luis Antonio Miguel MD     I discussed the patient's case with Dr. Walter Roth, hospitalist, who accepts the patient for admission.     Admitting physician: Dr. Roth  Admitting service:  Hospital Medicine       ED Medication(s) Administered:  Medications   lactated ringers infusion ( Intravenous New Bag 4/13/25 9192)   atorvastatin tablet 40 mg (has no administration in time range)   pantoprazole EC tablet 40 mg (has no administration in time range)   sucralfate tablet 1 g (has no administration in time range)   sodium chloride 0.9% flush 10 mL (has no administration in time range)   enoxaparin injection 30 mg (has no administration in time range)   potassium bicarbonate disintegrating tablet 50 mEq (has no administration in time range)   potassium bicarbonate disintegrating tablet 35 mEq (has no administration in time range)   potassium bicarbonate disintegrating tablet 60 mEq (has no administration in time range)   magnesium oxide tablet 800 mg (has no administration in time range)   magnesium oxide tablet 800 mg (has no administration in time range)   potassium, sodium phosphates 280-160-250 mg packet 2 packet (has no administration in time range)   potassium, sodium phosphates 280-160-250 mg packet 2 packet (has no administration in time range)   potassium, sodium phosphates 280-160-250 mg packet 2 packet (has no administration in time range)   levoFLOXacin 750 mg/150 mL IVPB 750 mg (has no administration in time range)   oxyCODONE immediate release tablet 5 mg (has no administration in time range)   polyethylene glycol packet 17 g (has no administration in time range)   albuterol-ipratropium 2.5 mg-0.5 mg/3 mL nebulizer solution 3 mL (3 mLs Nebulization Given by Other 4/13/25 6029)   methylPREDNISolone sodium succinate  injection 125 mg (125 mg Intravenous Given 4/13/25 1421)   azithromycin (ZITHROMAX) 500 mg in 0.9% NaCl 250 mL IVPB (admixture device) (0 mg Intravenous Stopped 4/13/25 1530)   lactated ringers bolus 1,335 mL (0 mLs Intravenous Stopped 4/13/25 1653)   EPINEPHrine (EPIPEN) 0.3 mg/0.3 mL pen injection 0.3 mg (0.3 mg Intramuscular Given 4/13/25 1510)   albuterol nebulizer solution 7.5 mg (7.5 mg Nebulization Given 4/13/25 1526)   levoFLOXacin 750 mg/150 mL IVPB 750 mg (0 mg Intravenous Stopped 4/13/25 1705)       Prescription Management: I performed a review of the patient's current Rx medication list as input by nursing staff.    Patient's Medications   New Prescriptions    No medications on file   Previous Medications    ALBUTEROL (VENTOLIN HFA) 90 MCG/ACTUATION INHALER    Inhale 2 puffs into the lungs every 6 (six) hours as needed for Wheezing. Rescue    ALBUTEROL-IPRATROPIUM (DUO-NEB) 2.5 MG-0.5 MG/3 ML NEBULIZER SOLUTION    Take 3 mLs by nebulization every 6 (six) hours as needed for Wheezing.    ALENDRONATE (FOSAMAX) 70 MG TABLET    Take 1 tablet (70 mg total) by mouth every 7 days.    ATORVASTATIN (LIPITOR) 40 MG TABLET    Take 1 tablet (40 mg total) by mouth once daily.    AZELASTINE (ASTELIN) 137 MCG (0.1 %) NASAL SPRAY    1 spray (137 mcg total) by Nasal route 2 (two) times daily.    BENZONATATE (TESSALON) 200 MG CAPSULE    Take 1 capsule (200 mg total) by mouth 3 (three) times daily as needed for Cough.    ERGOCALCIFEROL (ERGOCALCIFEROL) 50,000 UNIT CAP    Take 1 capsule (50,000 Units total) by mouth every 7 days.    FERROUS SULFATE (FEOSOL) 325 MG (65 MG IRON) TAB TABLET    Take 1 tablet (325 mg total) by mouth daily with breakfast.    FLUTICASONE PROPIONATE (FLONASE) 50 MCG/ACTUATION NASAL SPRAY    1 spray (50 mcg total) by Each Nostril route once daily.    FLUTICASONE-UMECLIDIN-VILANTER (TRELEGY ELLIPTA) 200-62.5-25 MCG INHALER    Inhale 1 puff into the lungs once daily.    FOLIC ACID (FOLVITE) 1 MG  TABLET    TAKE 1 TABLET(1 MG) BY MOUTH EVERY DAY    FUROSEMIDE (LASIX) 40 MG TABLET    Take 1 tablet (40 mg total) by mouth 2 (two) times daily.    GUAIFENESIN (MUCINEX) 600 MG 12 HR TABLET    Take 2 tablets (1,200 mg total) by mouth 2 (two) times daily.    HYDROCODONE-ACETAMINOPHEN 10-325MG (NORCO)  MG TAB    Take 1 tablet by mouth every 6 (six) hours as needed.     LEVOCETIRIZINE (XYZAL) 5 MG TABLET    Take 1 tablet (5 mg total) by mouth every evening.    LOSARTAN (COZAAR) 100 MG TABLET    Take 1 tablet (100 mg total) by mouth once daily.    METHOTREXATE 2.5 MG TAB    Take by mouth.    PANTOPRAZOLE (PROTONIX) 40 MG TABLET    Take 1 tablet (40 mg total) by mouth once daily.    SUCRALFATE (CARAFATE) 1 GRAM TABLET    Take 1 tablet (1 g total) by mouth 4 (four) times daily before meals and nightly.    TIZANIDINE (ZANAFLEX) 4 MG TABLET    Take 4 mg by mouth every 8 (eight) hours as needed.   Modified Medications    No medications on file   Discontinued Medications    No medications on file           Clinical Impression       ICD-10-CM ICD-9-CM   1. Septic shock  A41.9 038.9    R65.21 785.52     995.92   2. Chest pain  R07.9 786.50   3. Screening for cardiovascular condition  Z13.6 V81.2   4. Pneumonia of left lower lobe due to infectious organism  J18.9 486   5. Acute hypoxemic respiratory failure  J96.01 518.81   6. Anaphylaxis, initial encounter  T78.2XXA 995.0      ED Disposition Condition    Admit Serious             Luis Antonio Miguel MD  04/13/25 0451

## 2025-04-13 NOTE — RESPIRATORY THERAPY
04/13/25 1429   PRE-TX-O2   SpO2 (!) 94 %   Pulse Oximetry Type Continuous   $ Pulse Oximetry - Multiple Charge Pulse Oximetry - Multiple   Pulse 104   Resp 17   Aerosol Therapy   $ Aerosol Therapy Charges Aerosol Treatment   Daily Review of Necessity (SVN) completed   Respiratory Treatment Status (SVN) given   Treatment Route (SVN) oxygen;mouthpiece utilized   Patient Position HOB elevated   Post Treatment Assessment (SVN) breath sounds improved   Signs of Intolerance (SVN) none   Breath Sounds Post-Respiratory Treatment   Throughout All Fields Post-Treatment All Fields   Throughout All Fields Post-Treatment aeration increased   Post-treatment Heart Rate (beats/min) 104   Post-treatment Resp Rate (breaths/min) 21

## 2025-04-13 NOTE — RESPIRATORY THERAPY
04/13/25 1606   Skin Integrity   $ Wound Care Tech Time 15 min   Area Observed Left;Right;Behind ear;Cheek;Upper lip;Nares   Skin Appearance without discoloration   PRE-TX-O2   Device (Oxygen Therapy) nasal cannula   $ Is the patient on Low Flow Oxygen? Yes   Flow (L/min) (Oxygen Therapy) (S)  1   Oxygen Concentration (%) (S)  24   SpO2 98 %   Pulse Oximetry Type Continuous   $ Pulse Oximetry - Multiple Charge Pulse Oximetry - Multiple   Pulse (!) 116   Resp (!) 22

## 2025-04-13 NOTE — ASSESSMENT & PLAN NOTE
DINA is likely due to pre-renal azotemia due to dehydration and post-obstructive d/t urinary outlet obstruction. Baseline creatinine is 0.6. Most recent creatinine and eGFR are listed below.  Recent Labs     04/13/25  1415   CREATININE 1.8*   EGFRNORACEVR 30*      Plan  - DINA is stable  - Avoid nephrotoxins and renally dose meds for GFR listed above  - Monitor urine output, serial BMP, and adjust therapy as needed  - IVF, bladder scans with prn In and out caths. If patient requires more than 2 in and out caths would place davila

## 2025-04-13 NOTE — Clinical Note
Diagnosis: Septic shock [[id number]]   Future Attending Provider: DONALD GARCÍA [[id number]]   Reason for IP Medical Treatment  (Clinical interventions that can only be accomplished in the IP setting? ) :: IV abx   Plans for Post-Acute care--if anticipated (pick the single best option):: A. No post acute care anticipated at this time

## 2025-04-13 NOTE — SUBJECTIVE & OBJECTIVE
Past Medical History:   Diagnosis Date    Anxiety     COPD (chronic obstructive pulmonary disease)     Depression     Encounter for long-term (current) use of high-risk medication     Fibromyalgia     Osteoporosis     Rheumatoid arthritis involving multiple sites with positive rheumatoid factor        Past Surgical History:   Procedure Laterality Date    ESOPHAGOGASTRODUODENOSCOPY N/A 4/5/2023    Procedure: EGD (ESOPHAGOGASTRODUODENOSCOPY);  Surgeon: Tyrell Cornelius MD;  Location: [facility] ENDO;  Service: General;  Laterality: N/A;    REPAIR OF RECURRENT INCARCERATED INCISIONAL HERNIA Left 4/13/2022    Procedure: REPAIR, HERNIA--RIGHT LATERAL POSITIONING;  Surgeon: Tyrell Cornelius MD;  Location: [facility] OR;  Service: General;  Laterality: Left;  left lumbar hernia    right leg       multiple surgeries ,amputation       Review of patient's allergies indicates:   Allergen Reactions    Azithromycin Anaphylaxis    Penicillins        No current facility-administered medications on file prior to encounter.     Current Outpatient Medications on File Prior to Encounter   Medication Sig    albuterol (VENTOLIN HFA) 90 mcg/actuation inhaler Inhale 2 puffs into the lungs every 6 (six) hours as needed for Wheezing. Rescue    albuterol-ipratropium (DUO-NEB) 2.5 mg-0.5 mg/3 mL nebulizer solution Take 3 mLs by nebulization every 6 (six) hours as needed for Wheezing.    alendronate (FOSAMAX) 70 MG tablet Take 1 tablet (70 mg total) by mouth every 7 days.    atorvastatin (LIPITOR) 40 MG tablet Take 1 tablet (40 mg total) by mouth once daily.    azelastine (ASTELIN) 137 mcg (0.1 %) nasal spray 1 spray (137 mcg total) by Nasal route 2 (two) times daily.    benzonatate (TESSALON) 200 MG capsule Take 1 capsule (200 mg total) by mouth 3 (three) times daily as needed for Cough.    ergocalciferol (ERGOCALCIFEROL) 50,000 unit Cap Take 1 capsule (50,000 Units total) by mouth every 7 days.    ferrous sulfate (FEOSOL) 325 mg (65 mg iron)  Tab tablet Take 1 tablet (325 mg total) by mouth daily with breakfast.    fluticasone propionate (FLONASE) 50 mcg/actuation nasal spray 1 spray (50 mcg total) by Each Nostril route once daily.    fluticasone-umeclidin-vilanter (TRELEGY ELLIPTA) 200-62.5-25 mcg inhaler Inhale 1 puff into the lungs once daily.    folic acid (FOLVITE) 1 MG tablet TAKE 1 TABLET(1 MG) BY MOUTH EVERY DAY    furosemide (LASIX) 40 MG tablet Take 1 tablet (40 mg total) by mouth 2 (two) times daily.    guaiFENesin (MUCINEX) 600 mg 12 hr tablet Take 2 tablets (1,200 mg total) by mouth 2 (two) times daily.    hydrocodone-acetaminophen 10-325mg (NORCO)  mg Tab Take 1 tablet by mouth every 6 (six) hours as needed.  (Patient not taking: Reported on 2025)    levocetirizine (XYZAL) 5 MG tablet Take 1 tablet (5 mg total) by mouth every evening.    losartan (COZAAR) 100 MG tablet Take 1 tablet (100 mg total) by mouth once daily.    methotrexate 2.5 MG Tab Take by mouth.    pantoprazole (PROTONIX) 40 MG tablet Take 1 tablet (40 mg total) by mouth once daily.    sucralfate (CARAFATE) 1 gram tablet Take 1 tablet (1 g total) by mouth 4 (four) times daily before meals and nightly.    tiZANidine (ZANAFLEX) 4 MG tablet Take 4 mg by mouth every 8 (eight) hours as needed.     Family History    None       Tobacco Use    Smoking status: Every Day     Current packs/day: 0.00     Average packs/day: 1 pack/day for 61.0 years (61.0 ttl pk-yrs)     Types: Cigarettes     Start date:      Last attempt to quit: 2023     Years since quittin.0    Smokeless tobacco: Never   Substance and Sexual Activity    Alcohol use: No    Drug use: Never    Sexual activity: Not Currently     Review of Systems   All other systems reviewed and are negative.    Objective:     Vital Signs (Most Recent):  Temp: 97.6 °F (36.4 °C) (25 1623)  Pulse: (!) 113 (25 1702)  Resp: (!) 21 (25)  BP: (!) 99/54 (25)  SpO2: 100 % (25) Vital  Signs (24h Range):  Temp:  [97.6 °F (36.4 °C)-98 °F (36.7 °C)] 97.6 °F (36.4 °C)  Pulse:  [101-120] 113  Resp:  [17-34] 21  SpO2:  [92 %-100 %] 100 %  BP: ()/(43-66) 99/54     Weight: 44.5 kg (98 lb)  Body mass index is 21.97 kg/m².     Physical Exam        Vitals reviewed  General: NAD, thin, frail appearing  Head: NC/AT  Eyes: EOMI, CONTRERAS  Cardiovascular: Pulses difficult to palpate, sinus tachcyardia  Pulmonary: increased Respiratory Rate, No respiratory distress  Gi: Soft, Non-tender  Extremities: Warm, right BKA, swelling LLE  Skin: Warm, dry  Neuro: Alert, Oriented x3, No focal Deficit  Psych: Appropriate mood and affect       Significant Labs: All pertinent labs within the past 24 hours have been reviewed.    Significant Imaging: I have reviewed all pertinent imaging results/findings within the past 24 hours.

## 2025-04-13 NOTE — ASSESSMENT & PLAN NOTE
Possibly 2/2 urinary retention. Likely not the cause of patients current septic picture  Continue IV abx  F/U Ucx

## 2025-04-13 NOTE — RESPIRATORY THERAPY
04/13/25 1421   PRE-TX-O2   SpO2 (!) 94 %   Pulse 101   Resp (!) 22   Aerosol Therapy   $ Aerosol Therapy Charges Aerosol Treatment   Daily Review of Necessity (SVN) completed   Respiratory Treatment Status (SVN) given   Treatment Route (SVN) oxygen;mouthpiece utilized   Patient Position HOB elevated   Post Treatment Assessment (SVN) breath sounds improved   Signs of Intolerance (SVN) none   Breath Sounds Post-Respiratory Treatment   Throughout All Fields Post-Treatment All Fields   Throughout All Fields Post-Treatment aeration increased   Post-treatment Heart Rate (beats/min) 100   Post-treatment Resp Rate (breaths/min) 22

## 2025-04-13 NOTE — ASSESSMENT & PLAN NOTE
This patient does have evidence of infective focus  My overall impression is sepsis.  Source: Respiratory  Antibiotics given-   Antibiotics (72h ago, onward)      Start     Stop Route Frequency Ordered    04/15/25 0900  levoFLOXacin 750 mg/150 mL IVPB 750 mg         -- IV Every 48 hours (non-standard times) 04/13/25 1707          Latest lactate reviewed-  Recent Labs   Lab 04/13/25  1421   LACTATE 2.1     Organ dysfunction indicated by Acute kidney injury    Fluid challenge Actual Body Weight- The patient's actual body weight will be used to calculate the 30 ml/kg fluid bolus.     Post- resuscitation assessment Yes - I attest a sepsis perfusion exam was performed within 6 hours of sepsis, severe sepsis, or septic shock presentation, following fluid resuscitation.      Will Not start Pressors- Levophed for MAP of 65  Source control achieved by: IV abx.    Microbiology Results (last 7 days)       Procedure Component Value Units Date/Time    Culture, Respiratory with Gram Stain [[id number]]     Order Status: Sent Specimen: Respiratory     Influenza A & B by Molecular [[id number]]  (Normal) Collected: 04/13/25 1823    Order Status: Completed Specimen: Nasal Swab Updated: 04/13/25 1851     INFLUENZA A MOLECULAR Negative     INFLUENZA B MOLECULAR  Negative    Urine culture [[id number]] Collected: 04/13/25 1655    Order Status: Sent Specimen: Urine, Clean Catch Updated: 04/13/25 1723    Blood culture x two cultures. Draw prior to antibiotics. [[id number]] Collected: 04/13/25 1437    Order Status: Sent Specimen: Blood from Peripheral, Antecubital, Left Updated: 04/13/25 1506    Blood culture x two cultures. Draw prior to antibiotics. [[id number]] Collected: 04/13/25 1414    Order Status: Sent Specimen: Blood from Peripheral, Antecubital, Right Updated: 04/13/25 1425

## 2025-04-13 NOTE — RESPIRATORY THERAPY
04/13/25 1526   Patient Assessment/Suction   Level of Consciousness (AVPU) alert   Respiratory Effort Labored;Short of breath   Expansion/Accessory Muscles/Retractions no retractions;no use of accessory muscles   All Lung Fields Breath Sounds Anterior:;Posterior:;Lateral:;equal bilaterally;diminished   RUTH Breath Sounds diminished   LLL Breath Sounds diminished   RUL Breath Sounds diminished   RML Breath Sounds diminished   RLL Breath Sounds diminished   Rhythm/Pattern, Respiratory depth regular;pattern regular;shortness of breath   Cough Frequency no cough   PRE-TX-O2   Device (Oxygen Therapy) room air   SpO2 96 %   Pulse Oximetry Type Continuous   $ Pulse Oximetry - Multiple Charge Pulse Oximetry - Multiple   Pulse 109   Resp (!) 21   Aerosol Therapy   $ Aerosol Therapy Charges Initial Continuous  (given via Aerogen ultra solo kit and mask)   Daily Review of Necessity (SVN) completed   Respiratory Treatment Status (SVN) given   Treatment Route (SVN) oxygen;mask   Patient Position HOB elevated   Post Treatment Assessment (SVN) breath sounds improved   Signs of Intolerance (SVN) none   Breath Sounds Post-Respiratory Treatment   Throughout All Fields Post-Treatment All Fields   Throughout All Fields Post-Treatment aeration increased   Post-treatment Heart Rate (beats/min) 109   Post-treatment Resp Rate (breaths/min) 21   Equipment Change   $ RT Equipment other (see comments)  (Aerogen ultra and solo set up)

## 2025-04-14 LAB
ABSOLUTE NEUTROPHIL MANUAL (OHS): 14.2 K/UL
ALBUMIN SERPL BCP-MCNC: 1.9 G/DL (ref 3.5–5.2)
ALP SERPL-CCNC: 149 UNIT/L (ref 40–150)
ALT SERPL W/O P-5'-P-CCNC: 22 UNIT/L (ref 10–44)
ANION GAP (OHS): 10 MMOL/L (ref 8–16)
AST SERPL-CCNC: 25 UNIT/L (ref 11–45)
BILIRUB SERPL-MCNC: 0.3 MG/DL (ref 0.1–1)
BUN SERPL-MCNC: 23 MG/DL (ref 8–23)
CALCIUM SERPL-MCNC: 8 MG/DL (ref 8.7–10.5)
CHLORIDE SERPL-SCNC: 109 MMOL/L (ref 95–110)
CO2 SERPL-SCNC: 21 MMOL/L (ref 23–29)
CREAT SERPL-MCNC: 0.7 MG/DL (ref 0.5–1.4)
ERYTHROCYTE [DISTWIDTH] IN BLOOD BY AUTOMATED COUNT: 17.9 % (ref 11.5–14.5)
GFR SERPLBLD CREATININE-BSD FMLA CKD-EPI: >60 ML/MIN/1.73/M2
GLUCOSE SERPL-MCNC: 127 MG/DL (ref 70–110)
HCT VFR BLD AUTO: 25.8 % (ref 37–48.5)
HGB BLD-MCNC: 8.5 GM/DL (ref 12–16)
LACTATE SERPL-SCNC: 1.5 MMOL/L (ref 0.5–2.2)
LYMPHOCYTES NFR BLD MANUAL: 1 % (ref 18–48)
MAGNESIUM SERPL-MCNC: 1.9 MG/DL (ref 1.6–2.6)
MCH RBC QN AUTO: 32.4 PG (ref 27–31)
MCHC RBC AUTO-ENTMCNC: 32.9 G/DL (ref 32–36)
MCV RBC AUTO: 99 FL (ref 82–98)
MONOCYTES NFR BLD MANUAL: 1 % (ref 4–15)
NEUTROPHILS NFR BLD MANUAL: 87 % (ref 38–73)
NEUTS BAND NFR BLD MANUAL: 11 %
NUCLEATED RBC (/100WBC) (OHS): 0 /100 WBC
OHS QRS DURATION: 62 MS
OHS QTC CALCULATION: 450 MS
PHOSPHATE SERPL-MCNC: 3 MG/DL (ref 2.7–4.5)
PLATELET # BLD AUTO: 326 K/UL (ref 150–450)
PLATELET BLD QL SMEAR: ABNORMAL
PMV BLD AUTO: 9.2 FL (ref 9.2–12.9)
POCT GLUCOSE: 169 MG/DL (ref 70–110)
POTASSIUM SERPL-SCNC: 3 MMOL/L (ref 3.5–5.1)
PROT SERPL-MCNC: 5.4 GM/DL (ref 6–8.4)
RBC # BLD AUTO: 2.62 M/UL (ref 4–5.4)
SODIUM SERPL-SCNC: 140 MMOL/L (ref 136–145)
WBC # BLD AUTO: 14.51 K/UL (ref 3.9–12.7)

## 2025-04-14 PROCEDURE — 63600175 PHARM REV CODE 636 W HCPCS: Performed by: HOSPITALIST

## 2025-04-14 PROCEDURE — 63600175 PHARM REV CODE 636 W HCPCS: Performed by: STUDENT IN AN ORGANIZED HEALTH CARE EDUCATION/TRAINING PROGRAM

## 2025-04-14 PROCEDURE — 25000003 PHARM REV CODE 250: Performed by: HOSPITALIST

## 2025-04-14 PROCEDURE — 25000242 PHARM REV CODE 250 ALT 637 W/ HCPCS: Performed by: STUDENT IN AN ORGANIZED HEALTH CARE EDUCATION/TRAINING PROGRAM

## 2025-04-14 PROCEDURE — 94640 AIRWAY INHALATION TREATMENT: CPT

## 2025-04-14 PROCEDURE — 63600175 PHARM REV CODE 636 W HCPCS: Performed by: INTERNAL MEDICINE

## 2025-04-14 PROCEDURE — 83735 ASSAY OF MAGNESIUM: CPT | Performed by: STUDENT IN AN ORGANIZED HEALTH CARE EDUCATION/TRAINING PROGRAM

## 2025-04-14 PROCEDURE — 83605 ASSAY OF LACTIC ACID: CPT | Performed by: INTERNAL MEDICINE

## 2025-04-14 PROCEDURE — 99233 SBSQ HOSP IP/OBS HIGH 50: CPT | Mod: 95,,, | Performed by: INTERNAL MEDICINE

## 2025-04-14 PROCEDURE — 85025 COMPLETE CBC W/AUTO DIFF WBC: CPT | Performed by: STUDENT IN AN ORGANIZED HEALTH CARE EDUCATION/TRAINING PROGRAM

## 2025-04-14 PROCEDURE — 99900035 HC TECH TIME PER 15 MIN (STAT)

## 2025-04-14 PROCEDURE — 84460 ALANINE AMINO (ALT) (SGPT): CPT | Performed by: STUDENT IN AN ORGANIZED HEALTH CARE EDUCATION/TRAINING PROGRAM

## 2025-04-14 PROCEDURE — 27000221 HC OXYGEN, UP TO 24 HOURS

## 2025-04-14 PROCEDURE — 84100 ASSAY OF PHOSPHORUS: CPT | Performed by: STUDENT IN AN ORGANIZED HEALTH CARE EDUCATION/TRAINING PROGRAM

## 2025-04-14 PROCEDURE — 87205 SMEAR GRAM STAIN: CPT | Performed by: STUDENT IN AN ORGANIZED HEALTH CARE EDUCATION/TRAINING PROGRAM

## 2025-04-14 PROCEDURE — 25000003 PHARM REV CODE 250: Performed by: STUDENT IN AN ORGANIZED HEALTH CARE EDUCATION/TRAINING PROGRAM

## 2025-04-14 PROCEDURE — 94761 N-INVAS EAR/PLS OXIMETRY MLT: CPT

## 2025-04-14 PROCEDURE — 94760 N-INVAS EAR/PLS OXIMETRY 1: CPT

## 2025-04-14 PROCEDURE — 20600001 HC STEP DOWN PRIVATE ROOM

## 2025-04-14 PROCEDURE — 36415 COLL VENOUS BLD VENIPUNCTURE: CPT | Performed by: INTERNAL MEDICINE

## 2025-04-14 RX ADMIN — SUCRALFATE 1 G: 1 TABLET ORAL at 05:04

## 2025-04-14 RX ADMIN — IPRATROPIUM BROMIDE AND ALBUTEROL SULFATE 3 ML: .5; 3 SOLUTION RESPIRATORY (INHALATION) at 06:04

## 2025-04-14 RX ADMIN — ENOXAPARIN SODIUM 30 MG: 30 INJECTION SUBCUTANEOUS at 05:04

## 2025-04-14 RX ADMIN — SUCRALFATE 1 G: 1 TABLET ORAL at 09:04

## 2025-04-14 RX ADMIN — PREDNISONE 40 MG: 20 TABLET ORAL at 09:04

## 2025-04-14 RX ADMIN — IPRATROPIUM BROMIDE AND ALBUTEROL SULFATE 3 ML: .5; 3 SOLUTION RESPIRATORY (INHALATION) at 07:04

## 2025-04-14 RX ADMIN — SODIUM CHLORIDE, POTASSIUM CHLORIDE, SODIUM LACTATE AND CALCIUM CHLORIDE: 600; 310; 30; 20 INJECTION, SOLUTION INTRAVENOUS at 09:04

## 2025-04-14 RX ADMIN — ATORVASTATIN CALCIUM 40 MG: 40 TABLET, FILM COATED ORAL at 09:04

## 2025-04-14 RX ADMIN — IPRATROPIUM BROMIDE AND ALBUTEROL SULFATE 3 ML: .5; 3 SOLUTION RESPIRATORY (INHALATION) at 02:04

## 2025-04-14 RX ADMIN — POTASSIUM BICARBONATE 60 MEQ: 782 TABLET, EFFERVESCENT ORAL at 11:04

## 2025-04-14 RX ADMIN — SODIUM CHLORIDE, POTASSIUM CHLORIDE, SODIUM LACTATE AND CALCIUM CHLORIDE: 600; 310; 30; 20 INJECTION, SOLUTION INTRAVENOUS at 10:04

## 2025-04-14 RX ADMIN — SODIUM CHLORIDE 1000 ML: 9 INJECTION, SOLUTION INTRAVENOUS at 01:04

## 2025-04-14 RX ADMIN — POTASSIUM BICARBONATE 60 MEQ: 782 TABLET, EFFERVESCENT ORAL at 09:04

## 2025-04-14 RX ADMIN — SUCRALFATE 1 G: 1 TABLET ORAL at 11:04

## 2025-04-14 RX ADMIN — PANTOPRAZOLE SODIUM 40 MG: 40 TABLET, DELAYED RELEASE ORAL at 09:04

## 2025-04-14 NOTE — AI DETERIORATION ALERT
Artificial Intelligence Notification  [address] [place] [address]  Phone: [phone]    This documentation was triggered by an Artificial Intelligence Notification:    Admit Date: 2025   LOS: 1  Code Status: Full Code  : 1955  Age: 69 y.o.  Weight:   Wt Readings from Last 1 Encounters:   25 40.8 kg (89 lb 15.2 oz)        Sex: female  Bed:   MRN: [id number]  Attending Physician: Walter Roth MD     Date of Alert: 2025  Time AI Alert Received:              Vitals:    25 0313   BP:    Pulse: 87   Resp: 17   Temp:      SpO2: 97 %      Artificial Intelligence alert discussed with Provider:     Name:  na   Date/Time of Provider Notification:  na      Patient Condition:  Pt primary RN with pt no acute distress

## 2025-04-14 NOTE — PLAN OF CARE
04/14/25 1556   Medicare Message   Important Message from Medicare regarding Discharge Appeal Rights Given to patient/caregiver;Explained to patient/caregiver;Signed/date by patient/caregiver   Date IMM was signed 04/14/25   Time IMM was signed [id number]     Placed in folder

## 2025-04-14 NOTE — PROGRESS NOTES
[facility], [place], operated by [facility]  Progress Note    Patient Name: Janey Null  MRN: [id number]  Patient Class: IP- Inpatient   Admission Date: 4/13/2025  Length of Stay: 1 days  Attending Physician: Connor Castillo DO  Primary Care Provider: Josefina Durand MD        Subjective:     Principal Problem:Severe sepsis    Interval History:   Shortness of Breath        SOB present progressively over the last three days.  Patient reports cough with sputum.  Chest pain reported mid sternal with deep breathing and coughing.           HPI: Patient is a 69 year old who has a history of RA, gastric cancer, and a long history of Tobacco use presenting with shortness of breath. Symptoms started Friday. No CP. Coughing up bill sputum. Subjective fever. No NV. 3 episodes of diarrhea last night but none today. Patient still smokes cigarettes. No sick contacts.        Review of Systems   Constitutional:  Positive for activity change and fatigue.   HENT: Negative.     Eyes: Negative.    Respiratory:  Positive for shortness of breath.    Cardiovascular: Negative.    Gastrointestinal: Negative.    Endocrine: Negative.    Genitourinary: Negative.    Musculoskeletal: Negative.    Allergic/Immunologic: Negative.    Neurological:  Positive for weakness.   Hematological: Negative.    Psychiatric/Behavioral: Negative.       Objective:     Vital Signs (Most Recent):  Temp: 98.1 °F (36.7 °C) (04/14/25 0845)  Pulse: 96 (04/14/25 0815)  Resp: (!) 22 (04/14/25 0815)  BP: (!) 106/51 (04/14/25 0800)  SpO2: 100 % (04/14/25 0815) Vital Signs (24h Range):  Temp:  [97.6 °F (36.4 °C)-98.1 °F (36.7 °C)] 98.1 °F (36.7 °C)  Pulse:  [] 96  Resp:  [16-34] 22  SpO2:  [89 %-100 %] 100 %  BP: ()/(43-66) 106/51     Weight: 41.5 kg (91 lb 7.9 oz)  Body mass index is 20.51 kg/m².    Intake/Output Summary (Last 24 hours) at 4/14/2025 1119  Last data filed at 4/14/2025 0607  Gross per 24 hour   Intake --   Output  "750 ml   Net -750 ml      Physical Exam  Constitutional:       Appearance: She is ill-appearing.   HENT:      Head: Normocephalic and atraumatic.      Mouth/Throat:      Mouth: Mucous membranes are moist.   Eyes:      Extraocular Movements: Extraocular movements intact.      Pupils: Pupils are equal, round, and reactive to light.   Cardiovascular:      Rate and Rhythm: Normal rate and regular rhythm.   Pulmonary:      Breath sounds: Rhonchi and rales present.   Abdominal:      General: Bowel sounds are normal.      Palpations: Abdomen is soft.   Musculoskeletal:      Comments: Right BKA   Skin:     Coloration: Skin is not jaundiced.      Findings: No erythema.   Neurological:      General: No focal deficit present.      Mental Status: She is alert and oriented to person, place, and time.   Psychiatric:         Mood and Affect: Mood normal.           Overview/Hospital Course:  Patient clinically states she is doing better family's in room at bedside.  Very frail weak and appearance.  But states she feels 50% better than she did on arrival yesterday.  Labs have white cells trending down to 14.51, hemoglobin 8.5 platelets of 326 sodium of 4.0 potassium down to 3 CO2 of 21 BUN of 23 creatinine 0.7 lactic acid was elevated on admission at 2.5 has not been repeated troponins were unremarkable trace leuko esterase moderate moderate bacteria cultures are pending ABGs on arrival showed a pH of 7.45 PO2 of 53 chest x-ray with pneumonitis possible some mild pulmonary edema patient has been treated with IV hydration.  Atrovent albuterol initiated Levaquin 750 mg q.day feels better today.  Significant Labs: All pertinent labs within the past 24 hours have been reviewed.  Blood Culture: No results for input(s): "LABBLOO" in the last 48 hours.  BMP:   Recent Labs   Lab 04/14/25  0420      K 3.0*      CO2 21*   BUN 23   CREATININE 0.7   CALCIUM 8.0*   MG 1.9     CBC:   Recent Labs   Lab 04/13/25  1415 04/14/25  0420 " "  WBC 15.88* 14.51*   HGB 9.9* 8.5*   HCT 30.2* 25.8*    326     CMP:   Recent Labs   Lab 04/13/25  1415 04/14/25  0420    140   K 3.7 3.0*    109   CO2 19* 21*   BUN 50* 23   CREATININE 1.8* 0.7   CALCIUM 8.8 8.0*   ALBUMIN 2.4* 1.9*   BILITOT 0.4 0.3   ALKPHOS 193* 149   AST 25 25   ALT 30 22   ANIONGAP 18* 10     Cardiac Markers: No results for input(s): "CKMB", "MYOGLOBIN", "BNP", "TROPISTAT" in the last 48 hours.  Magnesium:   Recent Labs   Lab 04/14/25  0420   MG 1.9       Significant Imaging: I have reviewed all pertinent imaging results/findings within the past 24 hours.    Assessment/Plan:    Severe sepsis.  Source pneumonia respiratory.  Initiated on Levaquin 750 mg q.day.    Lactic acidosis been given a fluid challenge.  Repeat lactic acid    icrobiology Results (last 7 days)         Procedure Component Value Units Date/Time     Culture, Respiratory with Gram Stain [[id number]]       Order Status: Sent Specimen: Respiratory       Influenza A & B by Molecular [[id number]]  (Normal) Collected: 04/13/25 1823     Order Status: Completed Specimen: Nasal Swab Updated: 04/13/25 1851       INFLUENZA A MOLECULAR Negative       INFLUENZA B MOLECULAR  Negative     Urine culture [[id number]] Collected: 04/13/25 1655     Order Status: Sent Specimen: Urine, Clean Catch Updated: 04/13/25 1723     Blood culture x two cultures. Draw prior to antibiotics. [[id number]] Collected: 04/13/25 1437     Order Status: Sent Specimen: Blood from Peripheral, Antecubital, Left Updated: 04/13/25 1506     Blood culture x two cultures. Draw prior to antibiotics. [[id number]] Collected: 04/13/25 1414     Order Status: Sent Specimen: Blood from Peripheral, Antecubital, Right        COPD.  Has been initiated on COPD pathway.  With the inhalers steroids antibiotics.  Supplemental oxygen.    Acute kidney injury.  He is currently resolved creatinine of 1.8-0.7      Acute cystitis.  Continuing IV Levaquin    Advanced care " discussed with the patient.  Patient remains a full code.    History of right BKA.      VT prophylaxis on Lovenox 30 mg q.day  Active Diagnoses:    Diagnosis Date Noted POA    PRINCIPAL PROBLEM:  Severe sepsis [A41.9, R65.20] 10/13/2024 Yes    DINA (acute kidney injury) [N17.9] 04/13/2025 Yes    Acute cystitis [N30.00] 04/13/2025 Yes    Advanced care planning/counseling discussion [Z71.89] 04/13/2025 Not Applicable    Wheelchair dependence [Z99.3] 04/13/2025 Not Applicable    History of lower limb amputation [Z89.619] 04/18/2023 Not Applicable    COPD exacerbation [J44.1] 04/05/2023 Yes    Rheumatoid arthritis [M06.9] 04/04/2023 Yes      Problems Resolved During this Admission:     VTE Risk Mitigation (From admission, onward)           Ordered     enoxaparin injection 30 mg  Daily         04/13/25 1707     IP VTE HIGH RISK PATIENT  Once         04/13/25 1707     Place sequential compression device  Until discontinued         04/13/25 1707                       Connor Castillo DO  Department of Hospital Medicine   [facility]

## 2025-04-14 NOTE — PLAN OF CARE
[facility] Comprehensive Care Unit  Initial Discharge Assessment       Primary Care Provider: Josefina Durand MD    Admission Diagnosis: Screening for cardiovascular condition [Z13.6]  Chest pain [R07.9]  Septic shock [A41.9, R65.21]  Anaphylaxis, initial encounter [T78.2XXA]  Pneumonia of left lower lobe due to infectious organism [J18.9]  Acute hypoxemic respiratory failure [J96.01]    Admission Date: 4/13/2025  Expected Discharge Date: 4/16/2025    DC assessment completed with patient at bedside. Verified information on facesheet as correct. Pt lives at listed address with souse adult child and dependant child. Reports she has help if needed from spouse and adult daughter (Patti.) PCP is Dr. Durand- reports last apt was last week. Pharmacy is [email or web address] in [place].  Denies hh/hd/outpt services. DME has wheelchair, walker, cane grab bars and nebulizer. Having RLE prothesis made. Reports being independent with activities.  Still drives herself , family drives to [place] Reports daughter will provide transportation home upon DC. Reports taking home medications as prescribed and can currently afford them. Verified insurance on file. Denies recent inpt stay in last 30 days.  DC plan is home with family.           Payor: [employer] MANAGED MEDICARE / Plan: [employer] MEDICARE HMO / Product Type: Capitation /     Extended Emergency Contact Information  Primary Emergency Contact: Owen Null  Address: [address] [place] [address] [facility]  Home Phone: [phone]  Mobile Phone: [phone]  Relation: Spouse  Preferred language: English   needed? No    Discharge Plan A: Home with family  Discharge Plan B: Home      [id number] - [place], [place] - [address]  [address]  [facility] [address]  Phone: [phone] Fax: [phone]      Initial Assessment (most recent)       Adult Discharge Assessment - 04/14/25 9360           Discharge Assessment    Assessment Type Discharge Planning Assessment     Confirmed/corrected address, phone number and insurance Yes     Confirmed Demographics Correct on Facesheet     Source of Information patient     When was your last doctors appointment? --   last week    Communicated SHELBI with patient/caregiver Yes     Reason For Admission sepsis     People in Home child(blanco), dependent;spouse;child(blanco), adult     Do you expect to return to your current living situation? Yes     Do you have help at home or someone to help you manage your care at home? Yes     Who are your caregiver(s) and their phone number(s)? Patti Null daughter [phone]     Prior to hospitilization cognitive status: Alert/Oriented;No Deficits     Current cognitive status: Alert/Oriented;No Deficits     Walking or Climbing Stairs Difficulty yes     Walking or Climbing Stairs ambulation difficulty, dependent     Mobility Management pt is Right BKA, currently WC dependent.     Dressing/Bathing Difficulty no     Home Accessibility wheelchair accessible     Home Layout Able to live on 1st floor     Equipment Currently Used at Home wheelchair;walker, standard;cane, quad;grab bar;nebulizer     Readmission within 30 days? No     Patient currently being followed by outpatient case management? Unable to determine (comments)     Do you currently have service(s) that help you manage your care at home? No     Do you take prescription medications? Yes     Do you have prescription coverage? Yes     Coverage [employer] Gold plus     Do you have any problems affording any of your prescribed medications? No     Is the patient taking medications as prescribed? yes     Who is going to help you get home at discharge? Daughter     How do you get to doctors appointments? car, drives self;family or friend will provide     Are you on dialysis? No     Do you take coumadin? No     Discharge Plan A Home with family     Discharge Plan B Home     DME Needed  Upon Discharge  none

## 2025-04-14 NOTE — PLAN OF CARE
Problem: Adult Inpatient Plan of Care  Goal: Plan of Care Review  Outcome: Progressing  Goal: Patient-Specific Goal (Individualized)  Outcome: Progressing  Goal: Absence of Hospital-Acquired Illness or Injury  Outcome: Progressing  Goal: Optimal Comfort and Wellbeing  Outcome: Progressing  Goal: Readiness for Transition of Care  Outcome: Progressing     Problem: Infection  Goal: Absence of Infection Signs and Symptoms  Outcome: Progressing     Problem: Sepsis/Septic Shock  Goal: Optimal Coping  Outcome: Progressing  Goal: Absence of Bleeding  Outcome: Progressing  Goal: Blood Glucose Level Within Targeted Range  Outcome: Progressing  Goal: Absence of Infection Signs and Symptoms  Outcome: Progressing  Goal: Optimal Nutrition Intake  Outcome: Progressing     Problem: Acute Kidney Injury/Impairment  Goal: Fluid and Electrolyte Balance  Outcome: Progressing  Goal: Improved Oral Intake  Outcome: Progressing  Goal: Effective Renal Function  Outcome: Progressing     Problem: Pneumonia  Goal: Fluid Balance  Outcome: Progressing  Goal: Resolution of Infection Signs and Symptoms  Outcome: Progressing  Goal: Effective Oxygenation and Ventilation  Outcome: Progressing     Problem: Skin Injury Risk Increased  Goal: Skin Health and Integrity  Outcome: Progressing

## 2025-04-14 NOTE — PLAN OF CARE
Pt received from ER @ 20:00 4/13/25. AAOx3, RBKA  Pt transferred from stretcher to bed with assist. Lr infusing at 100ml/hr to 20 natasha Left AC. Connected to cardiac monitor.Vital signs obtained, assessment complete.  Dr RANDI Kendall notified regarding Pts B/P trending down. I liter bolus ns ordered. B/P has maintained with MAP > 65.   Problem: Adult Inpatient Plan of Care  Goal: Absence of Hospital-Acquired Illness or Injury  Outcome: Progressing     Problem: Sepsis/Septic Shock  Goal: Absence of Infection Signs and Symptoms  Outcome: Progressing     Problem: Acute Kidney Injury/Impairment  Goal: Effective Renal Function  Outcome: Progressing     Problem: Pneumonia  Goal: Effective Oxygenation and Ventilation  Outcome: Progressing

## 2025-04-14 NOTE — H&P
[facility] Medicine  History & Physical    Patient Name: Janey Null  MRN: [id number]  Patient Class: IP- Inpatient  Admission Date: 4/13/2025  Attending Physician: Walter Roth MD   Primary Care Provider: Josefina Durand MD         Patient information was obtained from patient, past medical records, and ER records.     Subjective:     Principal Problem:Severe sepsis    Chief Complaint:   Chief Complaint   Patient presents with    Shortness of Breath     SOB present progressively over the last three days.  Patient reports cough with sputum.  Chest pain reported mid sternal with deep breathing and coughing.          HPI: Patient is a 69 year old who has a history of RA, gastric cancer, and a long history of Tobacco use presenting with shortness of breath. Symptoms started Friday. No CP. Coughing up bill sputum. Subjective fever. No NV. 3 episodes of diarrhea last night but none today. Patient still smokes cigarettes. No sick contacts.     Past Medical History:   Diagnosis Date    Anxiety     COPD (chronic obstructive pulmonary disease)     Depression     Encounter for long-term (current) use of high-risk medication     Fibromyalgia     Osteoporosis     Rheumatoid arthritis involving multiple sites with positive rheumatoid factor        Past Surgical History:   Procedure Laterality Date    ESOPHAGOGASTRODUODENOSCOPY N/A 4/5/2023    Procedure: EGD (ESOPHAGOGASTRODUODENOSCOPY);  Surgeon: Tyrell Cornelius MD;  Location: [facility] ENDO;  Service: General;  Laterality: N/A;    REPAIR OF RECURRENT INCARCERATED INCISIONAL HERNIA Left 4/13/2022    Procedure: REPAIR, HERNIA--RIGHT LATERAL POSITIONING;  Surgeon: Tyrell Cornelius MD;  Location: [facility] OR;  Service: General;  Laterality: Left;  left lumbar hernia    right leg       multiple surgeries ,amputation       Review of patient's allergies indicates:   Allergen Reactions    Azithromycin Anaphylaxis    Penicillins        No  current facility-administered medications on file prior to encounter.     Current Outpatient Medications on File Prior to Encounter   Medication Sig    albuterol (VENTOLIN HFA) 90 mcg/actuation inhaler Inhale 2 puffs into the lungs every 6 (six) hours as needed for Wheezing. Rescue    albuterol-ipratropium (DUO-NEB) 2.5 mg-0.5 mg/3 mL nebulizer solution Take 3 mLs by nebulization every 6 (six) hours as needed for Wheezing.    alendronate (FOSAMAX) 70 MG tablet Take 1 tablet (70 mg total) by mouth every 7 days.    atorvastatin (LIPITOR) 40 MG tablet Take 1 tablet (40 mg total) by mouth once daily.    azelastine (ASTELIN) 137 mcg (0.1 %) nasal spray 1 spray (137 mcg total) by Nasal route 2 (two) times daily.    benzonatate (TESSALON) 200 MG capsule Take 1 capsule (200 mg total) by mouth 3 (three) times daily as needed for Cough.    ergocalciferol (ERGOCALCIFEROL) 50,000 unit Cap Take 1 capsule (50,000 Units total) by mouth every 7 days.    ferrous sulfate (FEOSOL) 325 mg (65 mg iron) Tab tablet Take 1 tablet (325 mg total) by mouth daily with breakfast.    fluticasone propionate (FLONASE) 50 mcg/actuation nasal spray 1 spray (50 mcg total) by Each Nostril route once daily.    fluticasone-umeclidin-vilanter (TRELEGY ELLIPTA) 200-62.5-25 mcg inhaler Inhale 1 puff into the lungs once daily.    folic acid (FOLVITE) 1 MG tablet TAKE 1 TABLET(1 MG) BY MOUTH EVERY DAY    furosemide (LASIX) 40 MG tablet Take 1 tablet (40 mg total) by mouth 2 (two) times daily.    guaiFENesin (MUCINEX) 600 mg 12 hr tablet Take 2 tablets (1,200 mg total) by mouth 2 (two) times daily.    hydrocodone-acetaminophen 10-325mg (NORCO)  mg Tab Take 1 tablet by mouth every 6 (six) hours as needed.  (Patient not taking: Reported on 4/1/2025)    levocetirizine (XYZAL) 5 MG tablet Take 1 tablet (5 mg total) by mouth every evening.    losartan (COZAAR) 100 MG tablet Take 1 tablet (100 mg total) by mouth once daily.    methotrexate 2.5 MG Tab Take by  mouth.    pantoprazole (PROTONIX) 40 MG tablet Take 1 tablet (40 mg total) by mouth once daily.    sucralfate (CARAFATE) 1 gram tablet Take 1 tablet (1 g total) by mouth 4 (four) times daily before meals and nightly.    tiZANidine (ZANAFLEX) 4 MG tablet Take 4 mg by mouth every 8 (eight) hours as needed.     Family History    None       Tobacco Use    Smoking status: Every Day     Current packs/day: 0.00     Average packs/day: 1 pack/day for 61.0 years (61.0 ttl pk-yrs)     Types: Cigarettes     Start date:      Last attempt to quit: 2023     Years since quittin.0    Smokeless tobacco: Never   Substance and Sexual Activity    Alcohol use: No    Drug use: Never    Sexual activity: Not Currently     Review of Systems   All other systems reviewed and are negative.    Objective:     Vital Signs (Most Recent):  Temp: 97.6 °F (36.4 °C) (25 1623)  Pulse: (!) 113 (25 1702)  Resp: (!) 21 (25 1702)  BP: (!) 99/54 (25 1702)  SpO2: 100 % (25 170) Vital Signs (24h Range):  Temp:  [97.6 °F (36.4 °C)-98 °F (36.7 °C)] 97.6 °F (36.4 °C)  Pulse:  [101-120] 113  Resp:  [17-34] 21  SpO2:  [92 %-100 %] 100 %  BP: ()/(43-66) 99/54     Weight: 44.5 kg (98 lb)  Body mass index is 21.97 kg/m².     Physical Exam        Vitals reviewed  General: NAD, thin, frail appearing  Head: NC/AT  Eyes: EOMI, CONTRERAS  Cardiovascular: Pulses difficult to palpate, sinus tachcyardia  Pulmonary: increased Respiratory Rate, No respiratory distress  Gi: Soft, Non-tender  Extremities: Warm, right BKA, swelling LLE  Skin: Warm, dry  Neuro: Alert, Oriented x3, No focal Deficit  Psych: Appropriate mood and affect       Significant Labs: All pertinent labs within the past 24 hours have been reviewed.    Significant Imaging: I have reviewed all pertinent imaging results/findings within the past 24 hours.  Assessment/Plan:     Assessment & Plan  Severe sepsis  This patient does have evidence of infective focus  My overall  impression is sepsis.  Source: Respiratory  Antibiotics given-   Antibiotics (72h ago, onward)      Start     Stop Route Frequency Ordered    04/15/25 0900  levoFLOXacin 750 mg/150 mL IVPB 750 mg         -- IV Every 48 hours (non-standard times) 04/13/25 1707          Latest lactate reviewed-  Recent Labs   Lab 04/13/25  1421   LACTATE 2.1     Organ dysfunction indicated by Acute kidney injury    Fluid challenge Actual Body Weight- The patient's actual body weight will be used to calculate the 30 ml/kg fluid bolus.     Post- resuscitation assessment Yes - I attest a sepsis perfusion exam was performed within 6 hours of sepsis, severe sepsis, or septic shock presentation, following fluid resuscitation.      Will Not start Pressors- Levophed for MAP of 65  Source control achieved by: IV abx.    Microbiology Results (last 7 days)       Procedure Component Value Units Date/Time    Culture, Respiratory with Gram Stain [[id number]]     Order Status: Sent Specimen: Respiratory     Influenza A & B by Molecular [[id number]]  (Normal) Collected: 04/13/25 1823    Order Status: Completed Specimen: Nasal Swab Updated: 04/13/25 1851     INFLUENZA A MOLECULAR Negative     INFLUENZA B MOLECULAR  Negative    Urine culture [[id number]] Collected: 04/13/25 1655    Order Status: Sent Specimen: Urine, Clean Catch Updated: 04/13/25 1723    Blood culture x two cultures. Draw prior to antibiotics. [[id number]] Collected: 04/13/25 1437    Order Status: Sent Specimen: Blood from Peripheral, Antecubital, Left Updated: 04/13/25 1506    Blood culture x two cultures. Draw prior to antibiotics. [[id number]] Collected: 04/13/25 1414    Order Status: Sent Specimen: Blood from Peripheral, Antecubital, Right Updated: 04/13/25 1425            Rheumatoid arthritis  noted    COPD exacerbation  Patient's COPD is with exacerbation noted by continued dyspnea currently.  Patient is currently on COPD Pathway. Continue scheduled inhalers  duonebs, Steroids,  Antibiotics, and Supplemental oxygen and monitor respiratory status closely.   History of lower limb amputation  noted    DINA (acute kidney injury)  DINA is likely due to pre-renal azotemia due to dehydration and post-obstructive d/t urinary outlet obstruction . Baseline creatinine is  0.6 . Most recent creatinine and eGFR are listed below.  Recent Labs     04/13/25  1415   CREATININE 1.8*   EGFRNORACEVR 30*      Plan  - DINA is stable  - Avoid nephrotoxins and renally dose meds for GFR listed above  - Monitor urine output, serial BMP, and adjust therapy as needed  - IVF, bladder scans with prn In and out caths. If patient requires more than 2 in and out caths would place davila  Acute cystitis  Possibly 2/2 urinary retention. Likely not the cause of patients current septic picture  Continue IV abx  F/U Ucx    Advanced care planning/counseling discussion  Advance Care Planning     Date: 04/13/2025    [facility]  I engaged the patient in a voluntary conversation about advance care planning and we specifically addressed what the goals of care would be moving forward, in light of the patient's change in clinical status, specifically worsening COPD.  We did specifically address the patient's likely prognosis, which is fair .  We explored the patient's values and preferences for future care.  The patient endorses that what is most important right now is to focus on curative/life-prolongation (regardless of treatment burdens)    Accordingly, we have decided that the best plan to meet the patient's goals includes continuing with treatment    A total of 15 min was spent on advance care planning, goals of care discussion, emotional support, formulating and communicating prognosis and exploring burden/benefit of various approaches of treatment. This discussion occurred on a fully voluntary basis with the verbal consent of the patient and/or family.          Wheelchair dependence  2/2 right BKA    VTE Risk Mitigation (From admission,  onward)           Ordered     enoxaparin injection 30 mg  Daily         04/13/25 1707     IP VTE HIGH RISK PATIENT  Once         04/13/25 1707     Place sequential compression device  Until discontinued         04/13/25 1707                                    Walter Roth MD  Department of Hospital Medicine  [place] - Emergency Dept

## 2025-04-14 NOTE — RESPIRATORY THERAPY
04/14/25 0313   Patient Assessment/Suction   Level of Consciousness (AVPU) responds to voice  (patient asleep)   Respiratory Effort Normal;Unlabored   Expansion/Accessory Muscles/Retractions no use of accessory muscles;no retractions   Rhythm/Pattern, Respiratory no shortness of breath reported   Cough Frequency no cough   Skin Integrity   Area Observed Left;Right;Behind ear;Nares;Cheek;Upper lip   Skin Appearance without discoloration   PRE-TX-O2   Device (Oxygen Therapy) nasal cannula with humidification   $ Is the patient on Low Flow Oxygen? Yes   Flow (L/min) (Oxygen Therapy) 2   Oxygen Concentration (%) 28   Pulse Oximetry Type Continuous   $ Pulse Oximetry - Single Charge Pulse Oximetry - Single   Pulse 87   Resp 17   Ready to Wean/Extubation Screen   FIO2<=50 (chart decimal) 0.28

## 2025-04-14 NOTE — AI DETERIORATION ALERT
Artificial Intelligence Notification  [address] [place] [address]  Phone: [phone]    This documentation was triggered by an Artificial Intelligence Notification:    Admit Date: 2025   LOS: 1  Code Status: Full Code  : 1955  Age: 69 y.o.  Weight:   Wt Readings from Last 1 Encounters:   25 40.8 kg (89 lb 15.2 oz)        Sex: female  Bed:   MRN: [id number]  Attending Physician: Walter Roth MD     Date of Alert: 2025  Time AI Alert Received: 0417            Vitals:    25 0313   BP:    Pulse: 87   Resp: 17   Temp:      SpO2: 97 %      Artificial Intelligence alert discussed with Provider:     Name: n/a   Date/Time of Provider Notification: n/a      Patient Condition: Spoke with primary RN at bedside, no acute concerns at current time.

## 2025-04-15 ENCOUNTER — PATIENT MESSAGE (OUTPATIENT)
Dept: FAMILY MEDICINE | Facility: CLINIC | Age: 70
End: 2025-04-15
Payer: MEDICARE

## 2025-04-15 LAB
ABSOLUTE EOSINOPHIL (OHS): 0 K/UL
ABSOLUTE MONOCYTE (OHS): 0.89 K/UL (ref 0.3–1)
ABSOLUTE NEUTROPHIL COUNT (OHS): 12.97 K/UL (ref 1.8–7.7)
ALBUMIN SERPL BCP-MCNC: 1.7 G/DL (ref 3.5–5.2)
ALP SERPL-CCNC: 135 UNIT/L (ref 40–150)
ALT SERPL W/O P-5'-P-CCNC: 21 UNIT/L (ref 10–44)
ANION GAP (OHS): 8 MMOL/L (ref 8–16)
AST SERPL-CCNC: 24 UNIT/L (ref 11–45)
BACTERIA UR CULT: NORMAL
BASOPHILS # BLD AUTO: 0.02 K/UL
BASOPHILS NFR BLD AUTO: 0.1 %
BILIRUB SERPL-MCNC: 0.3 MG/DL (ref 0.1–1)
BUN SERPL-MCNC: 20 MG/DL (ref 8–23)
CALCIUM SERPL-MCNC: 8.2 MG/DL (ref 8.7–10.5)
CHLORIDE SERPL-SCNC: 110 MMOL/L (ref 95–110)
CO2 SERPL-SCNC: 22 MMOL/L (ref 23–29)
CREAT SERPL-MCNC: 0.5 MG/DL (ref 0.5–1.4)
ERYTHROCYTE [DISTWIDTH] IN BLOOD BY AUTOMATED COUNT: 18 % (ref 11.5–14.5)
GFR SERPLBLD CREATININE-BSD FMLA CKD-EPI: >60 ML/MIN/1.73/M2
GLUCOSE SERPL-MCNC: 100 MG/DL (ref 70–110)
HCT VFR BLD AUTO: 24.4 % (ref 37–48.5)
HGB BLD-MCNC: 8.2 GM/DL (ref 12–16)
IMM GRANULOCYTES # BLD AUTO: 0.19 K/UL (ref 0–0.04)
IMM GRANULOCYTES NFR BLD AUTO: 1.3 % (ref 0–0.5)
LYMPHOCYTES # BLD AUTO: 1 K/UL (ref 1–4.8)
MAGNESIUM SERPL-MCNC: 1.9 MG/DL (ref 1.6–2.6)
MCH RBC QN AUTO: 32.3 PG (ref 27–31)
MCHC RBC AUTO-ENTMCNC: 33.6 G/DL (ref 32–36)
MCV RBC AUTO: 96 FL (ref 82–98)
NUCLEATED RBC (/100WBC) (OHS): 0 /100 WBC
PHOSPHATE SERPL-MCNC: 2 MG/DL (ref 2.7–4.5)
PLATELET # BLD AUTO: 303 K/UL (ref 150–450)
PMV BLD AUTO: 8.9 FL (ref 9.2–12.9)
POCT GLUCOSE: 156 MG/DL (ref 70–110)
POTASSIUM SERPL-SCNC: 2.8 MMOL/L (ref 3.5–5.1)
PROCALCITONIN SERPL-MCNC: 0.18 NG/ML
PROT SERPL-MCNC: 5 GM/DL (ref 6–8.4)
RBC # BLD AUTO: 2.54 M/UL (ref 4–5.4)
RELATIVE EOSINOPHIL (OHS): 0 %
RELATIVE LYMPHOCYTE (OHS): 6.6 % (ref 18–48)
RELATIVE MONOCYTE (OHS): 5.9 % (ref 4–15)
RELATIVE NEUTROPHIL (OHS): 86.1 % (ref 38–73)
SODIUM SERPL-SCNC: 140 MMOL/L (ref 136–145)
WBC # BLD AUTO: 15.07 K/UL (ref 3.9–12.7)

## 2025-04-15 PROCEDURE — 85025 COMPLETE CBC W/AUTO DIFF WBC: CPT | Performed by: STUDENT IN AN ORGANIZED HEALTH CARE EDUCATION/TRAINING PROGRAM

## 2025-04-15 PROCEDURE — 80053 COMPREHEN METABOLIC PANEL: CPT | Performed by: STUDENT IN AN ORGANIZED HEALTH CARE EDUCATION/TRAINING PROGRAM

## 2025-04-15 PROCEDURE — 25000242 PHARM REV CODE 250 ALT 637 W/ HCPCS: Performed by: STUDENT IN AN ORGANIZED HEALTH CARE EDUCATION/TRAINING PROGRAM

## 2025-04-15 PROCEDURE — 83735 ASSAY OF MAGNESIUM: CPT | Performed by: STUDENT IN AN ORGANIZED HEALTH CARE EDUCATION/TRAINING PROGRAM

## 2025-04-15 PROCEDURE — 84145 PROCALCITONIN (PCT): CPT | Performed by: INTERNAL MEDICINE

## 2025-04-15 PROCEDURE — 94640 AIRWAY INHALATION TREATMENT: CPT

## 2025-04-15 PROCEDURE — 27000221 HC OXYGEN, UP TO 24 HOURS

## 2025-04-15 PROCEDURE — 25000003 PHARM REV CODE 250: Performed by: STUDENT IN AN ORGANIZED HEALTH CARE EDUCATION/TRAINING PROGRAM

## 2025-04-15 PROCEDURE — 99233 SBSQ HOSP IP/OBS HIGH 50: CPT | Mod: 95,,, | Performed by: INTERNAL MEDICINE

## 2025-04-15 PROCEDURE — 94761 N-INVAS EAR/PLS OXIMETRY MLT: CPT

## 2025-04-15 PROCEDURE — 25000003 PHARM REV CODE 250: Performed by: INTERNAL MEDICINE

## 2025-04-15 PROCEDURE — 20600001 HC STEP DOWN PRIVATE ROOM

## 2025-04-15 PROCEDURE — 84100 ASSAY OF PHOSPHORUS: CPT | Performed by: STUDENT IN AN ORGANIZED HEALTH CARE EDUCATION/TRAINING PROGRAM

## 2025-04-15 PROCEDURE — 63600175 PHARM REV CODE 636 W HCPCS: Performed by: STUDENT IN AN ORGANIZED HEALTH CARE EDUCATION/TRAINING PROGRAM

## 2025-04-15 PROCEDURE — 63600175 PHARM REV CODE 636 W HCPCS: Performed by: HOSPITALIST

## 2025-04-15 PROCEDURE — 36415 COLL VENOUS BLD VENIPUNCTURE: CPT | Performed by: INTERNAL MEDICINE

## 2025-04-15 RX ORDER — MAGNESIUM SULFATE 1 G/100ML
1 INJECTION INTRAVENOUS ONCE
Status: COMPLETED | OUTPATIENT
Start: 2025-04-15 | End: 2025-04-15

## 2025-04-15 RX ORDER — POTASSIUM CHLORIDE 7.45 MG/ML
10 INJECTION INTRAVENOUS
Status: COMPLETED | OUTPATIENT
Start: 2025-04-15 | End: 2025-04-15

## 2025-04-15 RX ORDER — ELECTROLYTES/DEXTROSE
200 SOLUTION, ORAL ORAL
Status: DISCONTINUED | OUTPATIENT
Start: 2025-04-15 | End: 2025-04-16 | Stop reason: HOSPADM

## 2025-04-15 RX ORDER — PREDNISONE 20 MG/1
20 TABLET ORAL DAILY
Status: DISCONTINUED | OUTPATIENT
Start: 2025-04-16 | End: 2025-04-16 | Stop reason: HOSPADM

## 2025-04-15 RX ADMIN — Medication 200 ML: at 03:04

## 2025-04-15 RX ADMIN — IPRATROPIUM BROMIDE AND ALBUTEROL SULFATE 3 ML: .5; 3 SOLUTION RESPIRATORY (INHALATION) at 01:04

## 2025-04-15 RX ADMIN — IPRATROPIUM BROMIDE AND ALBUTEROL SULFATE 3 ML: .5; 3 SOLUTION RESPIRATORY (INHALATION) at 07:04

## 2025-04-15 RX ADMIN — ENOXAPARIN SODIUM 30 MG: 30 INJECTION SUBCUTANEOUS at 03:04

## 2025-04-15 RX ADMIN — POTASSIUM CHLORIDE 10 MEQ: 7.46 INJECTION, SOLUTION INTRAVENOUS at 08:04

## 2025-04-15 RX ADMIN — PANTOPRAZOLE SODIUM 40 MG: 40 TABLET, DELAYED RELEASE ORAL at 08:04

## 2025-04-15 RX ADMIN — ATORVASTATIN CALCIUM 40 MG: 40 TABLET, FILM COATED ORAL at 08:04

## 2025-04-15 RX ADMIN — Medication 200 ML: at 11:04

## 2025-04-15 RX ADMIN — SUCRALFATE 1 G: 1 TABLET ORAL at 07:04

## 2025-04-15 RX ADMIN — SUCRALFATE 1 G: 1 TABLET ORAL at 08:04

## 2025-04-15 RX ADMIN — SUCRALFATE 1 G: 1 TABLET ORAL at 11:04

## 2025-04-15 RX ADMIN — LEVOFLOXACIN 750 MG: 5 INJECTION, SOLUTION INTRAVENOUS at 10:04

## 2025-04-15 RX ADMIN — Medication 200 ML: at 08:04

## 2025-04-15 RX ADMIN — MAGNESIUM SULFATE IN DEXTROSE 1 G: 10 INJECTION, SOLUTION INTRAVENOUS at 06:04

## 2025-04-15 RX ADMIN — POTASSIUM & SODIUM PHOSPHATES POWDER PACK 280-160-250 MG 2 PACKET: 280-160-250 PACK at 06:04

## 2025-04-15 RX ADMIN — POTASSIUM CHLORIDE 10 MEQ: 7.46 INJECTION, SOLUTION INTRAVENOUS at 06:04

## 2025-04-15 RX ADMIN — IPRATROPIUM BROMIDE AND ALBUTEROL SULFATE 3 ML: .5; 3 SOLUTION RESPIRATORY (INHALATION) at 06:04

## 2025-04-15 RX ADMIN — SUCRALFATE 1 G: 1 TABLET ORAL at 03:04

## 2025-04-15 RX ADMIN — PREDNISONE 40 MG: 20 TABLET ORAL at 08:04

## 2025-04-15 RX ADMIN — POTASSIUM CHLORIDE 10 MEQ: 7.46 INJECTION, SOLUTION INTRAVENOUS at 09:04

## 2025-04-15 RX ADMIN — POTASSIUM CHLORIDE 10 MEQ: 7.46 INJECTION, SOLUTION INTRAVENOUS at 07:04

## 2025-04-15 NOTE — PROGRESS NOTES
[facility] Medicine  Progress Note    Patient Name: Janey Null  MRN: [id number]  Patient Class: IP- Inpatient   Admission Date: 4/13/2025  Length of Stay: 2 days  Attending Physician: Connor Castillo DO  Primary Care Provider: Josefina Durand MD        Subjective:     Principal Problem:Severe sepsis    Interval History:  69-year-old female with a history of rheumatoid arthritis tobacco abuse presents with shortness of breaths coughing up bill sputum no nausea and vomiting positive episodes of diarrhea x3 currently smoking.  He is admitted with severe sepsis laboratory studies show a sodium of 140 potassium down to 2 8 CO2 of 22 improved from 19 BUN 20 creatinine 0.5 glucose of 100 albumin is low at 1.7 magnesium at 1.9 troponins were negative lactic acid has resolved cultures are pending white cells still remain elevated 15.7, hemoglobin of 8.2 platelets of 303    Patient is maintaining on lactated Ringer's at 75 cc an hour albuterol VTE prophylaxis on Levaquin Protonix prednisone patient remains vital is stable.  Blood pressure 125/59 respiratory rate is going up to 32 saturations are % on 2 L and micro has remained negative clinically patient is feeling better appetite is picking up we will switch over to a regular diet DC fluids today wean steroids chest x-ray atelectasis pneumonitis.    Review of Systems   Constitutional:  Positive for activity change.   HENT: Negative.     Eyes: Negative.    Respiratory:  Positive for shortness of breath.    Cardiovascular: Negative.    Gastrointestinal: Negative.    Genitourinary: Negative.    Musculoskeletal: Negative.    Allergic/Immunologic: Negative.    Neurological:  Positive for weakness.   Hematological: Negative.    Psychiatric/Behavioral: Negative.       Objective:     Vital Signs (Most Recent):  Temp: 98.5 °F (36.9 °C) (04/15/25 0700)  Pulse: 91 (04/15/25 0900)  Resp: (!) 32 (04/15/25 0900)  BP: (!) 125/59  "(04/15/25 0900)  SpO2: 96 % (04/15/25 0900) Vital Signs (24h Range):  Temp:  [97.6 °F (36.4 °C)-98.9 °F (37.2 °C)] 98.5 °F (36.9 °C)  Pulse:  [] 91  Resp:  [20-32] 32  SpO2:  [94 %-100 %] 96 %  BP: (106-148)/(54-74) 125/59     Weight: 41.5 kg (91 lb 7.9 oz)  Body mass index is 20.51 kg/m².    Intake/Output Summary (Last 24 hours) at 4/15/2025 1029  Last data filed at 4/15/2025 0900  Gross per 24 hour   Intake 3814.98 ml   Output 1300 ml   Net 2514.98 ml      Physical Exam  Constitutional:       Appearance: She is ill-appearing.   HENT:      Head: Normocephalic and atraumatic.      Mouth/Throat:      Mouth: Mucous membranes are moist.   Eyes:      Extraocular Movements: Extraocular movements intact.      Pupils: Pupils are equal, round, and reactive to light.   Cardiovascular:      Rate and Rhythm: Normal rate and regular rhythm.   Pulmonary:      Effort: Pulmonary effort is normal.      Breath sounds: Normal breath sounds.   Abdominal:      General: Abdomen is flat. Bowel sounds are normal.      Palpations: Abdomen is soft.   Musculoskeletal:      Cervical back: Normal range of motion.      Comments: Right BKA   Skin:     General: Skin is warm.      Findings: No bruising.   Neurological:      General: No focal deficit present.      Mental Status: She is alert and oriented to person, place, and time.      Motor: Weakness present.   Psychiatric:         Mood and Affect: Mood normal.           Overview/Hospital Course:  See above    Significant Labs: All pertinent labs within the past 24 hours have been reviewed.  A1C: No results for input(s): "HGBA1C" in the last 4320 hours.  ABGs:   Recent Labs   Lab 04/13/25  1603   PH 7.450   PCO2 27.3*   HCO3 19.0*   POCSATURATED 89   BE -5*   PO2 53*     Bilirubin:   Recent Labs   Lab 03/29/25  1100 04/13/25  1415 04/14/25  0420 04/15/25  0354   BILITOT 0.6 0.4 0.3 0.3     Blood Culture: No results for input(s): "LABBLOO" in the last 48 hours.  BMP:   Recent Labs   Lab " 04/15/25  0354      K 2.8*      CO2 22*   BUN 20   CREATININE 0.5   CALCIUM 8.2*   MG 1.9     CBC:   Recent Labs   Lab 04/13/25  1415 04/14/25  0420 04/15/25  0354   WBC 15.88* 14.51* 15.07*   HGB 9.9* 8.5* 8.2*   HCT 30.2* 25.8* 24.4*    326 303     CMP:   Recent Labs   Lab 04/13/25  1415 04/14/25  0420 04/15/25  0354    140 140   K 3.7 3.0* 2.8*    109 110   CO2 19* 21* 22*   BUN 50* 23 20   CREATININE 1.8* 0.7 0.5   CALCIUM 8.8 8.0* 8.2*   ALBUMIN 2.4* 1.9* 1.7*   BILITOT 0.4 0.3 0.3   ALKPHOS 193* 149 135   AST 25 25 24   ALT 30 22 21   ANIONGAP 18* 10 8       Significant Imaging: I have reviewed all pertinent imaging results/findings within the past 24 hours.    Assessment/Plan:    Severe sepsis.  Being treated for underlying multifocal pneumonia.  Levaquin 750 mg q.day we are going to add incentive spirometry continue nebulized therapy check procalcitonin    Acute kidney injury  On admission creatinine 1.8  Currently 0.5  DC IV fluids    Acute cystitis awaiting cultures  Continue on with Levaquin    Rheumatoid arthritis currently stable weaned down steroids    COPD place her on COPD pathway scheduled in her inhalers steroids which we will continue weaned down antibiotics oxygen    Right BKA stable    VT prophylaxis on Lovenox 30  Active Diagnoses:    Diagnosis Date Noted POA    PRINCIPAL PROBLEM:  Severe sepsis [A41.9, R65.20] 10/13/2024 Yes    DINA (acute kidney injury) [N17.9] 04/13/2025 Yes    Acute cystitis [N30.00] 04/13/2025 Yes    Advanced care planning/counseling discussion [Z71.89] 04/13/2025 Not Applicable    Wheelchair dependence [Z99.3] 04/13/2025 Not Applicable    History of lower limb amputation [Z89.619] 04/18/2023 Not Applicable    COPD exacerbation [J44.1] 04/05/2023 Yes    Rheumatoid arthritis [M06.9] 04/04/2023 Yes      Problems Resolved During this Admission:     VTE Risk Mitigation (From admission, onward)           Ordered     enoxaparin injection 30 mg   Daily         04/13/25 1707     IP VTE HIGH RISK PATIENT  Once         04/13/25 1707     Place sequential compression device  Until discontinued         04/13/25 1707                       Connor Castillo DO  Department of Hospital Medicine   [facility]

## 2025-04-15 NOTE — PLAN OF CARE
Problem: Adult Inpatient Plan of Care  Goal: Absence of Hospital-Acquired Illness or Injury  Outcome: Progressing     Problem: Adult Inpatient Plan of Care  Goal: Optimal Comfort and Wellbeing  Outcome: Progressing     Problem: Infection  Goal: Absence of Infection Signs and Symptoms  Outcome: Progressing     Problem: Sepsis/Septic Shock  Goal: Optimal Coping  Outcome: Progressing     Problem: Sepsis/Septic Shock  Goal: Optimal Nutrition Intake  Outcome: Progressing     Problem: Acute Kidney Injury/Impairment  Goal: Fluid and Electrolyte Balance  Outcome: Progressing

## 2025-04-15 NOTE — PLAN OF CARE
Patient was provided with Advanced Directives.Patient did not wIsh for HH OR snf when discussed by TN.

## 2025-04-15 NOTE — PLAN OF CARE
Chart checked upon taking over patient care.     2L NC. 75ml/hr LR. Purewick in place. Replacing electrolytes

## 2025-04-16 VITALS
OXYGEN SATURATION: 99 % | TEMPERATURE: 98 F | WEIGHT: 91.5 LBS | BODY MASS INDEX: 20.58 KG/M2 | HEART RATE: 105 BPM | DIASTOLIC BLOOD PRESSURE: 65 MMHG | RESPIRATION RATE: 28 BRPM | HEIGHT: 56 IN | SYSTOLIC BLOOD PRESSURE: 140 MMHG

## 2025-04-16 LAB
ABSOLUTE EOSINOPHIL (OHS): 0.06 K/UL
ABSOLUTE MONOCYTE (OHS): 0.45 K/UL (ref 0.3–1)
ABSOLUTE NEUTROPHIL COUNT (OHS): 8.27 K/UL (ref 1.8–7.7)
ALBUMIN SERPL BCP-MCNC: 2 G/DL (ref 3.5–5.2)
ALP SERPL-CCNC: 141 UNIT/L (ref 40–150)
ALT SERPL W/O P-5'-P-CCNC: 45 UNIT/L (ref 10–44)
ANION GAP (OHS): 9 MMOL/L (ref 8–16)
AST SERPL-CCNC: 38 UNIT/L (ref 11–45)
BASOPHILS # BLD AUTO: 0.02 K/UL
BASOPHILS NFR BLD AUTO: 0.2 %
BILIRUB SERPL-MCNC: 0.5 MG/DL (ref 0.1–1)
BUN SERPL-MCNC: 14 MG/DL (ref 8–23)
CALCIUM SERPL-MCNC: 8.3 MG/DL (ref 8.7–10.5)
CHLORIDE SERPL-SCNC: 107 MMOL/L (ref 95–110)
CO2 SERPL-SCNC: 21 MMOL/L (ref 23–29)
CREAT SERPL-MCNC: 0.5 MG/DL (ref 0.5–1.4)
ERYTHROCYTE [DISTWIDTH] IN BLOOD BY AUTOMATED COUNT: 18.2 % (ref 11.5–14.5)
GFR SERPLBLD CREATININE-BSD FMLA CKD-EPI: >60 ML/MIN/1.73/M2
GLUCOSE SERPL-MCNC: 82 MG/DL (ref 70–110)
HCT VFR BLD AUTO: 30.3 % (ref 37–48.5)
HGB BLD-MCNC: 10.1 GM/DL (ref 12–16)
IMM GRANULOCYTES # BLD AUTO: 0.07 K/UL (ref 0–0.04)
IMM GRANULOCYTES NFR BLD AUTO: 0.7 % (ref 0–0.5)
LYMPHOCYTES # BLD AUTO: 1.13 K/UL (ref 1–4.8)
MAGNESIUM SERPL-MCNC: 1.7 MG/DL (ref 1.6–2.6)
MCH RBC QN AUTO: 33 PG (ref 27–31)
MCHC RBC AUTO-ENTMCNC: 33.3 G/DL (ref 32–36)
MCV RBC AUTO: 99 FL (ref 82–98)
NUCLEATED RBC (/100WBC) (OHS): 0 /100 WBC
PHOSPHATE SERPL-MCNC: 1.4 MG/DL (ref 2.7–4.5)
PLATELET # BLD AUTO: 331 K/UL (ref 150–450)
PMV BLD AUTO: 9 FL (ref 9.2–12.9)
POTASSIUM SERPL-SCNC: 3.1 MMOL/L (ref 3.5–5.1)
PROT SERPL-MCNC: 5.7 GM/DL (ref 6–8.4)
RBC # BLD AUTO: 3.06 M/UL (ref 4–5.4)
RELATIVE EOSINOPHIL (OHS): 0.6 %
RELATIVE LYMPHOCYTE (OHS): 11.3 % (ref 18–48)
RELATIVE MONOCYTE (OHS): 4.5 % (ref 4–15)
RELATIVE NEUTROPHIL (OHS): 82.7 % (ref 38–73)
SODIUM SERPL-SCNC: 137 MMOL/L (ref 136–145)
WBC # BLD AUTO: 10 K/UL (ref 3.9–12.7)

## 2025-04-16 PROCEDURE — 1111F DSCHRG MED/CURRENT MED MERGE: CPT | Mod: CPTII,,, | Performed by: INTERNAL MEDICINE

## 2025-04-16 PROCEDURE — 83735 ASSAY OF MAGNESIUM: CPT | Performed by: INTERNAL MEDICINE

## 2025-04-16 PROCEDURE — 36415 COLL VENOUS BLD VENIPUNCTURE: CPT | Performed by: INTERNAL MEDICINE

## 2025-04-16 PROCEDURE — 94760 N-INVAS EAR/PLS OXIMETRY 1: CPT

## 2025-04-16 PROCEDURE — 99900035 HC TECH TIME PER 15 MIN (STAT)

## 2025-04-16 PROCEDURE — 85025 COMPLETE CBC W/AUTO DIFF WBC: CPT | Performed by: INTERNAL MEDICINE

## 2025-04-16 PROCEDURE — 25000003 PHARM REV CODE 250: Performed by: STUDENT IN AN ORGANIZED HEALTH CARE EDUCATION/TRAINING PROGRAM

## 2025-04-16 PROCEDURE — 80053 COMPREHEN METABOLIC PANEL: CPT | Performed by: INTERNAL MEDICINE

## 2025-04-16 PROCEDURE — 94618 PULMONARY STRESS TESTING: CPT

## 2025-04-16 PROCEDURE — 25000242 PHARM REV CODE 250 ALT 637 W/ HCPCS: Performed by: STUDENT IN AN ORGANIZED HEALTH CARE EDUCATION/TRAINING PROGRAM

## 2025-04-16 PROCEDURE — 63600175 PHARM REV CODE 636 W HCPCS: Performed by: INTERNAL MEDICINE

## 2025-04-16 PROCEDURE — 94640 AIRWAY INHALATION TREATMENT: CPT

## 2025-04-16 PROCEDURE — 84100 ASSAY OF PHOSPHORUS: CPT | Performed by: INTERNAL MEDICINE

## 2025-04-16 PROCEDURE — 99239 HOSP IP/OBS DSCHRG MGMT >30: CPT | Mod: ,,, | Performed by: INTERNAL MEDICINE

## 2025-04-16 PROCEDURE — 27000221 HC OXYGEN, UP TO 24 HOURS

## 2025-04-16 PROCEDURE — 25000003 PHARM REV CODE 250: Performed by: INTERNAL MEDICINE

## 2025-04-16 PROCEDURE — 94761 N-INVAS EAR/PLS OXIMETRY MLT: CPT

## 2025-04-16 RX ORDER — LEVOFLOXACIN 500 MG/1
500 TABLET, FILM COATED ORAL DAILY
Status: DISCONTINUED | OUTPATIENT
Start: 2025-04-16 | End: 2025-04-16 | Stop reason: HOSPADM

## 2025-04-16 RX ORDER — LEVOFLOXACIN 500 MG/1
500 TABLET, FILM COATED ORAL DAILY
Qty: 7 TABLET | Refills: 0 | Status: SHIPPED | OUTPATIENT
Start: 2025-04-16

## 2025-04-16 RX ADMIN — Medication 200 ML: at 09:04

## 2025-04-16 RX ADMIN — Medication 200 ML: at 10:04

## 2025-04-16 RX ADMIN — ATORVASTATIN CALCIUM 40 MG: 40 TABLET, FILM COATED ORAL at 09:04

## 2025-04-16 RX ADMIN — LEVOFLOXACIN 500 MG: 500 TABLET, FILM COATED ORAL at 01:04

## 2025-04-16 RX ADMIN — IPRATROPIUM BROMIDE AND ALBUTEROL SULFATE 3 ML: .5; 3 SOLUTION RESPIRATORY (INHALATION) at 01:04

## 2025-04-16 RX ADMIN — PANTOPRAZOLE SODIUM 40 MG: 40 TABLET, DELAYED RELEASE ORAL at 09:04

## 2025-04-16 RX ADMIN — IPRATROPIUM BROMIDE AND ALBUTEROL SULFATE 3 ML: .5; 3 SOLUTION RESPIRATORY (INHALATION) at 08:04

## 2025-04-16 RX ADMIN — SUCRALFATE 1 G: 1 TABLET ORAL at 10:04

## 2025-04-16 RX ADMIN — PREDNISONE 20 MG: 20 TABLET ORAL at 09:04

## 2025-04-16 NOTE — NURSING
Patient is being discharged home via  with family member. Patient assisted into vehicle via RN. IV d/cd, CDI with no redness, swelling, or drainage. Tele removed prior. AVS reviewed. Patients prescriptions escribed to pharmacy of choice. Medication education given. No questions or concerns at this time. All patient needs met and anticipated. Home Oxygen use education given.

## 2025-04-16 NOTE — RESPIRATORY THERAPY
04/15/25 1943   Patient Assessment/Suction   Level of Consciousness (AVPU) alert   Respiratory Effort Normal;Unlabored   Expansion/Accessory Muscles/Retractions no retractions;no use of accessory muscles   All Lung Fields Breath Sounds Anterior:;Posterior:;Lateral:;diminished   Rhythm/Pattern, Respiratory unlabored;no shortness of breath reported   Cough Frequency infrequent   Cough Type congested;nonproductive   Skin Integrity   Area Observed Left;Right;Behind ear;Cheek;Upper lip;Nares   Skin Appearance without discoloration   PRE-TX-O2   Device (Oxygen Therapy) nasal cannula with humidification   $ Is the patient on Low Flow Oxygen? Yes   Flow (L/min) (Oxygen Therapy) 2   Oxygen Concentration (%) 28   SpO2 99 %   Pulse Oximetry Type Continuous   $ Pulse Oximetry - Multiple Charge Pulse Oximetry - Multiple   Pulse 86   Resp 20   Aerosol Therapy   $ Aerosol Therapy Charges Aerosol Treatment   Daily Review of Necessity (SVN) completed   Respiratory Treatment Status (SVN) given   Treatment Route (SVN) mask;oxygen   Patient Position HOB elevated   Post Treatment Assessment (SVN) increased aeration   Signs of Intolerance (SVN) none   Ready to Wean/Extubation Screen   FIO2<=50 (chart decimal) 0.28

## 2025-04-16 NOTE — DISCHARGE SUMMARY
[facility]      HOME HEALTH ORDERS  FACE TO FACE ENCOUNTER    Patient Name: Janey Null  YOB: 1955    PCP: Josefina Durand MD   PCP Address: [address] [place] [address]  PCP Phone Number: [phone]  PCP Fax: [phone]    Encounter Date: 4/13/25    Admit to Home Health    Diagnoses:  Active Hospital Problems    Diagnosis  POA    *Severe sepsis [A41.9, R65.20]  Yes    DINA (acute kidney injury) [N17.9]  Yes    Acute cystitis [N30.00]  Yes    Advanced care planning/counseling discussion [Z71.89]  Not Applicable    Wheelchair dependence [Z99.3]  Not Applicable    History of lower limb amputation [Z89.619]  Not Applicable    COPD exacerbation [J44.1]  Yes    Rheumatoid arthritis [M06.9]  Yes      Resolved Hospital Problems   No resolved problems to display.       Follow Up Appointments:  Future Appointments   Date Time Provider Department Center   4/21/2025  9:00 AM Josefina Durand MD [facility] James   4/29/2025  1:00 PM NURSE SCHEDULE, [facility] Clifford   6/12/2025  9:20 AM Josefina Durand MD [facility]       Allergies:  Review of patient's allergies indicates:   Allergen Reactions    Azithromycin Anaphylaxis    Penicillins        Medications: Review discharge medications with patient and family and provide education.    Current Medications[1]     Medication List        START taking these medications      levoFLOXacin 500 MG tablet  Commonly known as: LEVAQUIN  Take 1 tablet (500 mg total) by mouth once daily.            CONTINUE taking these medications      albuterol 90 mcg/actuation inhaler  Commonly known as: VENTOLIN HFA  Inhale 2 puffs into the lungs every 6 (six) hours as needed for Wheezing. Rescue     albuterol-ipratropium 2.5 mg-0.5 mg/3 mL nebulizer solution  Commonly known as: DUO-NEB  Take 3 mLs by nebulization every 6 (six) hours as needed for Wheezing.     alendronate 70 MG  tablet  Commonly known as: FOSAMAX  Take 1 tablet (70 mg total) by mouth every 7 days.     atorvastatin 40 MG tablet  Commonly known as: LIPITOR  Take 1 tablet (40 mg total) by mouth once daily.     azelastine 137 mcg (0.1 %) nasal spray  Commonly known as: ASTELIN  1 spray (137 mcg total) by Nasal route 2 (two) times daily.     benzonatate 200 MG capsule  Commonly known as: TESSALON  Take 1 capsule (200 mg total) by mouth 3 (three) times daily as needed for Cough.     ergocalciferol 50,000 unit Cap  Commonly known as: ERGOCALCIFEROL  Take 1 capsule (50,000 Units total) by mouth every 7 days.     ferrous sulfate 325 mg (65 mg iron) Tab tablet  Commonly known as: FEOSOL  Take 1 tablet (325 mg total) by mouth daily with breakfast.     fluticasone propionate 50 mcg/actuation nasal spray  Commonly known as: FLONASE  1 spray (50 mcg total) by Each Nostril route once daily.     fluticasone-umeclidin-vilanter 200-62.5-25 mcg inhaler  Commonly known as: TRELEGY ELLIPTA  Inhale 1 puff into the lungs once daily.     folic acid 1 MG tablet  Commonly known as: FOLVITE  TAKE 1 TABLET(1 MG) BY MOUTH EVERY DAY     guaiFENesin 600 mg 12 hr tablet  Commonly known as: MUCINEX  Take 2 tablets (1,200 mg total) by mouth 2 (two) times daily.     HYDROcodone-acetaminophen  mg per tablet  Commonly known as: NORCO  Take 1 tablet by mouth every 6 (six) hours as needed.     levocetirizine 5 MG tablet  Commonly known as: XYZAL  Take 1 tablet (5 mg total) by mouth every evening.     losartan 100 MG tablet  Commonly known as: COZAAR  Take 1 tablet (100 mg total) by mouth once daily.     methotrexate 2.5 MG Tab  Take by mouth.     pantoprazole 40 MG tablet  Commonly known as: PROTONIX  Take 1 tablet (40 mg total) by mouth once daily.     sucralfate 1 gram tablet  Commonly known as: CARAFATE  Take 1 tablet (1 g total) by mouth 4 (four) times daily before meals and nightly.     tiZANidine 4 MG tablet  Commonly known as: ZANAFLEX  Take 4 mg by  mouth every 8 (eight) hours as needed.            STOP taking these medications      furosemide 40 MG tablet  Commonly known as: LASIX                I have seen and examined this patient within the last 30 days. My clinical findings that support the need for the home health skilled services and home bound status are the following:no   Weakness/numbness causing balance and gait disturbance due to COPD Exacerbation, Weakness/Debility, and Dehydration making it taxing to leave home.  Requiring assistive device to leave home due to unsteady gait caused by  COPD Exacerbation.     Diet:   cardiac diet    Labs:  SN to perform labs:  CBC: Weekly; 2 week(s) and BMP: Biweekly; 3 week(s)    Referrals/ Consults  Physical Therapy to evaluate and treat. Evaluate for home safety and equipment needs; Establish/upgrade home exercise program. Perform / instruct on therapeutic exercises, gait training, transfer training, and Range of Motion.    Activities:   activity as tolerated and ambulate in house     Nursing:   Agency to admit patient within 24 hours of hospital discharge unless specified on physician order or at patient request    SN to complete comprehensive assessment including routine vital signs. Instruct on disease process and s/s of complications to report to MD. Review/verify medication list sent home with the patient at time of discharge  and instruct patient/caregiver as needed. Frequency may be adjusted depending on start of care date.     Skilled nurse to perform up to 3 visits PRN for symptoms related to diagnosis    Notify MD if SBP > 160 or < 90; DBP > 90 or < 50; HR > 120 or < 50; Temp > 101; O2 < 88%; Other:       Ok to schedule additional visits based on staff availability and patient request on consecutive days within the home health episode.    When multiple disciplines ordered:    Start of Care occurs on Sunday - Wednesday schedule remaining discipline evaluations as ordered on separate consecutive days  following the start of care.    Thursday SOC -schedule subsequent evaluations Friday and Monday the following week.     Friday - Saturday SOC - schedule subsequent discipline evaluations on consecutive days starting Monday of the following week.    For all post-discharge communication and subsequent orders please contact patient's primary care physician. If unable to reach primary care physician or do not receive response within 30 minutes, please contact  for clinical staff order clarification    Miscellaneous       Home Health Aide:  Nursing Twice weekly and Physical Therapy Twice weekly    Wound Care Orders  no    I certify that this patient is confined to her home and needs intermittent skilled nursing care and physical therapy.  Home oxygen 3 L            [1]   Current Facility-Administered Medications   Medication Dose Route Frequency Provider Last Rate Last Admin    albuterol-ipratropium 2.5 mg-0.5 mg/3 mL nebulizer solution 3 mL  3 mL Nebulization Q6H Walter Roth MD   3 mL at 04/16/25 0825    atorvastatin tablet 40 mg  40 mg Oral Daily Walter Roth MD   40 mg at 04/16/25 0935    electrolytes-dextrose (Pedialyte) oral solution 200 mL  200 mL Oral Q4H Connor Castillo DO   200 mL at 04/16/25 1053    enoxaparin injection 30 mg  30 mg Subcutaneous Daily Walter Roth MD   30 mg at 04/15/25 1530    levoFLOXacin 750 mg/150 mL IVPB 750 mg  750 mg Intravenous Q48H Walter Roth MD   Stopped at 04/15/25 1145    levoFLOXacin tablet 500 mg  500 mg Oral Daily Connor Castillo DO        magnesium oxide tablet 800 mg  800 mg Oral PRN Walter Roth MD        magnesium oxide tablet 800 mg  800 mg Oral PRN Walter Roth MD        nicotine 14 mg/24 hr 1 patch  1 patch Transdermal Daily PRN Walter Roth MD        oxyCODONE immediate release tablet 5 mg  5 mg Oral Q6H PRN Walter Roth MD        pantoprazole EC tablet 40 mg  40 mg Oral Daily  Walter Roht MD   40 mg at 04/16/25 0934    polyethylene glycol packet 17 g  17 g Oral BID PRN Walter Roth MD        potassium bicarbonate disintegrating tablet 35 mEq  35 mEq Oral PRN Walter Roth MD        potassium bicarbonate disintegrating tablet 50 mEq  50 mEq Oral PRN Walter Roth MD        potassium bicarbonate disintegrating tablet 60 mEq  60 mEq Oral PRN Walter Roth MD   60 mEq at 04/14/25 1151    potassium, sodium phosphates 280-160-250 mg packet 2 packet  2 packet Oral PRN Walter Roth MD        potassium, sodium phosphates 280-160-250 mg packet 2 packet  2 packet Oral PRN Walter Roth MD   2 packet at 04/15/25 0604    potassium, sodium phosphates 280-160-250 mg packet 2 packet  2 packet Oral PRN Walter Roth MD        predniSONE tablet 20 mg  20 mg Oral Daily Connor Castillo DO   20 mg at 04/16/25 0935    sodium chloride 0.9% flush 10 mL  10 mL Intravenous PRN Walter Roth MD        sucralfate tablet 1 g  1 g Oral QID (AC & HS) Walter Roth MD   1 g at 04/16/25 1053

## 2025-04-16 NOTE — PLAN OF CARE
04/16/25 1604   Final Note   Assessment Type Final Discharge Note   Anticipated Discharge Disposition Home-Health   What phone number can be called within the next 1-3 days to see how you are doing after discharge?   ([phone])   Hospital Resources/Appts/Education Provided Post-Acute resouces added to AVS;Appointments scheduled and added to AVS     Pt clear for DC from case management standpoint, once O2 has been delivered. Discharging to home with [facility] health. Pt will be going home on O2.  is on his way to transport her home, per pt.

## 2025-04-16 NOTE — DISCHARGE INSTRUCTIONS
Home oxygen at 3L/NC. Activity as tolerated and regular diet. See your PCP in 1 week and you will need a follow- up chest x-ray in 2-4 weeks. Return if worse

## 2025-04-16 NOTE — CARE UPDATE
04/16/25 1139   Home Oxygen Qualification   $ Home O2 Qualification Pulmonary Stress Test/6 min walk;Tech time 15 minutes   Room Air SpO2 At Rest (!) 89 %   Room Air SpO2 During Ambulation (!) 86 %   SpO2 During Ambulation on O2 94 %   Heart Rate on O2 92 bpm   Ambulation O2 LPM 3 LPM   SpO2 Post Ambulation 97 %   Post Ambulation Heart Rate 84 bpm   Post Ambulation O2 LPM 3 LPM

## 2025-04-16 NOTE — DISCHARGE SUMMARY
[facility]  Discharge Summary      Patient Name: Janey Null  MRN: [id number]  Admission Date: 4/13/2025  Hospital Length of Stay: 3 days  Discharge Date and Time: 04/16/2025 12:24 PM  Attending Physician: Connor Castillo DO   Discharging Provider: Connor Castillo DO  Discharge Provider Team: Networked reference to record PCT   Primary Care Provider: Josefina Durand MD        HPI: Severe sepsis     Chief Complaint:        Chief Complaint   Patient presents with    Shortness of Breath       SOB present progressively over the last three days.  Patient reports cough with sputum.  Chest pain reported mid sternal with deep breathing and coughing.           HPI: Patient is a 69 year old who has a history of RA, gastric cancer, and a long history of Tobacco use presenting with shortness of breath. Symptoms started Friday. No CP. Coughing up bill sputum. Subjective fever. No NV. 3 episodes of diarrhea last night but none today. Patient still smokes cigarettes. No sick contacts.        * No surgery found *      Hospital Course:  Patient is clinically doing much better.  And feels that she is ready to go home.  Her white cells are down to 10, hemoglobin of 10.1 platelets of 331 sodium of 137 potassium is down to 3.1 we will be on replacement CO2 of 21 BUN of 14 creatinine 0.5 phosphorus was low at 1.4 troponins were negative lactic acid has resolved procalcitonin was 0.18 respiratory culture shows Gram-negative rods seems to be responding well to Levaquin chest x-ray shows some pneumonitis the patient feels like she is back to her normal self and we would like to go home vitals are stable mild increase his respiratory rate.  She has 100% on 2 L.  But 89% on room air but with ambulation she sats at 97% on 3 L. patient need to be discharged home on oxygen patient overall feels much better.  We are treating a multifocal pneumonia with Levaquin and seems to be  responding well her acute kidney injury has resolved creatinine back to normal she has a history of rheumatoid arthritis which we are weaning down steroids and chronic COPD.  Patient will be discharged home on oxygen 2-3 L. patient does have a right BKA and waiting for a new prosthetic.  Patient feels that she is ready to be discharged home.  We will be discharging her with the oxygen and we will continue Levaquin for another 7 days patient will need follow up chest x-ray and follow up appointment within 1 week    Consults:   Consults (From admission, onward)          Status Ordering Provider     Inpatient consult to Social Work/Case Management  Once        Provider:  (Not yet assigned)    Completed ORDERS, INSTANT        Severe sepsis resolving felt secondary to multifocal pneumonia on Levaquin 750 mg q.day.  She has had added incentive spirometry nebulized therapy.  She is clinically showing good improvement off oxygen she is still hypoxic.  So we will be discharged home on 3 L    Acute kidney injury has currently resolved.  Creatinine is currently at 0.5    Leukocytosis resolved.    Rheumatoid arthritis.  Weaning down steroids to off    COPD.  Continue her inhalers wean off steroids.    Home oxygen 2-3 L.  Keep sats greater than 90%    Right BKA currently stable awaiting for prosthesis    Patient is currently looks much better is very anxious to be able to be discharged home today we will need oxygen recommend home health to check on her.  And we will continue antibiotic therapy    Final Active Diagnoses:    Diagnosis Date Noted POA    PRINCIPAL PROBLEM:  Severe sepsis [A41.9, R65.20] 10/13/2024 Yes    DINA (acute kidney injury) [N17.9] 04/13/2025 Yes    Acute cystitis [N30.00] 04/13/2025 Yes    Advanced care planning/counseling discussion [Z71.89] 04/13/2025 Not Applicable    Wheelchair dependence [Z99.3] 04/13/2025 Not Applicable    History of lower limb amputation [Z89.619] 04/18/2023 Not Applicable    COPD  exacerbation [J44.1] 04/05/2023 Yes    Rheumatoid arthritis [M06.9] 04/04/2023 Yes      Problems Resolved During this Admission:      Discharged Condition: fair    Disposition: Home or Self Care    Follow Up:   Follow-up Information       Josefina Durand MD Follow up on 4/21/2025.    Specialty: Family Medicine  Why: Hospital follow- up for Monday 4/21 at 9am  Contact information:  [address] [facility] [address]  [phone]                           Patient Instructions:   No discharge procedures on file.  Medications:  Reconciled Home Medications:      Medication List        START taking these medications      levoFLOXacin 500 MG tablet  Commonly known as: LEVAQUIN  Take 1 tablet (500 mg total) by mouth once daily.            CONTINUE taking these medications      albuterol 90 mcg/actuation inhaler  Commonly known as: VENTOLIN HFA  Inhale 2 puffs into the lungs every 6 (six) hours as needed for Wheezing. Rescue     albuterol-ipratropium 2.5 mg-0.5 mg/3 mL nebulizer solution  Commonly known as: DUO-NEB  Take 3 mLs by nebulization every 6 (six) hours as needed for Wheezing.     alendronate 70 MG tablet  Commonly known as: FOSAMAX  Take 1 tablet (70 mg total) by mouth every 7 days.     atorvastatin 40 MG tablet  Commonly known as: LIPITOR  Take 1 tablet (40 mg total) by mouth once daily.     azelastine 137 mcg (0.1 %) nasal spray  Commonly known as: ASTELIN  1 spray (137 mcg total) by Nasal route 2 (two) times daily.     benzonatate 200 MG capsule  Commonly known as: TESSALON  Take 1 capsule (200 mg total) by mouth 3 (three) times daily as needed for Cough.     ergocalciferol 50,000 unit Cap  Commonly known as: ERGOCALCIFEROL  Take 1 capsule (50,000 Units total) by mouth every 7 days.     ferrous sulfate 325 mg (65 mg iron) Tab tablet  Commonly known as: FEOSOL  Take 1 tablet (325 mg total) by mouth daily with breakfast.     fluticasone propionate 50 mcg/actuation nasal spray  Commonly known as:  FLONASE  1 spray (50 mcg total) by Each Nostril route once daily.     fluticasone-umeclidin-vilanter 200-62.5-25 mcg inhaler  Commonly known as: TRELEGY ELLIPTA  Inhale 1 puff into the lungs once daily.     folic acid 1 MG tablet  Commonly known as: FOLVITE  TAKE 1 TABLET(1 MG) BY MOUTH EVERY DAY     guaiFENesin 600 mg 12 hr tablet  Commonly known as: MUCINEX  Take 2 tablets (1,200 mg total) by mouth 2 (two) times daily.     HYDROcodone-acetaminophen  mg per tablet  Commonly known as: NORCO  Take 1 tablet by mouth every 6 (six) hours as needed.     levocetirizine 5 MG tablet  Commonly known as: XYZAL  Take 1 tablet (5 mg total) by mouth every evening.     losartan 100 MG tablet  Commonly known as: COZAAR  Take 1 tablet (100 mg total) by mouth once daily.     methotrexate 2.5 MG Tab  Take by mouth.     pantoprazole 40 MG tablet  Commonly known as: PROTONIX  Take 1 tablet (40 mg total) by mouth once daily.     sucralfate 1 gram tablet  Commonly known as: CARAFATE  Take 1 tablet (1 g total) by mouth 4 (four) times daily before meals and nightly.     tiZANidine 4 MG tablet  Commonly known as: ZANAFLEX  Take 4 mg by mouth every 8 (eight) hours as needed.            STOP taking these medications      furosemide 40 MG tablet  Commonly known as: LASIX              Significant Diagnostic Studies: Labs: BMP:   Recent Labs   Lab 04/15/25  0354 04/16/25  1040    137   K 2.8* 3.1*    107   CO2 22* 21*   BUN 20 14   CREATININE 0.5 0.5   CALCIUM 8.2* 8.3*   MG 1.9 1.7   , CMP   Recent Labs   Lab 04/15/25  0354 04/16/25  1040    137   K 2.8* 3.1*    107   CO2 22* 21*   BUN 20 14   CREATININE 0.5 0.5   CALCIUM 8.2* 8.3*   ALBUMIN 1.7* 2.0*   BILITOT 0.3 0.5   ALKPHOS 135 141   AST 24 38   ALT 21 45*   ANIONGAP 8 9   , and CBC   Recent Labs   Lab 04/15/25  0354 04/16/25  1040   WBC 15.07* 10.00   HGB 8.2* 10.1*   HCT 24.4* 30.3*    331     Microbiology: Blood Culture   Lab Results   Component  "Value Date    LABBLOO No growth after 5 days. 10/22/2024   , Sputum Culture No results found for: "GSRESP", "RESPIRATORYC", and Urine Culture    Lab Results   Component Value Date    LABURIN No Significant Growth 04/13/2025       Pending Diagnostic Studies:       Procedure Component Value Units Date/Time    CBC Auto Differential [[id number]]     Order Status: Sent Lab Status: No result     Specimen: Blood     Narrative:      The following orders were created for panel order CBC Auto Differential.  Procedure                               Abnormality         Status                     ---------                               -----------         ------                     CBC with Differential[[id number]]                                                        Please view results for these tests on the individual orders.    CBC with Differential [[id number]]     Order Status: Sent Lab Status: No result     Specimen: Blood     Comprehensive Metabolic Panel [[id number]]     Order Status: Sent Lab Status: No result     Specimen: Blood     GREY TOP URINE HOLD [[id number]] Collected: 04/13/25 1655    Order Status: Sent Lab Status: In process Updated: 04/13/25 1656    Specimen: Urine     Magnesium [[id number]]     Order Status: Sent Lab Status: No result     Specimen: Blood     Phosphorus [[id number]]     Order Status: Sent Lab Status: No result     Specimen: Blood           Indwelling Lines/Drains at time of discharge:   Lines/Drains/Airways       Drain  Duration             Female External Urinary Catheter w/ Suction 04/13/25 1622 2 days                    Time spent on the discharge of patient:  36 minutes     Set up home health home oxygen.    Connor Castillo DO  Department of [facility]  [facility]      "

## 2025-04-16 NOTE — RESPIRATORY THERAPY
04/16/25 0119   Patient Assessment/Suction   Level of Consciousness (AVPU) alert   Respiratory Effort Unlabored   Expansion/Accessory Muscles/Retractions no use of accessory muscles;no retractions   All Lung Fields Breath Sounds Anterior:;coarse;diminished   Rhythm/Pattern, Respiratory no shortness of breath reported;unlabored   Cough Frequency no cough   Skin Integrity   Area Observed Left;Right;Behind ear;Cheek;Upper lip;Nares   Skin Appearance without discoloration   PRE-TX-O2   Device (Oxygen Therapy) nasal cannula with humidification   $ Is the patient on Low Flow Oxygen? Yes   Flow (L/min) (Oxygen Therapy) 2   Oxygen Concentration (%) 28   SpO2 99 %   Pulse Oximetry Type Continuous   $ Pulse Oximetry - Single Charge Pulse Oximetry - Single   Pulse 77   Resp 20   Aerosol Therapy   $ Aerosol Therapy Charges Aerosol Treatment   Daily Review of Necessity (SVN) completed   Respiratory Treatment Status (SVN) given   Treatment Route (SVN) mask;oxygen   Patient Position HOB elevated   Post Treatment Assessment (SVN) breath sounds unchanged   Signs of Intolerance (SVN) none   Ready to Wean/Extubation Screen   FIO2<=50 (chart decimal) 0.28

## 2025-04-16 NOTE — PLAN OF CARE
04/16/25 1704   Medicare Message   Important Message from Medicare regarding Discharge Appeal Rights Given to patient/caregiver;Explained to patient/caregiver;Signed/date by patient/caregiver   Date IMM was signed 04/16/25   Time IMM was signed [id number]     Placed in folder

## 2025-04-16 NOTE — PLAN OF CARE
Problem: Adult Inpatient Plan of Care  Goal: Plan of Care Review  Outcome: Met  Goal: Patient-Specific Goal (Individualized)  Outcome: Met  Goal: Absence of Hospital-Acquired Illness or Injury  Outcome: Met  Goal: Optimal Comfort and Wellbeing  Outcome: Met  Goal: Readiness for Transition of Care  Outcome: Met     Problem: Infection  Goal: Absence of Infection Signs and Symptoms  Outcome: Met     Problem: Sepsis/Septic Shock  Goal: Optimal Coping  Outcome: Met  Goal: Absence of Bleeding  Outcome: Met  Goal: Blood Glucose Level Within Targeted Range  Outcome: Met  Goal: Absence of Infection Signs and Symptoms  Outcome: Met  Goal: Optimal Nutrition Intake  Outcome: Met     Problem: Acute Kidney Injury/Impairment  Goal: Fluid and Electrolyte Balance  Outcome: Met  Goal: Improved Oral Intake  Outcome: Met  Goal: Effective Renal Function  Outcome: Met     Problem: Pneumonia  Goal: Fluid Balance  Outcome: Met  Goal: Resolution of Infection Signs and Symptoms  Outcome: Met  Goal: Effective Oxygenation and Ventilation  Outcome: Met     Problem: Skin Injury Risk Increased  Goal: Skin Health and Integrity  Outcome: Met     Problem: Fall Injury Risk  Goal: Absence of Fall and Fall-Related Injury  Outcome: Met

## 2025-04-16 NOTE — PLAN OF CARE
04/16/25 1303   Post-Acute Status   Post-Acute Authorization HME   HME Status Referrals Sent     Home O2 order sent to [facility] and I called them to let them know the order has been emailed. They are to call me when they receive the order. I informed them pt will be DC today and needs it to go home.

## 2025-04-17 ENCOUNTER — PATIENT OUTREACH (OUTPATIENT)
Dept: ADMINISTRATIVE | Facility: CLINIC | Age: 70
End: 2025-04-17
Payer: MEDICARE

## 2025-04-17 LAB
BACTERIA SPT CULT: ABNORMAL
GRAM STN SPEC: ABNORMAL

## 2025-04-17 NOTE — PROGRESS NOTES
C3 nurse attempted to contact Janey Null  for a TCC post hospital discharge follow up call.  No answer. The patient has a scheduled HOSFU appointment with Josefina Durand MD  on 4/21/25 @ 0900.

## 2025-04-18 PROCEDURE — G0180 MD CERTIFICATION HHA PATIENT: HCPCS | Mod: ,,, | Performed by: STUDENT IN AN ORGANIZED HEALTH CARE EDUCATION/TRAINING PROGRAM

## 2025-04-19 LAB
BACTERIA BLD CULT: NORMAL
BACTERIA BLD CULT: NORMAL

## 2025-04-25 ENCOUNTER — PATIENT MESSAGE (OUTPATIENT)
Dept: ADMINISTRATIVE | Facility: HOSPITAL | Age: 70
End: 2025-04-25
Payer: MEDICARE

## 2025-04-28 ENCOUNTER — PATIENT MESSAGE (OUTPATIENT)
Dept: FAMILY MEDICINE | Facility: CLINIC | Age: 70
End: 2025-04-28
Payer: MEDICARE

## 2025-05-19 DIAGNOSIS — J44.9 CHRONIC OBSTRUCTIVE PULMONARY DISEASE, UNSPECIFIED COPD TYPE: ICD-10-CM

## 2025-05-19 RX ORDER — ALBUTEROL SULFATE 90 UG/1
2 INHALANT RESPIRATORY (INHALATION) EVERY 6 HOURS PRN
Qty: 25.5 G | Refills: 3 | Status: SHIPPED | OUTPATIENT
Start: 2025-05-19

## 2025-05-19 NOTE — TELEPHONE ENCOUNTER
No care due was identified.  [facility] Embedded Care Due Messages. Reference number: [id number].   5/19/2025 11:01:51 AM CDT

## 2025-05-19 NOTE — TELEPHONE ENCOUNTER
----- Message from Lauren sent at 5/19/2025 10:57 AM CDT -----  Regarding: Refill  Type:  RX Refill RequestWho Called: pt Refill or New Rx: refillRX Name and Strength: albuterol (VENTOLIN HFA) 90 mcg/actuation inhalerHow is the patient currently taking it? (ex. 1XDay): as directed Is this a 30 day or 90 day RX: 90Preferred Pharmacy with phone number: [id number] [place], [place] - [address] [address] AT [facility] [address] [address] [address] [address]Kbsvf: [phone] Fax: [phone] or Mail Order: local Ordering Provider: Kizzy Would the patient rather a call back or a response via [facility]? Call Best Call Back Number: [phone] Information:  pt called to get a refill on her inhaler. Please let her know whe it has been sent.    Please call back to advise. Thanks!

## 2025-05-20 NOTE — TELEPHONE ENCOUNTER
Refill Decision Note   Janey Null  is requesting a refill authorization.  Brief Assessment and Rationale for Refill:  Approve     Medication Therapy Plan:  ED visit for septic shock. FOVS. Approve 90 with 3      Alert overridden per protocol: Yes   Comments:     Note composed:7:50 PM 05/19/2025             Appointments     Last Visit   4/1/2025 Josefina Durand MD   Next Visit   6/12/2025 Josefina Durand MD

## 2025-05-30 ENCOUNTER — TELEPHONE (OUTPATIENT)
Dept: FAMILY MEDICINE | Facility: CLINIC | Age: 70
End: 2025-05-30
Payer: MEDICARE

## 2025-05-30 NOTE — TELEPHONE ENCOUNTER
Returned call to pt.   Requesting refill on Trelegy   Refills at pharmacy  Pt voiced understanding.

## 2025-05-30 NOTE — TELEPHONE ENCOUNTER
Copied from CRM #[id number]. Topic: Medications - Medication Refill  >> May 30, 2025  9:38 AM So wrote:  Type:  RX Refill Request    Who Called:  pt  Refill or New Rx:   refill  RX Name and Strength:  fluticasone-umeclidin-vilanter (TRELEGY ELLIPTA) 200-62.5-25 mcg inhaler  How is the patient currently taking it? (ex. 1XDay):   as ordered  Is this a 30 day or 90 day RX:  90  Preferred Pharmacy with phone number:   [id number] - [place], [place] - [address] [place] [address] AT [facility] [address]  [address] [facility] [address]  [place] [address]  Phone: [phone] Fax: [phone]      Local or Mail Order:  local  Ordering Provider:  nanda Beckford Call Back Number:  [phone]  Additional Information:

## 2025-06-02 ENCOUNTER — PATIENT MESSAGE (OUTPATIENT)
Dept: ADMINISTRATIVE | Facility: HOSPITAL | Age: 70
End: 2025-06-02
Payer: MEDICARE

## 2025-06-02 ENCOUNTER — TELEPHONE (OUTPATIENT)
Dept: FAMILY MEDICINE | Facility: CLINIC | Age: 70
End: 2025-06-02
Payer: MEDICARE

## 2025-06-03 ENCOUNTER — PATIENT MESSAGE (OUTPATIENT)
Dept: FAMILY MEDICINE | Facility: CLINIC | Age: 70
End: 2025-06-03
Payer: MEDICARE

## 2025-06-19 ENCOUNTER — PATIENT MESSAGE (OUTPATIENT)
Dept: FAMILY MEDICINE | Facility: CLINIC | Age: 70
End: 2025-06-19
Payer: MEDICARE

## 2025-06-19 DIAGNOSIS — Z12.11 ENCOUNTER FOR SCREENING FOR MALIGNANT NEOPLASM OF COLON: ICD-10-CM

## 2025-06-19 DIAGNOSIS — Z12.31 ENCOUNTER FOR SCREENING MAMMOGRAM FOR MALIGNANT NEOPLASM OF BREAST: Primary | ICD-10-CM

## 2025-06-30 ENCOUNTER — EXTERNAL HOME HEALTH (OUTPATIENT)
Dept: HOME HEALTH SERVICES | Facility: HOSPITAL | Age: 70
End: 2025-06-30
Payer: MEDICARE

## 2025-07-03 ENCOUNTER — DOCUMENT SCAN (OUTPATIENT)
Dept: HOME HEALTH SERVICES | Facility: HOSPITAL | Age: 70
End: 2025-07-03
Payer: MEDICARE

## 2025-07-07 ENCOUNTER — PATIENT MESSAGE (OUTPATIENT)
Dept: ADMINISTRATIVE | Facility: HOSPITAL | Age: 70
End: 2025-07-07
Payer: MEDICARE

## 2025-07-09 ENCOUNTER — PATIENT MESSAGE (OUTPATIENT)
Dept: ADMINISTRATIVE | Facility: HOSPITAL | Age: 70
End: 2025-07-09
Payer: MEDICARE

## 2025-07-10 ENCOUNTER — DOCUMENT SCAN (OUTPATIENT)
Dept: HOME HEALTH SERVICES | Facility: HOSPITAL | Age: 70
End: 2025-07-10
Payer: MEDICARE

## 2025-07-11 ENCOUNTER — DOCUMENT SCAN (OUTPATIENT)
Dept: HOME HEALTH SERVICES | Facility: HOSPITAL | Age: 70
End: 2025-07-11
Payer: MEDICARE

## 2025-07-14 ENCOUNTER — DOCUMENT SCAN (OUTPATIENT)
Dept: HOME HEALTH SERVICES | Facility: HOSPITAL | Age: 70
End: 2025-07-14
Payer: MEDICARE

## 2025-07-21 ENCOUNTER — TELEPHONE (OUTPATIENT)
Dept: FAMILY MEDICINE | Facility: CLINIC | Age: 70
End: 2025-07-21
Payer: MEDICARE

## 2025-07-22 ENCOUNTER — OFFICE VISIT (OUTPATIENT)
Dept: FAMILY MEDICINE | Facility: CLINIC | Age: 70
End: 2025-07-22
Payer: MEDICARE

## 2025-07-22 VITALS
HEART RATE: 66 BPM | WEIGHT: 100 LBS | SYSTOLIC BLOOD PRESSURE: 140 MMHG | BODY MASS INDEX: 22.5 KG/M2 | HEIGHT: 56 IN | DIASTOLIC BLOOD PRESSURE: 68 MMHG | OXYGEN SATURATION: 96 %

## 2025-07-22 DIAGNOSIS — E55.9 VITAMIN D DEFICIENCY DISEASE: ICD-10-CM

## 2025-07-22 DIAGNOSIS — I10 ESSENTIAL HYPERTENSION: ICD-10-CM

## 2025-07-22 DIAGNOSIS — E87.6 HYPOKALEMIA: ICD-10-CM

## 2025-07-22 DIAGNOSIS — I10 BENIGN ESSENTIAL HTN: ICD-10-CM

## 2025-07-22 DIAGNOSIS — E78.49 HYPERLIPIDEMIA DUE TO DIETARY FAT INTAKE: ICD-10-CM

## 2025-07-22 DIAGNOSIS — R79.9 ABNORMAL BLOOD CHEMISTRY: ICD-10-CM

## 2025-07-22 DIAGNOSIS — T78.40XA ALLERGY, INITIAL ENCOUNTER: ICD-10-CM

## 2025-07-22 DIAGNOSIS — A49.9 BACTERIAL INFECTION: ICD-10-CM

## 2025-07-22 DIAGNOSIS — Z12.11 ENCOUNTER FOR SCREENING FOR MALIGNANT NEOPLASM OF COLON: ICD-10-CM

## 2025-07-22 DIAGNOSIS — J44.9 CHRONIC OBSTRUCTIVE PULMONARY DISEASE, UNSPECIFIED COPD TYPE: ICD-10-CM

## 2025-07-22 DIAGNOSIS — Z12.31 ENCOUNTER FOR SCREENING MAMMOGRAM FOR MALIGNANT NEOPLASM OF BREAST: Primary | ICD-10-CM

## 2025-07-22 PROCEDURE — 3288F FALL RISK ASSESSMENT DOCD: CPT | Mod: CPTII,S$GLB,, | Performed by: STUDENT IN AN ORGANIZED HEALTH CARE EDUCATION/TRAINING PROGRAM

## 2025-07-22 PROCEDURE — 3077F SYST BP >= 140 MM HG: CPT | Mod: CPTII,S$GLB,, | Performed by: STUDENT IN AN ORGANIZED HEALTH CARE EDUCATION/TRAINING PROGRAM

## 2025-07-22 PROCEDURE — 1159F MED LIST DOCD IN RCRD: CPT | Mod: CPTII,S$GLB,, | Performed by: STUDENT IN AN ORGANIZED HEALTH CARE EDUCATION/TRAINING PROGRAM

## 2025-07-22 PROCEDURE — 99999 PR PBB SHADOW E&M-EST. PATIENT-LVL V: CPT | Mod: PBBFAC,,, | Performed by: STUDENT IN AN ORGANIZED HEALTH CARE EDUCATION/TRAINING PROGRAM

## 2025-07-22 PROCEDURE — 1101F PT FALLS ASSESS-DOCD LE1/YR: CPT | Mod: CPTII,S$GLB,, | Performed by: STUDENT IN AN ORGANIZED HEALTH CARE EDUCATION/TRAINING PROGRAM

## 2025-07-22 PROCEDURE — 99214 OFFICE O/P EST MOD 30 MIN: CPT | Mod: S$GLB,,, | Performed by: STUDENT IN AN ORGANIZED HEALTH CARE EDUCATION/TRAINING PROGRAM

## 2025-07-22 PROCEDURE — 4010F ACE/ARB THERAPY RXD/TAKEN: CPT | Mod: CPTII,S$GLB,, | Performed by: STUDENT IN AN ORGANIZED HEALTH CARE EDUCATION/TRAINING PROGRAM

## 2025-07-22 PROCEDURE — 1125F AMNT PAIN NOTED PAIN PRSNT: CPT | Mod: CPTII,S$GLB,, | Performed by: STUDENT IN AN ORGANIZED HEALTH CARE EDUCATION/TRAINING PROGRAM

## 2025-07-22 PROCEDURE — 3008F BODY MASS INDEX DOCD: CPT | Mod: CPTII,S$GLB,, | Performed by: STUDENT IN AN ORGANIZED HEALTH CARE EDUCATION/TRAINING PROGRAM

## 2025-07-22 PROCEDURE — 3078F DIAST BP <80 MM HG: CPT | Mod: CPTII,S$GLB,, | Performed by: STUDENT IN AN ORGANIZED HEALTH CARE EDUCATION/TRAINING PROGRAM

## 2025-07-22 RX ORDER — HYDROCHLOROTHIAZIDE 25 MG/1
25 TABLET ORAL DAILY
Qty: 30 TABLET | Refills: 11 | Status: SHIPPED | OUTPATIENT
Start: 2025-07-22 | End: 2026-07-22

## 2025-07-22 RX ORDER — FLUTICASONE PROPIONATE 50 MCG
1 SPRAY, SUSPENSION (ML) NASAL DAILY
Qty: 18.2 ML | Refills: 0 | Status: SHIPPED | OUTPATIENT
Start: 2025-07-22

## 2025-07-22 RX ORDER — AZELASTINE 1 MG/ML
1 SPRAY, METERED NASAL 2 TIMES DAILY
Qty: 18.2 ML | Refills: 3 | Status: SHIPPED | OUTPATIENT
Start: 2025-07-22 | End: 2026-07-22

## 2025-07-22 RX ORDER — IPRATROPIUM BROMIDE AND ALBUTEROL SULFATE 2.5; .5 MG/3ML; MG/3ML
3 SOLUTION RESPIRATORY (INHALATION) EVERY 6 HOURS PRN
Qty: 1080 ML | Refills: 3 | Status: SHIPPED | OUTPATIENT
Start: 2025-07-22

## 2025-07-22 RX ORDER — CETIRIZINE HYDROCHLORIDE 10 MG/1
10 TABLET ORAL DAILY
Qty: 30 TABLET | Refills: 11 | Status: SHIPPED | OUTPATIENT
Start: 2025-07-22 | End: 2026-07-22

## 2025-07-22 NOTE — PROGRESS NOTES
SUBJECTIVE:    CHIEF COMPLAINT:   Chief Complaint   Patient presents with    Follow-up           274}    HISTORY OF PRESENT ILLNESS:  Janey Null is a 69 y.o. female who presents to the clinic today   History of Present Illness    CHIEF COMPLAINT:  Ms. Null presents today for follow up of COPD.    COPD:  She reports improved COPD management with current treatment regimen. She uses oxygen at night at 3 L and utilizes nebulizer daily. She takes Trelegy one puff daily and reports significant improvement in symptoms, specifically noting reduced wheezing. She was recently seen by pulmonologist Dr. Mims at [facility]. She denies current wheezing and reports feeling much better compared to previous medical status.    HYPERTENSION:  She reports continued adherence to blood pressure medication, taking it early morning at approximately 5:00 AM. She does not check blood pressure at home.    ALLERGIES:  She reports severe allergy symptoms. Previously prescribed Xyzal (Levocetirizine) caused significant drowsiness, which has been problematic. She expresses concern about medication side effects impacting daily functioning.    PROSTHETIC ISSUES:  She reports ongoing issues with her current prosthetic device. She describes a suction-based prosthetic that frequently loses suction, causing her concern about potential falling. She expresses desire to obtain a different prosthetic with a pendant attachment to improve stability. She has a new prosthetic at home but has not yet transitioned to using it. She verbalizes anxiety about the prosthetic potentially falling off during daily activities.      ROS:  General: -fever, -chills, -fatigue, -weight gain, -weight loss  Eyes: -vision changes, -redness, -discharge  ENT: -ear pain, -nasal congestion, -sore throat  Cardiovascular: -chest pain, -palpitations, -lower extremity edema  Respiratory: -cough, -shortness of breath  Gastrointestinal: -abdominal pain, -nausea,  -vomiting, -diarrhea, -constipation, -blood in stool  Genitourinary: -dysuria, -hematuria, -frequency  Musculoskeletal: -joint pain, -muscle pain  Skin: -rash, -lesion  Neurological: -headache, -dizziness, -numbness, -tingling  Psychiatric: -anxiety, +depression, -sleep difficulty  Allergic: +seasonal allergies          PAST MEDICAL HISTORY:     274}  Past Medical History:   Diagnosis Date    Anxiety     COPD (chronic obstructive pulmonary disease)     Depression     Encounter for long-term (current) use of high-risk medication     Fibromyalgia     Osteoporosis     Rheumatoid arthritis involving multiple sites with positive rheumatoid factor        PAST SURGICAL HISTORY:  Past Surgical History:   Procedure Laterality Date    ESOPHAGOGASTRODUODENOSCOPY N/A 4/5/2023    Procedure: EGD (ESOPHAGOGASTRODUODENOSCOPY);  Surgeon: Tyrell Cornelius MD;  Location: [facility] ENDO;  Service: General;  Laterality: N/A;    REPAIR OF RECURRENT INCARCERATED INCISIONAL HERNIA Left 4/13/2022    Procedure: REPAIR, HERNIA--RIGHT LATERAL POSITIONING;  Surgeon: Tyrell Cornelius MD;  Location: [facility] OR;  Service: General;  Laterality: Left;  left lumbar hernia    right leg       multiple surgeries ,amputation       SOCIAL HISTORY:  Social History[1]    FAMILY HISTORY:       No family history on file.    ALLERGIES AND MEDICATIONS: updated and reviewed.      274}  Review of patient's allergies indicates:   Allergen Reactions    Azithromycin Anaphylaxis    Penicillins      Medication List with Changes/Refills   New Medications    CETIRIZINE (ZYRTEC) 10 MG TABLET    Take 1 tablet (10 mg total) by mouth once daily.    HYDROCHLOROTHIAZIDE (HYDRODIURIL) 25 MG TABLET    Take 1 tablet (25 mg total) by mouth once daily.   Current Medications    ALBUTEROL (VENTOLIN HFA) 90 MCG/ACTUATION INHALER    Inhale 2 puffs into the lungs every 6 (six) hours as needed for Wheezing. Rescue    ALENDRONATE (FOSAMAX) 70 MG TABLET    Take 1 tablet (70 mg total)  by mouth every 7 days.    ATORVASTATIN (LIPITOR) 40 MG TABLET    Take 1 tablet (40 mg total) by mouth once daily.    ERGOCALCIFEROL (ERGOCALCIFEROL) 50,000 UNIT CAP    Take 1 capsule (50,000 Units total) by mouth every 7 days.    FERROUS SULFATE (FEOSOL) 325 MG (65 MG IRON) TAB TABLET    Take 1 tablet (325 mg total) by mouth daily with breakfast.    FLUTICASONE-UMECLIDIN-VILANTER (TRELEGY ELLIPTA) 200-62.5-25 MCG INHALER    Inhale 1 puff into the lungs once daily.    FOLIC ACID (FOLVITE) 1 MG TABLET    TAKE 1 TABLET(1 MG) BY MOUTH EVERY DAY    GUAIFENESIN (MUCINEX) 600 MG 12 HR TABLET    Take 2 tablets (1,200 mg total) by mouth 2 (two) times daily.    HYDROCODONE-ACETAMINOPHEN 10-325MG (NORCO)  MG TAB    Take 1 tablet by mouth every 6 (six) hours as needed.    LOSARTAN (COZAAR) 100 MG TABLET    Take 1 tablet (100 mg total) by mouth once daily.    METHOTREXATE 2.5 MG TAB    Take by mouth.    PANTOPRAZOLE (PROTONIX) 40 MG TABLET    Take 1 tablet (40 mg total) by mouth once daily.    SUCRALFATE (CARAFATE) 1 GRAM TABLET    Take 1 tablet (1 g total) by mouth 4 (four) times daily before meals and nightly.    TIZANIDINE (ZANAFLEX) 4 MG TABLET    Take 4 mg by mouth every 8 (eight) hours as needed.   Changed and/or Refilled Medications    Modified Medication Previous Medication    ALBUTEROL-IPRATROPIUM (DUO-NEB) 2.5 MG-0.5 MG/3 ML NEBULIZER SOLUTION albuterol-ipratropium (DUO-NEB) 2.5 mg-0.5 mg/3 mL nebulizer solution       Take 3 mLs by nebulization every 6 (six) hours as needed for Wheezing.    Take 3 mLs by nebulization every 6 (six) hours as needed for Wheezing.    AZELASTINE (ASTELIN) 137 MCG (0.1 %) NASAL SPRAY azelastine (ASTELIN) 137 mcg (0.1 %) nasal spray       1 spray (137 mcg total) by Nasal route 2 (two) times daily.    1 spray (137 mcg total) by Nasal route 2 (two) times daily.    FLUTICASONE PROPIONATE (FLONASE) 50 MCG/ACTUATION NASAL SPRAY fluticasone propionate (FLONASE) 50 mcg/actuation nasal spray     "   1 spray (50 mcg total) by Each Nostril route once daily.    1 spray (50 mcg total) by Each Nostril route once daily.   Discontinued Medications    BENZONATATE (TESSALON) 200 MG CAPSULE    Take 1 capsule (200 mg total) by mouth 3 (three) times daily as needed for Cough.    LEVOCETIRIZINE (XYZAL) 5 MG TABLET    Take 1 tablet (5 mg total) by mouth every evening.    LEVOFLOXACIN (LEVAQUIN) 500 MG TABLET    Take 1 tablet (500 mg total) by mouth once daily.       SCREENING HISTORY:    274}  Health Maintenance         Date Due Completion Date    TETANUS VACCINE Never done ---    Mammogram Never done ---    Shingles Vaccine (1 of 2) Never done ---    Colorectal Cancer Screening Never done ---    COVID-19 Vaccine (6 - 2024-25 season) 09/01/2024 10/29/2023    Influenza Vaccine (1) 09/01/2025 11/4/2024    LDCT Lung Screen 12/12/2025 12/12/2024    DEXA Scan 09/19/2026 9/19/2024    Lipid Panel 09/19/2029 9/19/2024            REVIEW OF SYSTEMS:   ROS    PHYSICAL EXAM:      274}  BP (!) 140/68   Pulse 66   Ht 4' 8" (1.422 m)   Wt 45.4 kg (100 lb)   SpO2 96%   BMI 22.42 kg/m²   Wt Readings from Last 3 Encounters:   07/22/25 45.4 kg (100 lb)   04/14/25 41.5 kg (91 lb 7.9 oz)   04/01/25 46 kg (101 lb 6.6 oz)     BP Readings from Last 3 Encounters:   07/22/25 (!) 140/68   04/16/25 (!) 140/65   04/01/25 136/68     Estimated body mass index is 22.42 kg/m² as calculated from the following:    Height as of this encounter: 4' 8" (1.422 m).    Weight as of this encounter: 45.4 kg (100 lb).     Physical Exam  HENT:      Head: Normocephalic and atraumatic.      Nose: Nose normal.      Mouth/Throat:      Mouth: Mucous membranes are dry.      Pharynx: Oropharynx is clear.   Eyes:      Extraocular Movements: Extraocular movements intact.      Conjunctiva/sclera: Conjunctivae normal.   Cardiovascular:      Rate and Rhythm: Normal rate and regular rhythm.   Pulmonary:      Effort: Pulmonary effort is normal.      Breath sounds: Normal " breath sounds.   Abdominal:      General: Bowel sounds are normal.      Palpations: Abdomen is soft.   Musculoskeletal:      Cervical back: Normal range of motion.      Comments: R bka   Skin:     General: Skin is warm.   Neurological:      General: No focal deficit present.      Mental Status: She is alert. Mental status is at baseline.   Psychiatric:         Mood and Affect: Mood normal.         Thought Content: Thought content normal.         LABS:   274}  I have reviewed old labs below:  Lab Results   Component Value Date    WBC 10.00 04/16/2025    HGB 10.1 (L) 04/16/2025    HCT 30.3 (L) 04/16/2025    MCV 99 (H) 04/16/2025     04/16/2025     04/16/2025    K 3.1 (L) 04/16/2025     04/16/2025    CALCIUM 8.3 (L) 04/16/2025    PHOS 1.4 (L) 04/16/2025    CO2 21 (L) 04/16/2025    GLU 82 04/16/2025    BUN 14 04/16/2025    CREATININE 0.5 04/16/2025    ANIONGAP 9 04/16/2025    ESTGFRAFRICA >60.0 05/20/2022    EGFRNONAA >60.0 05/20/2022    PROT 5.7 (L) 04/16/2025    ALBUMIN 2.0 (L) 04/16/2025    BILITOT 0.5 04/16/2025    ALKPHOS 141 04/16/2025    ALT 45 (H) 04/16/2025    AST 38 04/16/2025    INR 1.0 03/29/2025    CHOL 251 (H) 09/19/2024    TRIG 104 09/19/2024    HDL 76 (H) 09/19/2024    LDLCALC 154.2 09/19/2024    TSH 0.429 09/19/2024    HGBA1C 5.4 09/19/2024       ASSESSMENT AND PLAN:  274}  Assessment & Plan    IMPRESSION:  Assessed COPD management, noting improvement in breathing and reduced nebulizer use.  Evaluated current oxygen therapy regimen (3 L at night).  Considered recent hospitalizations for COPD exacerbations.  Cardiovascular status stable.  Evaluated allergy symptoms and current treatment efficacy.  Noted concern about low potassium levels from recent hospitalization.  BP elevated on current reading.  Assessed fall risk and prosthetic use.    CHRONIC OBSTRUCTIVE PULMONARY DISEASE (COPD):  - Ms. Tennille was hospitalized a few months ago for COPD but breathing has improved with no  wheezing.  - Continue Trelegy 1 puff daily and current nebulizer medication.  - Monitoring oxygen usage at night.  - Referred to pulmonologist Dr. Severiano Mims for follow-up and ongoing COPD management.    HYPERTENSION:  - Blood pressure slightly elevated today.  - Will recheck before patient's departure.  - Ordered lab work to recheck potassium levels.    ALLERGIC RHINITIS:  - Ms. Null experiencing severe allergy symptoms recently.  - Prescribed Flonase nasal spray daily.  - Continue Benadryl as requested by patient.  - Discontinued Xyzal (levocetirizine) due to excessive drowsiness.    PROSTHETIC LEG:  - Noted suction issues with patient's prosthetic leg.    OXYGEN DEPENDENCE:  - Ms. Null using 3 L of oxygen at home and in the hospital.    ADVERSE DRUG EFFECT:  - Discontinued Xyzal (levocetirizine) due to patient reporting excessive somnolence.    PREVENTIVE CARE:  - Discussed importance of colon cancer screening and provided instructions for stool sample collection using the kit at home.  - Educated on importance of breast cancer screening and ordered mammogram.    FOLLOW-UP:  - Schedule follow-up visit in a few months.  - Ms. Null advised to contact office if any issues arise before next scheduled appointment.        1. Encounter for screening mammogram for malignant neoplasm of breast  - Mammo Digital Screening Bilat w/ Torin (XPD); Future  - TSH; Future    2. Encounter for screening for malignant neoplasm of colon  - Fecal Immunochemical Test (iFOBT); Future  - TSH; Future    3. Chronic obstructive pulmonary disease, unspecified COPD type  - albuterol-ipratropium (DUO-NEB) 2.5 mg-0.5 mg/3 mL nebulizer solution; Take 3 mLs by nebulization every 6 (six) hours as needed for Wheezing.  Dispense: 1080 mL; Refill: 3  - TSH; Future    4. Hypokalemia  - Comprehensive Metabolic Panel; Future  - CBC Auto Differential; Future  - TSH; Future    5. Bacterial infection  - fluticasone propionate (FLONASE) 50  mcg/actuation nasal spray; 1 spray (50 mcg total) by Each Nostril route once daily.  Dispense: 18.2 mL; Refill: 0  - TSH; Future  - azelastine (ASTELIN) 137 mcg (0.1 %) nasal spray; 1 spray (137 mcg total) by Nasal route 2 (two) times daily.  Dispense: 18.2 mL; Refill: 3    6. Allergy, initial encounter  - cetirizine (ZYRTEC) 10 MG tablet; Take 1 tablet (10 mg total) by mouth once daily.  Dispense: 30 tablet; Refill: 11  - TSH; Future    7. Essential hypertension  - TSH; Future    8. Hyperlipidemia due to dietary fat intake  - Lipid Panel; Future  - TSH; Future    9. Vitamin D deficiency disease  - Vitamin D 25-Hydroxy; Future    10. Abnormal blood chemistry  - Hemoglobin A1C; Future    11. Benign essential HTN  - hydroCHLOROthiazide (HYDRODIURIL) 25 MG tablet; Take 1 tablet (25 mg total) by mouth once daily.  Dispense: 30 tablet; Refill: 11         Orders Placed This Encounter   Procedures    Mammo Digital Screening Bilat w/ Torin (XPD)    Fecal Immunochemical Test (iFOBT)    Comprehensive Metabolic Panel    CBC Auto Differential    Lipid Panel    TSH    Vitamin D 25-Hydroxy    Hemoglobin A1C       Follow up in about 3 months (around 10/22/2025). or sooner as needed.    This note was generated with the assistance of ambient listening technology. Verbal consent was obtained by the patient and accompanying visitor(s) for the recording of patient appointment to facilitate this note. I attest to having reviewed and edited the generated note for accuracy, though some syntax or spelling errors may persist. Please contact the author of this note for any clarification.            [1]   Social History  Socioeconomic History    Marital status:    Tobacco Use    Smoking status: Every Day     Current packs/day: 0.00     Average packs/day: 1 pack/day for 61.0 years (61.0 ttl pk-yrs)     Types: Cigarettes     Start date:      Last attempt to quit: 2023     Years since quittin.2    Smokeless tobacco: Never    Substance and Sexual Activity    Alcohol use: No    Drug use: Never    Sexual activity: Not Currently     Social Drivers of Health     Financial Resource Strain: Low Risk  (4/14/2025)    Overall Financial Resource Strain (CARDIA)     Difficulty of Paying Living Expenses: Not very hard   Food Insecurity: No Food Insecurity (4/14/2025)    Hunger Vital Sign     Worried About Running Out of Food in the Last Year: Never true     Ran Out of Food in the Last Year: Never true   Transportation Needs: No Transportation Needs (4/14/2025)    PRAPARE - Transportation     Lack of Transportation (Medical): No     Lack of Transportation (Non-Medical): No   Physical Activity: Inactive (3/11/2025)    Exercise Vital Sign     Days of Exercise per Week: 0 days     Minutes of Exercise per Session: 0 min   Stress: No Stress Concern Present (4/14/2025)    [employer] [place] [employer] - Occupational Stress Questionnaire     Feeling of Stress : Not at all   Recent Concern: Stress - Stress Concern Present (3/11/2025)    [employer] [place] [employer] - Occupational Stress Questionnaire     Feeling of Stress : To some extent   Housing Stability: Low Risk  (4/14/2025)    Housing Stability Vital Sign     Unable to Pay for Housing in the Last Year: No     Number of Times Moved in the Last Year: 0     Homeless in the Last Year: No

## 2025-07-23 ENCOUNTER — TELEPHONE (OUTPATIENT)
Dept: FAMILY MEDICINE | Facility: CLINIC | Age: 70
End: 2025-07-23
Payer: MEDICARE

## 2025-07-23 DIAGNOSIS — E78.49 HYPERLIPIDEMIA DUE TO DIETARY FAT INTAKE: ICD-10-CM

## 2025-07-23 DIAGNOSIS — K29.70 GASTRITIS, PRESENCE OF BLEEDING UNSPECIFIED, UNSPECIFIED CHRONICITY, UNSPECIFIED GASTRITIS TYPE: ICD-10-CM

## 2025-07-23 DIAGNOSIS — E55.9 VITAMIN D DEFICIENCY DISEASE: ICD-10-CM

## 2025-07-23 DIAGNOSIS — J44.9 CHRONIC OBSTRUCTIVE PULMONARY DISEASE, UNSPECIFIED COPD TYPE: ICD-10-CM

## 2025-07-23 DIAGNOSIS — I10 ESSENTIAL HYPERTENSION: ICD-10-CM

## 2025-07-23 RX ORDER — ATORVASTATIN CALCIUM 40 MG/1
40 TABLET, FILM COATED ORAL DAILY
Qty: 90 TABLET | Refills: 0 | Status: SHIPPED | OUTPATIENT
Start: 2025-07-23

## 2025-07-23 RX ORDER — PANTOPRAZOLE SODIUM 40 MG/1
40 TABLET, DELAYED RELEASE ORAL DAILY
Qty: 90 TABLET | Refills: 3 | Status: SHIPPED | OUTPATIENT
Start: 2025-07-23

## 2025-07-23 RX ORDER — IPRATROPIUM BROMIDE AND ALBUTEROL SULFATE 2.5; .5 MG/3ML; MG/3ML
3 SOLUTION RESPIRATORY (INHALATION) EVERY 6 HOURS PRN
Qty: 1080 ML | Refills: 3 | OUTPATIENT
Start: 2025-07-23

## 2025-07-23 RX ORDER — ALBUTEROL SULFATE 90 UG/1
2 INHALANT RESPIRATORY (INHALATION) EVERY 6 HOURS PRN
Qty: 25.5 G | Refills: 3 | Status: SHIPPED | OUTPATIENT
Start: 2025-07-23

## 2025-07-23 NOTE — TELEPHONE ENCOUNTER
Copied from CRM #[id number]. Topic: Medications - Medication Refill  >> Jul 23, 2025  3:37 PM Roxanna wrote:  Patient was advised to send a list of meds needing a refill    atorvastatin (LIPITOR) 40 MG tablet    sucralfate (CARAFATE) 1 gram tablet    fluticasone-umeclidin-vilanter (TRELEGY ELLIPTA) 200-62.5-25 mcg inhaler    albuterol-ipratropium (DUO-NEB) 2.5 mg-0.5 mg/3 mL nebulizer solution    albuterol (VENTOLIN HFA) 90 mcg/actuation inhaler    Vitamin D 2    losartan (COZAAR) 100 MG tablet    pantoprazole (PROTONIX) 40 MG tablet.phar        [id number] - [place] [address] AT [facility] [address]  [address] [place] [address]  Phone: [phone] Fax: [phone]    Please call to advise [phone]

## 2025-07-23 NOTE — TELEPHONE ENCOUNTER
Care Due:                  Date            Visit Type   Department     Provider  --------------------------------------------------------------------------------                                EP -                              PRIMARY      [facility] 2ND FLOOR  Last Visit: 07-      CARE ([facility])   [facility]  [place]                              EP -                              PRIMARY      [facility] 2ND FLOOR  Next Visit: 10-      CARE ([facility])   [facility]                                                            Last  Test          Frequency    Reason                     Performed    Due Date  --------------------------------------------------------------------------------    Lipid Panel.  12 months..  atorvastatin.............  09- 09-    Vitamin D...  12 months..  ergocalciferol...........  09-   09-    [employer] Embedded Care Due Messages. Reference number: [id number].   7/23/2025 3:45:25 PM CDT

## 2025-07-24 ENCOUNTER — PATIENT MESSAGE (OUTPATIENT)
Dept: ADMINISTRATIVE | Facility: HOSPITAL | Age: 70
End: 2025-07-24
Payer: MEDICARE

## 2025-07-24 RX ORDER — SUCRALFATE 1 G/1
1 TABLET ORAL
Qty: 360 TABLET | Refills: 2 | Status: SHIPPED | OUTPATIENT
Start: 2025-07-24

## 2025-07-24 RX ORDER — ERGOCALCIFEROL 1.25 MG/1
50000 CAPSULE ORAL
Qty: 12 CAPSULE | Refills: 3 | Status: SHIPPED | OUTPATIENT
Start: 2025-07-24

## 2025-07-24 RX ORDER — LOSARTAN POTASSIUM 100 MG/1
100 TABLET ORAL DAILY
Qty: 90 TABLET | Refills: 2 | Status: SHIPPED | OUTPATIENT
Start: 2025-07-24

## 2025-07-24 NOTE — TELEPHONE ENCOUNTER
Refill Routing Note   Medication(s) are not appropriate for processing by [facility] for the following reason(s):        Drug-disease interaction  Required vitals abnormal  Outside of protocol    [facility] action(s):  Approve  Defer  Route      Medication Therapy Plan: Drug-Disease: fluticasone-umeclidin-vilanter and COPD exacerbation;FOVS/FLOS    Pharmacist review requested: Yes     Appointments  past 12m or future 3m with PCP    Date Provider   Last Visit   7/22/2025 Josefina Durand MD   Next Visit   10/21/2025 Josefina Durand MD   ED visits in past 90 days: 0        Note composed:10:26 PM 07/23/2025

## 2025-07-24 NOTE — TELEPHONE ENCOUNTER
Refill Routing Note   Medication(s) are not appropriate for processing by [facility] for the following reason(s):        Drug-disease interaction  Required vitals abnormal  Outside of protocol    [facility] action(s):  Approve  Defer  Route           Pharmacist review requested: Yes     Appointments  past 12m or future 3m with PCP    Date Provider   Last Visit   7/22/2025 Josefina Durand MD   Next Visit   10/21/2025 Josefina Durand MD   ED visits in past 90 days: 0        Note composed:10:44 AM 07/24/2025

## 2025-07-30 ENCOUNTER — TELEPHONE (OUTPATIENT)
Dept: FAMILY MEDICINE | Facility: CLINIC | Age: 70
End: 2025-07-30
Payer: MEDICARE

## 2025-07-31 ENCOUNTER — PATIENT OUTREACH (OUTPATIENT)
Dept: ADMINISTRATIVE | Facility: HOSPITAL | Age: 70
End: 2025-07-31
Payer: MEDICARE

## 2025-07-31 NOTE — PROGRESS NOTES
Population Health Chart Review & Patient Outreach Details      Additional [facility] Health Notes:               Updates Requested / Reviewed:      Updated Care Coordination Note         Health Maintenance Topics Overdue:      VBHM Score: 3     Colon Cancer Screening  Mammogram  Uncontrolled BP    Tetanus Vaccine  Shingles/Zoster Vaccine                  Health Maintenance Topic(s) Outreach Outcomes & Actions Taken:    Colorectal Cancer Screening - Outreach Outcomes & Actions Taken  : Unable to leave a VM for pt to call back regarding pt's overdue Colon Cancer screening

## 2025-08-04 ENCOUNTER — TELEPHONE (OUTPATIENT)
Dept: FAMILY MEDICINE | Facility: CLINIC | Age: 70
End: 2025-08-04
Payer: MEDICARE

## 2025-08-04 ENCOUNTER — PATIENT MESSAGE (OUTPATIENT)
Dept: FAMILY MEDICINE | Facility: CLINIC | Age: 70
End: 2025-08-04
Payer: MEDICARE

## 2025-08-04 NOTE — TELEPHONE ENCOUNTER
Spoke with pt spouse, advised pt does not need to come to office tomorrow, nurse visit will be canceled as it is a reminder for staff to reach out to pt to mail off fit kit. Pt spouse verbalized understanding.

## 2025-08-04 NOTE — TELEPHONE ENCOUNTER
LVM for pt regarding fit kit follow up. [employer] messages sent as well.

## 2025-08-12 ENCOUNTER — LAB VISIT (OUTPATIENT)
Dept: LAB | Facility: HOSPITAL | Age: 70
End: 2025-08-12
Payer: MEDICARE

## 2025-08-12 DIAGNOSIS — Z12.11 ENCOUNTER FOR SCREENING FOR MALIGNANT NEOPLASM OF COLON: ICD-10-CM

## 2025-08-12 PROCEDURE — 82274 ASSAY TEST FOR BLOOD FECAL: CPT

## 2025-08-13 ENCOUNTER — PATIENT MESSAGE (OUTPATIENT)
Dept: ADMINISTRATIVE | Facility: HOSPITAL | Age: 70
End: 2025-08-13
Payer: MEDICARE

## 2025-08-13 LAB — OB PNL STL IA: POSITIVE

## 2025-08-18 ENCOUNTER — PATIENT MESSAGE (OUTPATIENT)
Dept: FAMILY MEDICINE | Facility: CLINIC | Age: 70
End: 2025-08-18
Payer: MEDICARE

## (undated) DEVICE — CANISTER SUCTION 3000CC

## (undated) DEVICE — ADHESIVE DERMABOND ADVANCED

## (undated) DEVICE — SEE MEDLINE ITEM 156964

## (undated) DEVICE — SUT MONOCRYL 4-0 PS-2

## (undated) DEVICE — BINDER ABDOMINAL 8IN ADJUST

## (undated) DEVICE — ELECTRODE REM PLYHSV RETURN 9

## (undated) DEVICE — GLOVE SURGEONS ULTRA TOUCH 6.5

## (undated) DEVICE — SUT CTD VICRYL 3-0 CR/SH

## (undated) DEVICE — GOWN B1 X-LG X-LONG

## (undated) DEVICE — DRESSING WND ADH PRIMAPORE 10

## (undated) DEVICE — BLADE EZ CLEAN 2.5IN MODIFIED

## (undated) DEVICE — SUT ETHIBOND 0 CR/CT-1 8-18

## (undated) DEVICE — GLOVE SURG ULTRA TOUCH 7.5

## (undated) DEVICE — STAPLER SKIN ROTATING HEAD

## (undated) DEVICE — SOL WATER STRL IRR 1000ML

## (undated) DEVICE — GLOVE SURG ULTRA TOUCH 7

## (undated) DEVICE — DRAPE INCISE IOBAN 2 23X17IN

## (undated) DEVICE — SLEEVE SCD EXPRESS CALF MEDIUM

## (undated) DEVICE — UNDERGLOVES BIOGEL PI SZ 7 LF

## (undated) DEVICE — SOL 9P NACL IRR PIC IL

## (undated) DEVICE — LABEL FOR UTILITY MARKER

## (undated) DEVICE — SLEEVE SCD EXPRESS CALF LARGE

## (undated) DEVICE — BITE BLOCK ADULT LATEX FREE

## (undated) DEVICE — COVER TABLE BACK 44IN X 90IN

## (undated) DEVICE — SEE MEDLINE ITEM 157116

## (undated) DEVICE — SPONGE LAP 18X18 PREWASHED

## (undated) DEVICE — VOYANT OPEN FUSION DEVICE

## (undated) DEVICE — FORCEP BIOSY RJ 4 HOT 2.2X240

## (undated) DEVICE — KIT ENDO CARRY-ON PROC 100310

## (undated) DEVICE — KIT CANIST SUCTION 1200CC